# Patient Record
Sex: FEMALE | Race: WHITE | NOT HISPANIC OR LATINO | Employment: OTHER | URBAN - METROPOLITAN AREA
[De-identification: names, ages, dates, MRNs, and addresses within clinical notes are randomized per-mention and may not be internally consistent; named-entity substitution may affect disease eponyms.]

---

## 2023-09-13 DIAGNOSIS — C90.00 MULTIPLE MYELOMA NOT HAVING ACHIEVED REMISSION (MULTI): Primary | ICD-10-CM

## 2023-09-13 PROBLEM — I10 HYPERTENSION: Status: ACTIVE | Noted: 2023-09-13

## 2023-09-13 PROBLEM — E78.5 DYSLIPIDEMIA: Status: ACTIVE | Noted: 2023-09-13

## 2023-09-13 RX ORDER — ALBUTEROL SULFATE 0.83 MG/ML
3 SOLUTION RESPIRATORY (INHALATION) AS NEEDED
Status: CANCELLED | OUTPATIENT
Start: 2023-10-02

## 2023-09-13 RX ORDER — DIPHENHYDRAMINE HYDROCHLORIDE 50 MG/ML
50 INJECTION INTRAMUSCULAR; INTRAVENOUS AS NEEDED
Status: CANCELLED | OUTPATIENT
Start: 2023-10-30

## 2023-09-13 RX ORDER — DIPHENHYDRAMINE HYDROCHLORIDE 50 MG/ML
50 INJECTION INTRAMUSCULAR; INTRAVENOUS AS NEEDED
Status: CANCELLED | OUTPATIENT
Start: 2023-10-02

## 2023-09-13 RX ORDER — ACETAMINOPHEN 325 MG/1
650 TABLET ORAL ONCE
Status: CANCELLED | OUTPATIENT
Start: 2023-10-02

## 2023-09-13 RX ORDER — MONTELUKAST SODIUM 10 MG/1
10 TABLET ORAL ONCE
Status: CANCELLED | OUTPATIENT
Start: 2023-10-30

## 2023-09-13 RX ORDER — FAMOTIDINE 10 MG/ML
20 INJECTION INTRAVENOUS ONCE AS NEEDED
Status: CANCELLED | OUTPATIENT
Start: 2023-10-02

## 2023-09-13 RX ORDER — HEPARIN SODIUM,PORCINE/PF 10 UNIT/ML
50 SYRINGE (ML) INTRAVENOUS AS NEEDED
Status: CANCELLED | OUTPATIENT
Start: 2023-10-02

## 2023-09-13 RX ORDER — MONTELUKAST SODIUM 10 MG/1
10 TABLET ORAL ONCE
Status: CANCELLED | OUTPATIENT
Start: 2023-10-02

## 2023-09-13 RX ORDER — DIPHENHYDRAMINE HCL 50 MG
50 CAPSULE ORAL ONCE
Status: CANCELLED | OUTPATIENT
Start: 2023-10-30

## 2023-09-13 RX ORDER — EPINEPHRINE 0.3 MG/.3ML
0.3 INJECTION SUBCUTANEOUS EVERY 5 MIN PRN
Status: CANCELLED | OUTPATIENT
Start: 2023-10-30

## 2023-09-13 RX ORDER — FAMOTIDINE 10 MG/ML
20 INJECTION INTRAVENOUS ONCE AS NEEDED
Status: CANCELLED | OUTPATIENT
Start: 2023-10-30

## 2023-09-13 RX ORDER — ALBUTEROL SULFATE 0.83 MG/ML
3 SOLUTION RESPIRATORY (INHALATION) AS NEEDED
Status: CANCELLED | OUTPATIENT
Start: 2023-10-30

## 2023-09-13 RX ORDER — EPINEPHRINE 0.3 MG/.3ML
0.3 INJECTION SUBCUTANEOUS EVERY 5 MIN PRN
Status: CANCELLED | OUTPATIENT
Start: 2023-10-02

## 2023-09-13 RX ORDER — DIPHENHYDRAMINE HCL 50 MG
50 CAPSULE ORAL ONCE
Status: CANCELLED | OUTPATIENT
Start: 2023-10-02

## 2023-09-13 RX ORDER — HEPARIN 100 UNIT/ML
500 SYRINGE INTRAVENOUS AS NEEDED
Status: CANCELLED | OUTPATIENT
Start: 2023-10-02

## 2023-09-13 RX ORDER — ACETAMINOPHEN 325 MG/1
650 TABLET ORAL ONCE
Status: CANCELLED | OUTPATIENT
Start: 2023-10-30

## 2023-09-21 DIAGNOSIS — C90.00 MULTIPLE MYELOMA NOT HAVING ACHIEVED REMISSION (MULTI): Primary | ICD-10-CM

## 2023-09-26 PROBLEM — M89.9 LESION OF PELVIC BONE: Status: ACTIVE | Noted: 2023-09-26

## 2023-09-26 PROBLEM — R20.2 PARESTHESIA OF SKIN: Status: ACTIVE | Noted: 2023-09-26

## 2023-09-26 PROBLEM — D64.9 ANEMIA: Status: ACTIVE | Noted: 2023-09-26

## 2023-09-26 PROBLEM — H91.93 HEARING DIFFICULTY OF BOTH EARS: Status: ACTIVE | Noted: 2023-09-26

## 2023-09-26 PROBLEM — E87.6 HYPOKALEMIA: Status: ACTIVE | Noted: 2023-09-26

## 2023-09-26 PROBLEM — R20.2 PARESTHESIA OF BOTH HANDS: Status: ACTIVE | Noted: 2023-09-26

## 2023-09-26 PROBLEM — M17.12 OSTEOARTHRITIS OF LEFT KNEE: Status: ACTIVE | Noted: 2023-09-26

## 2023-09-26 PROBLEM — D50.9 IRON DEFICIENCY ANEMIA: Status: ACTIVE | Noted: 2023-09-26

## 2023-09-26 PROBLEM — M85.80 OSTEOPENIA: Status: ACTIVE | Noted: 2023-09-26

## 2023-09-26 PROBLEM — E78.5 HYPERLIPIDEMIA, UNSPECIFIED: Status: ACTIVE | Noted: 2023-09-26

## 2023-09-26 PROBLEM — R63.0 ANOREXIA: Status: ACTIVE | Noted: 2023-09-26

## 2023-09-26 PROBLEM — G56.03 BILATERAL CARPAL TUNNEL SYNDROME: Status: ACTIVE | Noted: 2023-09-26

## 2023-09-26 PROBLEM — S52.539A CLOSED COLLES' FRACTURE: Status: ACTIVE | Noted: 2023-09-26

## 2023-09-26 PROBLEM — M47.816 LUMBAR SPONDYLOSIS: Status: ACTIVE | Noted: 2023-09-26

## 2023-09-26 PROBLEM — J98.11 ATELECTASIS: Status: ACTIVE | Noted: 2023-09-26

## 2023-09-26 PROBLEM — M53.3 CHRONIC RIGHT SI JOINT PAIN: Status: ACTIVE | Noted: 2023-09-26

## 2023-09-26 PROBLEM — E83.52 HYPERCALCEMIA: Status: ACTIVE | Noted: 2023-09-26

## 2023-09-26 PROBLEM — E55.9 VITAMIN D DEFICIENCY: Status: ACTIVE | Noted: 2023-09-26

## 2023-09-26 PROBLEM — G89.29 CHRONIC RIGHT SI JOINT PAIN: Status: ACTIVE | Noted: 2023-09-26

## 2023-09-26 PROBLEM — K29.70 GASTRITIS DETERMINED BY BIOPSY: Status: ACTIVE | Noted: 2023-09-26

## 2023-09-26 PROBLEM — M35.3 POLYMYALGIA RHEUMATICA (MULTI): Status: ACTIVE | Noted: 2023-09-26

## 2023-09-26 PROBLEM — G62.9 NEUROPATHY: Status: ACTIVE | Noted: 2023-09-26

## 2023-09-26 RX ORDER — ONDANSETRON 4 MG/1
4 TABLET, FILM COATED ORAL EVERY 6 HOURS PRN
COMMUNITY
Start: 2023-02-06 | End: 2023-10-02 | Stop reason: ALTCHOICE

## 2023-09-26 RX ORDER — ATORVASTATIN CALCIUM 20 MG/1
1 TABLET, FILM COATED ORAL DAILY
COMMUNITY
End: 2024-02-14 | Stop reason: SDUPTHER

## 2023-09-26 RX ORDER — ACYCLOVIR 200 MG/1
1 CAPSULE ORAL 2 TIMES DAILY
COMMUNITY
End: 2023-10-02 | Stop reason: ENTERED-IN-ERROR

## 2023-09-26 RX ORDER — FLUOROURACIL 50 MG/G
CREAM TOPICAL 2 TIMES DAILY
COMMUNITY
Start: 2022-06-07 | End: 2023-10-02 | Stop reason: ALTCHOICE

## 2023-09-26 RX ORDER — FUROSEMIDE 20 MG/1
1 TABLET ORAL DAILY
COMMUNITY
Start: 2023-08-08 | End: 2023-10-02 | Stop reason: ALTCHOICE

## 2023-09-26 RX ORDER — ACYCLOVIR 400 MG/1
1 TABLET ORAL 2 TIMES DAILY
COMMUNITY
Start: 2023-09-11 | End: 2023-10-02 | Stop reason: SDUPTHER

## 2023-09-26 RX ORDER — AMLODIPINE BESYLATE 5 MG/1
1 TABLET ORAL DAILY
COMMUNITY
End: 2023-10-02 | Stop reason: SDUPTHER

## 2023-09-26 RX ORDER — ACETAMINOPHEN 325 MG/1
2 TABLET ORAL EVERY 4 HOURS PRN
COMMUNITY

## 2023-10-02 ENCOUNTER — LAB (OUTPATIENT)
Dept: LAB | Facility: HOSPITAL | Age: 83
End: 2023-10-02
Payer: MEDICARE

## 2023-10-02 ENCOUNTER — OFFICE VISIT (OUTPATIENT)
Dept: HEMATOLOGY/ONCOLOGY | Facility: HOSPITAL | Age: 83
End: 2023-10-02
Payer: MEDICARE

## 2023-10-02 ENCOUNTER — INFUSION (OUTPATIENT)
Dept: HEMATOLOGY/ONCOLOGY | Facility: HOSPITAL | Age: 83
End: 2023-10-02
Payer: MEDICARE

## 2023-10-02 VITALS
OXYGEN SATURATION: 100 % | WEIGHT: 151.9 LBS | HEART RATE: 87 BPM | SYSTOLIC BLOOD PRESSURE: 134 MMHG | BODY MASS INDEX: 28.35 KG/M2 | DIASTOLIC BLOOD PRESSURE: 64 MMHG | RESPIRATION RATE: 17 BRPM | TEMPERATURE: 97 F

## 2023-10-02 DIAGNOSIS — T45.1X5A IMMUNOSUPPRESSED DUE TO CHEMOTHERAPY (MULTI): ICD-10-CM

## 2023-10-02 DIAGNOSIS — E04.1 THYROID NODULE: ICD-10-CM

## 2023-10-02 DIAGNOSIS — C90.00 MULTIPLE MYELOMA NOT HAVING ACHIEVED REMISSION (MULTI): ICD-10-CM

## 2023-10-02 DIAGNOSIS — Z79.899 IMMUNOSUPPRESSED DUE TO CHEMOTHERAPY (MULTI): ICD-10-CM

## 2023-10-02 DIAGNOSIS — I10 PRIMARY HYPERTENSION: Primary | ICD-10-CM

## 2023-10-02 DIAGNOSIS — D84.821 IMMUNOSUPPRESSED DUE TO CHEMOTHERAPY (MULTI): ICD-10-CM

## 2023-10-02 LAB
ALBUMIN SERPL BCP-MCNC: 3.8 G/DL (ref 3.4–5)
ALP SERPL-CCNC: 53 U/L (ref 33–136)
ALT SERPL W P-5'-P-CCNC: 11 U/L (ref 7–45)
ANION GAP SERPL CALC-SCNC: 12 MMOL/L (ref 10–20)
AST SERPL W P-5'-P-CCNC: 15 U/L (ref 9–39)
BASOPHILS # BLD AUTO: 0.06 X10*3/UL (ref 0–0.1)
BASOPHILS NFR BLD AUTO: 2 %
BILIRUB SERPL-MCNC: 0.4 MG/DL (ref 0–1.2)
BUN SERPL-MCNC: 25 MG/DL (ref 6–23)
CALCIUM SERPL-MCNC: 8.9 MG/DL (ref 8.6–10.3)
CHLORIDE SERPL-SCNC: 108 MMOL/L (ref 98–107)
CO2 SERPL-SCNC: 28 MMOL/L (ref 21–32)
CREAT SERPL-MCNC: 1.1 MG/DL (ref 0.5–1.05)
EOSINOPHIL # BLD AUTO: 0.09 X10*3/UL (ref 0–0.4)
EOSINOPHIL NFR BLD AUTO: 3 %
ERYTHROCYTE [DISTWIDTH] IN BLOOD BY AUTOMATED COUNT: 14.6 % (ref 11.5–14.5)
GFR SERPL CREATININE-BSD FRML MDRD: 51 ML/MIN/1.73M*2
GLUCOSE SERPL-MCNC: 146 MG/DL (ref 74–99)
HBV CORE AB SER QL: NONREACTIVE
HBV SURFACE AB SER-ACNC: <3.1 MIU/ML
HBV SURFACE AG SERPL QL IA: NONREACTIVE
HCT VFR BLD AUTO: 28.9 % (ref 36–46)
HGB BLD-MCNC: 9.4 G/DL (ref 12–16)
IMM GRANULOCYTES # BLD AUTO: 0.01 X10*3/UL (ref 0–0.5)
IMM GRANULOCYTES NFR BLD AUTO: 0.3 % (ref 0–0.9)
LYMPHOCYTES # BLD AUTO: 0.64 X10*3/UL (ref 0.8–3)
LYMPHOCYTES NFR BLD AUTO: 21.4 %
MAGNESIUM SERPL-MCNC: 2.03 MG/DL (ref 1.6–2.4)
MCH RBC QN AUTO: 31.8 PG (ref 26–34)
MCHC RBC AUTO-ENTMCNC: 32.5 G/DL (ref 32–36)
MCV RBC AUTO: 98 FL (ref 80–100)
MONOCYTES # BLD AUTO: 0.32 X10*3/UL (ref 0.05–0.8)
MONOCYTES NFR BLD AUTO: 10.7 %
NEUTROPHILS # BLD AUTO: 1.87 X10*3/UL (ref 1.6–5.5)
NEUTROPHILS NFR BLD AUTO: 62.6 %
NRBC BLD-RTO: 0 /100 WBCS (ref 0–0)
PHOSPHATE SERPL-MCNC: 3.7 MG/DL (ref 2.5–4.9)
PLATELET # BLD AUTO: 219 X10*3/UL (ref 150–450)
PMV BLD AUTO: 9.1 FL (ref 7.5–11.5)
POTASSIUM SERPL-SCNC: 4.2 MMOL/L (ref 3.5–5.3)
PROT SERPL-MCNC: 5.2 G/DL (ref 6.4–8.2)
RBC # BLD AUTO: 2.96 X10*6/UL (ref 4–5.2)
SODIUM SERPL-SCNC: 144 MMOL/L (ref 136–145)
WBC # BLD AUTO: 3 X10*3/UL (ref 4.4–11.3)

## 2023-10-02 PROCEDURE — 3078F DIAST BP <80 MM HG: CPT | Performed by: PHYSICIAN ASSISTANT

## 2023-10-02 PROCEDURE — 86706 HEP B SURFACE ANTIBODY: CPT

## 2023-10-02 PROCEDURE — 1160F RVW MEDS BY RX/DR IN RCRD: CPT | Performed by: PHYSICIAN ASSISTANT

## 2023-10-02 PROCEDURE — 2500000004 HC RX 250 GENERAL PHARMACY W/ HCPCS (ALT 636 FOR OP/ED): Performed by: INTERNAL MEDICINE

## 2023-10-02 PROCEDURE — 86704 HEP B CORE ANTIBODY TOTAL: CPT

## 2023-10-02 PROCEDURE — 99214 OFFICE O/P EST MOD 30 MIN: CPT | Performed by: PHYSICIAN ASSISTANT

## 2023-10-02 PROCEDURE — 96402 CHEMO HORMON ANTINEOPL SQ/IM: CPT

## 2023-10-02 PROCEDURE — 84100 ASSAY OF PHOSPHORUS: CPT

## 2023-10-02 PROCEDURE — 83521 IG LIGHT CHAINS FREE EACH: CPT

## 2023-10-02 PROCEDURE — 1036F TOBACCO NON-USER: CPT | Performed by: PHYSICIAN ASSISTANT

## 2023-10-02 PROCEDURE — 3075F SYST BP GE 130 - 139MM HG: CPT | Performed by: PHYSICIAN ASSISTANT

## 2023-10-02 PROCEDURE — 99214 OFFICE O/P EST MOD 30 MIN: CPT | Mod: 59 | Performed by: PHYSICIAN ASSISTANT

## 2023-10-02 PROCEDURE — 1159F MED LIST DOCD IN RCRD: CPT | Performed by: PHYSICIAN ASSISTANT

## 2023-10-02 PROCEDURE — 36415 COLL VENOUS BLD VENIPUNCTURE: CPT

## 2023-10-02 PROCEDURE — 87340 HEPATITIS B SURFACE AG IA: CPT

## 2023-10-02 PROCEDURE — 85025 COMPLETE CBC W/AUTO DIFF WBC: CPT

## 2023-10-02 PROCEDURE — 2500000001 HC RX 250 WO HCPCS SELF ADMINISTERED DRUGS (ALT 637 FOR MEDICARE OP): Performed by: INTERNAL MEDICINE

## 2023-10-02 PROCEDURE — 80053 COMPREHEN METABOLIC PANEL: CPT

## 2023-10-02 PROCEDURE — 83735 ASSAY OF MAGNESIUM: CPT

## 2023-10-02 PROCEDURE — 1126F AMNT PAIN NOTED NONE PRSNT: CPT | Performed by: PHYSICIAN ASSISTANT

## 2023-10-02 RX ORDER — MONTELUKAST SODIUM 10 MG/1
10 TABLET ORAL ONCE
Status: COMPLETED | OUTPATIENT
Start: 2023-10-02 | End: 2023-10-02

## 2023-10-02 RX ORDER — EPINEPHRINE 0.3 MG/.3ML
0.3 INJECTION SUBCUTANEOUS EVERY 5 MIN PRN
Status: DISCONTINUED | OUTPATIENT
Start: 2023-10-02 | End: 2023-10-02 | Stop reason: HOSPADM

## 2023-10-02 RX ORDER — EPINEPHRINE 0.3 MG/.3ML
0.3 INJECTION SUBCUTANEOUS EVERY 5 MIN PRN
Status: CANCELLED | OUTPATIENT
Start: 2023-12-25

## 2023-10-02 RX ORDER — ALBUTEROL SULFATE 0.83 MG/ML
3 SOLUTION RESPIRATORY (INHALATION) AS NEEDED
Status: CANCELLED | OUTPATIENT
Start: 2023-12-25

## 2023-10-02 RX ORDER — ALBUTEROL SULFATE 0.83 MG/ML
3 SOLUTION RESPIRATORY (INHALATION) AS NEEDED
Status: DISCONTINUED | OUTPATIENT
Start: 2023-10-02 | End: 2023-10-02 | Stop reason: HOSPADM

## 2023-10-02 RX ORDER — DIPHENHYDRAMINE HYDROCHLORIDE 50 MG/ML
50 INJECTION INTRAMUSCULAR; INTRAVENOUS AS NEEDED
Status: CANCELLED | OUTPATIENT
Start: 2023-12-25

## 2023-10-02 RX ORDER — ACETAMINOPHEN 325 MG/1
650 TABLET ORAL ONCE
Status: COMPLETED | OUTPATIENT
Start: 2023-10-02 | End: 2023-10-02

## 2023-10-02 RX ORDER — FAMOTIDINE 10 MG/ML
20 INJECTION INTRAVENOUS ONCE AS NEEDED
Status: CANCELLED | OUTPATIENT
Start: 2023-12-25

## 2023-10-02 RX ORDER — DIPHENHYDRAMINE HCL 50 MG
50 CAPSULE ORAL ONCE
Status: COMPLETED | OUTPATIENT
Start: 2023-10-02 | End: 2023-10-02

## 2023-10-02 RX ORDER — AMLODIPINE BESYLATE 5 MG/1
5 TABLET ORAL DAILY
Qty: 90 TABLET | Refills: 0 | Status: SHIPPED | OUTPATIENT
Start: 2023-10-02 | End: 2023-11-27 | Stop reason: SDUPTHER

## 2023-10-02 RX ORDER — ACYCLOVIR 400 MG/1
400 TABLET ORAL 2 TIMES DAILY
Qty: 180 TABLET | Refills: 0 | Status: SHIPPED | OUTPATIENT
Start: 2023-10-02 | End: 2023-12-31

## 2023-10-02 RX ORDER — FAMOTIDINE 10 MG/ML
20 INJECTION INTRAVENOUS ONCE AS NEEDED
Status: DISCONTINUED | OUTPATIENT
Start: 2023-10-02 | End: 2023-10-02 | Stop reason: HOSPADM

## 2023-10-02 RX ORDER — LENALIDOMIDE 15 MG/1
15 CAPSULE ORAL DAILY
COMMUNITY
End: 2023-10-16 | Stop reason: SDUPTHER

## 2023-10-02 RX ORDER — DIPHENHYDRAMINE HYDROCHLORIDE 50 MG/ML
50 INJECTION INTRAMUSCULAR; INTRAVENOUS AS NEEDED
Status: DISCONTINUED | OUTPATIENT
Start: 2023-10-02 | End: 2023-10-02 | Stop reason: HOSPADM

## 2023-10-02 RX ADMIN — DEXAMETHASONE 20 MG: 4 TABLET ORAL at 15:50

## 2023-10-02 RX ADMIN — ACETAMINOPHEN 650 MG: 325 TABLET ORAL at 15:49

## 2023-10-02 RX ADMIN — DARATUMUMAB AND HYALURONIDASE-FIHJ (HUMAN RECOMBINANT) 1800 MG: 1800; 30000 INJECTION SUBCUTANEOUS at 16:53

## 2023-10-02 RX ADMIN — ZOLEDRONIC ACID 3.3 MG: 0.8 INJECTION, SOLUTION, CONCENTRATE INTRAVENOUS at 16:11

## 2023-10-02 RX ADMIN — DIPHENHYDRAMINE HYDROCHLORIDE 50 MG: 50 CAPSULE ORAL at 15:50

## 2023-10-02 RX ADMIN — MONTELUKAST 10 MG: 10 TABLET, FILM COATED ORAL at 15:50

## 2023-10-02 ASSESSMENT — PAIN SCALES - GENERAL: PAINLEVEL: 0-NO PAIN

## 2023-10-02 NOTE — PROGRESS NOTES
Patient ID: Sil West is a 83 y.o. female.      Oncology History Overview Note   IgG lambda MM diagnosed 1/23 after several months of urinary incontinence and diffuse brigida pain       Diagnosed by Bone biopsy on (1/6/23): plasma cells neoplasm.   - Repeat BMBx (1/26/23): hypercellular marrow, 80-90% plasma cells, lambda-restricted, FISH 1q gain(high risk) trisomy 3  - MRI spine (12/18/22): abundant foci of osseous metastases. MM involvement of mid cervical vertebral bodies, midthoracic vertebral bodies and lumbar vertebral bodies. Involvement of R iliac bone  - PET (1/23) mild FDG avidity associated with a large soft tissue mass w/in right iliac bone and right sacral ala with  a max SUV of 3.4. Mildly FDG avid soft tissue mass within the left posterior 6th rib with a max SUV of 2.5. Multiple additional hypermetabolic foci are noted within the appendicular skeleton. Mild diffuse heterogeneous uptake throughout the osseous structures.  - light chains (1/19/23) FKLC 1.57, FLLC 2947.7, K/L ratio 0 IgG 537, IgA 221, IgM 19, b2M 23.4 Hgb 5.7  - M protein IgA lambda 0.7 g/dL (1/18)  - LDH (1/26/23): 105 U/L  - Cr(1/26/23): 2.33-> peak at 3.15  - UPEP (1/28/23): Monoclonal lambda light chain at 386.6 mg/24H  - Hepatitis B Core Ab total, Hep B surf Ag, Hep B surf Ab, and Hep C: neg     Treatment History:   - s/p radiation to Right hip + T6-T8 x 5 fractions (total 2000 cGy completed 2/2/23)     Induction: Bortezomib and  dexamethasone (12/24/22) + daratumumab (12/27/22)  - C1: (1/28/23): Bortezomib 1, 4, 8, 11 + daratumumab x 2 doses  - C2 (2/16/23): Weekly dosing of both daratumumab and bortezomib (1, 8, 15) with addition of lenalidomide planned when renal function allows  - VGPR with M protein 0.1 and free lyte chain 1.37 5/25/23  - C8 roscoe RVD completed July 6, 2023.    7/6/23, appears to have had a VGPR vs CR with ongoing multifocal bone pain.  Repeat PET imaging (7/13/23) which showed marked improvement in brigida  "lesions, however pathological fx particularly in right  pelvis and ribs, possible infiltrate RLL base and  hypodensity right thyroid gland  - Cycle 9 and forward, Velcade is omitted, then she transitioned with C 10 to every 4 week schedule with Lucina (day 1) and Revlimid 15 mg increased from days 1-14/28 to days 1-21/28 days.        Multiple myeloma not having achieved remission (CMS/HCC)   9/13/2023 Initial Diagnosis    Multiple myeloma not having achieved remission (CMS/HCC)     10/2/2023 -  Chemotherapy    Daratumumab, 28 Day Cycles - Maintenance         Response:     Past Medical History:   History reviewed. No pertinent past medical history.    Surgical History:     Past Surgical History:   Procedure Laterality Date    CT GUIDED PERCUTANEOUS BIOPSY BONE DEEP  1/6/2023    CT GUIDED PERCUTANEOUS BIOPSY BONE DEEP 1/6/2023 [facility]        Family History:   No family history on file.    Social History:     Social History     Tobacco Use    Smoking status: Never     Passive exposure: Never    Smokeless tobacco: Never   Vaping Use    Vaping Use: Never used   Substance Use Topics    Alcohol use: Never    Drug use: Never      -------------------------------------------------------------------------------------------------------  Subjective       HPI  The patient presents to the clinic today for follow-up and C12 daratumumab and lenalidomide; overall, she is doing very well. .    She's been feeling \"pretty good\" since we saw her a month ago. She does have a pain in her back that seems to be associated to her bowel movements; if she has a good one, it doesn't hurt. She also has a stiff neck, which she thinks might be related to her lenalidomide. No nausea/vomiting or diarrhea. Just constipation when she takes the Revlimid. She does take sennosides, which helps \"eventually.\" She is trying to eat fiber and stewed prunes.    She needs refills on acyclovir and amlodipine > [facility] on [address] in [place]. "     Review of Systems   Gastrointestinal:  Positive for constipation.   Musculoskeletal:  Positive for arthralgias and neck pain.   All other systems reviewed and are negative.     -------------------------------------------------------------------------------------------------------  Objective   BSA: 1.73 meters squared  /64   Pulse 87   Temp 36.1 °C (97 °F)   Resp 17   Wt 68.9 kg (151 lb 14.4 oz)   SpO2 100%   BMI 28.35 kg/m²     Physical Exam  HENT:      Mouth/Throat:      Mouth: Mucous membranes are moist.      Pharynx: No posterior oropharyngeal erythema.   Eyes:      General: No scleral icterus.     Extraocular Movements: Extraocular movements intact.      Pupils: Pupils are equal, round, and reactive to light.   Cardiovascular:      Rate and Rhythm: Normal rate and regular rhythm.      Heart sounds: No murmur heard.     No friction rub. No gallop.   Pulmonary:      Effort: No respiratory distress.      Breath sounds: No stridor. No wheezing, rhonchi or rales.   Abdominal:      General: There is no distension.      Palpations: There is no mass.      Tenderness: There is no abdominal tenderness. There is no guarding or rebound.   Musculoskeletal:         General: No swelling or deformity.      Right lower leg: No edema.      Left lower leg: No edema.   Lymphadenopathy:      Cervical: No cervical adenopathy.   Skin:     Findings: No lesion or rash.   Neurological:      Mental Status: She is alert.      Cranial Nerves: No cranial nerve deficit.      Motor: No weakness.   Psychiatric:         Mood and Affect: Mood normal.         Behavior: Behavior normal.         Performance Status:  Symptomatic; fully ambulatory  -------------------------------------------------------------------------------------------------------  Assessment/Plan    Problem List Items Addressed This Visit             ICD-10-CM       Malignant Neoplasms    Multiple myeloma not having achieved remission (CMS/HCC) C90.00     - Most  recent (9/7/23) free light chains are normal, with free lambda light chains at 2.11 mg/dL, and SPEP shows a only a paraprotein on 0.1gm/dL which most likely represent the Lucina protein indicating a continued  VGPR. Today's myeloma lab results are pending (10/2/23).  - Will receive C12 daratumumab today (10/2/23, and she started C12 lenalidomide 15 mg daily today (10/2/23) as well  - Plan for C13 on 10/30/23     - Previously, Dr. Marshall explained that, although patient is healthy for her age, that 70% of patients who are not transplant candidates are alive at 6 years after start of modern therapy and that additional agents would be available at the time of relapse.  I also explained that it would take approximately 6 months to recovery from toxicities of an autograft.  Patient and daughter were already leaning against autograft and Dr. Marshall concurred with their decision.             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 Marshall previously xplained cysts are common and mostly benign.          -------------------------------------------------------------------------------------------------------   RTC:  - 10/30/23: Labs, Dr. Marshall, and C13 daratumumab + lenalidomide    Placido Tran PA-C

## 2023-10-03 LAB
KAPPA LC SERPL-MCNC: 1.36 MG/DL (ref 0.33–1.94)
KAPPA LC/LAMBDA SER: 0.57 {RATIO} (ref 0.26–1.65)
LAMBDA LC SERPL-MCNC: 2.38 MG/DL (ref 0.57–2.63)

## 2023-10-05 PROBLEM — Z91.89 AT RISK FOR OSTEOPENIA: Status: ACTIVE | Noted: 2023-10-05

## 2023-10-05 PROBLEM — D84.821 IMMUNOSUPPRESSED DUE TO CHEMOTHERAPY (MULTI): Status: ACTIVE | Noted: 2023-10-05

## 2023-10-05 PROBLEM — Z79.69 IMMUNOSUPPRESSED DUE TO CHEMOTHERAPY: Status: ACTIVE | Noted: 2023-10-05

## 2023-10-05 PROBLEM — Z79.899 IMMUNOSUPPRESSED DUE TO CHEMOTHERAPY (MULTI): Status: ACTIVE | Noted: 2023-10-05

## 2023-10-05 PROBLEM — E04.1 THYROID NODULE: Status: ACTIVE | Noted: 2023-10-05

## 2023-10-05 PROBLEM — T45.1X5A IMMUNOSUPPRESSED DUE TO CHEMOTHERAPY (MULTI): Status: ACTIVE | Noted: 2023-10-05

## 2023-10-05 ASSESSMENT — ENCOUNTER SYMPTOMS
CONSTIPATION: 1
NECK PAIN: 1
ARTHRALGIAS: 1

## 2023-10-06 NOTE — ASSESSMENT & PLAN NOTE
- Most recent (9/7/23) free light chains are normal, with free lambda light chains at 2.11 mg/dL, and SPEP shows a only a paraprotein on 0.1gm/dL which most likely represent the Lucina protein indicating a continued  VGPR. Today's myeloma lab results are pending (10/2/23).  - Will receive C12 daratumumab today (10/2/23, and she started C12 lenalidomide 15 mg daily today (10/2/23) as well  - Plan for C13 on 10/30/23     - Previously, Dr. Marshall explained that, although patient is healthy for her age, that 70% of patients who are not transplant candidates are alive at 6 years after start of modern therapy and that additional agents would be available at the time of relapse.  I also explained that it would take approximately 6 months to recovery from toxicities of an autograft.  Patient and daughter were already leaning against autograft and Dr. Marshall concurred with their decision.

## 2023-10-06 NOTE — ASSESSMENT & PLAN NOTE
- On PET imaging,   - Consider ultrasound at some point  - Dr. Marshall previously xplained cysts are common and mostly benign.

## 2023-10-06 NOTE — ASSESSMENT & PLAN NOTE
- Continue zoledronic acid 3.3 mg (renal dosing) every 3 months, received today (10/2/23), next dose late December 2023  - Continue vitamin d supplement -- 2000 units PO daily

## 2023-10-16 DIAGNOSIS — C90.01 MULTIPLE MYELOMA IN REMISSION (MULTI): Primary | ICD-10-CM

## 2023-10-16 RX ORDER — LENALIDOMIDE 15 MG/1
15 CAPSULE ORAL DAILY
Qty: 21 CAPSULE | Refills: 0 | Status: SHIPPED
Start: 2023-10-16 | End: 2023-10-18 | Stop reason: SDUPTHER

## 2023-10-18 ENCOUNTER — TELEPHONE (OUTPATIENT)
Dept: HEMATOLOGY/ONCOLOGY | Facility: HOSPITAL | Age: 83
End: 2023-10-18
Payer: MEDICARE

## 2023-10-18 DIAGNOSIS — C90.01 MULTIPLE MYELOMA IN REMISSION (MULTI): ICD-10-CM

## 2023-10-18 RX ORDER — LENALIDOMIDE 15 MG/1
15 CAPSULE ORAL DAILY
Qty: 21 CAPSULE | Refills: 0 | Status: SHIPPED | OUTPATIENT
Start: 2023-10-18 | End: 2023-10-23 | Stop reason: SDUPTHER

## 2023-10-23 ENCOUNTER — TELEPHONE (OUTPATIENT)
Dept: ADMISSION | Facility: HOSPITAL | Age: 83
End: 2023-10-23
Payer: MEDICARE

## 2023-10-23 DIAGNOSIS — C90.01 MULTIPLE MYELOMA IN REMISSION (MULTI): ICD-10-CM

## 2023-10-23 RX ORDER — LENALIDOMIDE 15 MG/1
15 CAPSULE ORAL DAILY
Qty: 21 CAPSULE | Refills: 0 | Status: SHIPPED | OUTPATIENT
Start: 2023-10-23 | End: 2023-11-10 | Stop reason: SDUPTHER

## 2023-10-23 NOTE — TELEPHONE ENCOUNTER
The pharmacy ([facility] by [address] called in and stated that the auth number on the Revlimid script sent in on 10/18  (Authorizatin # [id number] ) has already been used, new auth number needed and mes will need resubmitted.

## 2023-10-26 ENCOUNTER — TELEPHONE (OUTPATIENT)
Dept: ADMISSION | Facility: HOSPITAL | Age: 83
End: 2023-10-26
Payer: MEDICARE

## 2023-10-26 NOTE — TELEPHONE ENCOUNTER
Daughter called regarding Sil's Revlimid script. [facility]. She will call them.

## 2023-10-29 NOTE — PROGRESS NOTES
Patient ID: Sil West is a 83 y.o. female.    Diagnosis:     Treatment:   Oncology History Overview Note   IgG lambda MM diagnosed 1/23 after several months of urinary incontinence and diffuse brigida pain       Diagnosed by Bone biopsy on (1/6/23): plasma cells neoplasm.   - Repeat BMBx (1/26/23): hypercellular marrow, 80-90% plasma cells, lambda-restricted, FISH 1q gain(high risk) trisomy 3  - MRI spine (12/18/22): abundant foci of osseous metastases. MM involvement of mid cervical vertebral bodies, midthoracic vertebral bodies and lumbar vertebral bodies. Involvement of R iliac bone  - PET (1/23) mild FDG avidity associated with a large soft tissue mass w/in right iliac bone and right sacral ala with  a max SUV of 3.4. Mildly FDG avid soft tissue mass within the left posterior 6th rib with a max SUV of 2.5. Multiple additional hypermetabolic foci are noted within the appendicular skeleton. Mild diffuse heterogeneous uptake throughout the osseous structures.  - light chains (1/19/23) FKLC 1.57, FLLC 2947.7, K/L ratio 0 IgG 537, IgA 221, IgM 19, b2M 23.4 Hgb 5.7  - M protein IgA lambda 0.7 g/dL (1/18)  - LDH (1/26/23): 105 U/L  - Cr(1/26/23): 2.33-> peak at 3.15  - UPEP (1/28/23): Monoclonal lambda light chain at 386.6 mg/24H  - Hepatitis B Core Ab total, Hep B surf Ag, Hep B surf Ab, and Hep C: neg     Treatment History:   - s/p radiation to Right hip + T6-T8 x 5 fractions (total 2000 cGy completed 2/2/23)     Induction: Bortezomib and  dexamethasone (12/24/22) + daratumumab (12/27/22)  - C1: (1/28/23): Bortezomib 1, 4, 8, 11 + daratumumab x 2 doses  - C2 (2/16/23): Weekly dosing of both daratumumab and bortezomib (1, 8, 15) with addition of lenalidomide planned when renal function allows  - VGPR with M protein 0.1 and free lyte chain 1.37 5/25/23  - C8 roscoe RVD completed July 6, 2023.    7/6/23, appears to have had a VGPR vs CR with ongoing multifocal bone pain.  Repeat PET imaging (7/13/23) which showed  marked improvement in brigida lesions, however pathological fx particularly in right  pelvis and ribs, possible infiltrate RLL base and  hypodensity right thyroid gland  - Cycle 9 and forward, Velcade is omitted, then she transitioned with C 10 to every 4 week schedule with Lucina (day 1) and Revlimid 15 mg increased from days 1-14/28 to days 1-21/28 days.        Multiple myeloma not having achieved remission (CMS/HCC)   9/13/2023 Initial Diagnosis    Multiple myeloma not having achieved remission (CMS/HCC)     10/2/2023 -  Chemotherapy    Daratumumab, 28 Day Cycles - Maintenance       Response:     Past Medical History:   No past medical history on file.    Surgical History:     Past Surgical History:   Procedure Laterality Date    CT GUIDED PERCUTANEOUS BIOPSY BONE DEEP  1/6/2023    CT GUIDED PERCUTANEOUS BIOPSY BONE DEEP 1/6/2023 [facility]        Family History:   No family history on file.    Social History:     Social History     Tobacco Use    Smoking status: Never     Passive exposure: Never    Smokeless tobacco: Never   Vaping Use    Vaping Use: Never used   Substance Use Topics    Alcohol use: Never    Drug use: Never      -------------------------------------------------------------------------------------------------------  Subjective       HPI  Sil West is a 83 year old female with PMH of HTN, HLD, thyroid nodule, gastritis, iron deficiency anemia, polymyalgia rheumatica, osteopenia, and IgG Lambda multiple myeloma.    Sil West presents with her daughter to the clinic today (10/30/23), for C13 daratumumab and lenalidomine, count checks, and evaluation.      States she has had a difficult week, as her  of 59 years passed last Tuesday in his sleep.   Pain in back and left neck.  Describes the pain as sharp and very intense when doing activities like getting into bed or driving.  However, the pain only lasts temporarily while doing those movements then goes away.  Has been taking tylenol  "as needed.  This week she took tylenol PM at bedtime to help her sleep.  She has been able to sleep okay this week.      Repots fatigue but not able to do as much as she has been prior.  She has to lay down to \"recharge\".    Appetite was good until last Tuesday.  She reports that since her cancer diagnosis that she is not has hungry and misses meals at times.  Lost around 5 pounds recently.   was cooking for her, but she now has taken over this role.  Explained her bowels are more regular.  Has mild constipation, and has a BM about every 3 days.  Eats prunes and may try miralax soon.      Reports no fevers, chills, night sweats, swollen lymph nodes, headaches, light headedness, dizziness, cough, SOB, wheezing, chest pain, palpitations, nausea, vomiting, diarrhea, dysuria, blood in stool/urine, new rashes/lesions.      Review of Systems   Constitutional:  Positive for appetite change, fatigue and unexpected weight change. Negative for chills, diaphoresis and fever.   HENT:   Negative for mouth sores and sore throat.    Eyes:  Negative for eye problems.   Respiratory:  Negative for cough, shortness of breath and wheezing.    Cardiovascular:  Positive for leg swelling (mild to bilateral legs). Negative for chest pain and palpitations.   Gastrointestinal:  Positive for constipation (mild, BM every 3 days). Negative for blood in stool, diarrhea, nausea and vomiting.   Genitourinary:  Negative for dysuria and hematuria.    Musculoskeletal:  Positive for back pain (right side) and neck pain (left side). Negative for gait problem.   Skin:  Positive for itching and wound (get raised areas to bilateral arms and legs). Negative for rash.   Neurological:  Negative for dizziness, gait problem, headaches, light-headedness and numbness.   Hematological:  Negative for adenopathy. Does not bruise/bleed easily.   Psychiatric/Behavioral:  Negative for sleep disturbance.     " "  -------------------------------------------------------------------------------------------------------  Objective   BSA: 1.71 meters squared  /58   Pulse 79   Temp 36.1 °C (97 °F)   Resp 17   Ht (S) 1.561 m (5' 1.46\")   Wt 67.4 kg (148 lb 9.4 oz)   SpO2 100%   BMI 27.66 kg/m²     Physical Exam  Vitals reviewed.   Constitutional:       General: She is not in acute distress.     Appearance: Normal appearance. She is not ill-appearing.   HENT:      Head: Normocephalic.      Mouth/Throat:      Pharynx: Oropharynx is clear.   Eyes:      Pupils: Pupils are equal, round, and reactive to light.   Cardiovascular:      Rate and Rhythm: Normal rate.      Pulses: Normal pulses.      Heart sounds: Normal heart sounds. No murmur heard.     No friction rub. No gallop.   Pulmonary:      Effort: Pulmonary effort is normal. No respiratory distress.      Breath sounds: No wheezing.   Abdominal:      General: Abdomen is flat. Bowel sounds are normal. There is no distension.      Palpations: Abdomen is soft.      Tenderness: There is no abdominal tenderness.   Musculoskeletal:      Right lower leg: Edema (non-pitting) present.      Left lower leg: Edema (non-pitting) present.   Skin:     General: Skin is warm and dry.   Neurological:      General: No focal deficit present.      Mental Status: She is alert and oriented to person, place, and time.   Psychiatric:         Behavior: Behavior normal.         Judgment: Judgment normal.      Comments: Mood sad due to recent passing of        Performance Status:  ECOG Performance Status: 1 restricted in physically strenuous activity  -------------------------------------------------------------------------------------------------------  Assessment/Plan    Problem List Items Addressed This Visit             ICD-10-CM    Multiple myeloma not having achieved remission (CMS/[facility]) - Primary C90.00     - Most recent (10/2/23) free light chains are normal, with free lambda light " chains at 2.38 mg/dL, and SPEP shows a only a paraprotein on 0.1gm/dL which most likely represent the Lucina protein indicating a continued VGPR. Today's myeloma lab results are pending (10/30/23).  - Will receive C13 daratumumab today (10/30/23, and she started C13 lenalidomide 15 mg daily today (10/30/23) as well  - Plan for C14 on 11/27/23     -Taking aspirin 81 mg daily during treatment with lenalidomide     - Previously, Dr. Marshall explained that, although patient is healthy for her age, that 70% of patients who are not transplant candidates are alive at 6 years after start of modern therapy and that additional agents would be available at the time of relapse.  I also explained that it would take approximately 6 months to recovery from toxicities of an autograft.  Patient and daughter were already leaning against autograft and Dr. Marshall concurred with their decision.          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    - Dr. Marshall previously explained cysts are common and mostly benign.          Other Visit Diagnoses         Codes    Secondary malignant neoplasm of bone (CMS/HCC)     C79.51          RTC:  -11/27/23: Labs, visit with Placido Tran PA-C, and C14 daratumumab + lenalidomide    -------------------------------------------------------------------------------------------------------  CHANTEL Paige-PAUL

## 2023-10-30 ENCOUNTER — LAB (OUTPATIENT)
Dept: LAB | Facility: HOSPITAL | Age: 83
End: 2023-10-30
Payer: MEDICARE

## 2023-10-30 ENCOUNTER — INFUSION (OUTPATIENT)
Dept: HEMATOLOGY/ONCOLOGY | Facility: HOSPITAL | Age: 83
End: 2023-10-30
Payer: MEDICARE

## 2023-10-30 ENCOUNTER — OFFICE VISIT (OUTPATIENT)
Dept: HEMATOLOGY/ONCOLOGY | Facility: HOSPITAL | Age: 83
End: 2023-10-30
Payer: MEDICARE

## 2023-10-30 VITALS
RESPIRATION RATE: 17 BRPM | SYSTOLIC BLOOD PRESSURE: 134 MMHG | HEART RATE: 79 BPM | BODY MASS INDEX: 28.05 KG/M2 | DIASTOLIC BLOOD PRESSURE: 58 MMHG | OXYGEN SATURATION: 100 % | HEIGHT: 61 IN | WEIGHT: 148.59 LBS | TEMPERATURE: 97 F

## 2023-10-30 DIAGNOSIS — C90.00 MULTIPLE MYELOMA NOT HAVING ACHIEVED REMISSION (MULTI): ICD-10-CM

## 2023-10-30 DIAGNOSIS — C90.00 MULTIPLE MYELOMA NOT HAVING ACHIEVED REMISSION (MULTI): Primary | ICD-10-CM

## 2023-10-30 DIAGNOSIS — D84.821 IMMUNOSUPPRESSED DUE TO CHEMOTHERAPY (MULTI): ICD-10-CM

## 2023-10-30 DIAGNOSIS — C79.51 SECONDARY MALIGNANT NEOPLASM OF BONE (MULTI): ICD-10-CM

## 2023-10-30 DIAGNOSIS — E04.1 THYROID NODULE: ICD-10-CM

## 2023-10-30 DIAGNOSIS — Z79.899 IMMUNOSUPPRESSED DUE TO CHEMOTHERAPY (MULTI): ICD-10-CM

## 2023-10-30 DIAGNOSIS — I10 HYPERTENSION, UNSPECIFIED TYPE: ICD-10-CM

## 2023-10-30 DIAGNOSIS — T45.1X5A IMMUNOSUPPRESSED DUE TO CHEMOTHERAPY (MULTI): ICD-10-CM

## 2023-10-30 LAB
ALBUMIN SERPL BCP-MCNC: 4 G/DL (ref 3.4–5)
ALP SERPL-CCNC: 53 U/L (ref 33–136)
ALT SERPL W P-5'-P-CCNC: 12 U/L (ref 7–45)
ANION GAP SERPL CALC-SCNC: 12 MMOL/L (ref 10–20)
AST SERPL W P-5'-P-CCNC: 14 U/L (ref 9–39)
BASOPHILS # BLD AUTO: 0.04 X10*3/UL (ref 0–0.1)
BASOPHILS NFR BLD AUTO: 1.1 %
BILIRUB SERPL-MCNC: 0.3 MG/DL (ref 0–1.2)
BUN SERPL-MCNC: 22 MG/DL (ref 6–23)
CALCIUM SERPL-MCNC: 9.4 MG/DL (ref 8.6–10.3)
CHLORIDE SERPL-SCNC: 107 MMOL/L (ref 98–107)
CO2 SERPL-SCNC: 28 MMOL/L (ref 21–32)
CREAT SERPL-MCNC: 1.1 MG/DL (ref 0.5–1.05)
EOSINOPHIL # BLD AUTO: 0.06 X10*3/UL (ref 0–0.4)
EOSINOPHIL NFR BLD AUTO: 1.6 %
ERYTHROCYTE [DISTWIDTH] IN BLOOD BY AUTOMATED COUNT: 14.6 % (ref 11.5–14.5)
GFR SERPL CREATININE-BSD FRML MDRD: 51 ML/MIN/1.73M*2
GLUCOSE SERPL-MCNC: 120 MG/DL (ref 74–99)
HCT VFR BLD AUTO: 29.6 % (ref 36–46)
HGB BLD-MCNC: 10 G/DL (ref 12–16)
IMM GRANULOCYTES # BLD AUTO: 0.01 X10*3/UL (ref 0–0.5)
IMM GRANULOCYTES NFR BLD AUTO: 0.3 % (ref 0–0.9)
LDH SERPL L TO P-CCNC: 130 U/L (ref 84–246)
LYMPHOCYTES # BLD AUTO: 0.76 X10*3/UL (ref 0.8–3)
LYMPHOCYTES NFR BLD AUTO: 20.7 %
MCH RBC QN AUTO: 32.9 PG (ref 26–34)
MCHC RBC AUTO-ENTMCNC: 33.8 G/DL (ref 32–36)
MCV RBC AUTO: 97 FL (ref 80–100)
MONOCYTES # BLD AUTO: 0.37 X10*3/UL (ref 0.05–0.8)
MONOCYTES NFR BLD AUTO: 10.1 %
NEUTROPHILS # BLD AUTO: 2.44 X10*3/UL (ref 1.6–5.5)
NEUTROPHILS NFR BLD AUTO: 66.2 %
NRBC BLD-RTO: 0 /100 WBCS (ref 0–0)
PLATELET # BLD AUTO: 229 X10*3/UL (ref 150–450)
PMV BLD AUTO: 9.2 FL (ref 7.5–11.5)
POTASSIUM SERPL-SCNC: 4.3 MMOL/L (ref 3.5–5.3)
PROT SERPL-MCNC: 5.5 G/DL (ref 6.4–8.2)
PROT SERPL-MCNC: 6.1 G/DL (ref 6.4–8.2)
RBC # BLD AUTO: 3.04 X10*6/UL (ref 4–5.2)
SODIUM SERPL-SCNC: 143 MMOL/L (ref 136–145)
URATE SERPL-MCNC: 3.5 MG/DL (ref 2.3–6.7)
WBC # BLD AUTO: 3.7 X10*3/UL (ref 4.4–11.3)

## 2023-10-30 PROCEDURE — 84165 PROTEIN E-PHORESIS SERUM: CPT

## 2023-10-30 PROCEDURE — 84165 PROTEIN E-PHORESIS SERUM: CPT | Performed by: STUDENT IN AN ORGANIZED HEALTH CARE EDUCATION/TRAINING PROGRAM

## 2023-10-30 PROCEDURE — 80053 COMPREHEN METABOLIC PANEL: CPT

## 2023-10-30 PROCEDURE — 2500000004 HC RX 250 GENERAL PHARMACY W/ HCPCS (ALT 636 FOR OP/ED): Performed by: INTERNAL MEDICINE

## 2023-10-30 PROCEDURE — 3075F SYST BP GE 130 - 139MM HG: CPT | Performed by: INTERNAL MEDICINE

## 2023-10-30 PROCEDURE — 2500000001 HC RX 250 WO HCPCS SELF ADMINISTERED DRUGS (ALT 637 FOR MEDICARE OP): Performed by: INTERNAL MEDICINE

## 2023-10-30 PROCEDURE — 1160F RVW MEDS BY RX/DR IN RCRD: CPT | Performed by: INTERNAL MEDICINE

## 2023-10-30 PROCEDURE — 1125F AMNT PAIN NOTED PAIN PRSNT: CPT | Performed by: INTERNAL MEDICINE

## 2023-10-30 PROCEDURE — 36415 COLL VENOUS BLD VENIPUNCTURE: CPT

## 2023-10-30 PROCEDURE — 96401 CHEMO ANTI-NEOPL SQ/IM: CPT

## 2023-10-30 PROCEDURE — 86334 IMMUNOFIX E-PHORESIS SERUM: CPT

## 2023-10-30 PROCEDURE — 85025 COMPLETE CBC W/AUTO DIFF WBC: CPT

## 2023-10-30 PROCEDURE — 1159F MED LIST DOCD IN RCRD: CPT | Performed by: INTERNAL MEDICINE

## 2023-10-30 PROCEDURE — 84155 ASSAY OF PROTEIN SERUM: CPT

## 2023-10-30 PROCEDURE — 83615 LACTATE (LD) (LDH) ENZYME: CPT

## 2023-10-30 PROCEDURE — 83521 IG LIGHT CHAINS FREE EACH: CPT

## 2023-10-30 PROCEDURE — 99214 OFFICE O/P EST MOD 30 MIN: CPT | Mod: 25 | Performed by: INTERNAL MEDICINE

## 2023-10-30 PROCEDURE — 86320 SERUM IMMUNOELECTROPHORESIS: CPT | Performed by: STUDENT IN AN ORGANIZED HEALTH CARE EDUCATION/TRAINING PROGRAM

## 2023-10-30 PROCEDURE — 1126F AMNT PAIN NOTED NONE PRSNT: CPT | Performed by: INTERNAL MEDICINE

## 2023-10-30 PROCEDURE — 1036F TOBACCO NON-USER: CPT | Performed by: INTERNAL MEDICINE

## 2023-10-30 PROCEDURE — 84550 ASSAY OF BLOOD/URIC ACID: CPT

## 2023-10-30 PROCEDURE — 96372 THER/PROPH/DIAG INJ SC/IM: CPT | Performed by: INTERNAL MEDICINE

## 2023-10-30 PROCEDURE — 99214 OFFICE O/P EST MOD 30 MIN: CPT | Performed by: INTERNAL MEDICINE

## 2023-10-30 PROCEDURE — 3078F DIAST BP <80 MM HG: CPT | Performed by: INTERNAL MEDICINE

## 2023-10-30 RX ORDER — ACETAMINOPHEN 325 MG/1
650 TABLET ORAL ONCE
Status: COMPLETED | OUTPATIENT
Start: 2023-10-30 | End: 2023-10-30

## 2023-10-30 RX ORDER — ASPIRIN 81 MG/1
81 TABLET ORAL DAILY
COMMUNITY

## 2023-10-30 RX ORDER — DIPHENHYDRAMINE HCL 50 MG
50 CAPSULE ORAL ONCE
Status: COMPLETED | OUTPATIENT
Start: 2023-10-30 | End: 2023-10-30

## 2023-10-30 RX ORDER — MONTELUKAST SODIUM 10 MG/1
10 TABLET ORAL ONCE
Status: COMPLETED | OUTPATIENT
Start: 2023-10-30 | End: 2023-10-30

## 2023-10-30 RX ADMIN — MONTELUKAST 10 MG: 10 TABLET, FILM COATED ORAL at 11:15

## 2023-10-30 RX ADMIN — ACETAMINOPHEN 650 MG: 325 TABLET ORAL at 11:15

## 2023-10-30 RX ADMIN — DARATUMUMAB AND HYALURONIDASE-FIHJ (HUMAN RECOMBINANT) 1800 MG: 1800; 30000 INJECTION SUBCUTANEOUS at 11:24

## 2023-10-30 RX ADMIN — DIPHENHYDRAMINE HYDROCHLORIDE 50 MG: 50 CAPSULE ORAL at 11:15

## 2023-10-30 RX ADMIN — DEXAMETHASONE 20 MG: 6 TABLET ORAL at 11:15

## 2023-10-30 ASSESSMENT — ENCOUNTER SYMPTOMS
NUMBNESS: 0
WOUND: 1
ADENOPATHY: 0
SORE THROAT: 0
HEADACHES: 0
DYSURIA: 0
CONSTIPATION: 1
LIGHT-HEADEDNESS: 0
NAUSEA: 0
SLEEP DISTURBANCE: 0
NECK PAIN: 1
APPETITE CHANGE: 1
FATIGUE: 1
HEMATURIA: 0
BLOOD IN STOOL: 0
UNEXPECTED WEIGHT CHANGE: 1
DIAPHORESIS: 0
LEG SWELLING: 1
DIARRHEA: 0
PALPITATIONS: 0
CHILLS: 0
EYE PROBLEMS: 0
FEVER: 0
COUGH: 0
BRUISES/BLEEDS EASILY: 0
SHORTNESS OF BREATH: 0
WHEEZING: 0
BACK PAIN: 1
VOMITING: 0
DIZZINESS: 0

## 2023-10-30 ASSESSMENT — PAIN SCALES - GENERAL: PAINLEVEL: 9

## 2023-10-30 NOTE — ASSESSMENT & PLAN NOTE
- On PET imaging,   - Consider ultrasound at November 2023 visit.    - Dr. Marshall previously explained cysts are common and mostly benign.

## 2023-10-30 NOTE — ASSESSMENT & PLAN NOTE
- Most recent (10/2/23) free light chains are normal, with free lambda light chains at 2.38 mg/dL, and SPEP shows a only a paraprotein on 0.1gm/dL which most likely represent the Lucina protein indicating a continued VGPR. Today's myeloma lab results are pending (10/30/23).  - Will receive C13 daratumumab today (10/30/23, and she started C13 lenalidomide 15 mg daily today (10/30/23) as well  - Plan for C14 on 11/27/23     -Taking aspirin 81 mg daily during treatment with lenalidomide     - Previously, Dr. Marshall explained that, although patient is healthy for her age, that 70% of patients who are not transplant candidates are alive at 6 years after start of modern therapy and that additional agents would be available at the time of relapse.  I also explained that it would take approximately 6 months to recovery from toxicities of an autograft.  Patient and daughter were already leaning against autograft and Dr. Marshall concurred with their decision.

## 2023-10-31 DIAGNOSIS — C90.00 MULTIPLE MYELOMA NOT HAVING ACHIEVED REMISSION (MULTI): Primary | ICD-10-CM

## 2023-10-31 LAB
KAPPA LC SERPL-MCNC: 1.49 MG/DL (ref 0.33–1.94)
KAPPA LC/LAMBDA SER: 0.49 {RATIO} (ref 0.26–1.65)
LAMBDA LC SERPL-MCNC: 3.06 MG/DL (ref 0.57–2.63)

## 2023-11-05 LAB
ALBUMIN: 3.4 G/DL (ref 3.4–5)
ALPHA 1 GLOBULIN: 0.4 G/DL (ref 0.2–0.6)
ALPHA 2 GLOBULIN: 0.8 G/DL (ref 0.4–1.1)
BETA GLOBULIN: 0.6 G/DL (ref 0.5–1.2)
GAMMA GLOBULIN: 0.3 G/DL (ref 0.5–1.4)
IMMUNOFIXATION COMMENT: ABNORMAL
M-PROTEIN 1: 0.1 G/DL
PATH REVIEW - SERUM IMMUNOFIXATION: ABNORMAL
PATH REVIEW-SERUM PROTEIN ELECTROPHORESIS: ABNORMAL
PROTEIN ELECTROPHORESIS COMMENT: ABNORMAL

## 2023-11-08 ENCOUNTER — TELEPHONE (OUTPATIENT)
Dept: PALLIATIVE MEDICINE | Facility: HOSPITAL | Age: 83
End: 2023-11-08
Payer: MEDICARE

## 2023-11-08 ENCOUNTER — TELEPHONE (OUTPATIENT)
Dept: HEMATOLOGY/ONCOLOGY | Facility: HOSPITAL | Age: 83
End: 2023-11-08
Payer: MEDICARE

## 2023-11-08 NOTE — TELEPHONE ENCOUNTER
Called patient to determine what would be a good date and time for a follow up visit with Maria Luisa Hampton. Message left encouraging a call back

## 2023-11-10 DIAGNOSIS — C90.01 MULTIPLE MYELOMA IN REMISSION (MULTI): ICD-10-CM

## 2023-11-10 RX ORDER — LENALIDOMIDE 15 MG/1
15 CAPSULE ORAL DAILY
Qty: 21 CAPSULE | Refills: 0 | Status: SHIPPED | OUTPATIENT
Start: 2023-11-10 | End: 2023-12-06

## 2023-11-10 NOTE — PROGRESS NOTES
ONCOLOGY MEDICATION REFILL NOTE:    Sil West had a patient care encounter with  Janet Arboleda CNP   on 10/30/23 for management of their Multiple Myeloma.    Current Outpatient Medications on File Prior to Visit   Medication Sig Dispense Refill    acetaminophen (TylenoL) 325 mg tablet Take 2 tablets (650 mg) by mouth every 4 hours if needed.      acyclovir (Zovirax) 400 mg tablet Take 1 tablet (400 mg) by mouth 2 times a day. 180 tablet 0    amLODIPine (Norvasc) 5 mg tablet Take 1 tablet (5 mg) by mouth once daily. 90 tablet 0    aspirin 81 mg EC tablet Take 1 tablet (81 mg) by mouth once daily.      atorvastatin (Lipitor) 20 mg tablet Take 1 tablet (20 mg) by mouth once daily.      [DISCONTINUED] lenalidomide (Revlimid) 15 mg capsule Take 1 capsule (15 mg total) by mouth once daily. 21 capsule 0     No current facility-administered medications on file prior to visit.      Treatment Plans       Name Type Plan Dates Plan Provider         Active    Daratumumab, 28 Day Cycles - Maintenance Oncology Treatment  10/1/2023 - Present Carmencita Marshall MD                       Impression/ Plan:    Ms. West is tolerating therapy well. Labs stable and WNL, patient takes aspiring for thromboprophylaxis     Per Dr. Carmencita Marshall authorization, on 11/10/23 I refilled lenalidomide 15mg PO daily on days 1-21/28.  QTY 21 No refills. Authorization # [id number] obtained, prescription was sent to [facility].

## 2023-11-14 ENCOUNTER — TELEMEDICINE (OUTPATIENT)
Dept: PALLIATIVE MEDICINE | Facility: HOSPITAL | Age: 83
End: 2023-11-14
Payer: MEDICARE

## 2023-11-14 DIAGNOSIS — G89.3 CANCER RELATED PAIN: ICD-10-CM

## 2023-11-14 DIAGNOSIS — Z51.5 PALLIATIVE CARE ENCOUNTER: Primary | ICD-10-CM

## 2023-11-14 PROCEDURE — 99213 OFFICE O/P EST LOW 20 MIN: CPT | Performed by: NURSE PRACTITIONER

## 2023-11-14 NOTE — PROGRESS NOTES
SUPPORTIVE AND PALLIATIVE ONCOLOGY OUTPATIENT FOLLOW-UP    Virtual or Telephone Consent    A telephone visit (audio only) between the patient (at the originating site) and the provider (at the distant site) was utilized to provide this telehealth service.   Verbal consent was requested and obtained from Sil West on this date, 11/14/23 for a telehealth visit.     SERVICE DATE: 11/14/2023    Subjective   HISTORY OF PRESENT ILLNESS: Sil West is a 83 y.o. female who presents with diagnosis of Multiple Myeloma January 2023. Following with Dr. Marshall as primary oncologist. Patient has been referred to Supportive Oncology/Palliative Care for symptom management.      Pain Assessment:  Pain Score:  0    Symptom Assessment:  Pain:a little - continues in neck and upper to mid back, notes it managed well with Tylenol morning and evening  Sore Muscles/Spasms: none  Headache: none  Dizziness:none  Constipation: a little - history of diverticulitis, alternates between constipation and diarrhea. Manages by diet  Diarrhea: a little - as above   Nausea: none  Vomiting: none  Lack of Appetite: none   Weight Loss: a little - with husbands passing;  normally cooked for her. Notes she is now maintaining her weight learning to cook for herself  Taste changes: none  Dry Mouth: none  Pain in Mouth/Swallowing: none  Lack of Energy: a little - manageable   Difficulty Sleeping: none  Worrying: none  Anxiety: none  Depression: a little - with loss of . Notes to be doing okay  Shortness of breath: none  Other: none      Information obtained from: interview of patient  ______________________________________________________________________        Objective   Lab on 10/30/2023   Component Date Value    Ig Kappa Free Light Chain 10/30/2023 1.49     Ig Lambda Free Light Debi* 10/30/2023 3.06 (H)     Kappa/Lambda Ratio 10/30/2023 0.49     WBC 10/30/2023 3.7 (L)     nRBC 10/30/2023 0.0     RBC 10/30/2023 3.04 (L)      Hemoglobin 10/30/2023 10.0 (L)     Hematocrit 10/30/2023 29.6 (L)     MCV 10/30/2023 97     MCH 10/30/2023 32.9     MCHC 10/30/2023 33.8     RDW 10/30/2023 14.6 (H)     Platelets 10/30/2023 229     MPV 10/30/2023 9.2     Neutrophils % 10/30/2023 66.2     Immature Granulocytes %,* 10/30/2023 0.3     Lymphocytes % 10/30/2023 20.7     Monocytes % 10/30/2023 10.1     Eosinophils % 10/30/2023 1.6     Basophils % 10/30/2023 1.1     Neutrophils Absolute 10/30/2023 2.44     Immature Granulocytes Ab* 10/30/2023 0.01     Lymphocytes Absolute 10/30/2023 0.76 (L)     Monocytes Absolute 10/30/2023 0.37     Eosinophils Absolute 10/30/2023 0.06     Basophils Absolute 10/30/2023 0.04     Glucose 10/30/2023 120 (H)     Sodium 10/30/2023 143     Potassium 10/30/2023 4.3     Chloride 10/30/2023 107     Bicarbonate 10/30/2023 28     Anion Gap 10/30/2023 12     Urea Nitrogen 10/30/2023 22     Creatinine 10/30/2023 1.10 (H)     eGFR 10/30/2023 51 (L)     Calcium 10/30/2023 9.4     Albumin 10/30/2023 4.0     Alkaline Phosphatase 10/30/2023 53     Total Protein 10/30/2023 6.1 (L)     AST 10/30/2023 14     Bilirubin, Total 10/30/2023 0.3     ALT 10/30/2023 12     LDH 10/30/2023 130     Uric Acid 10/30/2023 3.5     Total Protein 10/30/2023 5.5 (L)     Albumin 10/30/2023 3.4     Alpha 1 Globulin 10/30/2023 0.4     Alpha 2 Globulin 10/30/2023 0.8     Beta Globulin 10/30/2023 0.6     Gamma 10/30/2023 0.3 (L)     M-PROTEIN 1 10/30/2023 0.1 (H)     Protein Electrophoresis * 10/30/2023 Aberrant band detected. See immunofixation.     Immunofixation Comment 10/30/2023 10/30/23 Known monoclonal IgG kappa in the gamma region at 0.1 g/dL (daratumumab therapy). Unchanged from the previous analysis on 9/7/23.     Path Review - Serum Prot* 10/30/2023 Reviewed and approved by LETHA HAAS on 11/5/23 at 7:21 PM.         Path Review - Serum Immu* 10/30/2023 Reviewed and approved by LETHA HAAS on 11/5/23 at 7:21 PM.            PHYSICAL EXAMINATION    Vital Signs:   Vital signs reviewed  Not complete dt telephone visit    Physical Exam  Not complete dt telephone visit    ASSESSMENT/PLAN    Pain  Pain is: muscle tightness to left neck; radiating mid to lower back pain from left to right; sharp pain to right hip worse with movement   Home regimen:   - Continue Acetaminophen 500mg-1000mg [id number] PRN  - Discussed Tramadol - patient declined - wants to limit opiates if possible  - Discussed Gabapentin - patient declined starting   - Continue Baclofen 5mg TID PRN for muscle tightness/pain  - Continue Lidocaine 4% Topical Patch once daily PRN    Opioid Use  Medication Management:   - OARRS report reviewed with no aberrant behavior; consistent with  prescriptions/records and patient history  - .  Overdose Risk Score 190. This has been discussed with patient.   - We will continue to closely monitor the patient for signs of prescription misuse including UDS, OARRS review and subjective reports at each visit.  - No concurrent benzodiazepine use   - I am a provider who either is or has consulted and collaborated with a provider certified in Hospice and Palliative Medicine and have conducted a face-face visit and examination for this patient.  - Routine Urine Drug Screen: defer  - Controlled Substance Agreement: defer  - Specifically discussed that controlled substance prescriptions will only be provided by our group as outlined in the completed agreement  - Prescribed naloxone: n/a  - Red Flags: none    Constipation  - Discussed starting Docusate 100mg 1-2x per day  - Discussed taking Senna 2 tabs 1-2x per day every day  - Encouraged good hydration    Nausea/Appetite - improving   - Continue Ondansetron 4mg [id number] PRN  - Continue Gas-X PRN with meals  - Dexamethasone with treatment  - Encouraged good hydration  - Encouraged small more frequent meals  - Encouraged continuing Boost protein supplement at least once per da    Altered Mood/Sleeping Difficulty  -  Continue to monitor  - Declines assistance - recently lost  unexpected    Supportive and Palliative Oncology Encounter:  Emotional support provided  Coordination of care  Will continue to follow and address symptoms as needed    Advance Directives  Existence of Advance Directives:No - has interest  Decision maker: Surrogate decision maker is her daughter -     Next Follow-Up Visit:  Patient declines follow-up at this time. Encouraged to call our office if any needs arise.    Signature and billing  Medical complexity was low level due to due to complexity of problems, extensive data review, and high risk of management/treatment.  Time was spent on the following: Prep Time, Time Directly with Patient/Family/Caregiver, Documentation Time. Total time spent: 25      Data    Some elements copied from Supportive Onc note on 9/7/23, the elements have been updated and all reflect current decision making from today, 11/14/2023.      Plan of Care discussed with: Patient    SIGNATURE: GLENN Garcia    Contact information:  Supportive and Palliative Oncology  Monday-Friday 8 AM-5 PM  Phone:  [phone], press option #5, then option #1.   Or Epic Secure Chat

## 2023-11-27 ENCOUNTER — LAB (OUTPATIENT)
Dept: LAB | Facility: HOSPITAL | Age: 83
End: 2023-11-27
Payer: MEDICARE

## 2023-11-27 ENCOUNTER — INFUSION (OUTPATIENT)
Dept: HEMATOLOGY/ONCOLOGY | Facility: HOSPITAL | Age: 83
End: 2023-11-27
Payer: MEDICARE

## 2023-11-27 ENCOUNTER — OFFICE VISIT (OUTPATIENT)
Dept: HEMATOLOGY/ONCOLOGY | Facility: HOSPITAL | Age: 83
End: 2023-11-27
Payer: MEDICARE

## 2023-11-27 VITALS
DIASTOLIC BLOOD PRESSURE: 59 MMHG | WEIGHT: 148.59 LBS | HEART RATE: 76 BPM | SYSTOLIC BLOOD PRESSURE: 134 MMHG | RESPIRATION RATE: 16 BRPM | BODY MASS INDEX: 27.66 KG/M2 | OXYGEN SATURATION: 100 % | TEMPERATURE: 98.2 F

## 2023-11-27 DIAGNOSIS — C90.00 MULTIPLE MYELOMA NOT HAVING ACHIEVED REMISSION (MULTI): Primary | ICD-10-CM

## 2023-11-27 DIAGNOSIS — T45.1X5A IMMUNOSUPPRESSED DUE TO CHEMOTHERAPY (MULTI): ICD-10-CM

## 2023-11-27 DIAGNOSIS — I10 PRIMARY HYPERTENSION: ICD-10-CM

## 2023-11-27 DIAGNOSIS — Z91.89 AT RISK FOR OSTEOPENIA: ICD-10-CM

## 2023-11-27 DIAGNOSIS — C90.00 MULTIPLE MYELOMA NOT HAVING ACHIEVED REMISSION (MULTI): ICD-10-CM

## 2023-11-27 DIAGNOSIS — E04.1 THYROID NODULE: ICD-10-CM

## 2023-11-27 DIAGNOSIS — Z79.899 IMMUNOSUPPRESSED DUE TO CHEMOTHERAPY (MULTI): ICD-10-CM

## 2023-11-27 DIAGNOSIS — D84.821 IMMUNOSUPPRESSED DUE TO CHEMOTHERAPY (MULTI): ICD-10-CM

## 2023-11-27 DIAGNOSIS — K59.09 OTHER CONSTIPATION: ICD-10-CM

## 2023-11-27 PROBLEM — J98.11 ATELECTASIS: Status: RESOLVED | Noted: 2023-09-26 | Resolved: 2023-11-27

## 2023-11-27 PROBLEM — S52.539A CLOSED COLLES' FRACTURE: Status: RESOLVED | Noted: 2023-09-26 | Resolved: 2023-11-27

## 2023-11-27 PROBLEM — D64.9 ANEMIA: Status: RESOLVED | Noted: 2023-09-26 | Resolved: 2023-11-27

## 2023-11-27 PROBLEM — D50.9 IRON DEFICIENCY ANEMIA: Status: RESOLVED | Noted: 2023-09-26 | Resolved: 2023-11-27

## 2023-11-27 PROBLEM — R20.2 PARESTHESIA OF BOTH HANDS: Status: RESOLVED | Noted: 2023-09-26 | Resolved: 2023-11-27

## 2023-11-27 PROBLEM — M85.80 OSTEOPENIA: Status: RESOLVED | Noted: 2023-09-26 | Resolved: 2023-11-27

## 2023-11-27 PROBLEM — E87.6 HYPOKALEMIA: Status: RESOLVED | Noted: 2023-09-26 | Resolved: 2023-11-27

## 2023-11-27 PROBLEM — R63.0 ANOREXIA: Status: RESOLVED | Noted: 2023-09-26 | Resolved: 2023-11-27

## 2023-11-27 PROBLEM — E78.5 DYSLIPIDEMIA: Status: RESOLVED | Noted: 2023-09-13 | Resolved: 2023-11-27

## 2023-11-27 PROBLEM — E83.52 HYPERCALCEMIA: Status: RESOLVED | Noted: 2023-09-26 | Resolved: 2023-11-27

## 2023-11-27 PROBLEM — K29.70 GASTRITIS DETERMINED BY BIOPSY: Status: RESOLVED | Noted: 2023-09-26 | Resolved: 2023-11-27

## 2023-11-27 LAB
ALBUMIN SERPL BCP-MCNC: 4 G/DL (ref 3.4–5)
ALP SERPL-CCNC: 48 U/L (ref 33–136)
ALT SERPL W P-5'-P-CCNC: 11 U/L (ref 7–45)
ANION GAP SERPL CALC-SCNC: 13 MMOL/L (ref 10–20)
AST SERPL W P-5'-P-CCNC: 14 U/L (ref 9–39)
BASOPHILS # BLD AUTO: 0.06 X10*3/UL (ref 0–0.1)
BASOPHILS NFR BLD AUTO: 1.9 %
BILIRUB SERPL-MCNC: 0.4 MG/DL (ref 0–1.2)
BUN SERPL-MCNC: 25 MG/DL (ref 6–23)
CALCIUM SERPL-MCNC: 9.4 MG/DL (ref 8.6–10.3)
CHLORIDE SERPL-SCNC: 105 MMOL/L (ref 98–107)
CO2 SERPL-SCNC: 29 MMOL/L (ref 21–32)
CREAT SERPL-MCNC: 1.1 MG/DL (ref 0.5–1.05)
EOSINOPHIL # BLD AUTO: 0.05 X10*3/UL (ref 0–0.4)
EOSINOPHIL NFR BLD AUTO: 1.6 %
ERYTHROCYTE [DISTWIDTH] IN BLOOD BY AUTOMATED COUNT: 14.5 % (ref 11.5–14.5)
GFR SERPL CREATININE-BSD FRML MDRD: 51 ML/MIN/1.73M*2
GLUCOSE SERPL-MCNC: 113 MG/DL (ref 74–99)
HCT VFR BLD AUTO: 28.8 % (ref 36–46)
HGB BLD-MCNC: 9.4 G/DL (ref 12–16)
IMM GRANULOCYTES # BLD AUTO: 0.01 X10*3/UL (ref 0–0.5)
IMM GRANULOCYTES NFR BLD AUTO: 0.3 % (ref 0–0.9)
LYMPHOCYTES # BLD AUTO: 0.63 X10*3/UL (ref 0.8–3)
LYMPHOCYTES NFR BLD AUTO: 19.9 %
MCH RBC QN AUTO: 32.2 PG (ref 26–34)
MCHC RBC AUTO-ENTMCNC: 32.6 G/DL (ref 32–36)
MCV RBC AUTO: 99 FL (ref 80–100)
MONOCYTES # BLD AUTO: 0.3 X10*3/UL (ref 0.05–0.8)
MONOCYTES NFR BLD AUTO: 9.5 %
NEUTROPHILS # BLD AUTO: 2.12 X10*3/UL (ref 1.6–5.5)
NEUTROPHILS NFR BLD AUTO: 66.8 %
NRBC BLD-RTO: 0 /100 WBCS (ref 0–0)
PLATELET # BLD AUTO: 217 X10*3/UL (ref 150–450)
POTASSIUM SERPL-SCNC: 4 MMOL/L (ref 3.5–5.3)
PROT SERPL-MCNC: 5.6 G/DL (ref 6.4–8.2)
PROT SERPL-MCNC: 5.9 G/DL (ref 6.4–8.2)
RBC # BLD AUTO: 2.92 X10*6/UL (ref 4–5.2)
SODIUM SERPL-SCNC: 143 MMOL/L (ref 136–145)
WBC # BLD AUTO: 3.2 X10*3/UL (ref 4.4–11.3)

## 2023-11-27 PROCEDURE — 84155 ASSAY OF PROTEIN SERUM: CPT

## 2023-11-27 PROCEDURE — 99417 PROLNG OP E/M EACH 15 MIN: CPT | Performed by: PHYSICIAN ASSISTANT

## 2023-11-27 PROCEDURE — 86320 SERUM IMMUNOELECTROPHORESIS: CPT | Performed by: STUDENT IN AN ORGANIZED HEALTH CARE EDUCATION/TRAINING PROGRAM

## 2023-11-27 PROCEDURE — 84165 PROTEIN E-PHORESIS SERUM: CPT | Performed by: STUDENT IN AN ORGANIZED HEALTH CARE EDUCATION/TRAINING PROGRAM

## 2023-11-27 PROCEDURE — 83521 IG LIGHT CHAINS FREE EACH: CPT

## 2023-11-27 PROCEDURE — 3078F DIAST BP <80 MM HG: CPT | Performed by: PHYSICIAN ASSISTANT

## 2023-11-27 PROCEDURE — 1036F TOBACCO NON-USER: CPT | Performed by: PHYSICIAN ASSISTANT

## 2023-11-27 PROCEDURE — 84165 PROTEIN E-PHORESIS SERUM: CPT

## 2023-11-27 PROCEDURE — 80053 COMPREHEN METABOLIC PANEL: CPT

## 2023-11-27 PROCEDURE — 2500000004 HC RX 250 GENERAL PHARMACY W/ HCPCS (ALT 636 FOR OP/ED): Performed by: INTERNAL MEDICINE

## 2023-11-27 PROCEDURE — 84155 ASSAY OF PROTEIN SERUM: CPT | Mod: 59

## 2023-11-27 PROCEDURE — 3075F SYST BP GE 130 - 139MM HG: CPT | Performed by: PHYSICIAN ASSISTANT

## 2023-11-27 PROCEDURE — 1160F RVW MEDS BY RX/DR IN RCRD: CPT | Performed by: PHYSICIAN ASSISTANT

## 2023-11-27 PROCEDURE — 99215 OFFICE O/P EST HI 40 MIN: CPT | Performed by: PHYSICIAN ASSISTANT

## 2023-11-27 PROCEDURE — 2500000001 HC RX 250 WO HCPCS SELF ADMINISTERED DRUGS (ALT 637 FOR MEDICARE OP): Performed by: INTERNAL MEDICINE

## 2023-11-27 PROCEDURE — 99215 OFFICE O/P EST HI 40 MIN: CPT | Mod: 25 | Performed by: PHYSICIAN ASSISTANT

## 2023-11-27 PROCEDURE — 1159F MED LIST DOCD IN RCRD: CPT | Performed by: PHYSICIAN ASSISTANT

## 2023-11-27 PROCEDURE — 1125F AMNT PAIN NOTED PAIN PRSNT: CPT | Performed by: PHYSICIAN ASSISTANT

## 2023-11-27 PROCEDURE — G2212 PROLONG OUTPT/OFFICE VIS: HCPCS | Performed by: PHYSICIAN ASSISTANT

## 2023-11-27 PROCEDURE — 36415 COLL VENOUS BLD VENIPUNCTURE: CPT

## 2023-11-27 PROCEDURE — 85025 COMPLETE CBC W/AUTO DIFF WBC: CPT

## 2023-11-27 PROCEDURE — 96401 CHEMO ANTI-NEOPL SQ/IM: CPT

## 2023-11-27 RX ORDER — AMLODIPINE BESYLATE 5 MG/1
5 TABLET ORAL DAILY
Qty: 90 TABLET | Refills: 0 | Status: SHIPPED | OUTPATIENT
Start: 2023-11-27 | End: 2023-12-26 | Stop reason: SDUPTHER

## 2023-11-27 RX ORDER — ACETAMINOPHEN 325 MG/1
650 TABLET ORAL ONCE
Status: COMPLETED | OUTPATIENT
Start: 2023-11-27 | End: 2023-11-27

## 2023-11-27 RX ORDER — DIPHENHYDRAMINE HCL 50 MG
50 CAPSULE ORAL ONCE
Status: COMPLETED | OUTPATIENT
Start: 2023-11-27 | End: 2023-11-27

## 2023-11-27 RX ORDER — MONTELUKAST SODIUM 10 MG/1
10 TABLET ORAL ONCE
Status: COMPLETED | OUTPATIENT
Start: 2023-11-27 | End: 2023-11-27

## 2023-11-27 RX ORDER — ASCORBIC ACID 125 MG
2 TABLET,CHEWABLE ORAL DAILY
COMMUNITY

## 2023-11-27 RX ADMIN — DARATUMUMAB AND HYALURONIDASE-FIHJ (HUMAN RECOMBINANT) 1800 MG: 1800; 30000 INJECTION SUBCUTANEOUS at 11:11

## 2023-11-27 RX ADMIN — MONTELUKAST 10 MG: 10 TABLET, FILM COATED ORAL at 11:11

## 2023-11-27 RX ADMIN — ACETAMINOPHEN 650 MG: 325 TABLET ORAL at 11:10

## 2023-11-27 RX ADMIN — DIPHENHYDRAMINE HYDROCHLORIDE 50 MG: 50 CAPSULE ORAL at 11:11

## 2023-11-27 RX ADMIN — DEXAMETHASONE 20 MG: 4 TABLET ORAL at 11:10

## 2023-11-27 ASSESSMENT — PAIN SCALES - GENERAL: PAINLEVEL: 5

## 2023-11-27 NOTE — ASSESSMENT & PLAN NOTE
- Continue zoledronic acid 3.3 mg (renal dosing) every 3 months, received (10/2/23), next dose late December 2023  - Continue vitamin D supplement -- 2000 units PO daily

## 2023-11-27 NOTE — PROGRESS NOTES
Patient ID: Sil West is a 83 y.o. female.    Diagnosis: No matching staging information was found for the patient.,    Treatment:   Oncology History Overview Note   IgG lambda MM diagnosed 1/23 after several months of urinary incontinence and diffuse brigida pain       Diagnosed by Bone biopsy on (1/6/23): plasma cells neoplasm.   - Repeat BMBx (1/26/23): hypercellular marrow, 80-90% plasma cells, lambda-restricted, FISH 1q gain(high risk) trisomy 3  - MRI spine (12/18/22): abundant foci of osseous metastases. MM involvement of mid cervical vertebral bodies, midthoracic vertebral bodies and lumbar vertebral bodies. Involvement of R iliac bone  - PET (1/23) mild FDG avidity associated with a large soft tissue mass w/in right iliac bone and right sacral ala with  a max SUV of 3.4. Mildly FDG avid soft tissue mass within the left posterior 6th rib with a max SUV of 2.5. Multiple additional hypermetabolic foci are noted within the appendicular skeleton. Mild diffuse heterogeneous uptake throughout the osseous structures.  - light chains (1/19/23) FKLC 1.57, FLLC 2947.7, K/L ratio 0 IgG 537, IgA 221, IgM 19, b2M 23.4 Hgb 5.7  - M protein IgA lambda 0.7 g/dL (1/18)  - LDH (1/26/23): 105 U/L  - Cr(1/26/23): 2.33-> peak at 3.15  - UPEP (1/28/23): Monoclonal lambda light chain at 386.6 mg/24H  - Hepatitis B Core Ab total, Hep B surf Ag, Hep B surf Ab, and Hep C: neg     Treatment History:   - s/p radiation to Right hip + T6-T8 x 5 fractions (total 2000 cGy completed 2/2/23)     Induction: Bortezomib and  dexamethasone (12/24/22) + daratumumab (12/27/22)  - C1: (1/28/23): Bortezomib 1, 4, 8, 11 + daratumumab x 2 doses  - C2 (2/16/23): Weekly dosing of both daratumumab and bortezomib (1, 8, 15) with addition of lenalidomide planned when renal function allows  - VGPR with M protein 0.1 and free lyte chain 1.37 5/25/23  - C8 roscoe RVD completed July 6, 2023.    7/6/23, appears to have had a VGPR vs CR with ongoing  multifocal bone pain.  Repeat PET imaging (7/13/23) which showed marked improvement in brigida lesions, however pathological fx particularly in right  pelvis and ribs, possible infiltrate RLL base and  hypodensity right thyroid gland  - Cycle 9 and forward, Velcade is omitted, then she transitioned with C 10 to every 4 week schedule with Lucina (day 1) and Revlimid 15 mg increased from days 1-14/28 to days 1-21/28 days.        Multiple myeloma not having achieved remission (CMS/HCC)   9/13/2023 Initial Diagnosis    Multiple myeloma not having achieved remission (CMS/HCC)     10/2/2023 -  Chemotherapy    Daratumumab, 28 Day Cycles - Maintenance         Past Medical History:   No past medical history on file.    Surgical History:     Past Surgical History:   Procedure Laterality Date    CT GUIDED PERCUTANEOUS BIOPSY BONE DEEP  1/6/2023    CT GUIDED PERCUTANEOUS BIOPSY BONE DEEP 1/6/2023 [facility]        Family History:   No family history on file.    Social History:     Social History     Tobacco Use    Smoking status: Never     Passive exposure: Never    Smokeless tobacco: Never   Vaping Use    Vaping Use: Never used   Substance Use Topics    Alcohol use: Never    Drug use: Never      -------------------------------------------------------------------------------------------------------  Subjective       The patient presents to the clinic today (11/28/23) for follow-up and daratumumab; overall, she is doing okay.    She says that her  passed away on 10/24/23, and she is straining a little under the weight of caring for all 6 of her cats. She has lots of support from her family -- her son lives in [place], and her daughter is only 50 miles away. Her third son lives in [place]. She has also gotten a little closer to some neighbors since his passing.    Physically, she says that she feels better and better with each month's cycle. Her brain is working to plan out what she needs to do each day.     She  "does just have a nagging backache. She says that it is helped greatly by acetaminophen, but when that wears off she has nagging pain. It gets set off greatly when she does things like rake leaves, or transplanting of mums. (She does a fair amount of work in her garden, it seems.) She doesn't haven't have to take anything other than Tylenol.     She says that since she last had imaging (July 2023), she thinks that her pain is stable. No new or asymmetrical weakness in her legs. She says she does notice that they're \"not as strong as they should be.\"    She is potentially interested in doing physical therapy or aquatic therapy. She thinks that the [facility] has seniors times.     She is having some constipation. She says she only has a bowel movement every 4 days, but she took MiraLax once and it helped a little. She didn't know she could take it every day.         Review of Systems   Gastrointestinal:  Positive for constipation.   Musculoskeletal:  Positive for back pain and flank pain.   All other systems reviewed and are negative.     -------------------------------------------------------------------------------------------------------  Objective   BSA: 1.71 meters squared  /59   Pulse 76   Temp 36.8 °C (98.2 °F)   Resp 16   Wt 67.4 kg (148 lb 9.4 oz)   SpO2 100%   BMI 27.66 kg/m²     Physical Exam  Constitutional:       Appearance: She is well-developed and normal weight.   HENT:      Mouth/Throat:      Mouth: Mucous membranes are moist.      Pharynx: No posterior oropharyngeal erythema.   Eyes:      General: No scleral icterus.     Extraocular Movements: Extraocular movements intact.      Pupils: Pupils are equal, round, and reactive to light.   Cardiovascular:      Rate and Rhythm: Normal rate and regular rhythm.      Heart sounds: No murmur heard.     No friction rub. No gallop.   Pulmonary:      Effort: No respiratory distress.      Breath sounds: No stridor. No wheezing, rhonchi or rales. "   Abdominal:      General: There is no distension.      Palpations: There is no mass.      Tenderness: There is no abdominal tenderness. There is no guarding or rebound.   Musculoskeletal:         General: No swelling or deformity.      Right lower leg: No edema.      Left lower leg: No edema.   Lymphadenopathy:      Cervical: No cervical adenopathy.   Skin:     Findings: No lesion or rash.   Neurological:      Mental Status: She is alert.      Cranial Nerves: No cranial nerve deficit.      Motor: Weakness present.      Deep Tendon Reflexes:      Reflex Scores:       Patellar reflexes are 2+ on the right side and 2+ on the left side.     Comments: 4+/5 against resistance in hip flexors b/l -- limited by back pain, 5/5 in knee extension and dorsiflexion b/l   Psychiatric:         Mood and Affect: Mood normal.         Behavior: Behavior normal.         Performance Status:  Symptomatic; fully ambulatory  -------------------------------------------------------------------------------------------------------  Multiple myeloma not having achieved remission (CMS/[facility])  M-PROTEIN 1   Date Value Ref Range Status   10/30/2023 0.1 (H)   g/dL Final   09/07/2023 0.1 (A) Not Detected g/dL Final   08/07/2023 0.1 (A) Not Detected g/dL Final   07/17/2023 0.1 (A) Not Detected g/dL Final     Ig Kappa Free Light Chain   Date Value Ref Range Status   10/30/2023 1.49 0.33 - 1.94 mg/dL Final   10/02/2023 1.36 0.33 - 1.94 mg/dL Final   09/07/2023 1.25 0.33 - 1.94 mg/dL Final   08/07/2023 1.29 0.33 - 1.94 mg/dL Final     Ig Lambda Free Light Chain   Date Value Ref Range Status   10/30/2023 3.06 (H) 0.57 - 2.63 mg/dL Final   10/02/2023 2.38 0.57 - 2.63 mg/dL Final   09/07/2023 2.11 0.57 - 2.63 mg/dL Final   08/07/2023 2.01 0.57 - 2.63 mg/dL Final   - Most recent myeloma labs indicate ongoing very good partial response (VGPR) by [facility] Uniform Reponse Criteria, although there was a very slight uptick in her free lambda light chains  - Myeloma  labs from today (11/27/23) are pending  - Will receive C14 daratumumab today (11/27/23, and she started C14 lenalidomide 15 mg daily today (11/27/23) as well  - Plan for C15 on 12/26/23  -Taking aspirin 81 mg daily for VTE prophylaxis during treatment with lenalidomide     - Previously, Dr. Marshall explained that, although patient is healthy for her age, that 70% of patients who are not transplant candidates are alive at 6 years after start of modern therapy and that additional agents would be available at the time of relapse.  I also explained that it would take approximately 6 months to recovery from toxicities of an autograft.  Patient and daughter were already leaning against autograft and Dr. Marshall concurred with their decision.     Thyroid nodule  - On PET imaging,   - Consider ultrasound at December 2023 visit.    - Dr. Marshall previously explained cysts are common and mostly benign.    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 Marshall, and C16 daratumumab  -------------------------------------------------------------------------------------------------------  Placido Tran PA-C

## 2023-11-27 NOTE — ASSESSMENT & PLAN NOTE
- On PET imaging,   - Consider ultrasound at December 2023 visit.    - Dr. Marshall previously explained cysts are common and mostly benign.

## 2023-11-27 NOTE — ASSESSMENT & PLAN NOTE
M-PROTEIN 1   Date Value Ref Range Status   10/30/2023 0.1 (H)   g/dL Final   09/07/2023 0.1 (A) Not Detected g/dL Final   08/07/2023 0.1 (A) Not Detected g/dL Final   07/17/2023 0.1 (A) Not Detected g/dL Final     Ig Kappa Free Light Chain   Date Value Ref Range Status   10/30/2023 1.49 0.33 - 1.94 mg/dL Final   10/02/2023 1.36 0.33 - 1.94 mg/dL Final   09/07/2023 1.25 0.33 - 1.94 mg/dL Final   08/07/2023 1.29 0.33 - 1.94 mg/dL Final     Ig Lambda Free Light Chain   Date Value Ref Range Status   10/30/2023 3.06 (H) 0.57 - 2.63 mg/dL Final   10/02/2023 2.38 0.57 - 2.63 mg/dL Final   09/07/2023 2.11 0.57 - 2.63 mg/dL Final   08/07/2023 2.01 0.57 - 2.63 mg/dL Final   - Most recent myeloma labs indicate ongoing very good partial response (VGPR) by IM[facility] Uniform Reponse Criteria, although there was a very slight uptick in her free lambda light chains  - Myeloma labs from today (11/27/23) are pending  - Will receive C14 daratumumab today (11/27/23, and she started C14 lenalidomide 15 mg daily today (11/27/23) as well  - Plan for C15 on 12/26/23  -Taking aspirin 81 mg daily for VTE prophylaxis during treatment with lenalidomide     - Previously, Dr. Marshall explained that, although patient is healthy for her age, that 70% of patients who are not transplant candidates are alive at 6 years after start of modern therapy and that additional agents would be available at the time of relapse.  I also explained that it would take approximately 6 months to recovery from toxicities of an autograft.  Patient and daughter were already leaning against autograft and Dr. Marshall concurred with their decision.

## 2023-11-28 PROBLEM — K59.09 OTHER CONSTIPATION: Status: ACTIVE | Noted: 2023-11-28

## 2023-11-28 LAB
KAPPA LC SERPL-MCNC: 1.49 MG/DL (ref 0.33–1.94)
KAPPA LC/LAMBDA SER: 0.41 {RATIO} (ref 0.26–1.65)
LAMBDA LC SERPL-MCNC: 3.67 MG/DL (ref 0.57–2.63)

## 2023-11-28 ASSESSMENT — ENCOUNTER SYMPTOMS
BACK PAIN: 1
CONSTIPATION: 1
FLANK PAIN: 1

## 2023-11-28 NOTE — ASSESSMENT & PLAN NOTE
:: Etiology unclear, possibly due to therapy  - Encouraged polyethylene glycol daily -- goal BM daily

## 2023-11-29 RX ORDER — LENALIDOMIDE 15 MG/1
15 CAPSULE ORAL DAILY
Qty: 21 CAPSULE | Refills: 0 | Status: SHIPPED | OUTPATIENT
Start: 2023-12-20 | End: 2023-12-06

## 2023-12-05 LAB
ALBUMIN: 3.6 G/DL (ref 3.4–5)
ALPHA 1 GLOBULIN: 0.3 G/DL (ref 0.2–0.6)
ALPHA 2 GLOBULIN: 0.7 G/DL (ref 0.4–1.1)
BETA GLOBULIN: 0.6 G/DL (ref 0.5–1.2)
GAMMA GLOBULIN: 0.3 G/DL (ref 0.5–1.4)
IMMUNOFIXATION COMMENT: ABNORMAL
M-PROTEIN 1: 0.1 G/DL
PATH REVIEW - SERUM IMMUNOFIXATION: ABNORMAL
PATH REVIEW-SERUM PROTEIN ELECTROPHORESIS: ABNORMAL
PROTEIN ELECTROPHORESIS COMMENT: ABNORMAL

## 2023-12-06 DIAGNOSIS — C90.00 MULTIPLE MYELOMA NOT HAVING ACHIEVED REMISSION (MULTI): ICD-10-CM

## 2023-12-06 RX ORDER — LENALIDOMIDE 15 MG/1
15 CAPSULE ORAL DAILY
Qty: 21 CAPSULE | Refills: 0 | Status: SHIPPED | OUTPATIENT
Start: 2023-12-20 | End: 2023-12-06 | Stop reason: SDUPTHER

## 2023-12-06 RX ORDER — LENALIDOMIDE 15 MG/1
15 CAPSULE ORAL DAILY
Qty: 21 CAPSULE | Refills: 0 | Status: SHIPPED | OUTPATIENT
Start: 2023-12-06 | End: 2024-01-03 | Stop reason: SDUPTHER

## 2023-12-06 NOTE — PROGRESS NOTES
ONCOLOGY MEDICATION REFILL NOTE:    Sil West had a patient care encounter with Placido Tran PA-C  on 11/27/23 for management of their Multiple Myeloma.    Current Outpatient Medications on File Prior to Visit   Medication Sig Dispense Refill    acetaminophen (TylenoL) 325 mg tablet Take 2 tablets (650 mg) by mouth every 4 hours if needed.      acyclovir (Zovirax) 400 mg tablet Take 1 tablet (400 mg) by mouth 2 times a day. 180 tablet 0    amLODIPine (Norvasc) 5 mg tablet Take 1 tablet (5 mg) by mouth once daily. 90 tablet 0    aspirin 81 mg EC tablet Take 1 tablet (81 mg) by mouth once daily.      atorvastatin (Lipitor) 20 mg tablet Take 1 tablet (20 mg) by mouth once daily.      cholecalciferol, vitamin D3, 25 mcg (1,000 unit) tablet,chewable Chew 2 tablets (50 mcg) once daily.      [DISCONTINUED] lenalidomide (Revlimid) 15 mg capsule Take 1 capsule (15 mg total) by mouth once daily.  Take days 1-21 of 28 day cycle 21 capsule 0    [DISCONTINUED] lenalidomide (Revlimid) 15 mg capsule Take 1 capsule (15 mg total) by mouth once daily for 21 days.  Take whole with water. Do not break, chew, or open. Take daily on days 1-21 of 28 day cycle. Auth # 21 [id number]     No current facility-administered medications on file prior to visit.          Impression/ Plan:    Ms. West continues to tolerate her maintenance Lucina/ john, taking low dose aspirin for prophylaxis of thrombus. CBCd and myeloma markers are stable    Per Dr. Carmencita Marshall authorization, on 12/06/23 I refilled lenalidomide 15mg PO daily on days 1-21 of 28.  QTY 21 No refills. Authorization # [id number] obtained, [facility].

## 2023-12-20 ENCOUNTER — TELEPHONE (OUTPATIENT)
Dept: PHARMACY | Facility: CLINIC | Age: 83
End: 2023-12-20

## 2023-12-20 ENCOUNTER — SPECIALTY PHARMACY (OUTPATIENT)
Dept: PHARMACY | Facility: CLINIC | Age: 83
End: 2023-12-20

## 2023-12-26 ENCOUNTER — OFFICE VISIT (OUTPATIENT)
Dept: HEMATOLOGY/ONCOLOGY | Facility: HOSPITAL | Age: 83
End: 2023-12-26
Payer: MEDICARE

## 2023-12-26 ENCOUNTER — LAB (OUTPATIENT)
Dept: LAB | Facility: HOSPITAL | Age: 83
End: 2023-12-26
Payer: MEDICARE

## 2023-12-26 ENCOUNTER — INFUSION (OUTPATIENT)
Dept: HEMATOLOGY/ONCOLOGY | Facility: HOSPITAL | Age: 83
End: 2023-12-26
Payer: MEDICARE

## 2023-12-26 VITALS
BODY MASS INDEX: 27.41 KG/M2 | WEIGHT: 147.27 LBS | DIASTOLIC BLOOD PRESSURE: 56 MMHG | HEART RATE: 70 BPM | SYSTOLIC BLOOD PRESSURE: 140 MMHG | OXYGEN SATURATION: 100 % | RESPIRATION RATE: 18 BRPM | TEMPERATURE: 97.3 F

## 2023-12-26 DIAGNOSIS — C90.00 MULTIPLE MYELOMA NOT HAVING ACHIEVED REMISSION (MULTI): ICD-10-CM

## 2023-12-26 DIAGNOSIS — I10 PRIMARY HYPERTENSION: ICD-10-CM

## 2023-12-26 DIAGNOSIS — C90.00 MULTIPLE MYELOMA NOT HAVING ACHIEVED REMISSION (MULTI): Primary | ICD-10-CM

## 2023-12-26 DIAGNOSIS — Z91.89 AT RISK FOR OSTEOPENIA: ICD-10-CM

## 2023-12-26 DIAGNOSIS — K59.09 OTHER CONSTIPATION: ICD-10-CM

## 2023-12-26 DIAGNOSIS — T45.1X5A IMMUNOSUPPRESSED DUE TO CHEMOTHERAPY (MULTI): ICD-10-CM

## 2023-12-26 DIAGNOSIS — Z79.899 IMMUNOSUPPRESSED DUE TO CHEMOTHERAPY (MULTI): ICD-10-CM

## 2023-12-26 DIAGNOSIS — E04.1 THYROID NODULE: ICD-10-CM

## 2023-12-26 DIAGNOSIS — D84.821 IMMUNOSUPPRESSED DUE TO CHEMOTHERAPY (MULTI): ICD-10-CM

## 2023-12-26 LAB
ALBUMIN SERPL BCP-MCNC: 3.7 G/DL (ref 3.4–5)
ALP SERPL-CCNC: 49 U/L (ref 33–136)
ALT SERPL W P-5'-P-CCNC: 10 U/L (ref 7–45)
ANION GAP SERPL CALC-SCNC: 11 MMOL/L (ref 10–20)
AST SERPL W P-5'-P-CCNC: 14 U/L (ref 9–39)
BASOPHILS # BLD AUTO: 0.05 X10*3/UL (ref 0–0.1)
BASOPHILS NFR BLD AUTO: 1.7 %
BILIRUB SERPL-MCNC: 0.4 MG/DL (ref 0–1.2)
BUN SERPL-MCNC: 24 MG/DL (ref 6–23)
CALCIUM SERPL-MCNC: 8.9 MG/DL (ref 8.6–10.3)
CHLORIDE SERPL-SCNC: 107 MMOL/L (ref 98–107)
CO2 SERPL-SCNC: 28 MMOL/L (ref 21–32)
CREAT SERPL-MCNC: 0.95 MG/DL (ref 0.5–1.05)
EOSINOPHIL # BLD AUTO: 0.06 X10*3/UL (ref 0–0.4)
EOSINOPHIL NFR BLD AUTO: 2 %
ERYTHROCYTE [DISTWIDTH] IN BLOOD BY AUTOMATED COUNT: 14.3 % (ref 11.5–14.5)
GFR SERPL CREATININE-BSD FRML MDRD: 60 ML/MIN/1.73M*2
GLUCOSE SERPL-MCNC: 146 MG/DL (ref 74–99)
HCT VFR BLD AUTO: 28.4 % (ref 36–46)
HGB BLD-MCNC: 9.4 G/DL (ref 12–16)
IGA SERPL-MCNC: 68 MG/DL (ref 70–400)
IGG SERPL-MCNC: 348 MG/DL (ref 700–1600)
IGM SERPL-MCNC: 11 MG/DL (ref 40–230)
IMM GRANULOCYTES # BLD AUTO: 0.02 X10*3/UL (ref 0–0.5)
IMM GRANULOCYTES NFR BLD AUTO: 0.7 % (ref 0–0.9)
LYMPHOCYTES # BLD AUTO: 0.66 X10*3/UL (ref 0.8–3)
LYMPHOCYTES NFR BLD AUTO: 22.2 %
MAGNESIUM SERPL-MCNC: 1.95 MG/DL (ref 1.6–2.4)
MCH RBC QN AUTO: 32.9 PG (ref 26–34)
MCHC RBC AUTO-ENTMCNC: 33.1 G/DL (ref 32–36)
MCV RBC AUTO: 99 FL (ref 80–100)
MONOCYTES # BLD AUTO: 0.24 X10*3/UL (ref 0.05–0.8)
MONOCYTES NFR BLD AUTO: 8.1 %
NEUTROPHILS # BLD AUTO: 1.94 X10*3/UL (ref 1.6–5.5)
NEUTROPHILS NFR BLD AUTO: 65.3 %
NRBC BLD-RTO: 0 /100 WBCS (ref 0–0)
PHOSPHATE SERPL-MCNC: 3 MG/DL (ref 2.5–4.9)
PLATELET # BLD AUTO: 218 X10*3/UL (ref 150–450)
POTASSIUM SERPL-SCNC: 4.2 MMOL/L (ref 3.5–5.3)
PROT SERPL-MCNC: 5.4 G/DL (ref 6.4–8.2)
PROT SERPL-MCNC: 5.7 G/DL (ref 6.4–8.2)
RBC # BLD AUTO: 2.86 X10*6/UL (ref 4–5.2)
SODIUM SERPL-SCNC: 142 MMOL/L (ref 136–145)
WBC # BLD AUTO: 3 X10*3/UL (ref 4.4–11.3)

## 2023-12-26 PROCEDURE — 2500000004 HC RX 250 GENERAL PHARMACY W/ HCPCS (ALT 636 FOR OP/ED): Performed by: INTERNAL MEDICINE

## 2023-12-26 PROCEDURE — 85025 COMPLETE CBC W/AUTO DIFF WBC: CPT

## 2023-12-26 PROCEDURE — 96360 HYDRATION IV INFUSION INIT: CPT | Mod: INF

## 2023-12-26 PROCEDURE — 84165 PROTEIN E-PHORESIS SERUM: CPT | Performed by: STUDENT IN AN ORGANIZED HEALTH CARE EDUCATION/TRAINING PROGRAM

## 2023-12-26 PROCEDURE — 2500000001 HC RX 250 WO HCPCS SELF ADMINISTERED DRUGS (ALT 637 FOR MEDICARE OP): Performed by: INTERNAL MEDICINE

## 2023-12-26 PROCEDURE — 83735 ASSAY OF MAGNESIUM: CPT

## 2023-12-26 PROCEDURE — 80053 COMPREHEN METABOLIC PANEL: CPT

## 2023-12-26 PROCEDURE — 84155 ASSAY OF PROTEIN SERUM: CPT | Mod: 59

## 2023-12-26 PROCEDURE — 1160F RVW MEDS BY RX/DR IN RCRD: CPT | Performed by: PHYSICIAN ASSISTANT

## 2023-12-26 PROCEDURE — 96401 CHEMO ANTI-NEOPL SQ/IM: CPT

## 2023-12-26 PROCEDURE — 82784 ASSAY IGA/IGD/IGG/IGM EACH: CPT

## 2023-12-26 PROCEDURE — 1125F AMNT PAIN NOTED PAIN PRSNT: CPT | Performed by: PHYSICIAN ASSISTANT

## 2023-12-26 PROCEDURE — 96361 HYDRATE IV INFUSION ADD-ON: CPT | Mod: INF

## 2023-12-26 PROCEDURE — 99417 PROLNG OP E/M EACH 15 MIN: CPT | Performed by: PHYSICIAN ASSISTANT

## 2023-12-26 PROCEDURE — 36415 COLL VENOUS BLD VENIPUNCTURE: CPT

## 2023-12-26 PROCEDURE — 1036F TOBACCO NON-USER: CPT | Performed by: PHYSICIAN ASSISTANT

## 2023-12-26 PROCEDURE — 99215 OFFICE O/P EST HI 40 MIN: CPT | Mod: 25 | Performed by: PHYSICIAN ASSISTANT

## 2023-12-26 PROCEDURE — 83521 IG LIGHT CHAINS FREE EACH: CPT

## 2023-12-26 PROCEDURE — 84165 PROTEIN E-PHORESIS SERUM: CPT

## 2023-12-26 PROCEDURE — 84100 ASSAY OF PHOSPHORUS: CPT

## 2023-12-26 PROCEDURE — 96365 THER/PROPH/DIAG IV INF INIT: CPT | Mod: INF

## 2023-12-26 PROCEDURE — 1159F MED LIST DOCD IN RCRD: CPT | Performed by: PHYSICIAN ASSISTANT

## 2023-12-26 RX ORDER — EPINEPHRINE 0.3 MG/.3ML
0.3 INJECTION SUBCUTANEOUS EVERY 5 MIN PRN
Status: CANCELLED | OUTPATIENT
Start: 2024-03-19

## 2023-12-26 RX ORDER — DIPHENHYDRAMINE HYDROCHLORIDE 50 MG/ML
50 INJECTION INTRAMUSCULAR; INTRAVENOUS AS NEEDED
Status: CANCELLED | OUTPATIENT
Start: 2024-02-26

## 2023-12-26 RX ORDER — FAMOTIDINE 10 MG/ML
20 INJECTION INTRAVENOUS ONCE AS NEEDED
Status: DISCONTINUED | OUTPATIENT
Start: 2023-12-26 | End: 2023-12-26 | Stop reason: HOSPADM

## 2023-12-26 RX ORDER — AMLODIPINE BESYLATE 5 MG/1
5 TABLET ORAL DAILY
Qty: 90 TABLET | Refills: 0 | Status: SHIPPED | OUTPATIENT
Start: 2023-12-26 | End: 2024-04-10 | Stop reason: SDUPTHER

## 2023-12-26 RX ORDER — ALBUTEROL SULFATE 0.83 MG/ML
3 SOLUTION RESPIRATORY (INHALATION) AS NEEDED
Status: DISCONTINUED | OUTPATIENT
Start: 2023-12-26 | End: 2023-12-26 | Stop reason: HOSPADM

## 2023-12-26 RX ORDER — DIPHENHYDRAMINE HYDROCHLORIDE 50 MG/ML
50 INJECTION INTRAMUSCULAR; INTRAVENOUS AS NEEDED
Status: CANCELLED | OUTPATIENT
Start: 2024-03-19

## 2023-12-26 RX ORDER — EPINEPHRINE 0.3 MG/.3ML
0.3 INJECTION SUBCUTANEOUS EVERY 5 MIN PRN
Status: CANCELLED | OUTPATIENT
Start: 2024-02-26

## 2023-12-26 RX ORDER — ALBUTEROL SULFATE 0.83 MG/ML
3 SOLUTION RESPIRATORY (INHALATION) AS NEEDED
Status: CANCELLED | OUTPATIENT
Start: 2024-02-26

## 2023-12-26 RX ORDER — HEPARIN 100 UNIT/ML
500 SYRINGE INTRAVENOUS AS NEEDED
OUTPATIENT
Start: 2023-12-26

## 2023-12-26 RX ORDER — FAMOTIDINE 10 MG/ML
20 INJECTION INTRAVENOUS ONCE AS NEEDED
Status: CANCELLED | OUTPATIENT
Start: 2024-03-19

## 2023-12-26 RX ORDER — ALBUTEROL SULFATE 0.83 MG/ML
3 SOLUTION RESPIRATORY (INHALATION) AS NEEDED
Status: CANCELLED | OUTPATIENT
Start: 2024-03-19

## 2023-12-26 RX ORDER — EPINEPHRINE 0.3 MG/.3ML
0.3 INJECTION SUBCUTANEOUS EVERY 5 MIN PRN
Status: DISCONTINUED | OUTPATIENT
Start: 2023-12-26 | End: 2023-12-26 | Stop reason: HOSPADM

## 2023-12-26 RX ORDER — DIPHENHYDRAMINE HCL 50 MG
50 CAPSULE ORAL ONCE
Status: COMPLETED | OUTPATIENT
Start: 2023-12-26 | End: 2023-12-26

## 2023-12-26 RX ORDER — DIPHENHYDRAMINE HYDROCHLORIDE 50 MG/ML
50 INJECTION INTRAMUSCULAR; INTRAVENOUS AS NEEDED
Status: DISCONTINUED | OUTPATIENT
Start: 2023-12-26 | End: 2023-12-26 | Stop reason: HOSPADM

## 2023-12-26 RX ORDER — DIPHENHYDRAMINE HCL 50 MG
50 CAPSULE ORAL ONCE
Status: CANCELLED | OUTPATIENT
Start: 2024-02-26

## 2023-12-26 RX ORDER — ACETAMINOPHEN 325 MG/1
650 TABLET ORAL ONCE
Status: CANCELLED | OUTPATIENT
Start: 2024-02-26

## 2023-12-26 RX ORDER — ACETAMINOPHEN 325 MG/1
650 TABLET ORAL ONCE
Status: COMPLETED | OUTPATIENT
Start: 2023-12-26 | End: 2023-12-26

## 2023-12-26 RX ORDER — FAMOTIDINE 10 MG/ML
20 INJECTION INTRAVENOUS ONCE AS NEEDED
Status: CANCELLED | OUTPATIENT
Start: 2024-02-26

## 2023-12-26 RX ORDER — MONTELUKAST SODIUM 10 MG/1
10 TABLET ORAL ONCE
Status: COMPLETED | OUTPATIENT
Start: 2023-12-26 | End: 2023-12-26

## 2023-12-26 RX ORDER — HEPARIN SODIUM,PORCINE/PF 10 UNIT/ML
50 SYRINGE (ML) INTRAVENOUS AS NEEDED
OUTPATIENT
Start: 2023-12-26

## 2023-12-26 RX ORDER — MONTELUKAST SODIUM 10 MG/1
10 TABLET ORAL ONCE
Status: CANCELLED | OUTPATIENT
Start: 2024-02-26

## 2023-12-26 RX ADMIN — ZOLEDRONIC ACID 3.3 MG: 4 INJECTION, SOLUTION, CONCENTRATE INTRAVENOUS at 12:40

## 2023-12-26 RX ADMIN — SODIUM CHLORIDE 500 ML: 9 INJECTION, SOLUTION INTRAVENOUS at 12:07

## 2023-12-26 RX ADMIN — MONTELUKAST 10 MG: 10 TABLET, FILM COATED ORAL at 10:35

## 2023-12-26 RX ADMIN — DEXAMETHASONE 20 MG: 6 TABLET ORAL at 10:35

## 2023-12-26 RX ADMIN — DIPHENHYDRAMINE HYDROCHLORIDE 50 MG: 50 CAPSULE ORAL at 10:32

## 2023-12-26 RX ADMIN — DARATUMUMAB AND HYALURONIDASE-FIHJ (HUMAN RECOMBINANT) 1800 MG: 1800; 30000 INJECTION SUBCUTANEOUS at 12:09

## 2023-12-26 RX ADMIN — ACETAMINOPHEN 650 MG: 325 TABLET ORAL at 10:35

## 2023-12-26 ASSESSMENT — ENCOUNTER SYMPTOMS
BACK PAIN: 1
CONSTIPATION: 1
FLANK PAIN: 1

## 2023-12-26 ASSESSMENT — PAIN SCALES - GENERAL: PAINLEVEL: 3

## 2023-12-26 NOTE — ASSESSMENT & PLAN NOTE
- Continue zoledronic acid 3.3 mg (renal dosing) every 3 months, received (10/2/23), next dose today (12/26/23)   - Continue vitamin D supplement -- 2000 units PO daily

## 2023-12-26 NOTE — PROGRESS NOTES
Patient ID: Sil West is a 83 y.o. female.    Diagnosis: No matching staging information was found for the patient.,    Treatment:   Oncology History Overview Note   I. Diagnosis (1/2023):  IgG Lambda Multiple Myeloma  Presenting symptoms: Urinary incontinence and diffuse bony pain  II. Workup:  Bone Biopsy (1/6/23):  Plasma cells neoplasm  Repeat Bone Marrow Biopsy (1/26/23):  Hypercellular marrow, 80-90% plasma cells, lambda-restricted  FISH: 1q gain (high risk), trisomy 3  MRI Spine (12/18/22):  Osseous metastases, involvement in mid-cervical, mid-thoracic, and lumbar vertebral bodies, as well as the right iliac bone  PET Scan (1/23):  FDG avidity in right iliac bone, right sacral ala, left posterior 6th rib, and appendicular skeleton  Serum and Urine Studies (1/19-1/28/23):  FKLC 1.57, FLLC 2947.7, K/L ratio 0  IgG 537, IgA 221, IgM 19, b2M 23.4, Hgb 5.7  M protein IgA lambda 0.7 g/dL   U/L  Creatinine: 2.33 (peaked at 3.15)  UPEP: Monoclonal lambda light chain at 386.6 mg/24H  Hepatitis B and C negative  III. Treatment:  Radiation (2/2/23):  Right hip + T6-T8 x 5 fractions (total 2000 cGy)  Induction (12/24/22 - 2/16/23):  Bortezomib and dexamethasone + daratumumab  C1 (1/28/23): Bortezomib 1, 4, 8, 11 + daratumumab x 2 doses  C2 (2/16/23): Weekly dosing of daratumumab and bortezomib (1, 8, 15), with lenalidomide planned when renal function allows  Response Evaluation (5/25/23):  VGPR with M protein 0.1 and free lyte chain 1.37  C8 Lucina RVD (Completed 7/6/23):  Transitioned with a VGPR vs CR, ongoing multifocal bone pain  IV. Response Evaluation (7/13/23):  Marked improvement in bony lesions  Pathological fractures in right pelvis and ribs, possible infiltrate in the right lower lobe base, and hypodensity in the right thyroid gland  V. Treatment Adjustment (Cycle 9 and forward):  Velcade omitted  Transitioned to a 4-week schedule with Lucina (day 1) and Revlimid 15 mg increased from days 1-14/28 to  "days 1-21/28 days     Multiple myeloma not having achieved remission (CMS/HCC)   9/13/2023 Initial Diagnosis    Multiple myeloma not having achieved remission (CMS/HCC)     10/2/2023 -  Chemotherapy    Daratumumab, 28 Day Cycles - Maintenance         Past Medical History:   No past medical history on file.    Surgical History:     Past Surgical History:   Procedure Laterality Date    CT GUIDED PERCUTANEOUS BIOPSY BONE DEEP  1/6/2023    CT GUIDED PERCUTANEOUS BIOPSY BONE DEEP 1/6/2023 [facility]        Family History:   No family history on file.    Social History:     Social History     Tobacco Use    Smoking status: Never     Passive exposure: Never    Smokeless tobacco: Never   Vaping Use    Vaping Use: Never used   Substance Use Topics    Alcohol use: Never    Drug use: Never      -------------------------------------------------------------------------------------------------------  Subjective       The patient presents to the clinic today (12/26/23) for follow-up, C15 daratumumab, and zoledronic acid; overall, she is doing well.    She says that her back pain is normal for her - she feels a twinge in her back or her neck when she moves in certain ways. She does have some pain relief from acetaminophen, but she gets constipation from this, so she tries to limit it.    She had to have 3 crowns replaced in November. No teeth were pulled; she just had to have the old crowns pulled out a new one put in.     Denies nausea/vomiting/diarrhea. Denies constipation, except when she takes acetaminophen. She's drinking the Boost less often because her FSA card is running out, but she'll aim to increase this again after January first. Denies chest pain, dyspnea, cough, and productive cough.     She says that \"I do have a runny nose more often than I should,\" but no other respiratory symptoms. She did have epistaxis x 1, and was able to stop it okay.    Review of Systems   Gastrointestinal:  Positive for constipation " (with acetaminophen use).   Musculoskeletal:  Positive for back pain (stable, worse with activity) and flank pain (stable, worse with activity).   All other systems reviewed and are negative.     -------------------------------------------------------------------------------------------------------  Objective   BSA: There is no height or weight on file to calculate BSA.  There were no vitals taken for this visit.    Physical Exam  Constitutional:       Appearance: She is well-developed and normal weight.   HENT:      Mouth/Throat:      Mouth: Mucous membranes are moist.      Pharynx: No posterior oropharyngeal erythema.   Eyes:      General: No scleral icterus.     Extraocular Movements: Extraocular movements intact.      Pupils: Pupils are equal, round, and reactive to light.   Cardiovascular:      Rate and Rhythm: Normal rate and regular rhythm.      Heart sounds: No murmur heard.     No friction rub. No gallop.   Pulmonary:      Effort: No respiratory distress.      Breath sounds: No stridor. No wheezing, rhonchi or rales.   Abdominal:      General: There is no distension.      Palpations: There is no mass.      Tenderness: There is no abdominal tenderness. There is no guarding or rebound.   Musculoskeletal:         General: No swelling or deformity.      Right lower leg: No edema.      Left lower leg: No edema.   Lymphadenopathy:      Cervical: No cervical adenopathy.   Skin:     Findings: No lesion or rash.   Neurological:      Mental Status: She is alert.      Cranial Nerves: No cranial nerve deficit.      Motor: Weakness present.      Deep Tendon Reflexes:      Reflex Scores:       Patellar reflexes are 2+ on the right side and 2+ on the left side.     Comments: 4+/5 against resistance in hip flexors b/l -- limited by back pain, 5/5 in knee extension and dorsiflexion b/l   Psychiatric:         Mood and Affect: Mood normal.         Behavior: Behavior normal.       Performance Status:  Symptomatic; fully  ambulatory  -------------------------------------------------------------------------------------------------------  Assessment/Plan    Multiple myeloma not having achieved remission (CMS/[facility])  M-PROTEIN 1   Date Value Ref Range Status   11/27/2023 0.1 (H)   g/dL Final   10/30/2023 0.1 (H)   g/dL Final   09/07/2023 0.1 (A) Not Detected g/dL Final   08/07/2023 0.1 (A) Not Detected g/dL Final     Ig Kappa Free Light Chain   Date Value Ref Range Status   11/27/2023 1.49 0.33 - 1.94 mg/dL Final   10/30/2023 1.49 0.33 - 1.94 mg/dL Final   10/02/2023 1.36 0.33 - 1.94 mg/dL Final   09/07/2023 1.25 0.33 - 1.94 mg/dL Final     Ig Lambda Free Light Chain   Date Value Ref Range Status   11/27/2023 3.67 (H) 0.57 - 2.63 mg/dL Final   10/30/2023 3.06 (H) 0.57 - 2.63 mg/dL Final   10/02/2023 2.38 0.57 - 2.63 mg/dL Final   09/07/2023 2.11 0.57 - 2.63 mg/dL Final   - Most recent myeloma labs indicate ongoing very good partial response (VGPR) by IMWG Uniform Reponse Criteria, although there was a very slight uptick in her free lambda light chains  - Myeloma labs from today (12/26/23) are pending  - Will receive C15 daratumumab today (12/26/23, and will start C15 lenalidomide 15 mg daily today (12/26/23) as well  - Plan for C16 on 1/22/24  -Taking aspirin 81 mg daily for VTE prophylaxis during treatment with lenalidomide     - Previously, Dr. Marshall explained that, although patient is healthy for her age, that 70% of patients who are not transplant candidates are alive at 6 years after start of modern therapy and that additional agents would be available at the time of relapse.  I also explained that it would take approximately 6 months to recovery from toxicities of an autograft.  Patient and daughter were already leaning against autograft and Dr. Marshall concurred with their decision.     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 Marshall previously explained cysts are common and mostly benign    Other constipation  :: Etiology unclear, possibly due to therapy, though she says this worsens with acetaminophen  - Encouraged polyethylene glycol daily -- goal BM daily  - Largely improved when not taking acetaminophen    RTC:  - 1/22/24: Labs, Dr. Marshall, and C16 daratumumab  - Has treatment and provider appointments requested through May (!) 2024  - Adjusted requests today (12/26/23) so that not every month is an MD visit -- can also see JOJO    -------------------------------------------------------------------------------------------------------  [facility]

## 2023-12-26 NOTE — ASSESSMENT & PLAN NOTE
- On PET imaging,   - Consider ultrasound at January 2024  - Dr. Marshall previously explained cysts are common and mostly benign

## 2023-12-26 NOTE — ASSESSMENT & PLAN NOTE
M-PROTEIN 1   Date Value Ref Range Status   11/27/2023 0.1 (H)   g/dL Final   10/30/2023 0.1 (H)   g/dL Final   09/07/2023 0.1 (A) Not Detected g/dL Final   08/07/2023 0.1 (A) Not Detected g/dL Final     Ig Kappa Free Light Chain   Date Value Ref Range Status   11/27/2023 1.49 0.33 - 1.94 mg/dL Final   10/30/2023 1.49 0.33 - 1.94 mg/dL Final   10/02/2023 1.36 0.33 - 1.94 mg/dL Final   09/07/2023 1.25 0.33 - 1.94 mg/dL Final     Ig Lambda Free Light Chain   Date Value Ref Range Status   11/27/2023 3.67 (H) 0.57 - 2.63 mg/dL Final   10/30/2023 3.06 (H) 0.57 - 2.63 mg/dL Final   10/02/2023 2.38 0.57 - 2.63 mg/dL Final   09/07/2023 2.11 0.57 - 2.63 mg/dL Final   - Most recent myeloma labs indicate ongoing very good partial response (VGPR) by IMWG Uniform Reponse Criteria, although there was a very slight uptick in her free lambda light chains  - Myeloma labs from today (12/26/23) are pending  - Will receive C15 daratumumab today (12/26/23, and will start C15 lenalidomide 15 mg daily today (12/26/23) as well  - Plan for C16 on 1/22/24  -Taking aspirin 81 mg daily for VTE prophylaxis during treatment with lenalidomide     - Previously, Dr. Marshall explained that, although patient is healthy for her age, that 70% of patients who are not transplant candidates are alive at 6 years after start of modern therapy and that additional agents would be available at the time of relapse.  I also explained that it would take approximately 6 months to recovery from toxicities of an autograft.  Patient and daughter were already leaning against autograft and Dr. Marshall concurred with their decision.

## 2023-12-26 NOTE — ASSESSMENT & PLAN NOTE
:: Etiology unclear, possibly due to therapy, though she says this worsens with acetaminophen  - Encouraged polyethylene glycol daily -- goal BM daily  - Largely improved when not taking acetaminophen

## 2023-12-27 LAB
KAPPA LC SERPL-MCNC: 1.46 MG/DL (ref 0.33–1.94)
KAPPA LC/LAMBDA SER: 0.27 {RATIO} (ref 0.26–1.65)
LAMBDA LC SERPL-MCNC: 5.38 MG/DL (ref 0.57–2.63)

## 2023-12-28 LAB
ALBUMIN: 3.5 G/DL (ref 3.4–5)
ALPHA 1 GLOBULIN: 0.3 G/DL (ref 0.2–0.6)
ALPHA 2 GLOBULIN: 0.7 G/DL (ref 0.4–1.1)
BETA GLOBULIN: 0.6 G/DL (ref 0.5–1.2)
GAMMA GLOBULIN: 0.3 G/DL (ref 0.5–1.4)
M-PROTEIN 1: 0.1 G/DL
PATH REVIEW-SERUM PROTEIN ELECTROPHORESIS: ABNORMAL
PROTEIN ELECTROPHORESIS COMMENT: ABNORMAL

## 2024-01-03 DIAGNOSIS — C90.00 MULTIPLE MYELOMA NOT HAVING ACHIEVED REMISSION (MULTI): ICD-10-CM

## 2024-01-03 RX ORDER — LENALIDOMIDE 15 MG/1
15 CAPSULE ORAL DAILY
Qty: 21 CAPSULE | Refills: 0 | Status: SHIPPED
Start: 2024-01-03 | End: 2024-01-04 | Stop reason: SDUPTHER

## 2024-01-03 NOTE — PROGRESS NOTES
ONCOLOGY MEDICATION REFILL NOTE:    Sil West had a patient care encounter with Placido Tran PA-C  on 23 for management of their multiple myeloma.    Current Outpatient Medications on File Prior to Visit   Medication Sig Dispense Refill    acetaminophen (TylenoL) 325 mg tablet Take 2 tablets (650 mg) by mouth every 4 hours if needed.      [] acyclovir (Zovirax) 400 mg tablet Take 1 tablet (400 mg) by mouth 2 times a day. 180 tablet 0    amLODIPine (Norvasc) 5 mg tablet Take 1 tablet (5 mg) by mouth once daily. 90 tablet 0    aspirin 81 mg EC tablet Take 1 tablet (81 mg) by mouth once daily.      atorvastatin (Lipitor) 20 mg tablet Take 1 tablet (20 mg) by mouth once daily.      cholecalciferol, vitamin D3, 25 mcg (1,000 unit) tablet,chewable Chew 2 tablets (50 mcg) once daily.      lenalidomide (Revlimid) 15 mg capsule Take 1 capsule (15 mg total) by mouth once daily.  Take whole with water. Do not break, chew, or open. Take daily on days 1-21 of 28 day cycle. 21 capsule 0     No current facility-administered medications on file prior to visit.          Impression/ Plan:    Ms. West continues to do well on her maintenance Lucina/ Domenico. Scr normalizing, CBCd WNL. KFLC are up slightly, but other myeloma markers remain stable. Pt takes aspirin for clot prevention.    Per Dr. Carmencita Marshall authorization, on 24 I refilled lenalidomide 15mg PO daily on days 1-.  QTY 21 No refills. Authorization # [id number] obtained, [facility].

## 2024-01-04 DIAGNOSIS — C90.00 MULTIPLE MYELOMA NOT HAVING ACHIEVED REMISSION (MULTI): ICD-10-CM

## 2024-01-04 RX ORDER — LENALIDOMIDE 15 MG/1
15 CAPSULE ORAL DAILY
Qty: 21 CAPSULE | Refills: 0 | Status: SHIPPED | OUTPATIENT
Start: 2024-01-04 | End: 2024-02-01 | Stop reason: SDUPTHER

## 2024-01-10 ENCOUNTER — TELEPHONE (OUTPATIENT)
Dept: ADMISSION | Facility: HOSPITAL | Age: 84
End: 2024-01-10
Payer: MEDICARE

## 2024-01-10 DIAGNOSIS — C90.00 MULTIPLE MYELOMA NOT HAVING ACHIEVED REMISSION (MULTI): Primary | ICD-10-CM

## 2024-01-10 RX ORDER — ACYCLOVIR 400 MG/1
400 TABLET ORAL 2 TIMES DAILY
Qty: 180 TABLET | Refills: 0 | Status: SHIPPED | OUTPATIENT
Start: 2024-01-10 | End: 2024-04-10 | Stop reason: SDUPTHER

## 2024-01-10 RX ORDER — ACYCLOVIR 400 MG/1
400 TABLET ORAL 2 TIMES DAILY
COMMUNITY
End: 2024-01-10 | Stop reason: SDUPTHER

## 2024-01-21 ASSESSMENT — ENCOUNTER SYMPTOMS
FLANK PAIN: 1
BACK PAIN: 1
CONSTIPATION: 1

## 2024-01-21 NOTE — PROGRESS NOTES
Patient ID: Sil West is a 83 y.o. female.    Diagnosis: No matching staging information was found for the patient.,    Treatment:   Oncology History Overview Note   I. Diagnosis (1/2023):  IgG Lambda Multiple Myeloma  Presenting symptoms: Urinary incontinence and diffuse bony pain  II. Workup:  Bone Biopsy (1/6/23):  Plasma cells neoplasm  Repeat Bone Marrow Biopsy (1/26/23):  Hypercellular marrow, 80-90% plasma cells, lambda-restricted  FISH: 1q gain (high risk), trisomy 3  MRI Spine (12/18/22):  Osseous metastases, involvement in mid-cervical, mid-thoracic, and lumbar vertebral bodies, as well as the right iliac bone  PET Scan (1/23):  FDG avidity in right iliac bone, right sacral ala, left posterior 6th rib, and appendicular skeleton  Serum and Urine Studies (1/19-1/28/23):  FKLC 1.57, FLLC 2947.7, K/L ratio 0  IgG 537, IgA 221, IgM 19, b2M 23.4, Hgb 5.7  M protein IgA lambda 0.7 g/dL   U/L  Creatinine: 2.33 (peaked at 3.15)  UPEP: Monoclonal lambda light chain at 386.6 mg/24H  Hepatitis B and C negative  III. Treatment:  Radiation (2/2/23):  Right hip + T6-T8 x 5 fractions (total 2000 cGy)  Induction (12/24/22 - 2/16/23):  Bortezomib and dexamethasone + daratumumab  C1 (1/28/23): Bortezomib 1, 4, 8, 11 + daratumumab x 2 doses  C2 (2/16/23): Weekly dosing of daratumumab and bortezomib (1, 8, 15), with lenalidomide planned when renal function allows  Response Evaluation (5/25/23):  VGPR with M protein 0.1 and free lyte chain 1.37  C8 Lucina RVD (Completed 7/6/23):  Transitioned with a VGPR vs CR, ongoing multifocal bone pain  IV. Response Evaluation (7/13/23):  Marked improvement in bony lesions  Pathological fractures in right pelvis and ribs, possible infiltrate in the right lower lobe base, and hypodensity in the right thyroid gland  V. Treatment Adjustment (Cycle 9 and forward):  Velcade omitted  Transitioned to a 4-week schedule with Lucina (day 1) and Revlimid 15 mg increased from days 1-14/28 to  days 1-21/28 days     Multiple myeloma not having achieved remission (CMS/HCC)   9/13/2023 Initial Diagnosis    Multiple myeloma not having achieved remission (CMS/HCC)     10/2/2023 -  Chemotherapy    Daratumumab, 28 Day Cycles - Maintenance         Past Medical History:   No past medical history on file.    Surgical History:     Past Surgical History:   Procedure Laterality Date    CT GUIDED PERCUTANEOUS BIOPSY BONE DEEP  1/6/2023    CT GUIDED PERCUTANEOUS BIOPSY BONE DEEP 1/6/2023 [facility]        Family History:   No family history on file.    Social History:     Social History     Tobacco Use    Smoking status: Never     Passive exposure: Never    Smokeless tobacco: Never   Vaping Use    Vaping Use: Never used   Substance Use Topics    Alcohol use: Never    Drug use: Never      -------------------------------------------------------------------------------------------------------  Subjective       The patient presents to the clinic today (01/22/24) for follow-up and cycle 16 of monthly  daratumumab; overall, she is doing okay.    She says that her  passed away on 10/24/23, and she is straining a little under the weight of caring for all 6 of her cats.  She is always struggling under mounds of paperwork.  Her children are checking on her frequently. She has some lower right back pain not as severe as before.    She is potentially interested in doing physical therapy or aquatic therapy. She thinks that the [facility] seniors times.         Review of Systems   Gastrointestinal:  Positive for constipation.   Musculoskeletal:  Positive for back pain and flank pain.      -------------------------------------------------------------------------------------------------------  Objective   BSA: 1.71 meters squared  /58   Pulse 77   Temp 37.2 °C (99 °F)   Resp 17   Wt 67.1 kg (147 lb 14.9 oz)   SpO2 100%   BMI 27.54 kg/m²     Physical Exam  Constitutional:       Appearance: Normal  appearance. She is well-developed and normal weight.      Comments: Looks very well   HENT:      Head: Normocephalic and atraumatic.      Nose: Nose normal.      Mouth/Throat:      Mouth: Mucous membranes are moist.      Pharynx: No posterior oropharyngeal erythema.   Eyes:      General: No scleral icterus.     Extraocular Movements: Extraocular movements intact.      Conjunctiva/sclera: Conjunctivae normal.      Pupils: Pupils are equal, round, and reactive to light.   Cardiovascular:      Rate and Rhythm: Normal rate and regular rhythm.      Heart sounds: No murmur heard.     No friction rub. No gallop.   Pulmonary:      Effort: No respiratory distress.      Breath sounds: Normal breath sounds. No stridor. No wheezing, rhonchi or rales.   Abdominal:      General: Abdomen is flat. Bowel sounds are normal. There is no distension.      Palpations: Abdomen is soft. There is no mass.      Tenderness: There is no abdominal tenderness. There is no guarding or rebound.   Musculoskeletal:         General: No swelling or deformity. Normal range of motion.      Right lower leg: No edema.      Left lower leg: No edema.      Comments: No spine tenderness   Lymphadenopathy:      Cervical: No cervical adenopathy.   Skin:     General: Skin is warm and dry.      Findings: No lesion or rash.   Neurological:      General: No focal deficit present.      Mental Status: She is alert and oriented to person, place, and time.      Cranial Nerves: No cranial nerve deficit.      Motor: No weakness.      Comments: Normal strength and gait   Psychiatric:         Mood and Affect: Mood normal.         Behavior: Behavior normal.         Thought Content: Thought content normal.         Performance Status:  Symptomatic; fully ambulatory  -------------------------------------------------------------------------------------------------------  No problem-specific Assessment & Plan notes found for this encounter.      Multiple myeloma not having  achieved remission (CMS/[facility])           Ig Lambda Free Light Chain   Date Value Ref Range Status   12/26/23 5.38     11/27/2023 3.67 (H) 0.57 - 2.63 mg/dL Final   10/30/2023 3.06 (H) 0.57 - 2.63 mg/dL Final   10/02/2023 2.38 0.57 - 2.63 mg/dL Final   09/07/2023 2.11 0.57 - 2.63 mg/dL Final   - Most recent myeloma labs indicate ongoing very good partial response (VGPR) by IMWG Uniform Reponse Criteria, although there was a very slight uptick in her free lambda light chains  -  Will receive C16 daratumumab today and  lenalidomide 15 mg daily today (12/26/23) as well  - Plan for C16 on 1/22/24  -Taking aspirin 81 mg daily for VTE prophylaxis during treatment with lenalidomide      Await free lyte chain testing from today     At risk for osteopenia  - Continue zoledronic acid 3.3 mg (renal dosing) every 3 months, received (10/2/23), next dose end of March.  - Continue vitamin D supplement -- 2000 units PO daily      Immunosuppressed due to chemotherapy (CMS/[facility])  - VZV: Continue acyclovir 400 mg PO BID     Thyroid nodule  - On PET imaging,   - Ordered ultrasound at January 2024  - Dr. Marshall previously explained cysts are common and mostly benign     Right lower back pain- seems musculoskeletal, last PET in 7/2023       RTC:  - 2/19/24/24: Labs, Dr. Marshall, and [id number]  - Has treatment and provider appointments requested through May (!) 2024, will order thryoid ultrasound    - ------------------------------------------  Carmencita Marshall MD

## 2024-01-22 ENCOUNTER — LAB (OUTPATIENT)
Dept: LAB | Facility: HOSPITAL | Age: 84
End: 2024-01-22
Payer: MEDICARE

## 2024-01-22 ENCOUNTER — OFFICE VISIT (OUTPATIENT)
Dept: HEMATOLOGY/ONCOLOGY | Facility: HOSPITAL | Age: 84
End: 2024-01-22
Payer: MEDICARE

## 2024-01-22 ENCOUNTER — INFUSION (OUTPATIENT)
Dept: HEMATOLOGY/ONCOLOGY | Facility: HOSPITAL | Age: 84
End: 2024-01-22
Payer: MEDICARE

## 2024-01-22 VITALS
SYSTOLIC BLOOD PRESSURE: 135 MMHG | RESPIRATION RATE: 17 BRPM | DIASTOLIC BLOOD PRESSURE: 58 MMHG | WEIGHT: 147.93 LBS | OXYGEN SATURATION: 100 % | HEART RATE: 77 BPM | BODY MASS INDEX: 27.54 KG/M2 | TEMPERATURE: 99 F

## 2024-01-22 DIAGNOSIS — C90.00 MULTIPLE MYELOMA NOT HAVING ACHIEVED REMISSION (MULTI): ICD-10-CM

## 2024-01-22 DIAGNOSIS — C90.00 MULTIPLE MYELOMA NOT HAVING ACHIEVED REMISSION (MULTI): Primary | ICD-10-CM

## 2024-01-22 LAB
ALBUMIN SERPL BCP-MCNC: 4 G/DL (ref 3.4–5)
ALP SERPL-CCNC: 53 U/L (ref 33–136)
ALT SERPL W P-5'-P-CCNC: 11 U/L (ref 7–45)
ANION GAP SERPL CALC-SCNC: 10 MMOL/L (ref 10–20)
AST SERPL W P-5'-P-CCNC: 12 U/L (ref 9–39)
BASOPHILS # BLD AUTO: 0.04 X10*3/UL (ref 0–0.1)
BASOPHILS NFR BLD AUTO: 1.1 %
BILIRUB SERPL-MCNC: 0.3 MG/DL (ref 0–1.2)
BUN SERPL-MCNC: 24 MG/DL (ref 6–23)
CALCIUM SERPL-MCNC: 8.9 MG/DL (ref 8.6–10.3)
CHLORIDE SERPL-SCNC: 106 MMOL/L (ref 98–107)
CO2 SERPL-SCNC: 31 MMOL/L (ref 21–32)
CREAT SERPL-MCNC: 0.97 MG/DL (ref 0.5–1.05)
EGFRCR SERPLBLD CKD-EPI 2021: 59 ML/MIN/1.73M*2
EOSINOPHIL # BLD AUTO: 0.04 X10*3/UL (ref 0–0.4)
EOSINOPHIL NFR BLD AUTO: 1.1 %
ERYTHROCYTE [DISTWIDTH] IN BLOOD BY AUTOMATED COUNT: 14 % (ref 11.5–14.5)
GLUCOSE SERPL-MCNC: 91 MG/DL (ref 74–99)
HCT VFR BLD AUTO: 30.4 % (ref 36–46)
HGB BLD-MCNC: 10 G/DL (ref 12–16)
IGA SERPL-MCNC: 73 MG/DL (ref 70–400)
IGG SERPL-MCNC: 397 MG/DL (ref 700–1600)
IGM SERPL-MCNC: 11 MG/DL (ref 40–230)
IMM GRANULOCYTES # BLD AUTO: 0.02 X10*3/UL (ref 0–0.5)
IMM GRANULOCYTES NFR BLD AUTO: 0.5 % (ref 0–0.9)
LDH SERPL L TO P-CCNC: 115 U/L (ref 84–246)
LYMPHOCYTES # BLD AUTO: 0.78 X10*3/UL (ref 0.8–3)
LYMPHOCYTES NFR BLD AUTO: 21.4 %
MCH RBC QN AUTO: 32.6 PG (ref 26–34)
MCHC RBC AUTO-ENTMCNC: 32.9 G/DL (ref 32–36)
MCV RBC AUTO: 99 FL (ref 80–100)
MONOCYTES # BLD AUTO: 0.38 X10*3/UL (ref 0.05–0.8)
MONOCYTES NFR BLD AUTO: 10.4 %
NEUTROPHILS # BLD AUTO: 2.38 X10*3/UL (ref 1.6–5.5)
NEUTROPHILS NFR BLD AUTO: 65.5 %
NRBC BLD-RTO: 0 /100 WBCS (ref 0–0)
PLATELET # BLD AUTO: 211 X10*3/UL (ref 150–450)
POTASSIUM SERPL-SCNC: 4.4 MMOL/L (ref 3.5–5.3)
PROT SERPL-MCNC: 5.6 G/DL (ref 6.4–8.2)
PROT SERPL-MCNC: 6 G/DL (ref 6.4–8.2)
RBC # BLD AUTO: 3.07 X10*6/UL (ref 4–5.2)
SODIUM SERPL-SCNC: 143 MMOL/L (ref 136–145)
WBC # BLD AUTO: 3.6 X10*3/UL (ref 4.4–11.3)

## 2024-01-22 PROCEDURE — 2500000004 HC RX 250 GENERAL PHARMACY W/ HCPCS (ALT 636 FOR OP/ED): Performed by: INTERNAL MEDICINE

## 2024-01-22 PROCEDURE — 36415 COLL VENOUS BLD VENIPUNCTURE: CPT

## 2024-01-22 PROCEDURE — 1160F RVW MEDS BY RX/DR IN RCRD: CPT | Performed by: INTERNAL MEDICINE

## 2024-01-22 PROCEDURE — 86320 SERUM IMMUNOELECTROPHORESIS: CPT | Performed by: STUDENT IN AN ORGANIZED HEALTH CARE EDUCATION/TRAINING PROGRAM

## 2024-01-22 PROCEDURE — 86334 IMMUNOFIX E-PHORESIS SERUM: CPT

## 2024-01-22 PROCEDURE — 1126F AMNT PAIN NOTED NONE PRSNT: CPT | Performed by: INTERNAL MEDICINE

## 2024-01-22 PROCEDURE — 83615 LACTATE (LD) (LDH) ENZYME: CPT

## 2024-01-22 PROCEDURE — 96401 CHEMO ANTI-NEOPL SQ/IM: CPT

## 2024-01-22 PROCEDURE — 99214 OFFICE O/P EST MOD 30 MIN: CPT | Performed by: INTERNAL MEDICINE

## 2024-01-22 PROCEDURE — 84165 PROTEIN E-PHORESIS SERUM: CPT | Performed by: STUDENT IN AN ORGANIZED HEALTH CARE EDUCATION/TRAINING PROGRAM

## 2024-01-22 PROCEDURE — 82784 ASSAY IGA/IGD/IGG/IGM EACH: CPT

## 2024-01-22 PROCEDURE — 80053 COMPREHEN METABOLIC PANEL: CPT

## 2024-01-22 PROCEDURE — 85025 COMPLETE CBC W/AUTO DIFF WBC: CPT

## 2024-01-22 PROCEDURE — 84155 ASSAY OF PROTEIN SERUM: CPT | Mod: 91

## 2024-01-22 PROCEDURE — 84165 PROTEIN E-PHORESIS SERUM: CPT

## 2024-01-22 PROCEDURE — 83521 IG LIGHT CHAINS FREE EACH: CPT

## 2024-01-22 PROCEDURE — 96372 THER/PROPH/DIAG INJ SC/IM: CPT | Performed by: INTERNAL MEDICINE

## 2024-01-22 PROCEDURE — 2500000001 HC RX 250 WO HCPCS SELF ADMINISTERED DRUGS (ALT 637 FOR MEDICARE OP): Performed by: INTERNAL MEDICINE

## 2024-01-22 PROCEDURE — 1036F TOBACCO NON-USER: CPT | Performed by: INTERNAL MEDICINE

## 2024-01-22 RX ORDER — MONTELUKAST SODIUM 10 MG/1
10 TABLET ORAL ONCE
Status: COMPLETED | OUTPATIENT
Start: 2024-01-22 | End: 2024-01-22

## 2024-01-22 RX ORDER — EPINEPHRINE 0.3 MG/.3ML
0.3 INJECTION SUBCUTANEOUS EVERY 5 MIN PRN
Status: DISCONTINUED | OUTPATIENT
Start: 2024-01-22 | End: 2024-01-22 | Stop reason: HOSPADM

## 2024-01-22 RX ORDER — ALBUTEROL SULFATE 0.83 MG/ML
3 SOLUTION RESPIRATORY (INHALATION) AS NEEDED
Status: DISCONTINUED | OUTPATIENT
Start: 2024-01-22 | End: 2024-01-22 | Stop reason: HOSPADM

## 2024-01-22 RX ORDER — DIPHENHYDRAMINE HYDROCHLORIDE 50 MG/ML
50 INJECTION INTRAMUSCULAR; INTRAVENOUS AS NEEDED
Status: DISCONTINUED | OUTPATIENT
Start: 2024-01-22 | End: 2024-01-22 | Stop reason: HOSPADM

## 2024-01-22 RX ORDER — ACETAMINOPHEN 325 MG/1
650 TABLET ORAL ONCE
Status: COMPLETED | OUTPATIENT
Start: 2024-01-22 | End: 2024-01-22

## 2024-01-22 RX ORDER — DIPHENHYDRAMINE HCL 50 MG
50 CAPSULE ORAL ONCE
Status: COMPLETED | OUTPATIENT
Start: 2024-01-22 | End: 2024-01-22

## 2024-01-22 RX ORDER — FAMOTIDINE 10 MG/ML
20 INJECTION INTRAVENOUS ONCE AS NEEDED
Status: DISCONTINUED | OUTPATIENT
Start: 2024-01-22 | End: 2024-01-22 | Stop reason: HOSPADM

## 2024-01-22 RX ADMIN — DIPHENHYDRAMINE HYDROCHLORIDE 50 MG: 50 CAPSULE ORAL at 12:40

## 2024-01-22 RX ADMIN — ACETAMINOPHEN 650 MG: 325 TABLET ORAL at 12:39

## 2024-01-22 RX ADMIN — MONTELUKAST 10 MG: 10 TABLET, FILM COATED ORAL at 12:40

## 2024-01-22 RX ADMIN — DEXAMETHASONE 20 MG: 6 TABLET ORAL at 12:40

## 2024-01-22 RX ADMIN — DARATUMUMAB AND HYALURONIDASE-FIHJ (HUMAN RECOMBINANT) 1800 MG: 1800; 30000 INJECTION SUBCUTANEOUS at 13:21

## 2024-01-22 ASSESSMENT — PAIN SCALES - GENERAL: PAINLEVEL: 0-NO PAIN

## 2024-01-23 LAB
KAPPA LC SERPL-MCNC: 1.93 MG/DL (ref 0.33–1.94)
KAPPA LC/LAMBDA SER: 0.24 {RATIO} (ref 0.26–1.65)
LAMBDA LC SERPL-MCNC: 8.06 MG/DL (ref 0.57–2.63)

## 2024-01-27 LAB
ALBUMIN: 3.5 G/DL (ref 3.4–5)
ALPHA 1 GLOBULIN: 0.3 G/DL (ref 0.2–0.6)
ALPHA 2 GLOBULIN: 0.8 G/DL (ref 0.4–1.1)
BETA GLOBULIN: 0.7 G/DL (ref 0.5–1.2)
GAMMA GLOBULIN: 0.4 G/DL (ref 0.5–1.4)
IMMUNOFIXATION COMMENT: NORMAL
M-PROTEIN 1: 0.1 G/DL
PATH REVIEW - SERUM IMMUNOFIXATION: NORMAL
PATH REVIEW-SERUM PROTEIN ELECTROPHORESIS: ABNORMAL
PROTEIN ELECTROPHORESIS COMMENT: ABNORMAL

## 2024-02-01 ENCOUNTER — TELEPHONE (OUTPATIENT)
Dept: ADMISSION | Facility: HOSPITAL | Age: 84
End: 2024-02-01
Payer: MEDICARE

## 2024-02-01 DIAGNOSIS — C90.00 MULTIPLE MYELOMA NOT HAVING ACHIEVED REMISSION (MULTI): ICD-10-CM

## 2024-02-01 RX ORDER — LENALIDOMIDE 15 MG/1
15 CAPSULE ORAL DAILY
Qty: 21 CAPSULE | Refills: 0 | Status: SHIPPED | OUTPATIENT
Start: 2024-02-01 | End: 2024-03-07 | Stop reason: SDUPTHER

## 2024-02-01 NOTE — PROGRESS NOTES
ONCOLOGY MEDICATION REFILL NOTE:    [unfilled]  Silagus West had a patient care encounter with Dr. Carmencita Marshall  on 1/22/24 for management of their Multiple myeloma.      [unfilled]  Current Outpatient Medications on File Prior to Visit   Medication Sig Dispense Refill    acetaminophen (TylenoL) 325 mg tablet Take 2 tablets (650 mg) by mouth every 4 hours if needed.      acyclovir (Zovirax) 400 mg tablet Take 1 tablet (400 mg) by mouth 2 times a day. 180 tablet 0    amLODIPine (Norvasc) 5 mg tablet Take 1 tablet (5 mg) by mouth once daily. 90 tablet 0    aspirin 81 mg EC tablet Take 1 tablet (81 mg) by mouth once daily.      atorvastatin (Lipitor) 20 mg tablet Take 1 tablet (20 mg) by mouth once daily.      cholecalciferol, vitamin D3, 25 mcg (1,000 unit) tablet,chewable Chew 2 tablets (50 mcg) once daily.      [DISCONTINUED] lenalidomide (Revlimid) 15 mg capsule Take 1 capsule (15 mg total) by mouth once daily.  Take whole with water. Do not break, chew, or open. Take daily on days 1-21 of 28 day cycle. 21 capsule 0     No current facility-administered medications on file prior to visit.      Lab Results   Component Value Date    KAPPA 1.93 01/22/2024    KAPPA 1.46 12/26/2023    KAPPA 1.49 11/27/2023      Lab Results   Component Value Date    LAMBDA 8.06 (H) 01/22/2024      Lab Results   Component Value Date    KAPLS 0.24 (L) 01/22/2024    KAPLS 0.27 12/26/2023    KAPLS 0.41 11/27/2023      Lab Results   Component Value Date    PEAK1 0.1 (H) 01/22/2024    PEAK1 0.1 (H) 12/26/2023    PEAK1 0.1 (H) 11/27/2023          [unfilled]  Ms. West is tolerating her maintenance daratumumab and lenalidomide well. CBCd  and myeloma markers are stable. Continue current treatment plan.  Clot Prevention: Thromboprophylaxis: Low dose aspirin      Per Dr. Carmencita Marshall authorization, on 02/01/24 I refilled lenalidomide 15mg PO daily on days 1-21/28.  QTY 21 No refills. Authorization # [id number] obtained,  prescription was sent to [facility].      Alcides RaderD, [facility]  Outpatient Hematology Pharmacist  [phone]

## 2024-02-14 DIAGNOSIS — C90.00 MULTIPLE MYELOMA NOT HAVING ACHIEVED REMISSION (MULTI): Primary | ICD-10-CM

## 2024-02-14 RX ORDER — ATORVASTATIN CALCIUM 20 MG/1
20 TABLET, FILM COATED ORAL DAILY
Qty: 90 TABLET | Refills: 3 | Status: SHIPPED | OUTPATIENT
Start: 2024-02-14 | End: 2025-02-13

## 2024-02-19 ENCOUNTER — APPOINTMENT (OUTPATIENT)
Dept: HEMATOLOGY/ONCOLOGY | Facility: HOSPITAL | Age: 84
End: 2024-02-19
Payer: MEDICARE

## 2024-02-19 ENCOUNTER — OFFICE VISIT (OUTPATIENT)
Dept: HEMATOLOGY/ONCOLOGY | Facility: HOSPITAL | Age: 84
End: 2024-02-19
Payer: MEDICARE

## 2024-02-19 ENCOUNTER — HOSPITAL ENCOUNTER (OUTPATIENT)
Dept: RADIOLOGY | Facility: HOSPITAL | Age: 84
Discharge: HOME | End: 2024-02-19
Payer: MEDICARE

## 2024-02-19 ENCOUNTER — INFUSION (OUTPATIENT)
Dept: HEMATOLOGY/ONCOLOGY | Facility: HOSPITAL | Age: 84
End: 2024-02-19
Payer: MEDICARE

## 2024-02-19 ENCOUNTER — LAB (OUTPATIENT)
Dept: LAB | Facility: HOSPITAL | Age: 84
End: 2024-02-19
Payer: MEDICARE

## 2024-02-19 VITALS
RESPIRATION RATE: 18 BRPM | TEMPERATURE: 98.2 F | DIASTOLIC BLOOD PRESSURE: 59 MMHG | OXYGEN SATURATION: 100 % | SYSTOLIC BLOOD PRESSURE: 121 MMHG | WEIGHT: 139.77 LBS | BODY MASS INDEX: 26.39 KG/M2 | HEART RATE: 94 BPM | HEIGHT: 61 IN

## 2024-02-19 DIAGNOSIS — C90.00 MULTIPLE MYELOMA NOT HAVING ACHIEVED REMISSION (MULTI): Primary | ICD-10-CM

## 2024-02-19 DIAGNOSIS — C90.00 MULTIPLE MYELOMA NOT HAVING ACHIEVED REMISSION (MULTI): ICD-10-CM

## 2024-02-19 LAB
ALBUMIN SERPL BCP-MCNC: 3.8 G/DL (ref 3.4–5)
ALP SERPL-CCNC: 63 U/L (ref 33–136)
ALT SERPL W P-5'-P-CCNC: 12 U/L (ref 7–45)
ANION GAP SERPL CALC-SCNC: 12 MMOL/L (ref 10–20)
AST SERPL W P-5'-P-CCNC: 13 U/L (ref 9–39)
BASOPHILS # BLD AUTO: 0.07 X10*3/UL (ref 0–0.1)
BASOPHILS NFR BLD AUTO: 1.9 %
BILIRUB SERPL-MCNC: 0.4 MG/DL (ref 0–1.2)
BUN SERPL-MCNC: 23 MG/DL (ref 6–23)
CALCIUM SERPL-MCNC: 9.3 MG/DL (ref 8.6–10.3)
CHLORIDE SERPL-SCNC: 104 MMOL/L (ref 98–107)
CO2 SERPL-SCNC: 29 MMOL/L (ref 21–32)
CREAT SERPL-MCNC: 1.01 MG/DL (ref 0.5–1.05)
EGFRCR SERPLBLD CKD-EPI 2021: 56 ML/MIN/1.73M*2
EOSINOPHIL # BLD AUTO: 0.37 X10*3/UL (ref 0–0.4)
EOSINOPHIL NFR BLD AUTO: 10.1 %
ERYTHROCYTE [DISTWIDTH] IN BLOOD BY AUTOMATED COUNT: 13.6 % (ref 11.5–14.5)
GLUCOSE SERPL-MCNC: 137 MG/DL (ref 74–99)
HCT VFR BLD AUTO: 29.2 % (ref 36–46)
HGB BLD-MCNC: 9.8 G/DL (ref 12–16)
IMM GRANULOCYTES # BLD AUTO: 0.01 X10*3/UL (ref 0–0.5)
IMM GRANULOCYTES NFR BLD AUTO: 0.3 % (ref 0–0.9)
LYMPHOCYTES # BLD AUTO: 0.45 X10*3/UL (ref 0.8–3)
LYMPHOCYTES NFR BLD AUTO: 12.3 %
MCH RBC QN AUTO: 32.7 PG (ref 26–34)
MCHC RBC AUTO-ENTMCNC: 33.6 G/DL (ref 32–36)
MCV RBC AUTO: 97 FL (ref 80–100)
MONOCYTES # BLD AUTO: 0.35 X10*3/UL (ref 0.05–0.8)
MONOCYTES NFR BLD AUTO: 9.6 %
NEUTROPHILS # BLD AUTO: 2.4 X10*3/UL (ref 1.6–5.5)
NEUTROPHILS NFR BLD AUTO: 65.8 %
NRBC BLD-RTO: 0 /100 WBCS (ref 0–0)
PLATELET # BLD AUTO: 251 X10*3/UL (ref 150–450)
POTASSIUM SERPL-SCNC: 4.3 MMOL/L (ref 3.5–5.3)
PROT SERPL-MCNC: 5.7 G/DL (ref 6.4–8.2)
PROT SERPL-MCNC: 6.2 G/DL (ref 6.4–8.2)
RBC # BLD AUTO: 3 X10*6/UL (ref 4–5.2)
SODIUM SERPL-SCNC: 141 MMOL/L (ref 136–145)
WBC # BLD AUTO: 3.7 X10*3/UL (ref 4.4–11.3)

## 2024-02-19 PROCEDURE — 1126F AMNT PAIN NOTED NONE PRSNT: CPT | Performed by: INTERNAL MEDICINE

## 2024-02-19 PROCEDURE — 76536 US EXAM OF HEAD AND NECK: CPT

## 2024-02-19 PROCEDURE — 86320 SERUM IMMUNOELECTROPHORESIS: CPT | Performed by: STUDENT IN AN ORGANIZED HEALTH CARE EDUCATION/TRAINING PROGRAM

## 2024-02-19 PROCEDURE — 36415 COLL VENOUS BLD VENIPUNCTURE: CPT

## 2024-02-19 PROCEDURE — 1159F MED LIST DOCD IN RCRD: CPT | Performed by: INTERNAL MEDICINE

## 2024-02-19 PROCEDURE — 1036F TOBACCO NON-USER: CPT | Performed by: INTERNAL MEDICINE

## 2024-02-19 PROCEDURE — 85025 COMPLETE CBC W/AUTO DIFF WBC: CPT

## 2024-02-19 PROCEDURE — 84165 PROTEIN E-PHORESIS SERUM: CPT | Performed by: STUDENT IN AN ORGANIZED HEALTH CARE EDUCATION/TRAINING PROGRAM

## 2024-02-19 PROCEDURE — 99214 OFFICE O/P EST MOD 30 MIN: CPT | Performed by: INTERNAL MEDICINE

## 2024-02-19 PROCEDURE — 80053 COMPREHEN METABOLIC PANEL: CPT

## 2024-02-19 PROCEDURE — 76536 US EXAM OF HEAD AND NECK: CPT | Performed by: RADIOLOGY

## 2024-02-19 PROCEDURE — 83521 IG LIGHT CHAINS FREE EACH: CPT

## 2024-02-19 PROCEDURE — 86334 IMMUNOFIX E-PHORESIS SERUM: CPT

## 2024-02-19 PROCEDURE — 84155 ASSAY OF PROTEIN SERUM: CPT | Mod: 59

## 2024-02-19 PROCEDURE — 1160F RVW MEDS BY RX/DR IN RCRD: CPT | Performed by: INTERNAL MEDICINE

## 2024-02-19 RX ORDER — DIPHENHYDRAMINE HCL 50 MG
50 CAPSULE ORAL ONCE
Status: CANCELLED | OUTPATIENT
Start: 2024-04-15

## 2024-02-19 RX ORDER — EPINEPHRINE 0.3 MG/.3ML
0.3 INJECTION SUBCUTANEOUS EVERY 5 MIN PRN
Status: CANCELLED | OUTPATIENT
Start: 2024-04-15

## 2024-02-19 RX ORDER — ACETAMINOPHEN 325 MG/1
650 TABLET ORAL ONCE
Status: CANCELLED | OUTPATIENT
Start: 2024-03-18

## 2024-02-19 RX ORDER — DEXAMETHASONE 4 MG/1
20 TABLET ORAL ONCE
Status: CANCELLED | OUTPATIENT
Start: 2024-04-15

## 2024-02-19 RX ORDER — DIPHENHYDRAMINE HYDROCHLORIDE 50 MG/ML
50 INJECTION INTRAMUSCULAR; INTRAVENOUS AS NEEDED
Status: CANCELLED | OUTPATIENT
Start: 2024-03-18

## 2024-02-19 RX ORDER — DIPHENHYDRAMINE HCL 50 MG
50 CAPSULE ORAL ONCE
Status: CANCELLED | OUTPATIENT
Start: 2024-03-18

## 2024-02-19 RX ORDER — FAMOTIDINE 10 MG/ML
20 INJECTION INTRAVENOUS ONCE AS NEEDED
Status: CANCELLED | OUTPATIENT
Start: 2024-04-15

## 2024-02-19 RX ORDER — ALBUTEROL SULFATE 0.83 MG/ML
3 SOLUTION RESPIRATORY (INHALATION) AS NEEDED
Status: CANCELLED | OUTPATIENT
Start: 2024-03-18

## 2024-02-19 RX ORDER — DEXAMETHASONE 2 MG/1
TABLET ORAL
Qty: 20 TABLET | Refills: 1 | Status: SHIPPED | OUTPATIENT
Start: 2024-02-19 | End: 2024-04-15 | Stop reason: SDUPTHER

## 2024-02-19 RX ORDER — DIPHENHYDRAMINE HYDROCHLORIDE 50 MG/ML
50 INJECTION INTRAMUSCULAR; INTRAVENOUS AS NEEDED
Status: CANCELLED | OUTPATIENT
Start: 2024-04-15

## 2024-02-19 RX ORDER — DEXAMETHASONE 4 MG/1
20 TABLET ORAL ONCE
Status: CANCELLED | OUTPATIENT
Start: 2024-03-18

## 2024-02-19 RX ORDER — ACETAMINOPHEN 325 MG/1
650 TABLET ORAL ONCE
Status: CANCELLED | OUTPATIENT
Start: 2024-04-15

## 2024-02-19 RX ORDER — ALBUTEROL SULFATE 0.83 MG/ML
3 SOLUTION RESPIRATORY (INHALATION) AS NEEDED
Status: CANCELLED | OUTPATIENT
Start: 2024-04-15

## 2024-02-19 RX ORDER — EPINEPHRINE 0.3 MG/.3ML
0.3 INJECTION SUBCUTANEOUS EVERY 5 MIN PRN
Status: CANCELLED | OUTPATIENT
Start: 2024-03-18

## 2024-02-19 RX ORDER — DEXAMETHASONE 2 MG/1
TABLET ORAL
Qty: 20 TABLET | Refills: 1 | Status: SHIPPED | OUTPATIENT
Start: 2024-02-19 | End: 2024-02-19 | Stop reason: SDUPTHER

## 2024-02-19 RX ORDER — MONTELUKAST SODIUM 10 MG/1
10 TABLET ORAL ONCE
Status: CANCELLED | OUTPATIENT
Start: 2024-04-15

## 2024-02-19 RX ORDER — MONTELUKAST SODIUM 10 MG/1
10 TABLET ORAL ONCE
Status: CANCELLED | OUTPATIENT
Start: 2024-03-18

## 2024-02-19 RX ORDER — FAMOTIDINE 10 MG/ML
20 INJECTION INTRAVENOUS ONCE AS NEEDED
Status: CANCELLED | OUTPATIENT
Start: 2024-03-18

## 2024-02-19 ASSESSMENT — ENCOUNTER SYMPTOMS
CONSTIPATION: 1
FLANK PAIN: 1
BACK PAIN: 1

## 2024-02-19 NOTE — PROGRESS NOTES
"Patient ID: Sil West \"Ines" is a 83 y.o. female.    Diagnosis: No matching staging information was found for the patient.,    Treatment:   Oncology History Overview Note   I. Diagnosis (1/2023):  IgG Lambda Multiple Myeloma  Presenting symptoms: Urinary incontinence and diffuse bony pain  II. Workup:  Bone Biopsy (1/6/23):  Plasma cells neoplasm  Repeat Bone Marrow Biopsy (1/26/23):  Hypercellular marrow, 80-90% plasma cells, lambda-restricted  FISH: 1q gain (high risk), trisomy 3  MRI Spine (12/18/22):  Osseous metastases, involvement in mid-cervical, mid-thoracic, and lumbar vertebral bodies, as well as the right iliac bone  PET Scan (1/23):  FDG avidity in right iliac bone, right sacral ala, left posterior 6th rib, and appendicular skeleton  Serum and Urine Studies (1/19-1/28/23):  FKLC 1.57, FLLC 2947.7, K/L ratio 0  IgG 537, IgA 221, IgM 19, b2M 23.4, Hgb 5.7  M protein IgA lambda 0.7 g/dL   U/L  Creatinine: 2.33 (peaked at 3.15)  UPEP: Monoclonal lambda light chain at 386.6 mg/24H  Hepatitis B and C negative  III. Treatment:  Radiation (2/2/23):  Right hip + T6-T8 x 5 fractions (total 2000 cGy)  Induction (12/24/22 - 2/16/23):  Bortezomib and dexamethasone + daratumumab  C1 (1/28/23): Bortezomib 1, 4, 8, 11 + daratumumab x 2 doses  C2 (2/16/23): Weekly dosing of daratumumab and bortezomib (1, 8, 15), with lenalidomide planned when renal function allows  Response Evaluation (5/25/23):  CR with M protein 0.1 (IgG kappa c/w roscoe) and free lyte chain 1.37  C8 Roscoe RVD (Completed 7/6/23):  Transitioned with a VGPR vs CR, ongoing multifocal bone pain  IV. Response Evaluation (7/13/23):  Marked improvement in bony lesions  Pathological fractures in right pelvis and ribs, possible infiltrate in the right lower lobe base, and hypodensity in the right thyroid gland  V. Treatment Adjustment (Cycle 9 and forward): 10/2023  Velcade omitted  Transitioned to a 4-week schedule with Roscoe (day 1) and Revlimid " 15 mg days 1-21/28 days     Multiple myeloma not having achieved remission (CMS/HCC)   9/13/2023 Initial Diagnosis    Multiple myeloma not having achieved remission (CMS/HCC)     10/2/2023 -  Chemotherapy    Daratumumab, 28 Day Cycles - Maintenance         Past Medical History:   No past medical history on file.    Surgical History:     Past Surgical History:   Procedure Laterality Date    CT GUIDED PERCUTANEOUS BIOPSY BONE DEEP  1/6/2023    CT GUIDED PERCUTANEOUS BIOPSY BONE DEEP 1/6/2023 [facility]        Family History:   No family history on file.    Social History:     Social History     Tobacco Use    Smoking status: Never     Passive exposure: Never    Smokeless tobacco: Never   Vaping Use    Vaping Use: Never used   Substance Use Topics    Alcohol use: Never    Drug use: Never      -------------------------------------------------------------------------------------------------------  Subjective       The patient presents to the clinic today (02/19/24) for follow-up and cycle 17 of monthly daratumumab. On Saturday, she had a fever 100.5 at home, with some upper resp symptoms; she took tylenol and no fever since. Today, she feels well. Still has Rt lower back pain, worse with movement. No dysuria.     She says that her  passed away on 10/24/23, and she is straining a little under the weight of caring for all 6 of her cats.  She lives alone in a house and dose grocery shopping/cooking herself. Her children are checking on her frequently. She has some right lower right back pain not as severe as before.    She is doing aquatic therapy. She thinks that the [facility] has seniors times.     Review of Systems   Gastrointestinal:  Positive for constipation.   Musculoskeletal:  Positive for back pain and flank pain.      -------------------------------------------------------------------------------------------------------  Objective   BSA: There is no height or weight on file to calculate  BSA.  There were no vitals taken for this visit.    Physical Exam  Constitutional:       Appearance: Normal appearance. She is well-developed and normal weight.      Comments: Looks very well   HENT:      Head: Normocephalic and atraumatic.      Nose: Nose normal.      Mouth/Throat:      Mouth: Mucous membranes are moist.      Pharynx: No posterior oropharyngeal erythema.   Eyes:      General: No scleral icterus.     Extraocular Movements: Extraocular movements intact.      Conjunctiva/sclera: Conjunctivae normal.      Pupils: Pupils are equal, round, and reactive to light.   Cardiovascular:      Rate and Rhythm: Normal rate and regular rhythm.      Heart sounds: No murmur heard.     No friction rub. No gallop.   Pulmonary:      Effort: No respiratory distress.      Breath sounds: Normal breath sounds. No stridor. No wheezing, rhonchi or rales.   Abdominal:      General: Abdomen is flat. Bowel sounds are normal. There is no distension.      Palpations: Abdomen is soft. There is no mass.      Tenderness: There is no abdominal tenderness. There is no guarding or rebound.   Musculoskeletal:         General: No swelling or deformity. Normal range of motion.      Right lower leg: No edema.      Left lower leg: No edema.      Comments: No spine tenderness   Lymphadenopathy:      Cervical: No cervical adenopathy.   Skin:     General: Skin is warm and dry.      Findings: No lesion or rash.   Neurological:      General: No focal deficit present.      Mental Status: She is alert and oriented to person, place, and time.      Cranial Nerves: No cranial nerve deficit.      Motor: No weakness.      Comments: Normal strength and gait   Psychiatric:         Mood and Affect: Mood normal.         Behavior: Behavior normal.         Thought Content: Thought content normal.         Performance Status:  Symptomatic; fully  ambulatory  -------------------------------------------------------------------------------------------------------  No problem-specific Assessment & Plan notes found for this encounter.      Multiple myeloma not having achieved remission (CMS/[facility])  IgG lambda MM  Component      Latest Ref Rng 7/17/2023 8/7/2023 9/7/2023 10/2/2023 10/30/2023   Ig [place] Free Light Chain      0.33 - 1.94 mg/dL 0.90  1.29  1.25  1.36  1.49    Ig Lambda Free Light Chain      0.57 - 2.63 mg/dL 1.56  2.01  2.11  2.38  3.06 (H)    Kappa/Lambda Ratio      0.26 - 1.65     0.57  0.49      Component      Latest Ref Rng 11/27/2023 12/26/2023 1/22/2024   Ig [place] Free Light Chain      0.33 - 1.94 mg/dL 1.49  1.46  1.93    Ig Lambda Free Light Chain      0.57 - 2.63 mg/dL 3.67 (H)  5.38 (H)  8.06 (H)    Kappa/Lambda Ratio      0.26 - 1.65  0.41  0.27  0.24 (L)       - Currently on roscoe/John. Continued uptrending of free lambda light chain. M-protein still 0.1g IgG kappa, likely represents daratumumab. Will add weekly dex 10mg to see if this could deepen her response.   -  Will delay C17 daratumumab to next week (2/26) given recent fever on 2/17. Will also start lenalidomide 15 mg next week (2/26/24). Would not change schedule for C18, so pt will take john for 14days this cycle.   -Taking aspirin 81 mg daily for VTE prophylaxis during treatment with lenalidomide      At risk for osteopenia  - Continue zoledronic acid 3.3 mg (renal dosing) every 3 months, received (12/26/23), next dose end of March.  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 Marshall, and [id number]  - Has treatment and provider appointments requested through May (!) 2024,    -  ------------------------------------------  Carmencita Marshall MD

## 2024-02-19 NOTE — PROGRESS NOTES
Pt presents to infusion appointment for maintenance daratumumab reporting fever and runny nose last week. Reports last fever on Saturday night with a high of 100.5F and states she still has a runny nose. Also c/o right sided back pain with movement. Dr. Marshall notified, decision to hold treatment made. Pt sent to Dr. Marshall's clinic ambulatory in stable condition at 1115.

## 2024-02-20 DIAGNOSIS — E04.1 THYROID NODULE: Primary | ICD-10-CM

## 2024-02-20 LAB
KAPPA LC SERPL-MCNC: 1.45 MG/DL (ref 0.33–1.94)
KAPPA LC/LAMBDA SER: 0.13 {RATIO} (ref 0.26–1.65)
LAMBDA LC SERPL-MCNC: 11.11 MG/DL (ref 0.57–2.63)

## 2024-02-22 LAB
ALBUMIN: 3.2 G/DL (ref 3.4–5)
ALPHA 1 GLOBULIN: 0.5 G/DL (ref 0.2–0.6)
ALPHA 2 GLOBULIN: 1 G/DL (ref 0.4–1.1)
BETA GLOBULIN: 0.7 G/DL (ref 0.5–1.2)
GAMMA GLOBULIN: 0.4 G/DL (ref 0.5–1.4)
IMMUNOFIXATION COMMENT: ABNORMAL
M-PROTEIN 1: 0.1 G/DL
PATH REVIEW - SERUM IMMUNOFIXATION: ABNORMAL
PATH REVIEW-SERUM PROTEIN ELECTROPHORESIS: ABNORMAL
PROTEIN ELECTROPHORESIS COMMENT: ABNORMAL

## 2024-02-26 ENCOUNTER — LAB (OUTPATIENT)
Dept: LAB | Facility: HOSPITAL | Age: 84
End: 2024-02-26
Payer: MEDICARE

## 2024-02-26 ENCOUNTER — INFUSION (OUTPATIENT)
Dept: HEMATOLOGY/ONCOLOGY | Facility: HOSPITAL | Age: 84
End: 2024-02-26
Payer: MEDICARE

## 2024-02-26 VITALS
HEIGHT: 61 IN | SYSTOLIC BLOOD PRESSURE: 130 MMHG | RESPIRATION RATE: 18 BRPM | HEART RATE: 97 BPM | WEIGHT: 147.05 LBS | OXYGEN SATURATION: 100 % | TEMPERATURE: 98.6 F | BODY MASS INDEX: 27.76 KG/M2 | DIASTOLIC BLOOD PRESSURE: 70 MMHG

## 2024-02-26 DIAGNOSIS — C90.00 MULTIPLE MYELOMA NOT HAVING ACHIEVED REMISSION (MULTI): ICD-10-CM

## 2024-02-26 DIAGNOSIS — C90.00 MULTIPLE MYELOMA NOT HAVING ACHIEVED REMISSION (MULTI): Primary | ICD-10-CM

## 2024-02-26 LAB
ALBUMIN SERPL BCP-MCNC: 3.8 G/DL (ref 3.4–5)
ALP SERPL-CCNC: 66 U/L (ref 33–136)
ALT SERPL W P-5'-P-CCNC: 11 U/L (ref 7–45)
ANION GAP SERPL CALC-SCNC: 11 MMOL/L (ref 10–20)
AST SERPL W P-5'-P-CCNC: 12 U/L (ref 9–39)
BASOPHILS # BLD AUTO: 0.06 X10*3/UL (ref 0–0.1)
BASOPHILS NFR BLD AUTO: 1.4 %
BILIRUB SERPL-MCNC: 0.3 MG/DL (ref 0–1.2)
BUN SERPL-MCNC: 31 MG/DL (ref 6–23)
CALCIUM SERPL-MCNC: 9.3 MG/DL (ref 8.6–10.3)
CHLORIDE SERPL-SCNC: 103 MMOL/L (ref 98–107)
CO2 SERPL-SCNC: 30 MMOL/L (ref 21–32)
CREAT SERPL-MCNC: 1.02 MG/DL (ref 0.5–1.05)
EGFRCR SERPLBLD CKD-EPI 2021: 55 ML/MIN/1.73M*2
EOSINOPHIL # BLD AUTO: 0.28 X10*3/UL (ref 0–0.4)
EOSINOPHIL NFR BLD AUTO: 6.6 %
ERYTHROCYTE [DISTWIDTH] IN BLOOD BY AUTOMATED COUNT: 13.3 % (ref 11.5–14.5)
GLUCOSE SERPL-MCNC: 94 MG/DL (ref 74–99)
HCT VFR BLD AUTO: 29.9 % (ref 36–46)
HGB BLD-MCNC: 9.8 G/DL (ref 12–16)
IMM GRANULOCYTES # BLD AUTO: 0.04 X10*3/UL (ref 0–0.5)
IMM GRANULOCYTES NFR BLD AUTO: 0.9 % (ref 0–0.9)
LYMPHOCYTES # BLD AUTO: 0.71 X10*3/UL (ref 0.8–3)
LYMPHOCYTES NFR BLD AUTO: 16.6 %
MCH RBC QN AUTO: 31.6 PG (ref 26–34)
MCHC RBC AUTO-ENTMCNC: 32.8 G/DL (ref 32–36)
MCV RBC AUTO: 97 FL (ref 80–100)
MONOCYTES # BLD AUTO: 0.26 X10*3/UL (ref 0.05–0.8)
MONOCYTES NFR BLD AUTO: 6.1 %
NEUTROPHILS # BLD AUTO: 2.92 X10*3/UL (ref 1.6–5.5)
NEUTROPHILS NFR BLD AUTO: 68.4 %
NRBC BLD-RTO: 0 /100 WBCS (ref 0–0)
PLATELET # BLD AUTO: 276 X10*3/UL (ref 150–450)
POTASSIUM SERPL-SCNC: 4.3 MMOL/L (ref 3.5–5.3)
PROT SERPL-MCNC: 6.2 G/DL (ref 6.4–8.2)
RBC # BLD AUTO: 3.1 X10*6/UL (ref 4–5.2)
SODIUM SERPL-SCNC: 140 MMOL/L (ref 136–145)
WBC # BLD AUTO: 4.3 X10*3/UL (ref 4.4–11.3)

## 2024-02-26 PROCEDURE — 2500000001 HC RX 250 WO HCPCS SELF ADMINISTERED DRUGS (ALT 637 FOR MEDICARE OP): Performed by: PHYSICIAN ASSISTANT

## 2024-02-26 PROCEDURE — 96372 THER/PROPH/DIAG INJ SC/IM: CPT | Performed by: PHYSICIAN ASSISTANT

## 2024-02-26 PROCEDURE — 2500000004 HC RX 250 GENERAL PHARMACY W/ HCPCS (ALT 636 FOR OP/ED): Performed by: PHYSICIAN ASSISTANT

## 2024-02-26 PROCEDURE — 85025 COMPLETE CBC W/AUTO DIFF WBC: CPT

## 2024-02-26 PROCEDURE — 96401 CHEMO ANTI-NEOPL SQ/IM: CPT

## 2024-02-26 PROCEDURE — 2500000004 HC RX 250 GENERAL PHARMACY W/ HCPCS (ALT 636 FOR OP/ED): Mod: JZ | Performed by: PHYSICIAN ASSISTANT

## 2024-02-26 PROCEDURE — 36415 COLL VENOUS BLD VENIPUNCTURE: CPT

## 2024-02-26 PROCEDURE — 84155 ASSAY OF PROTEIN SERUM: CPT

## 2024-02-26 RX ORDER — ACETAMINOPHEN 325 MG/1
650 TABLET ORAL ONCE
Status: COMPLETED | OUTPATIENT
Start: 2024-02-26 | End: 2024-02-26

## 2024-02-26 RX ORDER — FAMOTIDINE 10 MG/ML
20 INJECTION INTRAVENOUS ONCE AS NEEDED
Status: DISCONTINUED | OUTPATIENT
Start: 2024-02-26 | End: 2024-02-26 | Stop reason: HOSPADM

## 2024-02-26 RX ORDER — EPINEPHRINE 0.3 MG/.3ML
0.3 INJECTION SUBCUTANEOUS EVERY 5 MIN PRN
Status: DISCONTINUED | OUTPATIENT
Start: 2024-02-26 | End: 2024-02-26 | Stop reason: HOSPADM

## 2024-02-26 RX ORDER — DIPHENHYDRAMINE HYDROCHLORIDE 50 MG/ML
50 INJECTION INTRAMUSCULAR; INTRAVENOUS AS NEEDED
Status: DISCONTINUED | OUTPATIENT
Start: 2024-02-26 | End: 2024-02-26 | Stop reason: HOSPADM

## 2024-02-26 RX ORDER — DIPHENHYDRAMINE HCL 50 MG
50 CAPSULE ORAL ONCE
Status: COMPLETED | OUTPATIENT
Start: 2024-02-26 | End: 2024-02-26

## 2024-02-26 RX ORDER — MONTELUKAST SODIUM 10 MG/1
10 TABLET ORAL ONCE
Status: COMPLETED | OUTPATIENT
Start: 2024-02-26 | End: 2024-02-26

## 2024-02-26 RX ORDER — ALBUTEROL SULFATE 0.83 MG/ML
3 SOLUTION RESPIRATORY (INHALATION) AS NEEDED
Status: DISCONTINUED | OUTPATIENT
Start: 2024-02-26 | End: 2024-02-26 | Stop reason: HOSPADM

## 2024-02-26 RX ADMIN — ACETAMINOPHEN 650 MG: 325 TABLET ORAL at 15:23

## 2024-02-26 RX ADMIN — DARATUMUMAB AND HYALURONIDASE-FIHJ (HUMAN RECOMBINANT) 1800 MG: 1800; 30000 INJECTION SUBCUTANEOUS at 16:12

## 2024-02-26 RX ADMIN — DIPHENHYDRAMINE HYDROCHLORIDE 50 MG: 50 CAPSULE ORAL at 15:23

## 2024-02-26 RX ADMIN — MONTELUKAST 10 MG: 10 TABLET, FILM COATED ORAL at 15:23

## 2024-02-26 RX ADMIN — DEXAMETHASONE 20 MG: 6 TABLET ORAL at 15:23

## 2024-02-26 NOTE — PROGRESS NOTES
Patient arrived ambulatory to infusion for scheduled tx of C6D1 datatumumab. Denies any new or worsening sx, feeling much better from last week and has been afebrile. Tolerated injection into LLQ without issue. Discharged in stable condition.

## 2024-02-27 ENCOUNTER — TELEPHONE (OUTPATIENT)
Dept: HEMATOLOGY/ONCOLOGY | Facility: CLINIC | Age: 84
End: 2024-02-27
Payer: MEDICARE

## 2024-02-27 ENCOUNTER — TELEPHONE (OUTPATIENT)
Dept: HEMATOLOGY/ONCOLOGY | Facility: HOSPITAL | Age: 84
End: 2024-02-27
Payer: MEDICARE

## 2024-02-27 NOTE — TELEPHONE ENCOUNTER
Pt's daughter inquires if she should have thyroid labs done due to thyroid nodule?  She also asked if MRI thoracic spine was ordered due to patient's c/o back pain?

## 2024-03-04 ENCOUNTER — HOSPITAL ENCOUNTER (OUTPATIENT)
Dept: RADIOLOGY | Facility: HOSPITAL | Age: 84
Discharge: HOME | End: 2024-03-04
Payer: MEDICARE

## 2024-03-04 DIAGNOSIS — C90.00 MULTIPLE MYELOMA NOT HAVING ACHIEVED REMISSION (MULTI): ICD-10-CM

## 2024-03-04 PROCEDURE — 72158 MRI LUMBAR SPINE W/O & W/DYE: CPT

## 2024-03-04 PROCEDURE — 72158 MRI LUMBAR SPINE W/O & W/DYE: CPT | Performed by: STUDENT IN AN ORGANIZED HEALTH CARE EDUCATION/TRAINING PROGRAM

## 2024-03-04 PROCEDURE — 2550000001 HC RX 255 CONTRASTS: Performed by: STUDENT IN AN ORGANIZED HEALTH CARE EDUCATION/TRAINING PROGRAM

## 2024-03-04 PROCEDURE — A9575 INJ GADOTERATE MEGLUMI 0.1ML: HCPCS | Performed by: STUDENT IN AN ORGANIZED HEALTH CARE EDUCATION/TRAINING PROGRAM

## 2024-03-04 RX ORDER — GADOTERATE MEGLUMINE 376.9 MG/ML
13 INJECTION INTRAVENOUS
Status: COMPLETED | OUTPATIENT
Start: 2024-03-04 | End: 2024-03-04

## 2024-03-04 RX ADMIN — GADOTERATE MEGLUMINE 13 ML: 376.9 INJECTION INTRAVENOUS at 14:38

## 2024-03-07 DIAGNOSIS — C90.00 MULTIPLE MYELOMA NOT HAVING ACHIEVED REMISSION (MULTI): ICD-10-CM

## 2024-03-07 RX ORDER — LENALIDOMIDE 15 MG/1
15 CAPSULE ORAL DAILY
Qty: 21 CAPSULE | Refills: 0 | Status: SHIPPED | OUTPATIENT
Start: 2024-03-07 | End: 2024-04-05 | Stop reason: SDUPTHER

## 2024-03-07 NOTE — PROGRESS NOTES
"ONCOLOGY MEDICATION REFILL NOTE:    [unfilled]  Sil West \"Sherie\" had a patient care encounter with Dr. Carmencita Marshall  on 2/19/24 for management of their Multiple myeloma.      [unfilled]  Current Outpatient Medications on File Prior to Visit   Medication Sig Dispense Refill    acetaminophen (TylenoL) 325 mg tablet Take 2 tablets (650 mg) by mouth every 4 hours if needed.      acyclovir (Zovirax) 400 mg tablet Take 1 tablet (400 mg) by mouth 2 times a day. 180 tablet 0    amLODIPine (Norvasc) 5 mg tablet Take 1 tablet (5 mg) by mouth once daily. 90 tablet 0    aspirin 81 mg EC tablet Take 1 tablet (81 mg) by mouth once daily.      atorvastatin (Lipitor) 20 mg tablet Take 1 tablet (20 mg) by mouth once daily. 90 tablet 3    cholecalciferol, vitamin D3, 25 mcg (1,000 unit) tablet,chewable Chew 2 tablets (50 mcg) once daily.      dexAMETHasone (Decadron) 2 mg tablet Take 10mg weekly on weeks not receiving daratumumab 20 tablet 1    [DISCONTINUED] lenalidomide (Revlimid) 15 mg capsule Take 1 capsule (15 mg total) by mouth once daily.  Take whole with water. Do not break, chew, or open. Take daily on days 1-21 of 28 day cycle. 21 capsule 0     No current facility-administered medications on file prior to visit.      Lab Results   Component Value Date    KAPPA 1.45 02/19/2024    KAPPA 1.93 01/22/2024    KAPPA 1.46 12/26/2023      Lab Results   Component Value Date    LAMBDA 11.11 (H) 02/19/2024      Lab Results   Component Value Date    KAPLS 0.13 (L) 02/19/2024    KAPLS 0.24 (L) 01/22/2024    KAPLS 0.27 12/26/2023      Lab Results   Component Value Date    PEAK1 0.1 (H) 02/19/2024    PEAK1 0.1 (H) 01/22/2024    PEAK1 0.1 (H) 12/26/2023          [unfilled]  Ms. West is tolerating her treatment with Daratumumab and lenalidomide. Her CBCd is stable, but FLC is increasing. Adding Dexamethasone 10mg on weeks that she does not get daratumumab to help control. Continue lenalidomide.  Clot " Prevention: Thromboprophylaxis: Low dose aspirin      Per Dr. Carmencita Marshall authorization, on 03/07/24 I refilled lenalidomide 15mg PO daily on days 1-21/28.  QTY 21 No refills. Authorization # [id number] obtained, prescription was sent to [facility] specialty.      Alcides Lopez PharmD, [facility]  Outpatient Hematology Pharmacist  [phone]

## 2024-03-11 ENCOUNTER — TELEPHONE (OUTPATIENT)
Dept: ADMISSION | Facility: HOSPITAL | Age: 84
End: 2024-03-11
Payer: MEDICARE

## 2024-03-11 NOTE — TELEPHONE ENCOUNTER
"Daughter called in to review Revlimid schedule.  Per clinic note: \"Will also start lenalidomide 15 mg next week (2/26/24). Would not change schedule for C18, so pt will take john for 14days this cycle\".    Daughter confirmed yesterday was day 14 of Revlimid so Sherie is finished with current cycle/will not take anymore Revlimid.     Next FUV 3/18.  No further questions or concerns at this time.    "

## 2024-03-12 ENCOUNTER — HOSPITAL ENCOUNTER (OUTPATIENT)
Dept: RADIOLOGY | Facility: HOSPITAL | Age: 84
Discharge: HOME | End: 2024-03-12
Payer: MEDICARE

## 2024-03-12 DIAGNOSIS — C90.00 MULTIPLE MYELOMA NOT HAVING ACHIEVED REMISSION (MULTI): ICD-10-CM

## 2024-03-12 PROCEDURE — A9575 INJ GADOTERATE MEGLUMI 0.1ML: HCPCS | Performed by: INTERNAL MEDICINE

## 2024-03-12 PROCEDURE — 72157 MRI CHEST SPINE W/O & W/DYE: CPT

## 2024-03-12 PROCEDURE — 72157 MRI CHEST SPINE W/O & W/DYE: CPT | Performed by: RADIOLOGY

## 2024-03-12 PROCEDURE — 2550000001 HC RX 255 CONTRASTS: Performed by: INTERNAL MEDICINE

## 2024-03-12 RX ORDER — GADOTERATE MEGLUMINE 376.9 MG/ML
13 INJECTION INTRAVENOUS
Status: COMPLETED | OUTPATIENT
Start: 2024-03-12 | End: 2024-03-12

## 2024-03-12 RX ADMIN — GADOTERATE MEGLUMINE 13 ML: 376.9 INJECTION INTRAVENOUS at 13:40

## 2024-03-17 ASSESSMENT — ENCOUNTER SYMPTOMS
BACK PAIN: 1
FLANK PAIN: 1

## 2024-03-18 ENCOUNTER — APPOINTMENT (OUTPATIENT)
Dept: HEMATOLOGY/ONCOLOGY | Facility: HOSPITAL | Age: 84
End: 2024-03-18
Payer: MEDICARE

## 2024-03-18 ENCOUNTER — OFFICE VISIT (OUTPATIENT)
Dept: HEMATOLOGY/ONCOLOGY | Facility: HOSPITAL | Age: 84
End: 2024-03-18
Payer: MEDICARE

## 2024-03-18 ENCOUNTER — INFUSION (OUTPATIENT)
Dept: HEMATOLOGY/ONCOLOGY | Facility: HOSPITAL | Age: 84
End: 2024-03-18
Payer: MEDICARE

## 2024-03-18 ENCOUNTER — LAB (OUTPATIENT)
Dept: LAB | Facility: HOSPITAL | Age: 84
End: 2024-03-18
Payer: MEDICARE

## 2024-03-18 ENCOUNTER — APPOINTMENT (OUTPATIENT)
Dept: SURGERY | Facility: CLINIC | Age: 84
End: 2024-03-18
Payer: MEDICARE

## 2024-03-18 VITALS
DIASTOLIC BLOOD PRESSURE: 58 MMHG | BODY MASS INDEX: 26.99 KG/M2 | OXYGEN SATURATION: 100 % | WEIGHT: 145 LBS | RESPIRATION RATE: 16 BRPM | SYSTOLIC BLOOD PRESSURE: 123 MMHG | HEART RATE: 67 BPM

## 2024-03-18 DIAGNOSIS — T45.1X5A IMMUNOSUPPRESSED DUE TO CHEMOTHERAPY (MULTI): ICD-10-CM

## 2024-03-18 DIAGNOSIS — E55.9 VITAMIN D DEFICIENCY: ICD-10-CM

## 2024-03-18 DIAGNOSIS — M54.9 BACK PAIN, UNSPECIFIED BACK LOCATION, UNSPECIFIED BACK PAIN LATERALITY, UNSPECIFIED CHRONICITY: ICD-10-CM

## 2024-03-18 DIAGNOSIS — C90.00 MULTIPLE MYELOMA NOT HAVING ACHIEVED REMISSION (MULTI): ICD-10-CM

## 2024-03-18 DIAGNOSIS — D84.821 IMMUNOSUPPRESSED DUE TO CHEMOTHERAPY (MULTI): ICD-10-CM

## 2024-03-18 DIAGNOSIS — C90.00 MULTIPLE MYELOMA NOT HAVING ACHIEVED REMISSION (MULTI): Primary | ICD-10-CM

## 2024-03-18 DIAGNOSIS — L98.9 SKIN LESION OF RIGHT ARM: ICD-10-CM

## 2024-03-18 DIAGNOSIS — E04.1 THYROID NODULE: ICD-10-CM

## 2024-03-18 DIAGNOSIS — Z79.899 IMMUNOSUPPRESSED DUE TO CHEMOTHERAPY (MULTI): ICD-10-CM

## 2024-03-18 LAB
ALBUMIN SERPL BCP-MCNC: 3.9 G/DL (ref 3.4–5)
ALP SERPL-CCNC: 59 U/L (ref 33–136)
ALT SERPL W P-5'-P-CCNC: 11 U/L (ref 7–45)
ANION GAP SERPL CALC-SCNC: 12 MMOL/L (ref 10–20)
AST SERPL W P-5'-P-CCNC: 11 U/L (ref 9–39)
BASOPHILS # BLD AUTO: 0.02 X10*3/UL (ref 0–0.1)
BASOPHILS NFR BLD AUTO: 0.6 %
BILIRUB SERPL-MCNC: 0.3 MG/DL (ref 0–1.2)
BUN SERPL-MCNC: 24 MG/DL (ref 6–23)
CALCIUM SERPL-MCNC: 9.4 MG/DL (ref 8.6–10.3)
CHLORIDE SERPL-SCNC: 106 MMOL/L (ref 98–107)
CO2 SERPL-SCNC: 29 MMOL/L (ref 21–32)
CREAT SERPL-MCNC: 1.01 MG/DL (ref 0.5–1.05)
EGFRCR SERPLBLD CKD-EPI 2021: 56 ML/MIN/1.73M*2
EOSINOPHIL # BLD AUTO: 0.13 X10*3/UL (ref 0–0.4)
EOSINOPHIL NFR BLD AUTO: 4.2 %
ERYTHROCYTE [DISTWIDTH] IN BLOOD BY AUTOMATED COUNT: 13.8 % (ref 11.5–14.5)
GLUCOSE SERPL-MCNC: 100 MG/DL (ref 74–99)
HCT VFR BLD AUTO: 32.7 % (ref 36–46)
HGB BLD-MCNC: 10.6 G/DL (ref 12–16)
IMM GRANULOCYTES # BLD AUTO: 0.01 X10*3/UL (ref 0–0.5)
IMM GRANULOCYTES NFR BLD AUTO: 0.3 % (ref 0–0.9)
LYMPHOCYTES # BLD AUTO: 0.64 X10*3/UL (ref 0.8–3)
LYMPHOCYTES NFR BLD AUTO: 20.5 %
MAGNESIUM SERPL-MCNC: 2.04 MG/DL (ref 1.6–2.4)
MCH RBC QN AUTO: 31.7 PG (ref 26–34)
MCHC RBC AUTO-ENTMCNC: 32.4 G/DL (ref 32–36)
MCV RBC AUTO: 98 FL (ref 80–100)
MONOCYTES # BLD AUTO: 0.43 X10*3/UL (ref 0.05–0.8)
MONOCYTES NFR BLD AUTO: 13.8 %
NEUTROPHILS # BLD AUTO: 1.89 X10*3/UL (ref 1.6–5.5)
NEUTROPHILS NFR BLD AUTO: 60.6 %
NRBC BLD-RTO: 0 /100 WBCS (ref 0–0)
PHOSPHATE SERPL-MCNC: 3.9 MG/DL (ref 2.5–4.9)
PLATELET # BLD AUTO: 219 X10*3/UL (ref 150–450)
POTASSIUM SERPL-SCNC: 4.3 MMOL/L (ref 3.5–5.3)
PROT SERPL-MCNC: 5.8 G/DL (ref 6.4–8.2)
PROT SERPL-MCNC: 6.3 G/DL (ref 6.4–8.2)
RBC # BLD AUTO: 3.34 X10*6/UL (ref 4–5.2)
SODIUM SERPL-SCNC: 143 MMOL/L (ref 136–145)
WBC # BLD AUTO: 3.1 X10*3/UL (ref 4.4–11.3)

## 2024-03-18 PROCEDURE — 86320 SERUM IMMUNOELECTROPHORESIS: CPT | Performed by: PATHOLOGY

## 2024-03-18 PROCEDURE — 84155 ASSAY OF PROTEIN SERUM: CPT | Mod: 59

## 2024-03-18 PROCEDURE — 2500000004 HC RX 250 GENERAL PHARMACY W/ HCPCS (ALT 636 FOR OP/ED): Performed by: INTERNAL MEDICINE

## 2024-03-18 PROCEDURE — 83521 IG LIGHT CHAINS FREE EACH: CPT

## 2024-03-18 PROCEDURE — 96372 THER/PROPH/DIAG INJ SC/IM: CPT | Performed by: INTERNAL MEDICINE

## 2024-03-18 PROCEDURE — 83735 ASSAY OF MAGNESIUM: CPT

## 2024-03-18 PROCEDURE — 84165 PROTEIN E-PHORESIS SERUM: CPT | Performed by: PATHOLOGY

## 2024-03-18 PROCEDURE — 96374 THER/PROPH/DIAG INJ IV PUSH: CPT | Mod: INF

## 2024-03-18 PROCEDURE — 96401 CHEMO ANTI-NEOPL SQ/IM: CPT

## 2024-03-18 PROCEDURE — 1160F RVW MEDS BY RX/DR IN RCRD: CPT

## 2024-03-18 PROCEDURE — 99215 OFFICE O/P EST HI 40 MIN: CPT

## 2024-03-18 PROCEDURE — 85025 COMPLETE CBC W/AUTO DIFF WBC: CPT

## 2024-03-18 PROCEDURE — 1126F AMNT PAIN NOTED NONE PRSNT: CPT

## 2024-03-18 PROCEDURE — 96361 HYDRATE IV INFUSION ADD-ON: CPT | Mod: INF

## 2024-03-18 PROCEDURE — 36415 COLL VENOUS BLD VENIPUNCTURE: CPT

## 2024-03-18 PROCEDURE — 1036F TOBACCO NON-USER: CPT

## 2024-03-18 PROCEDURE — 84075 ASSAY ALKALINE PHOSPHATASE: CPT

## 2024-03-18 PROCEDURE — 2500000001 HC RX 250 WO HCPCS SELF ADMINISTERED DRUGS (ALT 637 FOR MEDICARE OP): Performed by: INTERNAL MEDICINE

## 2024-03-18 PROCEDURE — 84100 ASSAY OF PHOSPHORUS: CPT

## 2024-03-18 PROCEDURE — 1159F MED LIST DOCD IN RCRD: CPT

## 2024-03-18 PROCEDURE — 86334 IMMUNOFIX E-PHORESIS SERUM: CPT

## 2024-03-18 RX ORDER — ACETAMINOPHEN 325 MG/1
650 TABLET ORAL ONCE
Status: COMPLETED | OUTPATIENT
Start: 2024-03-18 | End: 2024-03-18

## 2024-03-18 RX ORDER — DIPHENHYDRAMINE HCL 50 MG
50 CAPSULE ORAL ONCE
Status: COMPLETED | OUTPATIENT
Start: 2024-03-18 | End: 2024-03-18

## 2024-03-18 RX ORDER — DIPHENHYDRAMINE HYDROCHLORIDE 50 MG/ML
50 INJECTION INTRAMUSCULAR; INTRAVENOUS AS NEEDED
Status: DISCONTINUED | OUTPATIENT
Start: 2024-03-18 | End: 2024-03-18 | Stop reason: HOSPADM

## 2024-03-18 RX ORDER — FAMOTIDINE 10 MG/ML
20 INJECTION INTRAVENOUS ONCE AS NEEDED
Status: CANCELLED | OUTPATIENT
Start: 2024-06-09

## 2024-03-18 RX ORDER — EPINEPHRINE 0.3 MG/.3ML
0.3 INJECTION SUBCUTANEOUS EVERY 5 MIN PRN
Status: CANCELLED | OUTPATIENT
Start: 2024-06-09

## 2024-03-18 RX ORDER — FAMOTIDINE 10 MG/ML
20 INJECTION INTRAVENOUS ONCE AS NEEDED
Status: DISCONTINUED | OUTPATIENT
Start: 2024-03-18 | End: 2024-03-18 | Stop reason: HOSPADM

## 2024-03-18 RX ORDER — MONTELUKAST SODIUM 10 MG/1
10 TABLET ORAL ONCE
Status: COMPLETED | OUTPATIENT
Start: 2024-03-18 | End: 2024-03-18

## 2024-03-18 RX ORDER — ALBUTEROL SULFATE 0.83 MG/ML
3 SOLUTION RESPIRATORY (INHALATION) AS NEEDED
Status: DISCONTINUED | OUTPATIENT
Start: 2024-03-18 | End: 2024-03-18 | Stop reason: HOSPADM

## 2024-03-18 RX ORDER — DIPHENHYDRAMINE HYDROCHLORIDE 50 MG/ML
50 INJECTION INTRAMUSCULAR; INTRAVENOUS AS NEEDED
Status: CANCELLED | OUTPATIENT
Start: 2024-06-09

## 2024-03-18 RX ORDER — ALBUTEROL SULFATE 0.83 MG/ML
3 SOLUTION RESPIRATORY (INHALATION) AS NEEDED
Status: CANCELLED | OUTPATIENT
Start: 2024-06-09

## 2024-03-18 RX ORDER — EPINEPHRINE 0.3 MG/.3ML
0.3 INJECTION SUBCUTANEOUS EVERY 5 MIN PRN
Status: DISCONTINUED | OUTPATIENT
Start: 2024-03-18 | End: 2024-03-18 | Stop reason: HOSPADM

## 2024-03-18 RX ADMIN — DIPHENHYDRAMINE HYDROCHLORIDE 50 MG: 50 CAPSULE ORAL at 14:18

## 2024-03-18 RX ADMIN — DEXAMETHASONE 20 MG: 6 TABLET ORAL at 14:18

## 2024-03-18 RX ADMIN — SODIUM CHLORIDE 500 ML: 9 INJECTION, SOLUTION INTRAVENOUS at 14:30

## 2024-03-18 RX ADMIN — ZOLEDRONIC ACID 3.3 MG: 4 INJECTION, SOLUTION, CONCENTRATE INTRAVENOUS at 14:41

## 2024-03-18 RX ADMIN — MONTELUKAST 10 MG: 10 TABLET, FILM COATED ORAL at 14:18

## 2024-03-18 RX ADMIN — ACETAMINOPHEN 650 MG: 325 TABLET ORAL at 14:18

## 2024-03-18 RX ADMIN — DARATUMUMAB AND HYALURONIDASE-FIHJ (HUMAN RECOMBINANT) 1800 MG: 1800; 30000 INJECTION SUBCUTANEOUS at 15:05

## 2024-03-18 ASSESSMENT — ENCOUNTER SYMPTOMS
PALPITATIONS: 0
WOUND: 0
NAUSEA: 0
FEVER: 0
CHILLS: 0
LIGHT-HEADEDNESS: 0
VOMITING: 0
DIARRHEA: 0
WHEEZING: 0
DIAPHORESIS: 0
UNEXPECTED WEIGHT CHANGE: 0
NUMBNESS: 1
ADENOPATHY: 0
FATIGUE: 1
COUGH: 0
CONSTIPATION: 0
BLOOD IN STOOL: 0
DYSURIA: 0
SHORTNESS OF BREATH: 0
DIZZINESS: 0
APPETITE CHANGE: 0
HEMATURIA: 0
LEG SWELLING: 0

## 2024-03-18 ASSESSMENT — PAIN SCALES - GENERAL: PAINLEVEL: 0-NO PAIN

## 2024-03-18 NOTE — SIGNIFICANT EVENT
03/18/24 1408   Prechemo Checklist   Has the patient been in the hospital, ED, or urgent care since last date of service No   Chemo/Immuno Consent Signed Yes   Protocol/Indications Verified Yes   Confirmed to previous date/time of medication Yes   Compared to previous dose Yes   All medications are dated accurately Yes   Pregnancy Test Negative Not applicable   Parameters Met Yes   BSA/Weight-Height Verified Yes   Dose Calculations Verified Yes

## 2024-03-18 NOTE — PROGRESS NOTES
"Patient ID: Sil West \"Ines" is a 83 y.o. female.    Treatment:   Oncology History Overview Note   I. Diagnosis (1/2023):  IgG Lambda Multiple Myeloma  Presenting symptoms: Urinary incontinence and diffuse bony pain  II. Workup:  Bone Biopsy (1/6/23):  Plasma cells neoplasm  Repeat Bone Marrow Biopsy (1/26/23):  Hypercellular marrow, 80-90% plasma cells, lambda-restricted  FISH: 1q gain (high risk), trisomy 3  MRI Spine (12/18/22):  Osseous metastases, involvement in mid-cervical, mid-thoracic, and lumbar vertebral bodies, as well as the right iliac bone  PET Scan (1/23):  FDG avidity in right iliac bone, right sacral ala, left posterior 6th rib, and appendicular skeleton  Serum and Urine Studies (1/19-1/28/23):  FKLC 1.57, FLLC 2947.7, K/L ratio 0  IgG 537, IgA 221, IgM 19, b2M 23.4, Hgb 5.7  M protein IgA lambda 0.7 g/dL   U/L  Creatinine: 2.33 (peaked at 3.15)  UPEP: Monoclonal lambda light chain at 386.6 mg/24H  Hepatitis B and C negative  III. Treatment:  Radiation (2/2/23):  Right hip + T6-T8 x 5 fractions (total 2000 cGy)  Induction (12/24/22 - 2/16/23):  Bortezomib and dexamethasone + daratumumab  C1 (1/28/23): Bortezomib 1, 4, 8, 11 + daratumumab x 2 doses  C2 (2/16/23): Weekly dosing of daratumumab and bortezomib (1, 8, 15), with lenalidomide planned when renal function allows  Response Evaluation (5/25/23):  CR with M protein 0.1 (IgG kappa c/w roscoe) and free lyte chain 1.37  C8 Roscoe RVD (Completed 7/6/23):  Transitioned with a VGPR vs CR, ongoing multifocal bone pain  IV. Response Evaluation (7/13/23):  Marked improvement in bony lesions  Pathological fractures in right pelvis and ribs, possible infiltrate in the right lower lobe base, and hypodensity in the right thyroid gland  V. Treatment Adjustment (Cycle 9 and forward): 10/2023  Velcade omitted  Transitioned to a 4-week schedule with Roscoe (day 1) and Revlimid 15 mg days 1-21/28 days     Multiple myeloma not having achieved remission " (CMS/[facility])   9/13/2023 Initial Diagnosis    Multiple myeloma not having achieved remission (CMS/[facility])     10/2/2023 -  Chemotherapy    Daratumumab, 28 Day Cycles - Maintenance       Past Medical History:  HTN, DLP  pre skin cancers,   diveriticulitis   peripheral artery disease not amenable to surgery    Surgical History:     Past Surgical History:   Procedure Laterality Date    CT GUIDED PERCUTANEOUS BIOPSY BONE DEEP  1/6/2023    CT GUIDED PERCUTANEOUS BIOPSY BONE DEEP 1/6/2023 [facility]        Family History:   No family history on file.    Social History:  ,  passed away on 10/24/23.  3 grown children (1 daughter, 2 sons). 2 grandchildren.  Has 6 cats.  Enjoyed skiing.  Occupation: retired .  Social History     Tobacco Use    Smoking status: Never     Passive exposure: Never    Smokeless tobacco: Never   Vaping Use    Vaping Use: Never used   Substance Use Topics    Alcohol use: Never    Drug use: Never      -------------------------------------------------------------------------------------------------------  Subjective       Sil West is a 83 year old female with IgG lambda multiple myeloma.  The patient presents to the clinic today (3/18/24) for follow-up and cycle 18 of monthly daratumumab and dose of zometa.    Sil reports she is doing okay.  Has some congestion since last Tuesday.  No fevers, chills, denies shortness of breath.  Declines wanting covid or flu testing.  Energy level is low but is getting better.  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  Appetite is good.  Feels she may have lost about 5 pounds.  Drinks boost a few times a week.  Chronic back pain to right side, takes prn tylenol.  Numbness to bilateral feet.      Review of Systems   Constitutional:  Positive for fatigue. Negative for appetite change, chills, diaphoresis, fever and unexpected weight change.   Respiratory:  Negative for  cough, shortness of breath and wheezing.    Cardiovascular:  Negative for chest pain, leg swelling and palpitations.   Gastrointestinal:  Negative for blood in stool, constipation, diarrhea, nausea and vomiting.   Genitourinary:  Negative for dysuria and hematuria.    Musculoskeletal:  Positive for back pain and flank pain. Negative for gait problem.   Skin:  Negative for rash and wound.   Neurological:  Positive for numbness. Negative for dizziness, gait problem and light-headedness.   Hematological:  Negative for adenopathy.      -------------------------------------------------------------------------------------------------------  Objective   BSA: 1.69 meters squared  /58 (BP Location: Left arm, Patient Position: Sitting, BP Cuff Size: Adult)   Pulse 67   Resp 16   Wt 65.8 kg (145 lb)   SpO2 100%   BMI 26.99 kg/m²     Physical Exam  Constitutional:       Appearance: Normal appearance. She is well-developed and normal weight.      Comments: Looks very well   HENT:      Head: Normocephalic and atraumatic.      Mouth/Throat:      Pharynx: No posterior oropharyngeal erythema.   Eyes:      General: No scleral icterus.     Pupils: Pupils are equal, round, and reactive to light.   Cardiovascular:      Rate and Rhythm: Normal rate and regular rhythm.      Pulses: Normal pulses.      Heart sounds: Normal heart sounds. No murmur heard.     No friction rub. No gallop.   Pulmonary:      Effort: Pulmonary effort is normal. No respiratory distress.      Breath sounds: Normal breath sounds. No wheezing.   Abdominal:      General: Abdomen is flat. Bowel sounds are normal. There is no distension.      Palpations: Abdomen is soft. There is no mass.      Tenderness: There is no abdominal tenderness.   Musculoskeletal:         General: No swelling. Normal range of motion.      Comments: No spine tenderness   Lymphadenopathy:      Cervical: No cervical adenopathy.      Comments: No lymphadenopathy   Skin:     General:  Skin is warm and dry.      Findings: No lesion or rash.      Comments: Irregular raised lesion to right forearm, eraser sized   Neurological:      General: No focal deficit present.      Mental Status: She is alert and oriented to person, place, and time.      Comments: Normal strength and gait   Psychiatric:         Mood and Affect: Mood normal.         Behavior: Behavior normal.         Thought Content: Thought content normal.       Performance Status:  Symptomatic; fully ambulatory  -------------------------------------------------------------------------------------------------------  Assessment and Plan:    Multiple myeloma not having achieved remission (CMS/[facility])  IgG lambda MM     Ig Kappa Free Light Chain   Date Value Ref Range Status   03/18/2024 1.02 0.33 - 1.94 mg/dL Final   02/19/2024 1.45 0.33 - 1.94 mg/dL Final   01/22/2024 1.93 0.33 - 1.94 mg/dL Final     Ig Lambda Free Light Chain   Date Value Ref Range Status   03/18/2024 18.62 (H) 0.57 - 2.63 mg/dL Final   02/19/2024 11.11 (H) 0.57 - 2.63 mg/dL Final   01/22/2024 8.06 (H) 0.57 - 2.63 mg/dL Final     Kappa/Lambda Ratio   Date Value Ref Range Status   03/18/2024 0.05 (L) 0.26 - 1.65 Final   02/19/2024 0.13 (L) 0.26 - 1.65 Final   01/22/2024 0.24 (L) 0.26 - 1.65 Final     M-PROTEIN 1   Date Value Ref Range Status   03/18/2024 0.1 (H)   g/dL Final   02/19/2024 0.1 (H)   g/dL Final   01/22/2024 0.1 (H)   g/dL Final     - Currently on daratumamab/Lenalidomide. Continued uptrending of free lambda light chain. M-protein still 0.1g IgG kappa, likely represents daratumumab. Will add weekly dex 10 mg to see if this could deepen her response.   -  Delayed C17 daratumumab by 1 week (2/26) due to fever on 2/17. Started lenalidomide 15 mg (2/26/24). Would not change schedule for C18, so pt will take john for 14 days this cycle.   3/18/24:  Continues to have increasing free lambda light chain, M-protein remains at 0.1.  Receiving C18 daratumumab today.  Continue  lenalidomide 15 mg on days 1-21 of 28 day cycle.  Continue dexamethasone 10 mg weekly on weeks not receiving daratumumab.    -Taking aspirin 81 mg daily for VTE prophylaxis during treatment with lenalidomide      At risk for osteopenia  - Continue zoledronic acid 3.3 mg (renal dosing) every 3 months, receiving dose today (3/18/24), next dose May 2024.   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  Has follow up appointment with Dr. Magallanes on 3/25/24.       Right lower back pain  - seems musculoskeletal  - MRI lumbar spine (3/4/24): degenerative changes, no evidence of progressive disease or fracture.    - MRI thoracic spine (3/12/24): degenerative changes, redemonstration of multifocal marrow signal abnormalities compatible with the given history of multiple myeloma, osseous expansion and epidural extension of neoplasm previously seen have improved, decrease in size and enhancement of previously seen lesions.      Dermatology:  3/18/24:  Noted irregular shaped lesion to right forearm.  Advised patient to follow up with dermatology to evaluate lesion.      RTC:  - 4/15/24: Follow up appointment with Dr. Marshall, blood work, and C19 daratumumab  - Has treatment and provider appointments scheduled through July 2024.  - ------------------------------------------  CHANTEL Medrano-PAUL

## 2024-03-19 ENCOUNTER — APPOINTMENT (OUTPATIENT)
Dept: HEMATOLOGY/ONCOLOGY | Facility: HOSPITAL | Age: 84
End: 2024-03-19
Payer: MEDICARE

## 2024-03-19 LAB
KAPPA LC SERPL-MCNC: 1.02 MG/DL (ref 0.33–1.94)
KAPPA LC/LAMBDA SER: 0.05 {RATIO} (ref 0.26–1.65)
LAMBDA LC SERPL-MCNC: 18.62 MG/DL (ref 0.57–2.63)

## 2024-03-21 LAB
ALBUMIN: 3.5 G/DL (ref 3.4–5)
ALPHA 1 GLOBULIN: 0.4 G/DL (ref 0.2–0.6)
ALPHA 2 GLOBULIN: 0.9 G/DL (ref 0.4–1.1)
BETA GLOBULIN: 0.7 G/DL (ref 0.5–1.2)
GAMMA GLOBULIN: 0.4 G/DL (ref 0.5–1.4)
IMMUNOFIXATION COMMENT: ABNORMAL
M-PROTEIN 1: 0.1 G/DL
PATH REVIEW - SERUM IMMUNOFIXATION: ABNORMAL
PATH REVIEW-SERUM PROTEIN ELECTROPHORESIS: ABNORMAL
PROTEIN ELECTROPHORESIS COMMENT: ABNORMAL

## 2024-03-22 PROBLEM — L98.9 SKIN LESION OF RIGHT ARM: Status: ACTIVE | Noted: 2024-03-22

## 2024-03-22 PROBLEM — M54.9 BACK PAIN: Status: ACTIVE | Noted: 2024-03-22

## 2024-03-22 PROBLEM — G89.29 OTHER CHRONIC PAIN: Status: ACTIVE | Noted: 2024-03-22

## 2024-03-25 ENCOUNTER — LAB (OUTPATIENT)
Dept: LAB | Facility: LAB | Age: 84
End: 2024-03-25
Payer: MEDICARE

## 2024-03-25 ENCOUNTER — OFFICE VISIT (OUTPATIENT)
Dept: SURGERY | Facility: CLINIC | Age: 84
End: 2024-03-25
Payer: MEDICARE

## 2024-03-25 ENCOUNTER — DOCUMENTATION (OUTPATIENT)
Dept: SURGERY | Facility: CLINIC | Age: 84
End: 2024-03-25

## 2024-03-25 VITALS
WEIGHT: 146 LBS | BODY MASS INDEX: 27.18 KG/M2 | SYSTOLIC BLOOD PRESSURE: 162 MMHG | DIASTOLIC BLOOD PRESSURE: 74 MMHG | HEART RATE: 63 BPM

## 2024-03-25 DIAGNOSIS — E04.1 THYROID NODULE: ICD-10-CM

## 2024-03-25 DIAGNOSIS — E04.2 MULTINODULAR THYROID: ICD-10-CM

## 2024-03-25 DIAGNOSIS — E04.2 MULTINODULAR THYROID: Primary | ICD-10-CM

## 2024-03-25 LAB
T4 FREE SERPL-MCNC: 1.1 NG/DL (ref 0.78–1.48)
TSH SERPL-ACNC: 1.76 MIU/L (ref 0.44–3.98)

## 2024-03-25 PROCEDURE — 99204 OFFICE O/P NEW MOD 45 MIN: CPT | Performed by: SURGERY

## 2024-03-25 PROCEDURE — 36415 COLL VENOUS BLD VENIPUNCTURE: CPT

## 2024-03-25 PROCEDURE — 84439 ASSAY OF FREE THYROXINE: CPT

## 2024-03-25 PROCEDURE — 1036F TOBACCO NON-USER: CPT | Performed by: SURGERY

## 2024-03-25 PROCEDURE — 1159F MED LIST DOCD IN RCRD: CPT | Performed by: SURGERY

## 2024-03-25 PROCEDURE — 84443 ASSAY THYROID STIM HORMONE: CPT

## 2024-03-25 PROCEDURE — 1160F RVW MEDS BY RX/DR IN RCRD: CPT | Performed by: SURGERY

## 2024-03-25 ASSESSMENT — ENCOUNTER SYMPTOMS
NECK STIFFNESS: 1
BRUISES/BLEEDS EASILY: 1
RESPIRATORY NEGATIVE: 1
NECK PAIN: 1
PSYCHIATRIC NEGATIVE: 1
NEUROLOGICAL NEGATIVE: 1
ARTHRALGIAS: 1
CARDIOVASCULAR NEGATIVE: 1
ENDOCRINE NEGATIVE: 1
EYES NEGATIVE: 1
CONSTITUTIONAL NEGATIVE: 1

## 2024-03-25 NOTE — PROGRESS NOTES
"Subjective   Patient ID: Sli West \"Ines" is a 83 y.o. female who presents for Incidentally discovered thyroid nodule.    HPI I saw Mrs. West in surgery clinic today.  She was referred by her oncologist, Dr. Marshall, for evaluation of incidentally discovered nodular thyroid disease.  Patient has evidence of multiple myeloma.  On a PET CT scan, she had a nodule noted in the right thyroid lobe.  This was negative for uptake on PET scan.  However based on the mass she was sent for a formal thyroid ultrasound completed February 2024.  This demonstrates a multinodular thyroid gland.  She has nodules ranging from 1.0 up to 3.2 cm in maximum dimension.  The largest nodule is TI-RADS 3 and at this size would meet criteria for biopsy.  None of her other nodules currently meet criteria for biopsy.    She is clinically euthyroid but has not had any recent thyroid function testing.    She denies any anterior cervical neck pain.  She does have some tightness in the posterior aspect of the left side of her neck that she thinks is skeletal muscular based.  She said that her vocal volume is a bit weaker than what she remembers in the past but no true hoarseness.  No personal history of head neck or chest radiation.  She did have radiation treatment to her left hip area related to the myeloma.    Family history no thyroid disorders no thyroid cancer.    Review of Systems   Constitutional: Negative.    Eyes: Negative.    Respiratory: Negative.     Cardiovascular: Negative.    Endocrine: Negative.    Musculoskeletal:  Positive for arthralgias, neck pain and neck stiffness.   Skin: Negative.    Neurological: Negative.    Hematological:  Bruises/bleeds easily.   Psychiatric/Behavioral: Negative.         Objective   Physical Exam  Vitals reviewed.   Constitutional:       Appearance: Normal appearance.   Eyes:      Comments: No proptosis   Neck:      Vascular: No carotid bruit.      Comments: Palpable 3 cm fullness right thyroid " lobe.  This is best appreciated upon swallowing.  Trachea remains midline.  No other palpable nodules.  Cardiovascular:      Rate and Rhythm: Normal rate and regular rhythm.      Pulses: Normal pulses.      Heart sounds: Normal heart sounds.   Pulmonary:      Effort: Pulmonary effort is normal. No respiratory distress.      Breath sounds: Normal breath sounds. No wheezing or rales.   Musculoskeletal:         General: Normal range of motion.   Lymphadenopathy:      Cervical: No cervical adenopathy.   Skin:     General: Skin is warm and dry.   Neurological:      General: No focal deficit present.      Mental Status: She is alert and oriented to person, place, and time.   Psychiatric:         Mood and Affect: Mood normal.         Behavior: Behavior normal.         US THYROID;  2/19/2024 1:14 pm      INDICATION:  Signs/Symptoms:thyroid  avid on PET scan.      COMPARISON:  PET-CT dated 07/11/2023      ACCESSION NUMBER(S):  [id number]      ORDERING CLINICIAN:  ERIKA PEÑA      TECHNIQUE:  Multiple ultrasonographic images of the thyroid gland were obtained.  This examination is interpreted at [facility].      FINDINGS:  The thyroid is enlarged and heterogeneous in appearance, with few  nodules identified.      RIGHT LOBE:  The right lobe of the thyroid measures 3.0 cm x 2.1 cm x 4.7 cm. The  right lobe of the thyroid is heterogeneous in echotexture with  nodules as described below:      Within the mid thyroid lobe there is a nodule with the following  features: Size: 3.2 x 2.7 x 1.8 cm  Composition: Solid or almost completely solid (2)  Echogenicity: Hyperechoic or isoechoic (1)  Shape: Wider-than-tall (0)  Margin: Smooth (0)  Echogenic Foci: None or Large comet-tail artifacts (0)  The total score of this nodule is 3 corresponding to a TI-RADS  category 3; Mildly suspicious. FNA is recommended if >2.5cm, Follow  up if >1.5cm in 1, 3, and 5 years.      Within the lower  thyroid lobe there is a nodule with the following  features: Size: 1.0 x 0.7 x 0.6 cm  Composition: Solid or almost completely solid (2)  Echogenicity: Hypoechoic (2)  Shape: Wider-than-tall (0)  Margin: Ill-defined (0)  Echogenic Foci: None or Large comet-tail artifacts (0)  The total score of this nodule is 4 corresponding to a TI-RADS  category 4; Moderately suspicious. FNA is recommended if >1.5cm,  Follow up if >1.0cm in 1, 2, 3, and 5 years.              LEFT LOBE:  The left lobe of the thyroid measures 1.6 cm x 1.8 cm x 4.2 cm. The  left lobe of the thyroid is homogeneous in echotexture and  demonstrates no nodules.      Within the mid thyroid lobe there is a nodule with the following  features: Size: 1.5 x 1.2 x 0.8 cm  Composition: Solid or almost completely solid (2)  Echogenicity: Hyperechoic or isoechoic (1)  Shape: Wider-than-tall (0)  Margin: Ill-defined (0)  Echogenic Foci: None or Large comet-tail artifacts (0)  The total score of this nodule is 3 corresponding to a TI-RADS  category 3; Mildly suspicious. FNA is recommended if >2.5cm, Follow  up if >1.5cm in 1, 3, and 5 years.          ISTHMUS:  The isthmus measures approximately 0.4 cm and is homogeneous in  echotexture and without any identifiable nodules.      CERVICAL LYMPH NODES:  A few non enlarged cervical lymph nodes are present with normal  appearance.      IMPRESSION:  The thyroid is enlarged in size and heterogeneous in echotexture with  2 nodules as described below:  1. Nodule in the right mid thyroid gland is TI-RADS category 3. Based  on size criteria FNA is recommended.  2. Nodule in the right lower thyroid gland is TI-RADS category 4.  Based on size criteria, follow-up in 1, 2, 3, and 5 years is  recommended.  3. Nodule in the left mid thyroid gland is TI-RADS category 3. Based  on size criteria follow-up in 1, 3, and 5 years is recommended.    Assessment/Plan    Mrs. West had incidental discovery of nodular thyroid disease during PET  scan follow-up for her multiple myeloma.  On PET scan she had a nonavid right sided thyroid nodule seen on the CT component.  I did discuss with her and her daughter that PET negative nodules are much less worrisome for cancer but cannot completely exclude cancer.    Based on that she had a follow-up ultrasound which shows a multinodular thyroid gland with a dominant 3.2 cm TI-RADS 3 nodule that would meet criteria for biopsy.  She is currently on antiplatelet therapy.  We will check with her oncologist if we could briefly interrupt this to facilitate ultrasound-guided biopsy of her nodule.    She is clinically euthyroid but has not had recent thyroid function testing.  We will update that as well.    Plan    1.  I will send her to the lab today to check a TSH and free T4 level.    2.  My nurse will check with her oncologist about briefly interrupting her antiplatelet therapy to facilitate biopsy.  We can then schedule a minor office procedure for ultrasound-guided biopsy of her right-sided thyroid nodule.    She and her daughter are comfortable with this plan.         Paul Magallanes MD 03/25/24 8:59 AM

## 2024-03-25 NOTE — PROGRESS NOTES
Plan for patient to have in office FNA of thyroid nodule. OK for patient to hold ASA 1 week prior to biopsy per Dr. Carmencita Marshall. Patient notified.

## 2024-04-03 ENCOUNTER — TELEPHONE (OUTPATIENT)
Dept: ADMISSION | Facility: HOSPITAL | Age: 84
End: 2024-04-03
Payer: MEDICARE

## 2024-04-03 NOTE — TELEPHONE ENCOUNTER
[facility] pharmacy faxed a refill request for Revlimid to the refill line.   Next MD FUV is 04/15/24.

## 2024-04-05 DIAGNOSIS — C90.00 MULTIPLE MYELOMA NOT HAVING ACHIEVED REMISSION (MULTI): ICD-10-CM

## 2024-04-05 RX ORDER — LENALIDOMIDE 15 MG/1
15 CAPSULE ORAL DAILY
Qty: 21 CAPSULE | Refills: 0 | Status: SHIPPED | OUTPATIENT
Start: 2024-04-05 | End: 2024-04-15 | Stop reason: ALTCHOICE

## 2024-04-10 ENCOUNTER — TELEPHONE (OUTPATIENT)
Dept: HEMATOLOGY/ONCOLOGY | Facility: HOSPITAL | Age: 84
End: 2024-04-10
Payer: MEDICARE

## 2024-04-10 DIAGNOSIS — I10 PRIMARY HYPERTENSION: ICD-10-CM

## 2024-04-10 DIAGNOSIS — C90.00 MULTIPLE MYELOMA NOT HAVING ACHIEVED REMISSION (MULTI): ICD-10-CM

## 2024-04-10 RX ORDER — AMLODIPINE BESYLATE 5 MG/1
5 TABLET ORAL DAILY
Qty: 90 TABLET | Refills: 3 | Status: SHIPPED | OUTPATIENT
Start: 2024-04-10 | End: 2025-04-05

## 2024-04-10 RX ORDER — ACYCLOVIR 400 MG/1
400 TABLET ORAL 2 TIMES DAILY
Qty: 180 TABLET | Refills: 3 | Status: SHIPPED | OUTPATIENT
Start: 2024-04-10

## 2024-04-14 RX ORDER — FAMOTIDINE 10 MG/ML
20 INJECTION INTRAVENOUS ONCE AS NEEDED
Status: CANCELLED | OUTPATIENT
Start: 2024-05-13

## 2024-04-14 RX ORDER — DEXAMETHASONE 4 MG/1
20 TABLET ORAL ONCE
Status: CANCELLED | OUTPATIENT
Start: 2024-05-13

## 2024-04-14 RX ORDER — DIPHENHYDRAMINE HYDROCHLORIDE 50 MG/ML
50 INJECTION INTRAMUSCULAR; INTRAVENOUS AS NEEDED
Status: CANCELLED | OUTPATIENT
Start: 2024-05-13

## 2024-04-14 RX ORDER — EPINEPHRINE 0.3 MG/.3ML
0.3 INJECTION SUBCUTANEOUS EVERY 5 MIN PRN
Status: CANCELLED | OUTPATIENT
Start: 2024-05-13

## 2024-04-14 RX ORDER — DIPHENHYDRAMINE HCL 50 MG
50 CAPSULE ORAL ONCE
Status: CANCELLED | OUTPATIENT
Start: 2024-05-13

## 2024-04-14 RX ORDER — ALBUTEROL SULFATE 0.83 MG/ML
3 SOLUTION RESPIRATORY (INHALATION) AS NEEDED
Status: CANCELLED | OUTPATIENT
Start: 2024-05-13

## 2024-04-14 RX ORDER — MONTELUKAST SODIUM 10 MG/1
10 TABLET ORAL ONCE
Status: CANCELLED | OUTPATIENT
Start: 2024-05-13

## 2024-04-14 RX ORDER — ACETAMINOPHEN 325 MG/1
650 TABLET ORAL ONCE
Status: CANCELLED | OUTPATIENT
Start: 2024-05-13

## 2024-04-14 ASSESSMENT — ENCOUNTER SYMPTOMS
WHEEZING: 0
DYSURIA: 0
CHILLS: 0
VOMITING: 0
LIGHT-HEADEDNESS: 0
COUGH: 0
HEMATURIA: 0
FLANK PAIN: 1
WOUND: 0
FEVER: 0
APPETITE CHANGE: 0
DIAPHORESIS: 0
PALPITATIONS: 0
BACK PAIN: 1
NAUSEA: 0
DIZZINESS: 0
CONSTIPATION: 0
ADENOPATHY: 0
SHORTNESS OF BREATH: 0
LEG SWELLING: 0
FATIGUE: 1
UNEXPECTED WEIGHT CHANGE: 0
NUMBNESS: 1
DIARRHEA: 0
BLOOD IN STOOL: 0

## 2024-04-14 NOTE — PROGRESS NOTES
"Patient ID: Sil West \"Ines" is a 83 y.o. female.    Treatment:   Oncology History Overview Note   I. Diagnosis (1/2023):  IgG Lambda Multiple Myeloma  Presenting symptoms: Urinary incontinence and diffuse bony pain  II. Workup:  Bone Biopsy (1/6/23):  Plasma cells neoplasm  Repeat Bone Marrow Biopsy (1/26/23):  Hypercellular marrow, 80-90% plasma cells, lambda-restricted  FISH: 1q gain (high risk), trisomy 3  MRI Spine (12/18/22):  Osseous metastases, involvement in mid-cervical, mid-thoracic, and lumbar vertebral bodies, as well as the right iliac bone  PET Scan (1/23):  FDG avidity in right iliac bone, right sacral ala, left posterior 6th rib, and appendicular skeleton  Serum and Urine Studies (1/19-1/28/23):  FKLC 1.57, FLLC 2947.7, K/L ratio 0  IgG 537, IgA 221, IgM 19, b2M 23.4, Hgb 5.7  M protein IgA lambda 0.7 g/dL   U/L  Creatinine: 2.33 (peaked at 3.15)  UPEP: Monoclonal lambda light chain at 386.6 mg/24H  Hepatitis B and C negative  III. Treatment:  Radiation (2/2/23):  Right hip + T6-T8 x 5 fractions (total 2000 cGy)  Induction (12/24/22 - 2/16/23):  Bortezomib and dexamethasone + daratumumab  C1 (1/28/23): Bortezomib 1, 4, 8, 11 + daratumumab x 2 doses  C2 (2/16/23): Weekly dosing of daratumumab and bortezomib (1, 8, 15), with lenalidomide planned when renal function allows  Response Evaluation (5/25/23):  CR with M protein 0.1 (IgG kappa c/w roscoe) and free lyte chain 1.37  C8 Roscoe RVD (Completed 7/6/23):  Transitioned with a VGPR vs CR, ongoing multifocal bone pain  IV. Response Evaluation (7/13/23):  Marked improvement in bony lesions  Pathological fractures in right pelvis and ribs, possible infiltrate in the right lower lobe base, and hypodensity in the right thyroid gland  V. Treatment Adjustment (Cycle 9 and forward): 10/2023  Velcade omitted  Transitioned to a 4-week schedule with Roscoe (day 1) and Revlimid 15 mg days 1-21/28 days     Multiple myeloma not having achieved remission " (Multi)   9/13/2023 Initial Diagnosis    Multiple myeloma not having achieved remission (CMS/HCC)     10/2/2023 -  Chemotherapy    Daratumumab, 28 Day Cycles - Maintenance       Past Medical History:  HTN, DLP  pre skin cancers,   diveriticulitis   peripheral artery disease not amenable to surgery    Surgical History:     Past Surgical History:   Procedure Laterality Date    CT GUIDED PERCUTANEOUS BIOPSY BONE DEEP  1/6/2023    CT GUIDED PERCUTANEOUS BIOPSY BONE DEEP 1/6/2023 [facility]        Family History:   No family history on file.    Social History:  ,  passed away on 10/24/23.  3 grown children (1 daughter, 2 sons). 2 grandchildren.  Has 6 cats.  Enjoyed skiing.  Occupation: retired .  Social History     Tobacco Use    Smoking status: Never     Passive exposure: Never    Smokeless tobacco: Never   Vaping Use    Vaping status: Never Used   Substance Use Topics    Alcohol use: Never    Drug use: Never      -------------------------------------------------------------------------------------------------------  Subjective       Sil Brett is a 83 year old female with IgG lambda multiple myeloma.  The patient presents to the clinic today (4/15/24) unaccompanied for follow-up and cycle 19 of monthly daratumumab and dose of zometa.    She is doing okay and has been doing a lot of yard work over the weekend. Still with a runny nose.  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 Note some ankle swelling, has been walking daughter's dog while she is out of town.        Review of Systems   Constitutional:  Positive for fatigue. Negative for appetite change, chills, diaphoresis, fever and unexpected weight change.   Respiratory:  Negative for cough, shortness of breath and wheezing.    Cardiovascular:  Negative for chest pain, leg  swelling and palpitations.   Gastrointestinal:  Negative for blood in stool, constipation, diarrhea, nausea and vomiting.   Genitourinary:  Negative for dysuria and hematuria.    Musculoskeletal:  Positive for back pain and flank pain. Negative for gait problem.   Skin:  Negative for rash and wound.   Neurological:  Positive for numbness. Negative for dizziness, gait problem and light-headedness.   Hematological:  Negative for adenopathy.      -------------------------------------------------------------------------------------------------------  Objective   BSA: There is no height or weight on file to calculate BSA.  There were no vitals taken for this visit.    Physical Exam  Constitutional:       Appearance: Normal appearance. She is well-developed and normal weight.      Comments: Looks very well   HENT:      Head: Normocephalic and atraumatic.      Mouth/Throat:      Pharynx: No posterior oropharyngeal erythema.   Eyes:      General: No scleral icterus.     Pupils: Pupils are equal, round, and reactive to light.   Cardiovascular:      Rate and Rhythm: Normal rate and regular rhythm.      Pulses: Normal pulses.      Heart sounds: Normal heart sounds. No murmur heard.     No friction rub. No gallop.   Pulmonary:      Effort: Pulmonary effort is normal. No respiratory distress.      Breath sounds: Normal breath sounds. No wheezing.   Abdominal:      General: Abdomen is flat. Bowel sounds are normal. There is no distension.      Palpations: Abdomen is soft. There is no mass.      Tenderness: There is no abdominal tenderness.   Musculoskeletal:         General: No swelling. Normal range of motion.      Comments: No spine tenderness   Lymphadenopathy:      Cervical: No cervical adenopathy.      Comments: No lymphadenopathy   Skin:     General: Skin is warm and dry.      Findings: No lesion or rash.      Comments: 2 Irregular raised lesions to right forearm, eraser sized   Neurological:      General: No focal deficit  present.      Mental Status: She is alert and oriented to person, place, and time.      Comments: Normal strength and gait   Psychiatric:         Mood and Affect: Mood normal.         Behavior: Behavior normal.         Thought Content: Thought content normal.       Performance Status:  Symptomatic; fully ambulatory  -------------------------------------------------------------------------------------------------------  Assessment and Plan:    Multiple myeloma not having achieved remission (CMS/[facility])  IgG lambda MM     Ig Kappa Free Light Chain   Date Value Ref Range Status   03/18/2024 1.02 0.33 - 1.94 mg/dL Final   02/19/2024 1.45 0.33 - 1.94 mg/dL Final   01/22/2024 1.93 0.33 - 1.94 mg/dL Final     Ig Lambda Free Light Chain   Date Value Ref Range Status   03/18/2024 18.62 (H) 0.57 - 2.63 mg/dL Final   02/19/2024 11.11 (H) 0.57 - 2.63 mg/dL Final   01/22/2024 8.06 (H) 0.57 - 2.63 mg/dL Final     Kappa/Lambda Ratio   Date Value Ref Range Status   03/18/2024 0.05 (L) 0.26 - 1.65 Final   02/19/2024 0.13 (L) 0.26 - 1.65 Final   01/22/2024 0.24 (L) 0.26 - 1.65 Final     M-PROTEIN 1   Date Value Ref Range Status   03/18/2024 0.1 (H)   g/dL Final   02/19/2024 0.1 (H)   g/dL Final   01/22/2024 0.1 (H)   g/dL Final     - Currently on daratumamab/Lenalidomide. Continued uptrending of free lambda light chain. M-protein still 0.1g IgG kappa, likely represents daratumumab. Will add weekly dex 10 mg to see if this could deepen her response.     4/15/24:  Continues to have increasing free lambda light chain, M-protein remains at 0.1.  Receiving C19 daratumumab today.  Discussed that free lyte chain continues to increase significantly and suggested switching revlimid to pomalidomide  4 mg 21/28 days since no other CRAB criteria,  reviewed side effects which include cytopenia, increased risk of blood clots,  patient information provided and chemo consent reviewed and signed. Continue dexamethasone 10 mg weekly on weeks not  receiving daratumumab.    -Taking aspirin 81 mg daily for VTE prophylaxis during treatment with lenalidomide      At risk for osteopenia  - Continue zoledronic acid 3.3 mg (renal dosing) every 3 months, receiving dose today (3/18/24), next dose May 2024.   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  Has follow up appointment with Dr. Magallanes on 3/25/24, biopsy is planned.       Right lower back pain  - seems musculoskeletal  - MRI lumbar spine (3/4/24): degenerative changes, no evidence of progressive disease or fracture.    - MRI thoracic spine (3/12/24): degenerative changes, redemonstration of multifocal marrow signal abnormalities compatible with the given history of multiple myeloma, osseous expansion and epidural extension of neoplasm previously seen have improved, decrease in size and enhancement of previously seen lesions.      Dermatology:  3/18/24:  Noted irregular shaped lesion to right forearm.  Advised patient to follow up with dermatology to evaluate lesio squamous cell cancer- MOHs procedure planned.      RTC:  - 5/13/24:  cycle 20 of roscoe with pomalyst, blood work - Has treatment and provider appointments scheduled through July 2024. Plan 2 year course of zometa  - ------------------------------------------  Carmencita Marshall MD

## 2024-04-15 ENCOUNTER — INFUSION (OUTPATIENT)
Dept: HEMATOLOGY/ONCOLOGY | Facility: HOSPITAL | Age: 84
End: 2024-04-15
Payer: MEDICARE

## 2024-04-15 ENCOUNTER — OFFICE VISIT (OUTPATIENT)
Dept: HEMATOLOGY/ONCOLOGY | Facility: HOSPITAL | Age: 84
End: 2024-04-15
Payer: MEDICARE

## 2024-04-15 ENCOUNTER — LAB (OUTPATIENT)
Dept: LAB | Facility: HOSPITAL | Age: 84
End: 2024-04-15
Payer: MEDICARE

## 2024-04-15 VITALS
WEIGHT: 145.5 LBS | DIASTOLIC BLOOD PRESSURE: 63 MMHG | OXYGEN SATURATION: 100 % | HEART RATE: 77 BPM | RESPIRATION RATE: 16 BRPM | BODY MASS INDEX: 27.09 KG/M2 | SYSTOLIC BLOOD PRESSURE: 133 MMHG | TEMPERATURE: 98.2 F

## 2024-04-15 DIAGNOSIS — C90.00 MULTIPLE MYELOMA NOT HAVING ACHIEVED REMISSION (MULTI): ICD-10-CM

## 2024-04-15 DIAGNOSIS — C90.00 MULTIPLE MYELOMA NOT HAVING ACHIEVED REMISSION (MULTI): Primary | ICD-10-CM

## 2024-04-15 LAB
ALBUMIN SERPL BCP-MCNC: 3.8 G/DL (ref 3.4–5)
ALP SERPL-CCNC: 49 U/L (ref 33–136)
ALT SERPL W P-5'-P-CCNC: 11 U/L (ref 7–45)
ANION GAP SERPL CALC-SCNC: 12 MMOL/L (ref 10–20)
AST SERPL W P-5'-P-CCNC: 12 U/L (ref 9–39)
BASOPHILS # BLD AUTO: 0.06 X10*3/UL (ref 0–0.1)
BASOPHILS NFR BLD AUTO: 1.4 %
BILIRUB SERPL-MCNC: 0.4 MG/DL (ref 0–1.2)
BUN SERPL-MCNC: 25 MG/DL (ref 6–23)
CALCIUM SERPL-MCNC: 9.3 MG/DL (ref 8.6–10.3)
CHLORIDE SERPL-SCNC: 106 MMOL/L (ref 98–107)
CO2 SERPL-SCNC: 28 MMOL/L (ref 21–32)
CREAT SERPL-MCNC: 1.03 MG/DL (ref 0.5–1.05)
EGFRCR SERPLBLD CKD-EPI 2021: 55 ML/MIN/1.73M*2
EOSINOPHIL # BLD AUTO: 0.06 X10*3/UL (ref 0–0.4)
EOSINOPHIL NFR BLD AUTO: 1.4 %
ERYTHROCYTE [DISTWIDTH] IN BLOOD BY AUTOMATED COUNT: 14.6 % (ref 11.5–14.5)
GLUCOSE SERPL-MCNC: 94 MG/DL (ref 74–99)
HCT VFR BLD AUTO: 32.6 % (ref 36–46)
HGB BLD-MCNC: 10.7 G/DL (ref 12–16)
IMM GRANULOCYTES # BLD AUTO: 0.01 X10*3/UL (ref 0–0.5)
IMM GRANULOCYTES NFR BLD AUTO: 0.2 % (ref 0–0.9)
LYMPHOCYTES # BLD AUTO: 0.7 X10*3/UL (ref 0.8–3)
LYMPHOCYTES NFR BLD AUTO: 16.7 %
MCH RBC QN AUTO: 31.8 PG (ref 26–34)
MCHC RBC AUTO-ENTMCNC: 32.8 G/DL (ref 32–36)
MCV RBC AUTO: 97 FL (ref 80–100)
MONOCYTES # BLD AUTO: 0.51 X10*3/UL (ref 0.05–0.8)
MONOCYTES NFR BLD AUTO: 12.1 %
NEUTROPHILS # BLD AUTO: 2.86 X10*3/UL (ref 1.6–5.5)
NEUTROPHILS NFR BLD AUTO: 68.2 %
NRBC BLD-RTO: 0 /100 WBCS (ref 0–0)
PLATELET # BLD AUTO: 257 X10*3/UL (ref 150–450)
POTASSIUM SERPL-SCNC: 4.4 MMOL/L (ref 3.5–5.3)
PROT SERPL-MCNC: 6 G/DL (ref 6.4–8.2)
RBC # BLD AUTO: 3.36 X10*6/UL (ref 4–5.2)
SODIUM SERPL-SCNC: 142 MMOL/L (ref 136–145)
WBC # BLD AUTO: 4.2 X10*3/UL (ref 4.4–11.3)

## 2024-04-15 PROCEDURE — 2500000001 HC RX 250 WO HCPCS SELF ADMINISTERED DRUGS (ALT 637 FOR MEDICARE OP): Performed by: INTERNAL MEDICINE

## 2024-04-15 PROCEDURE — 1159F MED LIST DOCD IN RCRD: CPT | Performed by: INTERNAL MEDICINE

## 2024-04-15 PROCEDURE — 36415 COLL VENOUS BLD VENIPUNCTURE: CPT

## 2024-04-15 PROCEDURE — 85025 COMPLETE CBC W/AUTO DIFF WBC: CPT

## 2024-04-15 PROCEDURE — 99214 OFFICE O/P EST MOD 30 MIN: CPT | Performed by: INTERNAL MEDICINE

## 2024-04-15 PROCEDURE — 83521 IG LIGHT CHAINS FREE EACH: CPT

## 2024-04-15 PROCEDURE — 2500000004 HC RX 250 GENERAL PHARMACY W/ HCPCS (ALT 636 FOR OP/ED): Performed by: INTERNAL MEDICINE

## 2024-04-15 PROCEDURE — 1126F AMNT PAIN NOTED NONE PRSNT: CPT | Performed by: INTERNAL MEDICINE

## 2024-04-15 PROCEDURE — 96401 CHEMO ANTI-NEOPL SQ/IM: CPT

## 2024-04-15 PROCEDURE — 80053 COMPREHEN METABOLIC PANEL: CPT

## 2024-04-15 PROCEDURE — 1160F RVW MEDS BY RX/DR IN RCRD: CPT | Performed by: INTERNAL MEDICINE

## 2024-04-15 PROCEDURE — 2500000004 HC RX 250 GENERAL PHARMACY W/ HCPCS (ALT 636 FOR OP/ED): Mod: JZ | Performed by: INTERNAL MEDICINE

## 2024-04-15 RX ORDER — MONTELUKAST SODIUM 10 MG/1
10 TABLET ORAL ONCE
Status: COMPLETED | OUTPATIENT
Start: 2024-04-15 | End: 2024-04-15

## 2024-04-15 RX ORDER — DIPHENHYDRAMINE HYDROCHLORIDE 50 MG/ML
50 INJECTION INTRAMUSCULAR; INTRAVENOUS AS NEEDED
Status: DISCONTINUED | OUTPATIENT
Start: 2024-04-15 | End: 2024-04-15 | Stop reason: HOSPADM

## 2024-04-15 RX ORDER — DEXAMETHASONE 2 MG/1
TABLET ORAL
Qty: 20 TABLET | Refills: 1 | Status: SHIPPED | OUTPATIENT
Start: 2024-04-15

## 2024-04-15 RX ORDER — ACETAMINOPHEN 325 MG/1
650 TABLET ORAL ONCE
Status: COMPLETED | OUTPATIENT
Start: 2024-04-15 | End: 2024-04-15

## 2024-04-15 RX ORDER — ALBUTEROL SULFATE 0.83 MG/ML
3 SOLUTION RESPIRATORY (INHALATION) AS NEEDED
Status: DISCONTINUED | OUTPATIENT
Start: 2024-04-15 | End: 2024-04-15 | Stop reason: HOSPADM

## 2024-04-15 RX ORDER — FAMOTIDINE 10 MG/ML
20 INJECTION INTRAVENOUS ONCE AS NEEDED
Status: DISCONTINUED | OUTPATIENT
Start: 2024-04-15 | End: 2024-04-15 | Stop reason: HOSPADM

## 2024-04-15 RX ORDER — EPINEPHRINE 0.3 MG/.3ML
0.3 INJECTION SUBCUTANEOUS EVERY 5 MIN PRN
Status: DISCONTINUED | OUTPATIENT
Start: 2024-04-15 | End: 2024-04-15 | Stop reason: HOSPADM

## 2024-04-15 RX ORDER — DIPHENHYDRAMINE HCL 50 MG
50 CAPSULE ORAL ONCE
Status: COMPLETED | OUTPATIENT
Start: 2024-04-15 | End: 2024-04-15

## 2024-04-15 RX ADMIN — MONTELUKAST 10 MG: 10 TABLET, FILM COATED ORAL at 13:42

## 2024-04-15 RX ADMIN — ACETAMINOPHEN 650 MG: 325 TABLET ORAL at 13:42

## 2024-04-15 RX ADMIN — DARATUMUMAB AND HYALURONIDASE-FIHJ (HUMAN RECOMBINANT) 1800 MG: 1800; 30000 INJECTION SUBCUTANEOUS at 14:06

## 2024-04-15 RX ADMIN — DIPHENHYDRAMINE HYDROCHLORIDE 50 MG: 50 CAPSULE ORAL at 13:42

## 2024-04-15 RX ADMIN — DEXAMETHASONE 20 MG: 6 TABLET ORAL at 13:42

## 2024-04-15 ASSESSMENT — PAIN SCALES - GENERAL: PAINLEVEL: 0-NO PAIN

## 2024-04-15 NOTE — PROGRESS NOTES
ONCOLOGY PHARMACY CHEMOTHERAPY EDUCATION NOTE     Diagnosis: Multiple myeloma    Prescriber: Dr. Marshall    Current Outpatient Medications on File Prior to Visit   Medication Sig Dispense Refill    acetaminophen (TylenoL) 325 mg tablet Take 2 tablets (650 mg) by mouth every 4 hours if needed.      acyclovir (Zovirax) 400 mg tablet Take 1 tablet (400 mg) by mouth 2 times a day. 180 tablet 3    amLODIPine (Norvasc) 5 mg tablet Take 1 tablet (5 mg) by mouth once daily. 90 tablet 3    aspirin 81 mg EC tablet Take 1 tablet (81 mg) by mouth once daily.      atorvastatin (Lipitor) 20 mg tablet Take 1 tablet (20 mg) by mouth once daily. 90 tablet 3    cholecalciferol, vitamin D3, 25 mcg (1,000 unit) tablet,chewable Chew 2 tablets (50 mcg) once daily.      dexAMETHasone (Decadron) 2 mg tablet Take 10mg weekly on weeks not receiving daratumumab 20 tablet 1    [DISCONTINUED] acyclovir (Zovirax) 400 mg tablet Take 1 tablet (400 mg) by mouth 2 times a day. 180 tablet 0    [DISCONTINUED] amLODIPine (Norvasc) 5 mg tablet Take 1 tablet (5 mg) by mouth once daily. 90 tablet 0    [DISCONTINUED] dexAMETHasone (Decadron) 2 mg tablet Take 10mg weekly on weeks not receiving daratumumab 20 tablet 1    [DISCONTINUED] lenalidomide (Revlimid) 15 mg capsule Take 1 capsule (15 mg total) by mouth once daily.  Take whole with water. Do not break, chew, or open. Take daily on days 1-21 of 28 day cycle. 21 capsule 0    [DISCONTINUED] pomalidomide (Pomalyst) 4 mg capsule Take 1 capsule (4 mg total) by mouth once daily for 21 days.  HAZARDOUS - use appropriate precautions for handling and disposal.   Do not crush, split, or open. 21 capsule 0     Current Facility-Administered Medications on File Prior to Visit   Medication Dose Route Frequency Provider Last Rate Last Admin    [COMPLETED] acetaminophen (Tylenol) tablet 650 mg  650 mg oral Once Carmencita Marshall MD   650 mg at 04/15/24 1342    albuterol 2.5 mg /3 mL (0.083 %) nebulizer solution 3 mL  3  mL nebulization PRN Carmencita Marshall MD        [COMPLETED] daratumumab-hyaluronidase (Darzalex Faspro) 1,800 mg-30,000 units/15 mL subQ syringe  1,800 mg subcutaneous Once Carmencita Marshall MD   1,800 mg at 04/15/24 1406    [COMPLETED] dexAMETHasone (Decadron) tablet 20 mg  20 mg oral Once Carmencita Marshall MD   20 mg at 04/15/24 1342    dextrose 5 % in water (D5W) bolus  500 mL intravenous PRN Carmencita Marshall MD        [COMPLETED] diphenhydrAMINE (BENADryl) capsule 50 mg  50 mg oral Once Carmencita Marshall MD   50 mg at 04/15/24 1342    diphenhydrAMINE (BENADryl) injection 50 mg  50 mg intravenous PRN Carmencita Marshall MD        EPINEPHrine (Epipen) injection syringe 0.3 mg  0.3 mg intramuscular q5 min PRN Carmencita Marshall MD        famotidine PF (Pepcid) injection 20 mg  20 mg intravenous Once PRN Carmencita Marshall MD        methylPREDNISolone sod succinate (SOLU-Medrol) 40 mg/mL injection 40 mg  40 mg intravenous PRN Carmencita Marshall MD        [COMPLETED] montelukast (Singulair) tablet 10 mg  10 mg oral Once Carmencita Marshall MD   10 mg at 04/15/24 1342    sodium chloride 0.9 % bolus 500 mL  500 mL intravenous PRN Carmencita Marshall MD         [unfilled]    Note:      Pharmacist consulted to provide education on Pomalidomide  chemotherapy via In-Person  visit.      1.  Chemotherapy Education: The chemotherapy regimen Daratumumab, pomalidomide, and dexamethasone was discussed with patient including treatment schedule, potential side effects, and management of side effects.  Potential side effects include but are not limited to: chemotherapy side effects: neutropenia, infection risk, anemia, fatigue, weakness, low energy, thrombocytopenia, n/v, diarrhea, constipation, edema, muscle or joint pain, skin rash, neuropathy, and blood clots .  Management techniques of these side effects were discussed, such as blood count checks, prophylactic anti-infective medicines, temperature checks, blood product transfusions,  electrolyte monitoring, antiemetic use, loperamide use with max dose of 8 tabs per 24 hours, staying hydrated if having diarrhea, monitoring for leg edema, SOB, trouble breathing, and daily aspirin .  Written materials were provided including drug-specific side effect handouts.  Prescriptions for supportive care/prophylaxis (if applicable) medications were also discussed in terms of use and potential side effects.      2.  Home Medications: Reviewed patients documented home medications. Drug interactions identified between chemotherapy and patient’s home medications: none*.  All questions were answered.  patient verbalized understanding of the plan of care and management of side effects.  Spent approximately 20 minutes coordinating care and patient interaction.  Will follow-up as necessary.        Thank you for consulting [facility] Oncology Pharmacy Team.   Please don’t hesitate to reach out for further questions regarding this patient.     Alcides Lopez, [facility], PharmD

## 2024-04-15 NOTE — PROGRESS NOTES
Pt arrived ambulatory to infusion for treatment of daratumumab.  Denies any new or worsening symptoms. Tolerated injection without issue. Discharged in stable condition.

## 2024-04-16 LAB
KAPPA LC SERPL-MCNC: 1.19 MG/DL (ref 0.33–1.94)
KAPPA LC/LAMBDA SER: 0.05 {RATIO} (ref 0.26–1.65)
LAMBDA LC SERPL-MCNC: 23.52 MG/DL (ref 0.57–2.63)

## 2024-04-22 ENCOUNTER — PROCEDURE VISIT (OUTPATIENT)
Dept: DERMATOLOGY | Facility: CLINIC | Age: 84
End: 2024-04-22
Payer: MEDICARE

## 2024-04-22 VITALS — HEART RATE: 87 BPM | SYSTOLIC BLOOD PRESSURE: 125 MMHG | DIASTOLIC BLOOD PRESSURE: 76 MMHG

## 2024-04-22 DIAGNOSIS — C44.622 SQUAMOUS CELL CARCINOMA OF SKIN OF RIGHT FOREARM: ICD-10-CM

## 2024-04-22 PROCEDURE — 13121 CMPLX RPR S/A/L 2.6-7.5 CM: CPT | Performed by: DERMATOLOGY

## 2024-04-22 PROCEDURE — 99204 OFFICE O/P NEW MOD 45 MIN: CPT | Performed by: DERMATOLOGY

## 2024-04-22 PROCEDURE — 17313 MOHS 1 STAGE T/A/L: CPT | Performed by: DERMATOLOGY

## 2024-04-22 NOTE — PROGRESS NOTES
Mohs Surgery Operative Note    Date of Surgery:  4/22/2024  Surgeon:  Sophia Vann MD  Office Location:  [address] [facility]  [address] [facility] [address]  [place] [address]  Dept: [phone]  Dept Fax: [phone]  Referring Provider:  GLENN Galeano       Assessment/Plan   Pre-procedure:   Obtained informed consent: written from patient  The surgical site was identified and confirmed with the patient.     Intra-operative:   Audible time out called at : 10:05AM 04/22/24  by: Teresa Danielle RN   Verified patient name, birthdate, site, specimen bottle label & requisition.    The planned procedure(s) was again reviewed with the patient. The risks of bleeding, infection, nerve damage and scarring were reviewed. Written authorization was obtained. The patient identity, surgical site, and planned procedure(s) were verified. The provider acted as both surgeon and pathologist.     Squamous cell carcinoma of skin of right forearm  Right Forearm  Mohs surgery  Consent obtained: written    Universal Protocol:  Procedure explained and questions answered to patient or proxy's satisfaction: Yes    Test results available and properly labeled: Yes    Pathology report reviewed: Yes    External notes reviewed: Yes    Photo or diagram used for site identification: Yes    Site/side marked: Yes    Slide independently reviewed by Mohs surgeon: Yes    Immediately prior to procedure a time out was called: Yes    Patient identity confirmed: verbally with patient  Preparation: Patient was prepped and draped in usual sterile fashion      Anticoagulation:  Is the patient taking prescription anticoagulant and/or aspirin prescribed/recommended by a physician? Yes    Was the anticoagulation regimen changed prior to Mohs? No      Anesthesia:  Anesthesia method: local infiltration  Local anesthetic: lidocaine 0.5% WITH epi    Procedure Details:  Biopsy accession number: [id number]  Date of biopsy:  4/1/2024  Pre-Op diagnosis: squamous cell carcinoma  SCC subtype: moderately differentiated (invasive)  Surgery side: right  Surgical site (from skin exam): Right Forearm  Pre-operative length (cm): 2  Pre-operative width (cm): 2  Indications for Mohs surgery: tumor size greater than 2 cm and immunocompromised  Previously treated? No      Micrographic Surgery Details:  Post-operative length (cm): 2.2  Post-operative width (cm): 2.2  Number of Mohs stages: 1    Stage 1  Comments: The patient was brought into the operating room and placed in the procedure chair in the appropriate position.  The area positive by previous biopsy was identified and confirmed with the patient. The area of clinically obvious tumor was debulked using a curette and/or scalpel as needed. An incision was made following the Mohs approach through the skin. The specimen was taken to the lab, divided into 2 piece(s) and appropriately chromacoded and processed.  Tumor features identified on Mohs section: no tumor identified  Depth of defect: subcutaneous fat    Patient tolerance of procedure: tolerated well, no immediate complications    Reconstruction:  Was the defect reconstructed? Yes    Was reconstruction performed by the same Mohs surgeon? Yes    Setting of reconstruction: outpatient office  When was reconstruction performed? same day  Type of reconstruction: linear  Linear reconstruction: complex  Length of linear repair (cm): 6  Subcutaneous Layers (Deep Stitches)   Suture size:  3-0  Suture type:  Vicryl  Stitches:  Buried vertical mattress  Fine/surface layer approximation (top stitches)   Epidermal/Superficial suture size:  5-0  Epidermal/Superficial suture type:  Fast-absorbing gut  Stitches: simple running    Hemostasis achieved with: electrodesiccation  Outcome: patient tolerated procedure well with no complications    Post-procedure details: sterile dressing applied and wound care instructions given    Dressing type: Coban, pressure  dressing, petrolatum, Hypafix and Telfa pad      Complex Linear Repair - Wide Undermining:  Given the location and size of the defect, it was determined that a complex layered linear closure was required to restore normal anatomy and function. The repair was considered complex because extensive undermining was required and performed. The amount of undermining performed was greater than the maximum width of the defect as measured perpendicular to the closure line along at least one entire edge of the defect. Standing cutaneous cones were removed using Burow's triangles. The wound edges were brought into close approximation with buried vertical mattress sutures. The remainder of the wound was then closed with epidermal top sutures.    The final repair measured 6 cm                  Wound care was discussed, and the patient was given written post-operative wound care instructions.      The patient will follow up with Sophia Vann MD as needed for any post operative problems or concerns, and will follow up with their primary dermatologist as scheduled.

## 2024-04-22 NOTE — LETTER
April 30, 2024     GLENN Galeano  [address]  [address]  [facility] [address]    Patient: Sherie West   YOB: 1940   Date of Visit: 4/22/2024       Dear GLENN Johnson:    Thank you for referring Sherie West to me for evaluation. Below are my notes for this consultation.  If you have questions, please do not hesitate to call me. I look forward to following your patient along with you.       Sincerely,     Sophia Vann MD      CC: No Recipients  ______________________________________________________________________________________    Mohs Surgery Operative Note    Date of Surgery:  4/22/2024  Surgeon:  Sophia Vann MD  Office Location: [address]  [address] [facility] [address]  [facility] [address]  Dept: [phone]  Dept Fax: [phone]  Referring Provider:  GLENN Galeano       Assessment/Plan  Pre-procedure:   Obtained informed consent: written from patient  The surgical site was identified and confirmed with the patient.     Intra-operative:   Audible time out called at : 10:05AM 04/22/24  by: Teresa Danielle RN   Verified patient name, birthdate, site, specimen bottle label & requisition.    The planned procedure(s) was again reviewed with the patient. The risks of bleeding, infection, nerve damage and scarring were reviewed. Written authorization was obtained. The patient identity, surgical site, and planned procedure(s) were verified. The provider acted as both surgeon and pathologist.     Squamous cell carcinoma of skin of right forearm  Right Forearm  Mohs surgery  Consent obtained: written    Universal Protocol:  Procedure explained and questions answered to patient or proxy's satisfaction: Yes    Test results available and properly labeled: Yes    Pathology report reviewed: Yes    External notes reviewed: Yes    Photo or diagram used for site identification: Yes    Site/side marked: Yes    Slide independently  reviewed by Mohs surgeon: Yes    Immediately prior to procedure a time out was called: Yes    Patient identity confirmed: verbally with patient  Preparation: Patient was prepped and draped in usual sterile fashion      Anticoagulation:  Is the patient taking prescription anticoagulant and/or aspirin prescribed/recommended by a physician? Yes    Was the anticoagulation regimen changed prior to Mohs? No      Anesthesia:  Anesthesia method: local infiltration  Local anesthetic: lidocaine 0.5% WITH epi    Procedure Details:  Biopsy accession number: [id number]  Date of biopsy: 4/1/2024  Pre-Op diagnosis: squamous cell carcinoma  SCC subtype: moderately differentiated (invasive)  Surgery side: right  Surgical site (from skin exam): Right Forearm  Pre-operative length (cm): 2  Pre-operative width (cm): 2  Indications for Mohs surgery: tumor size greater than 2 cm and immunocompromised  Previously treated? No      Micrographic Surgery Details:  Post-operative length (cm): 2.2  Post-operative width (cm): 2.2  Number of Mohs stages: 1    Stage 1  Comments: The patient was brought into the operating room and placed in the procedure chair in the appropriate position.  The area positive by previous biopsy was identified and confirmed with the patient. The area of clinically obvious tumor was debulked using a curette and/or scalpel as needed. An incision was made following the Mohs approach through the skin. The specimen was taken to the lab, divided into 2 piece(s) and appropriately chromacoded and processed.  Tumor features identified on Mohs section: no tumor identified  Depth of defect: subcutaneous fat    Patient tolerance of procedure: tolerated well, no immediate complications    Reconstruction:  Was the defect reconstructed? Yes    Was reconstruction performed by the same Mohs surgeon? Yes    Setting of reconstruction: outpatient office  When was reconstruction performed? same day  Type of reconstruction: linear  Linear  reconstruction: complex  Length of linear repair (cm): 6  Subcutaneous Layers (Deep Stitches)   Suture size:  3-0  Suture type:  Vicryl  Stitches:  Buried vertical mattress  Fine/surface layer approximation (top stitches)   Epidermal/Superficial suture size:  5-0  Epidermal/Superficial suture type:  Fast-absorbing gut  Stitches: simple running    Hemostasis achieved with: electrodesiccation  Outcome: patient tolerated procedure well with no complications    Post-procedure details: sterile dressing applied and wound care instructions given    Dressing type: Coban, pressure dressing, petrolatum, Hypafix and Telfa pad      Complex Linear Repair - Wide Undermining:  Given the location and size of the defect, it was determined that a complex layered linear closure was required to restore normal anatomy and function. The repair was considered complex because extensive undermining was required and performed. The amount of undermining performed was greater than the maximum width of the defect as measured perpendicular to the closure line along at least one entire edge of the defect. Standing cutaneous cones were removed using Burow's triangles. The wound edges were brought into close approximation with buried vertical mattress sutures. The remainder of the wound was then closed with epidermal top sutures.    The final repair measured 6 cm                  Wound care was discussed, and the patient was given written post-operative wound care instructions.      The patient will follow up with Sophia Vann MD as needed for any post operative problems or concerns, and will follow up with their primary dermatologist as scheduled.        Office Visit Note  Date: 4/22/2024  Surgeon:  Sophia Vann MD  Office Location: [address]  [address] [facility] [address]  [facility] [address]  Dept: [phone]  Dept Fax: [phone]  Referring Provider: Debra Figueroa, APRN-CNP    Subjective  Sil R  "Brett \"Sherie\" is a 83 y.o. female who presents for the following: MOHS Surgery    According to the patient, the lesion has been present for approximately greater than 1 year at the time of diagnosis.  The lesion is itchy and painful.  The lesion has not been treated previously.    The patient does not have a pacemaker / defibrillator.  The patient does not have a heart valve / joint replacement.    The patient is on blood thinners.  The patient does not have a history of hepatitis B or C.  The patient does not have a history of HIV.  The patient does have a history of immunosuppression (e.g. organ transplantation, malignancy, medications)    Review of Systems:  No other skin or systemic complaints other than what is documented elsewhere in the note.    MEDICAL HISTORY: clinically relevant history including significant past medical history, medications and allergies was reviewed and documented in Epic.    Objective  Well appearing patient in no apparent distress; mood and affect are within normal limits.  Vital signs: See record.  Noted on the Right Forearm  Is a 2.0 x 2.0 cm scar    The patient confirmed the identified site.    Discussion:  The nature of the diagnosis was explained. The lesion is a skin cancer.  It has a risk of local growth and distant spread. The condition is associated with sun exposure.  Warning signs of non-melanoma skin cancer discussed. Patient was instructed to perform monthly self skin examination.  We recommended that the patient have regular full skin exams given an increased risk of subsequent skin cancers. The patient was instructed to use sun protective behaviors including use of broad spectrum sunscreens and sun protective clothing to reduce risk of skin cancers.      Risks, benefits, side effects of Mohs surgery were discussed with patient and the patient voiced understanding.  It was explained that even though the cure rate of Mohs is very high it is not 100%. Risks of surgery " including but not limited to bleeding, infection, numbness, nerve damage, and scar were reviewed.  Discussion included wound care requirements, activity restrictions, likely scar outcome and time to heal.     After Mohs surgery, the defect may need to be repaired surgically and the scar may be longer than the original lesion.  Reconstruction options, risks, and benefits were reviewed including second intention healing, linear repair (4-1 ratio was explained), local flaps, skin grafts, cartilage grafts and interpolation flaps (the need for multiple surgeries was explained). Possible outcomes were reviewed including likely scar appearance, failure of flap survival, infection, bleeding and the need for revision surgery.     The pathology was reviewed, the photograph was reviewed, and the referring physician's note was reviewed.    Patient elected for Mohs surgery.

## 2024-04-22 NOTE — PROGRESS NOTES
"Office Visit Note  Date: 4/22/2024  Surgeon:  Sophia Vann MD  Office Location: DO [address] [facility]  [address] [facility] [address]  [facility] [address]  Dept: [phone]  Dept Fax: [phone]  Referring Provider: GLENN Galeano    Subjective   Sil West \"Sherie\" is a 83 y.o. female who presents for the following: MOHS Surgery    According to the patient, the lesion has been present for approximately greater than 1 year at the time of diagnosis.  The lesion is itchy and painful.  The lesion has not been treated previously.    The patient does not have a pacemaker / defibrillator.  The patient does not have a heart valve / joint replacement.    The patient is on blood thinners.  The patient does not have a history of hepatitis B or C.  The patient does not have a history of HIV.  The patient does have a history of immunosuppression (e.g. organ transplantation, malignancy, medications)    Review of Systems:  No other skin or systemic complaints other than what is documented elsewhere in the note.    MEDICAL HISTORY: clinically relevant history including significant past medical history, medications and allergies was reviewed and documented in Epic.    Objective   Well appearing patient in no apparent distress; mood and affect are within normal limits.  Vital signs: See record.  Noted on the Right Forearm  Is a 2.0 x 2.0 cm scar    The patient confirmed the identified site.    Discussion:  The nature of the diagnosis was explained. The lesion is a skin cancer.  It has a risk of local growth and distant spread. The condition is associated with sun exposure.  Warning signs of non-melanoma skin cancer discussed. Patient was instructed to perform monthly self skin examination.  We recommended that the patient have regular full skin exams given an increased risk of subsequent skin cancers. The patient was instructed to use sun protective behaviors including use of broad spectrum " sunscreens and sun protective clothing to reduce risk of skin cancers.      Risks, benefits, side effects of Mohs surgery were discussed with patient and the patient voiced understanding.  It was explained that even though the cure rate of Mohs is very high it is not 100%. Risks of surgery including but not limited to bleeding, infection, numbness, nerve damage, and scar were reviewed.  Discussion included wound care requirements, activity restrictions, likely scar outcome and time to heal.     After Mohs surgery, the defect may need to be repaired surgically and the scar may be longer than the original lesion.  Reconstruction options, risks, and benefits were reviewed including second intention healing, linear repair (4-1 ratio was explained), local flaps, skin grafts, cartilage grafts and interpolation flaps (the need for multiple surgeries was explained). Possible outcomes were reviewed including likely scar appearance, failure of flap survival, infection, bleeding and the need for revision surgery.     The pathology was reviewed, the photograph was reviewed, and the referring physician's note was reviewed.    Patient elected for Mohs surgery.

## 2024-04-24 ENCOUNTER — TELEPHONE (OUTPATIENT)
Dept: SURGERY | Facility: CLINIC | Age: 84
End: 2024-04-24
Payer: MEDICARE

## 2024-04-24 ENCOUNTER — TELEPHONE (OUTPATIENT)
Dept: ADMISSION | Facility: HOSPITAL | Age: 84
End: 2024-04-24
Payer: MEDICARE

## 2024-04-24 NOTE — TELEPHONE ENCOUNTER
Call to patient. No answer. Left voicemail message notifying patient that Dr. Magallanes has reviewed her medical record and corresponded with her PCP Dr. Marshall. Patient has been advised to continue with aspirin therapy in preparation for biopsy and that we would keep her for about 15-20 after the biopsy to monitor for bleeding. Callback requested and number provided.

## 2024-04-24 NOTE — TELEPHONE ENCOUNTER
Spoke to daughter (Frances) to confirm patient should stay on aspirin in preparation for biopsy 4/29/24. Frances will make sure the patient gets the message. No further questions.

## 2024-04-24 NOTE — TELEPHONE ENCOUNTER
The patient is having a biopsy of thyroid on Monday. Was advised by Dr. Magallanes to get off of baby aspirin x 7 days but Sherie is under the impression she should remain on it per Dr. Marshall    When can she stop the baby aspirin prior to the biopsy  Can she wear compression socks while off baby aspirin to assist with the prevention of blood clots

## 2024-04-24 NOTE — TELEPHONE ENCOUNTER
Asia Zurita to reach out to patient regarding instructions, she is going to reach out to patient daughter Frances. See her additional note in EPIC.

## 2024-04-29 ENCOUNTER — OFFICE VISIT (OUTPATIENT)
Dept: SURGERY | Facility: CLINIC | Age: 84
End: 2024-04-29
Payer: MEDICARE

## 2024-04-29 VITALS
SYSTOLIC BLOOD PRESSURE: 163 MMHG | BODY MASS INDEX: 27.55 KG/M2 | HEART RATE: 88 BPM | DIASTOLIC BLOOD PRESSURE: 70 MMHG | WEIGHT: 148 LBS

## 2024-04-29 DIAGNOSIS — E04.2 MULTINODULAR THYROID: Primary | ICD-10-CM

## 2024-04-29 PROCEDURE — 1160F RVW MEDS BY RX/DR IN RCRD: CPT | Performed by: SURGERY

## 2024-04-29 PROCEDURE — 88112 CYTOPATH CELL ENHANCE TECH: CPT

## 2024-04-29 PROCEDURE — 88173 CYTOPATH EVAL FNA REPORT: CPT | Performed by: PATHOLOGY

## 2024-04-29 PROCEDURE — 1159F MED LIST DOCD IN RCRD: CPT | Performed by: SURGERY

## 2024-04-29 PROCEDURE — 1036F TOBACCO NON-USER: CPT | Performed by: SURGERY

## 2024-04-29 PROCEDURE — 99213 OFFICE O/P EST LOW 20 MIN: CPT | Performed by: SURGERY

## 2024-04-29 PROCEDURE — 88173 CYTOPATH EVAL FNA REPORT: CPT

## 2024-04-29 PROCEDURE — 10005 FNA BX W/US GDN 1ST LES: CPT | Performed by: SURGERY

## 2024-04-29 NOTE — PROGRESS NOTES
"Subjective   Patient ID: Sil West \"Ines" is a 83 y.o. female who presents for ultrasound-guided thyroid biopsy.    HPI I saw Mrs. West back in surgery clinic today.  She has a history of melanoma and had a PET scan done.  There was a PET negative thyroid nodule seen on the CT component of her study.  On ultrasound this corresponds to a 3.2 cm TI-RADS 3 nodule in the right thyroid lobe which meets criteria for biopsy.      We did check in with her physicians about her antiplatelet therapy.  They felt that it was important for her to stay on this due to increased risk of thrombosis.  Therefore I told her she will just need to stay in the office with us for about a 10 to 15-minute observation after the biopsy to watch for any hematoma.    Objective   Physical Exam    Narrative & Impression   Interpreted By:  Rico Okeefe and Benza Andrew   STUDY:  US THYROID;  2/19/2024 1:14 pm      INDICATION:  Signs/Symptoms:thyroid  avid on PET scan.      COMPARISON:  PET-CT dated 07/11/2023      ACCESSION NUMBER(S):  [id number]      ORDERING CLINICIAN:  ERIKA PEÑA      TECHNIQUE:  Multiple ultrasonographic images of the thyroid gland were obtained.  This examination is interpreted at [facility].      FINDINGS:  The thyroid is enlarged and heterogeneous in appearance, with few  nodules identified.      RIGHT LOBE:  The right lobe of the thyroid measures 3.0 cm x 2.1 cm x 4.7 cm. The  right lobe of the thyroid is heterogeneous in echotexture with  nodules as described below:      Within the mid thyroid lobe there is a nodule with the following  features: Size: 3.2 x 2.7 x 1.8 cm  Composition: Solid or almost completely solid (2)  Echogenicity: Hyperechoic or isoechoic (1)  Shape: Wider-than-tall (0)  Margin: Smooth (0)  Echogenic Foci: None or Large comet-tail artifacts (0)  The total score of this nodule is 3 corresponding to a TI-RADS  category 3; " Mildly suspicious. FNA is recommended if >2.5cm, Follow  up if >1.5cm in 1, 3, and 5 years.      Within the lower thyroid lobe there is a nodule with the following  features: Size: 1.0 x 0.7 x 0.6 cm  Composition: Solid or almost completely solid (2)  Echogenicity: Hypoechoic (2)  Shape: Wider-than-tall (0)  Margin: Ill-defined (0)  Echogenic Foci: None or Large comet-tail artifacts (0)  The total score of this nodule is 4 corresponding to a TI-RADS  category 4; Moderately suspicious. FNA is recommended if >1.5cm,  Follow up if >1.0cm in 1, 2, 3, and 5 years.              LEFT LOBE:  The left lobe of the thyroid measures 1.6 cm x 1.8 cm x 4.2 cm. The  left lobe of the thyroid is homogeneous in echotexture and  demonstrates no nodules.      Within the mid thyroid lobe there is a nodule with the following  features: Size: 1.5 x 1.2 x 0.8 cm  Composition: Solid or almost completely solid (2)  Echogenicity: Hyperechoic or isoechoic (1)  Shape: Wider-than-tall (0)  Margin: Ill-defined (0)  Echogenic Foci: None or Large comet-tail artifacts (0)  The total score of this nodule is 3 corresponding to a TI-RADS  category 3; Mildly suspicious. FNA is recommended if >2.5cm, Follow  up if >1.5cm in 1, 3, and 5 years.          ISTHMUS:  The isthmus measures approximately 0.4 cm and is homogeneous in  echotexture and without any identifiable nodules.      CERVICAL LYMPH NODES:  A few non enlarged cervical lymph nodes are present with normal  appearance.      IMPRESSION:  The thyroid is enlarged in size and heterogeneous in echotexture with  2 nodules as described below:  1. Nodule in the right mid thyroid gland is TI-RADS category 3. Based  on size criteria FNA is recommended.  2. Nodule in the right lower thyroid gland is TI-RADS category 4.  Based on size criteria, follow-up in 1, 2, 3, and 5 years is  recommended.  3. Nodule in the left mid thyroid gland is TI-RADS category 3. Based  on size criteria follow-up in 1, 3, and 5  "years is recommended.       Patient ID: Sil West \"Ines" is a 83 y.o. female.    Biopsy    Date/Time: 4/29/2024 9:11 AM    Performed by: Paul Magallanes MD  Authorized by: Paul Magallanes MD    Consent:     Consent obtained:  Verbal    Consent given by:  Patient    Risks, benefits, and alternatives were discussed: yes      Risks discussed:  Bleeding and pain    Alternatives discussed:  No treatment and observation  Universal protocol:     Procedure explained and questions answered to patient or proxy's satisfaction: yes      Relevant documents present and verified: yes      Test results available: yes      Imaging studies available: yes      Required blood products, implants, devices, and special equipment available: yes      Site/side marked: no      Immediately prior to procedure, a time out was called: no      Patient identity confirmed:  Verbally with patient  Pre-procedure details:     Skin preparation:  Povidone-iodine  Sedation:     Sedation type:  None  Anesthesia:     Anesthesia method:  Local infiltration    Local anesthetic:  Lidocaine 1% w/o epi  Procedure specific details:      Ultrasound-guided thyroid biopsy.    In the office today I did an ultrasound-guided biopsy of the patient's 3.2 cm right sided TI-RADS 3 nodule.  This was done under sterile conditions with 1% lidocaine for pain control.  Needle placement into the nodule was done utilizing real-time ultrasound guidance with a 6-15 MHz linear ultrasound probe.  2 samples were taken.  Images were captured.  Post-procedure details:     Procedure completion:  Tolerated      Assessment/Plan    Mrs. West had incidental discovery of a right-sided thyroid nodule during PET scan follow-up for melanoma.  Nodule in the right thyroid lobe was PET scan negative.  On ultrasound at 3.2 cm TI-RADS 3.  This meets standard ACR guidelines for biopsy.  Therefore today in the office I performed an ultrasound-guided biopsy.  Full procedure listed above.  " Patient tolerated the biopsy well with no complications.    She was observed in clinic for 15 minutes after the biopsy with no sign of any bleeding or neck hematoma and was sent home.    Plan    1.  I told her I will call her toward the end of the week with biopsy results.  If she has not heard back from us within 1 week, she can feel free to contact our office.  Any further decision for surgical excision versus ongoing radiographic evaluation will be based on biopsy reports.         Paul Magallanes MD 04/29/24 9:10 AM

## 2024-05-06 LAB
LABORATORY COMMENT REPORT: NORMAL
LABORATORY COMMENT REPORT: NORMAL
PATH REPORT.FINAL DX SPEC: NORMAL
PATH REPORT.GROSS SPEC: NORMAL
PATH REPORT.RELEVANT HX SPEC: NORMAL
PATH REPORT.TOTAL CANCER: NORMAL

## 2024-05-08 ENCOUNTER — TELEPHONE (OUTPATIENT)
Dept: HEMATOLOGY/ONCOLOGY | Facility: CLINIC | Age: 84
End: 2024-05-08

## 2024-05-08 DIAGNOSIS — C90.00 MULTIPLE MYELOMA NOT HAVING ACHIEVED REMISSION (MULTI): ICD-10-CM

## 2024-05-08 NOTE — PROGRESS NOTES
"ONCOLOGY MEDICATION REFILL NOTE:    [unfilled]  [facility] \"Sherie\" had a patient care encounter with Dr. Carmencita Marshall  on 4/15/24 for management of their Multiple myeloma.      [unfilled]  Current Outpatient Medications on File Prior to Visit   Medication Sig Dispense Refill    acetaminophen (TylenoL) 325 mg tablet Take 2 tablets (650 mg) by mouth every 4 hours if needed.      acyclovir (Zovirax) 400 mg tablet Take 1 tablet (400 mg) by mouth 2 times a day. 180 tablet 3    amLODIPine (Norvasc) 5 mg tablet Take 1 tablet (5 mg) by mouth once daily. 90 tablet 3    aspirin 81 mg EC tablet Take 1 tablet (81 mg) by mouth once daily.      atorvastatin (Lipitor) 20 mg tablet Take 1 tablet (20 mg) by mouth once daily. 90 tablet 3    cholecalciferol, vitamin D3, 25 mcg (1,000 unit) tablet,chewable Chew 2 tablets (50 mcg) once daily.      dexAMETHasone (Decadron) 2 mg tablet Take 10mg weekly on weeks not receiving daratumumab 20 tablet 1    [DISCONTINUED] pomalidomide (Pomalyst) 4 mg capsule Take 1 capsule (4 mg total) by mouth once daily for 21 days.  Then off for 7 days. HAZARDOUS - use appropriate precautions for handling and disposal. Do not crush, split, or open. 21 capsule 0     No current facility-administered medications on file prior to visit.      Relevant Myeloma Labs:     Immunoglobulins:  Lab Results   Component Value Date     (L) 01/22/2024    IGM 11 (L) 01/22/2024    IGA 73 01/22/2024        24 Hour Urine Protein Electropheresis:   Lab Results   Component Value Date    GZNA63EBQ 772 (H) 01/28/2023    ALBPROTUR 5.4 01/28/2023    UPK01 50.1 (A) 01/28/2023    OGNCIXIU995 386.8 (A) 01/28/2023    UPEP ABNORMAL 01/28/2023    IFESU ABNORMAL 01/28/2023    PR35 A.HAAS 01/28/2023        Serum Protein Electropheresis:  Lab Results   Component Value Date    PROT 6.0 (L) 04/15/2024    PROT 6.3 (L) 03/18/2024    PROT 5.8 (L) 03/18/2024      Lab Results   Component Value Date    ALBUMINELP 3.5 " 03/18/2024    ALBUMINELP 3.2 (L) 02/19/2024    ALBUMINELP 3.5 01/22/2024     Lab Results   Component Value Date    ALPHA1 0.4 03/18/2024    ALPHA1 0.5 02/19/2024    ALPHA1 0.3 01/22/2024     Lab Results   Component Value Date    ALPHA2 0.9 03/18/2024    ALPHA2 1.0 02/19/2024    ALPHA2 0.8 01/22/2024     Lab Results   Component Value Date    BETA 0.7 03/18/2024    BETA 0.7 02/19/2024    BETA 0.7 01/22/2024     Lab Results   Component Value Date    GAMMA 0.4 (L) 03/18/2024    GAMMA 0.4 (L) 02/19/2024    GAMMA 0.4 (L) 01/22/2024     Lab Results   Component Value Date    SPEP  03/18/2024     Aberrant bands detected. See immunofixation.    Decrease in polyclonal gamma globulins.       SPEP  02/19/2024     Aberrant band detected. See immunofixation.       Hypoalbuminemia.     Decrease in polyclonal gamma globulins.       SPEP  01/22/2024     Aberrant band detected. See immunofixation.       Decrease in polyclonal gamma globulins.        Lab Results   Component Value Date    IEPIN  03/18/2024     Faint free lambda light chains in the gamma region at a level too low to quantitate and known monoclonal IgG kappa in the gamma region at 0.1 g/dL (daratumumab therapy). Unchanged from the previous analysis on 2/19/24.    IEPIN  02/19/2024 2/19/24 Known monoclonal IgG kappa in the gamma region at 0.1 g/dL (daratumumab therapy). Unchanged from the previous analysis on 1/22/24.    IEPIN  01/22/2024 01/22/24 Known monoclonal IgG kappa in the gamma region at 0.1 g/dL (daratumumab therapy). Unchanged from the previous analysis on 12/23/23.     Lab Results   Component Value Date    PRV12  03/18/2024     Reviewed and approved by FRANCE LIAO on 3/21/24 at 9:27 PM.       PRV12  02/19/2024     Reviewed and approved by LETHA HAAS on 2/22/24 at 9:52 PM.        PRV12  01/22/2024     Reviewed and approved by LETHA HAAS on 1/27/24 at 8:24 AM.         Lab Results   Component Value Date    PEAK1 0.1 (H) 03/18/2024    PEAK1 0.1  (H) 02/19/2024    PEAK1 0.1 (H) 01/22/2024       Serum Free Light Chains:  Lab Results   Component Value Date    KAPPA 1.19 04/15/2024    KAPPA 1.02 03/18/2024    KAPPA 1.45 02/19/2024     Lab Results   Component Value Date    LAMBDA 23.52 (H) 04/15/2024    LAMBDA 18.62 (H) 03/18/2024    LAMBDA 11.11 (H) 02/19/2024      Lab Results   Component Value Date    KAPLS 0.05 (L) 04/15/2024    KAPLS 0.05 (L) 03/18/2024    KAPLS 0.13 (L) 02/19/2024     Lab Results   Component Value Date    LIGHTCHAIN 0.59 09/07/2023    LIGHTCHAIN 0.64 08/07/2023    LIGHTCHAIN 0.58 07/17/2023         [unfilled]  Ms. West recently changed from lenalidomide to pomalidomide, and is tolerating her combination treatment with daratumumab and dexamthasone. Patient was switched to pomalidomide due to increasing free light chain trends, will continue to monitor on new therapy.  Clot Prevention: Thromboprophylaxis: Low dose aspirin      Per Dr. Carmencita Marshall authorization, on 05/08/24 I refilled Pomalidomide 4mg PO daily on days 1-21/28.  QTY 21 No refills. Authorization # [id number]  obtained, prescription was sent to [facility] specialty pharmacy.      Alcides RaderD, [facility]  Outpatient Hematology Pharmacist  [phone]

## 2024-05-12 RX ORDER — DIPHENHYDRAMINE HYDROCHLORIDE 50 MG/ML
50 INJECTION INTRAMUSCULAR; INTRAVENOUS AS NEEDED
Status: CANCELLED | OUTPATIENT
Start: 2024-06-10

## 2024-05-12 RX ORDER — DEXAMETHASONE 4 MG/1
20 TABLET ORAL ONCE
Status: CANCELLED | OUTPATIENT
Start: 2024-06-10

## 2024-05-12 RX ORDER — ALBUTEROL SULFATE 0.83 MG/ML
3 SOLUTION RESPIRATORY (INHALATION) AS NEEDED
Status: CANCELLED | OUTPATIENT
Start: 2024-06-10

## 2024-05-12 RX ORDER — ACETAMINOPHEN 325 MG/1
650 TABLET ORAL ONCE
Status: CANCELLED | OUTPATIENT
Start: 2024-06-10

## 2024-05-12 RX ORDER — FAMOTIDINE 10 MG/ML
20 INJECTION INTRAVENOUS ONCE AS NEEDED
Status: CANCELLED | OUTPATIENT
Start: 2024-06-10

## 2024-05-12 RX ORDER — MONTELUKAST SODIUM 10 MG/1
10 TABLET ORAL ONCE
Status: CANCELLED | OUTPATIENT
Start: 2024-06-10

## 2024-05-12 RX ORDER — DIPHENHYDRAMINE HCL 50 MG
50 CAPSULE ORAL ONCE
Status: CANCELLED | OUTPATIENT
Start: 2024-06-10

## 2024-05-12 RX ORDER — EPINEPHRINE 0.3 MG/.3ML
0.3 INJECTION SUBCUTANEOUS EVERY 5 MIN PRN
Status: CANCELLED | OUTPATIENT
Start: 2024-06-10

## 2024-05-12 ASSESSMENT — ENCOUNTER SYMPTOMS
CONSTIPATION: 0
PALPITATIONS: 0
LIGHT-HEADEDNESS: 0
NUMBNESS: 1
NAUSEA: 0
DYSURIA: 0
WHEEZING: 0
UNEXPECTED WEIGHT CHANGE: 0
DIARRHEA: 0
DIZZINESS: 0
DIAPHORESIS: 0
WOUND: 0
LEG SWELLING: 0
FEVER: 0
COUGH: 0
APPETITE CHANGE: 0
VOMITING: 0
CHILLS: 0
HEMATURIA: 0
ADENOPATHY: 0
SHORTNESS OF BREATH: 0
BLOOD IN STOOL: 0

## 2024-05-13 ENCOUNTER — APPOINTMENT (OUTPATIENT)
Dept: HEMATOLOGY/ONCOLOGY | Facility: HOSPITAL | Age: 84
End: 2024-05-13
Payer: MEDICARE

## 2024-05-13 ENCOUNTER — LAB (OUTPATIENT)
Dept: LAB | Facility: HOSPITAL | Age: 84
End: 2024-05-13
Payer: MEDICARE

## 2024-05-13 ENCOUNTER — INFUSION (OUTPATIENT)
Dept: HEMATOLOGY/ONCOLOGY | Facility: HOSPITAL | Age: 84
End: 2024-05-13
Payer: MEDICARE

## 2024-05-13 ENCOUNTER — OFFICE VISIT (OUTPATIENT)
Dept: HEMATOLOGY/ONCOLOGY | Facility: HOSPITAL | Age: 84
End: 2024-05-13
Payer: MEDICARE

## 2024-05-13 VITALS
BODY MASS INDEX: 27.7 KG/M2 | WEIGHT: 148.81 LBS | OXYGEN SATURATION: 100 % | SYSTOLIC BLOOD PRESSURE: 127 MMHG | TEMPERATURE: 98.8 F | DIASTOLIC BLOOD PRESSURE: 64 MMHG | HEART RATE: 79 BPM

## 2024-05-13 DIAGNOSIS — C90.00 MULTIPLE MYELOMA NOT HAVING ACHIEVED REMISSION (MULTI): ICD-10-CM

## 2024-05-13 DIAGNOSIS — C90.00 MULTIPLE MYELOMA NOT HAVING ACHIEVED REMISSION (MULTI): Primary | ICD-10-CM

## 2024-05-13 LAB
ALBUMIN SERPL BCP-MCNC: 3.8 G/DL (ref 3.4–5)
ALP SERPL-CCNC: 59 U/L (ref 33–136)
ALT SERPL W P-5'-P-CCNC: 14 U/L (ref 7–45)
ANION GAP SERPL CALC-SCNC: 12 MMOL/L (ref 10–20)
AST SERPL W P-5'-P-CCNC: 12 U/L (ref 9–39)
BASOPHILS # BLD AUTO: 0.05 X10*3/UL (ref 0–0.1)
BASOPHILS NFR BLD AUTO: 1.5 %
BILIRUB SERPL-MCNC: 0.4 MG/DL (ref 0–1.2)
BUN SERPL-MCNC: 21 MG/DL (ref 6–23)
CALCIUM SERPL-MCNC: 9.2 MG/DL (ref 8.6–10.6)
CHLORIDE SERPL-SCNC: 107 MMOL/L (ref 98–107)
CO2 SERPL-SCNC: 27 MMOL/L (ref 21–32)
CREAT SERPL-MCNC: 1.05 MG/DL (ref 0.5–1.05)
EGFRCR SERPLBLD CKD-EPI 2021: 53 ML/MIN/1.73M*2
EOSINOPHIL # BLD AUTO: 0.09 X10*3/UL (ref 0–0.4)
EOSINOPHIL NFR BLD AUTO: 2.7 %
ERYTHROCYTE [DISTWIDTH] IN BLOOD BY AUTOMATED COUNT: 15.2 % (ref 11.5–14.5)
GLUCOSE SERPL-MCNC: 115 MG/DL (ref 74–99)
HCT VFR BLD AUTO: 33.5 % (ref 36–46)
HGB BLD-MCNC: 10.9 G/DL (ref 12–16)
IMM GRANULOCYTES # BLD AUTO: 0.01 X10*3/UL (ref 0–0.5)
IMM GRANULOCYTES NFR BLD AUTO: 0.3 % (ref 0–0.9)
LYMPHOCYTES # BLD AUTO: 0.65 X10*3/UL (ref 0.8–3)
LYMPHOCYTES NFR BLD AUTO: 19.8 %
MCH RBC QN AUTO: 31.8 PG (ref 26–34)
MCHC RBC AUTO-ENTMCNC: 32.5 G/DL (ref 32–36)
MCV RBC AUTO: 98 FL (ref 80–100)
MONOCYTES # BLD AUTO: 0.35 X10*3/UL (ref 0.05–0.8)
MONOCYTES NFR BLD AUTO: 10.7 %
NEUTROPHILS # BLD AUTO: 2.13 X10*3/UL (ref 1.6–5.5)
NEUTROPHILS NFR BLD AUTO: 65 %
NRBC BLD-RTO: 0 /100 WBCS (ref 0–0)
PLATELET # BLD AUTO: 242 X10*3/UL (ref 150–450)
POTASSIUM SERPL-SCNC: 4.3 MMOL/L (ref 3.5–5.3)
PROT SERPL-MCNC: 5.8 G/DL (ref 6.4–8.2)
PROT SERPL-MCNC: 6.1 G/DL (ref 6.4–8.2)
RBC # BLD AUTO: 3.43 X10*6/UL (ref 4–5.2)
SODIUM SERPL-SCNC: 142 MMOL/L (ref 136–145)
WBC # BLD AUTO: 3.3 X10*3/UL (ref 4.4–11.3)

## 2024-05-13 PROCEDURE — 99214 OFFICE O/P EST MOD 30 MIN: CPT | Performed by: INTERNAL MEDICINE

## 2024-05-13 PROCEDURE — 96401 CHEMO ANTI-NEOPL SQ/IM: CPT

## 2024-05-13 PROCEDURE — 2500000001 HC RX 250 WO HCPCS SELF ADMINISTERED DRUGS (ALT 637 FOR MEDICARE OP): Performed by: INTERNAL MEDICINE

## 2024-05-13 PROCEDURE — 85025 COMPLETE CBC W/AUTO DIFF WBC: CPT

## 2024-05-13 PROCEDURE — 1160F RVW MEDS BY RX/DR IN RCRD: CPT | Performed by: INTERNAL MEDICINE

## 2024-05-13 PROCEDURE — 1126F AMNT PAIN NOTED NONE PRSNT: CPT | Performed by: INTERNAL MEDICINE

## 2024-05-13 PROCEDURE — 83521 IG LIGHT CHAINS FREE EACH: CPT

## 2024-05-13 PROCEDURE — 86334 IMMUNOFIX E-PHORESIS SERUM: CPT

## 2024-05-13 PROCEDURE — 1036F TOBACCO NON-USER: CPT | Performed by: INTERNAL MEDICINE

## 2024-05-13 PROCEDURE — 2500000004 HC RX 250 GENERAL PHARMACY W/ HCPCS (ALT 636 FOR OP/ED): Performed by: INTERNAL MEDICINE

## 2024-05-13 PROCEDURE — 84165 PROTEIN E-PHORESIS SERUM: CPT | Performed by: PATHOLOGY

## 2024-05-13 PROCEDURE — 1159F MED LIST DOCD IN RCRD: CPT | Performed by: INTERNAL MEDICINE

## 2024-05-13 PROCEDURE — 2500000004 HC RX 250 GENERAL PHARMACY W/ HCPCS (ALT 636 FOR OP/ED): Mod: JZ | Performed by: INTERNAL MEDICINE

## 2024-05-13 PROCEDURE — 84155 ASSAY OF PROTEIN SERUM: CPT | Mod: 59

## 2024-05-13 PROCEDURE — 86320 SERUM IMMUNOELECTROPHORESIS: CPT | Performed by: PATHOLOGY

## 2024-05-13 PROCEDURE — 36415 COLL VENOUS BLD VENIPUNCTURE: CPT

## 2024-05-13 PROCEDURE — 80053 COMPREHEN METABOLIC PANEL: CPT

## 2024-05-13 RX ORDER — EPINEPHRINE 0.3 MG/.3ML
0.3 INJECTION SUBCUTANEOUS EVERY 5 MIN PRN
Status: DISCONTINUED | OUTPATIENT
Start: 2024-05-13 | End: 2024-05-13 | Stop reason: HOSPADM

## 2024-05-13 RX ORDER — DIPHENHYDRAMINE HYDROCHLORIDE 50 MG/ML
50 INJECTION INTRAMUSCULAR; INTRAVENOUS AS NEEDED
Status: DISCONTINUED | OUTPATIENT
Start: 2024-05-13 | End: 2024-05-13 | Stop reason: HOSPADM

## 2024-05-13 RX ORDER — ACETAMINOPHEN 325 MG/1
650 TABLET ORAL ONCE
Status: COMPLETED | OUTPATIENT
Start: 2024-05-13 | End: 2024-05-13

## 2024-05-13 RX ORDER — DIPHENHYDRAMINE HYDROCHLORIDE 50 MG/ML
50 INJECTION INTRAMUSCULAR; INTRAVENOUS AS NEEDED
Status: CANCELLED | OUTPATIENT
Start: 2024-06-09

## 2024-05-13 RX ORDER — FAMOTIDINE 10 MG/ML
20 INJECTION INTRAVENOUS ONCE AS NEEDED
Status: CANCELLED | OUTPATIENT
Start: 2024-06-09

## 2024-05-13 RX ORDER — DIPHENHYDRAMINE HCL 50 MG
50 CAPSULE ORAL ONCE
Status: COMPLETED | OUTPATIENT
Start: 2024-05-13 | End: 2024-05-13

## 2024-05-13 RX ORDER — ALBUTEROL SULFATE 0.83 MG/ML
3 SOLUTION RESPIRATORY (INHALATION) AS NEEDED
Status: CANCELLED | OUTPATIENT
Start: 2024-06-09

## 2024-05-13 RX ORDER — EPINEPHRINE 0.3 MG/.3ML
0.3 INJECTION SUBCUTANEOUS EVERY 5 MIN PRN
Status: CANCELLED | OUTPATIENT
Start: 2024-06-09

## 2024-05-13 RX ORDER — MONTELUKAST SODIUM 10 MG/1
10 TABLET ORAL ONCE
Status: COMPLETED | OUTPATIENT
Start: 2024-05-13 | End: 2024-05-13

## 2024-05-13 RX ORDER — ALBUTEROL SULFATE 0.83 MG/ML
3 SOLUTION RESPIRATORY (INHALATION) AS NEEDED
Status: DISCONTINUED | OUTPATIENT
Start: 2024-05-13 | End: 2024-05-13 | Stop reason: HOSPADM

## 2024-05-13 RX ORDER — FAMOTIDINE 10 MG/ML
20 INJECTION INTRAVENOUS ONCE AS NEEDED
Status: DISCONTINUED | OUTPATIENT
Start: 2024-05-13 | End: 2024-05-13 | Stop reason: HOSPADM

## 2024-05-13 RX ADMIN — ACETAMINOPHEN 650 MG: 325 TABLET ORAL at 10:25

## 2024-05-13 RX ADMIN — DARATUMUMAB AND HYALURONIDASE-FIHJ (HUMAN RECOMBINANT) 1800 MG: 1800; 30000 INJECTION SUBCUTANEOUS at 11:08

## 2024-05-13 RX ADMIN — MONTELUKAST 10 MG: 10 TABLET, FILM COATED ORAL at 10:25

## 2024-05-13 RX ADMIN — DIPHENHYDRAMINE HYDROCHLORIDE 50 MG: 50 CAPSULE ORAL at 10:25

## 2024-05-13 RX ADMIN — DEXAMETHASONE 20 MG: 6 TABLET ORAL at 10:25

## 2024-05-13 ASSESSMENT — ENCOUNTER SYMPTOMS
BACK PAIN: 0
FLANK PAIN: 0
FATIGUE: 0

## 2024-05-13 ASSESSMENT — PAIN SCALES - GENERAL: PAINLEVEL: 0-NO PAIN

## 2024-05-13 NOTE — PROGRESS NOTES
"Patient ID: Sil West \"Ines" is a 83 y.o. female.    Treatment:   Oncology History Overview Note   I. Diagnosis (1/2023):  IgG Lambda Multiple Myeloma  Presenting symptoms: Urinary incontinence and diffuse bony pain  II. Workup:  Bone Biopsy (1/6/23):  Plasma cells neoplasm  Repeat Bone Marrow Biopsy (1/26/23):  Hypercellular marrow, 80-90% plasma cells, lambda-restricted  FISH: 1q gain (high risk), trisomy 3  MRI Spine (12/18/22):  Osseous metastases, involvement in mid-cervical, mid-thoracic, and lumbar vertebral bodies, as well as the right iliac bone  PET Scan (1/23):  FDG avidity in right iliac bone, right sacral ala, left posterior 6th rib, and appendicular skeleton  Serum and Urine Studies (1/19-1/28/23):  FKLC 1.57, FLLC 2947.7, K/L ratio 0  IgG 537, IgA 221, IgM 19, b2M 23.4, Hgb 5.7  M protein IgA lambda 0.7 g/dL   U/L  Creatinine: 2.33 (peaked at 3.15)  UPEP: Monoclonal lambda light chain at 386.6 mg/24H  Hepatitis B and C negative  III. Treatment:  Radiation (2/2/23):  Right hip + T6-T8 x 5 fractions (total 2000 cGy)  Induction (12/24/22 - 2/16/23):  Bortezomib and dexamethasone + daratumumab  C1 (1/28/23): Bortezomib 1, 4, 8, 11 + daratumumab x 2 doses  C2 (2/16/23): Weekly dosing of daratumumab and bortezomib (1, 8, 15), with lenalidomide planned when renal function allows  Response Evaluation (5/25/23):  CR with M protein 0.1 (IgG kappa c/w roscoe) and free lyte chain 1.37  C8 Roscoe RVD (Completed 7/6/23):  Transitioned with a VGPR vs CR, ongoing multifocal bone pain  IV. Response Evaluation (7/13/23):  Marked improvement in bony lesions  Pathological fractures in right pelvis and ribs, possible infiltrate in the right lower lobe base, and hypodensity in the right thyroid gland  V. Treatment Adjustment (Cycle 9 and forward): 10/2023  Velcade omitted  Transitioned to a 4-week schedule with Roscoe (day 1) and Revlimid 15 mg days 1-21/28 days     Multiple myeloma not having achieved remission " (Multi)   9/13/2023 Initial Diagnosis    Multiple myeloma not having achieved remission (CMS/HCC)     10/2/2023 -  Chemotherapy    Daratumumab, 28 Day Cycles - Maintenance       Past Medical History:  HTN, DLP  pre skin cancers,   diveriticulitis   peripheral artery disease not amenable to surgery    Surgical History:     Past Surgical History:   Procedure Laterality Date    CT GUIDED PERCUTANEOUS BIOPSY BONE DEEP  1/6/2023    CT GUIDED PERCUTANEOUS BIOPSY BONE DEEP 1/6/2023 [facility]        Family History:   No family history on file.    Social History:  ,  passed away on 10/24/23.  3 grown children (1 daughter, 2 sons). 2 grandchildren.  Has 6 cats.  Enjoyed skiing.  Occupation: retired .  Social History     Tobacco Use    Smoking status: Never     Passive exposure: Never    Smokeless tobacco: Never   Vaping Use    Vaping status: Never Used   Substance Use Topics    Alcohol use: Never    Drug use: Never      -------------------------------------------------------------------------------------------------------  Subjective       Sil West is a 83 year old female with IgG lambda multiple myeloma.  The patient presents to the clinic today (5/13/24) unaccompanied by  her daughter for follow-up and cycle 20 of monthly daratumumab on zometa q 3 months and weekly dex 10 mg.  Due to progressive free lyte chains transitioned revlimid to pomalyst, first dose to be taken today.     She is doing okay and has been doing a lot of yard work over the weekend.  Had needle aspirate of thyroid mass mid April which was indeterminate and another biopsy is going to be schedule. 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  She remains very active, doing yardwork, cutting down trees, using .   Back pain not an issue.      Review of Systems   Constitutional:  Negative for appetite change,  chills, diaphoresis, fatigue, fever and unexpected weight change.   Respiratory:  Negative for cough, shortness of breath and wheezing.    Cardiovascular:  Negative for chest pain, leg swelling and palpitations.   Gastrointestinal:  Negative for blood in stool, constipation, diarrhea, nausea and vomiting.   Genitourinary:  Negative for dysuria and hematuria.    Musculoskeletal:  Negative for back pain, flank pain and gait problem.   Skin:  Negative for rash and wound.   Neurological:  Positive for numbness. Negative for dizziness, gait problem and light-headedness.   Hematological:  Negative for adenopathy.      -------------------------------------------------------------------------------------------------------  Objective   BSA: There is no height or weight on file to calculate BSA.  There were no vitals taken for this visit.    Physical Exam  Constitutional:       Appearance: Normal appearance. She is well-developed and normal weight.      Comments: Looks very well   HENT:      Head: Normocephalic and atraumatic.      Nose: Nose normal.      Mouth/Throat:      Pharynx: No posterior oropharyngeal erythema.   Eyes:      General: No scleral icterus.     Extraocular Movements: Extraocular movements intact.      Conjunctiva/sclera: Conjunctivae normal.      Pupils: Pupils are equal, round, and reactive to light.   Neck:      Comments: Mild tenderness right side of thyroid at prior needle aspirate site.   Cardiovascular:      Rate and Rhythm: Normal rate and regular rhythm.      Pulses: Normal pulses.      Heart sounds: Normal heart sounds. No murmur heard.     No friction rub. No gallop.   Pulmonary:      Effort: Pulmonary effort is normal. No respiratory distress.      Breath sounds: Normal breath sounds. No wheezing.   Abdominal:      General: Abdomen is flat. Bowel sounds are normal. There is no distension.      Palpations: Abdomen is soft. There is no mass.      Tenderness: There is no abdominal tenderness.    Musculoskeletal:         General: No swelling. Normal range of motion.      Comments: No spine tenderness   Lymphadenopathy:      Cervical: No cervical adenopathy.      Comments: No lymphadenopathy   Skin:     General: Skin is warm and dry.      Findings: No lesion or rash.      Comments: Large surgical scar right forearm   Neurological:      General: No focal deficit present.      Mental Status: She is alert and oriented to person, place, and time.      Comments: Normal strength and gait   Psychiatric:         Mood and Affect: Mood normal.         Behavior: Behavior normal.         Thought Content: Thought content normal.       Performance Status:  Symptomatic; fully ambulatory  -------------------------------------------------------------------------------------------------------  Assessment and Plan:    Multiple myeloma not having achieved remission (CMS/[facility])  IgG lambda MM     Ig Kappa Free Light Chain   Date Value Ref Range Status   03/18/2024 1.02 0.33 - 1.94 mg/dL Final   02/19/2024 1.45 0.33 - 1.94 mg/dL Final   01/22/2024 1.93 0.33 - 1.94 mg/dL Final     Ig Lambda Free Light Chain   Date Value Ref Range Status   03/18/2024 18.62 (H) 0.57 - 2.63 mg/dL Final   02/19/2024 11.11 (H) 0.57 - 2.63 mg/dL Final   01/22/2024 8.06 (H) 0.57 - 2.63 mg/dL Final     Kappa/Lambda Ratio   Date Value Ref Range Status   03/18/2024 0.05 (L) 0.26 - 1.65 Final   02/19/2024 0.13 (L) 0.26 - 1.65 Final   01/22/2024 0.24 (L) 0.26 - 1.65 Final     M-PROTEIN 1   Date Value Ref Range Status   03/18/2024 0.1 (H)   g/dL Final   02/19/2024 0.1 (H)   g/dL Final   01/22/2024 0.1 (H)   g/dL Final     - Currently on daratumamab/Lenalidomide. Continued uptrending of free lambda light chain. M-protein still 0.1g IgG kappa, likely represents daratumumab. Will add weekly dex 10 mg to see if this could deepen her response.     5/13/24:  Continues to have increasing free lambda light chain, M-protein remains at 0.1.  Receiving C20 daratumumab  today.  Discussed that free lyte chain continues to increase significantly and suggested switching revlimid to pomalidomide  4 mg 21/28 days since no other CRAB criteria,  reviewed side effects which include cytopenia, increased risk of blood clots,  patient information provided and chemo consent reviewed and signed. Continue dexamethasone 10 mg weekly on weeks not receiving daratumumab.      -Taking aspirin 81 mg daily for VTE prophylaxis during treatment with pomalidomide    Explained many treatment options if disease progression, would prefer outpatient options     At risk for osteopenia  - Continue zoledronic acid 3.3 mg (renal dosing) every 3 months, receiving dose today (3/18/24), next dose today Emilia 10, 2024.   - Continue vitamin D supplement -- 2000 units PO daily. 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  Has follow up appointment with Dr. Magallanes on 3/25/24, biopsy is planned.       Right lower back pain  - seems musculoskeletal  - MRI lumbar spine (3/4/24): degenerative changes, no evidence of progressive disease or fracture.    - MRI thoracic spine (3/12/24): degenerative changes, redemonstration of multifocal marrow signal abnormalities compatible with the given history of multiple myeloma, osseous expansion and epidural extension of neoplasm previously seen have improved, decrease in size and enhancement of previously seen lesions.      Dermatology:  3/18/24:  Noted irregular shaped lesion to right forearm.  Advised patient to follow up with dermatology to evaluate lesio squamous cell cancer- MOHs procedure planned.      RTC: Overall doing well.  - 6/10/24:  cycle 21 of roscoe with pomalyst, blood work - Has treatment and provider appointments scheduled through July 2024. Plan 2 year course of zometa,  continue every 3 months, no sx of ONJ completes course 3/2025.   - ------------------------------------------  Carmencita Marshall MD

## 2024-05-14 LAB
KAPPA LC SERPL-MCNC: 0.78 MG/DL (ref 0.33–1.94)
KAPPA LC/LAMBDA SER: 0.03 {RATIO} (ref 0.26–1.65)
LAMBDA LC SERPL-MCNC: 26.58 MG/DL (ref 0.57–2.63)

## 2024-05-15 LAB
ALBUMIN: 3.6 G/DL (ref 3.4–5)
ALPHA 1 GLOBULIN: 0.3 G/DL (ref 0.2–0.6)
ALPHA 2 GLOBULIN: 0.8 G/DL (ref 0.4–1.1)
BETA GLOBULIN: 0.7 G/DL (ref 0.5–1.2)
GAMMA GLOBULIN: 0.4 G/DL (ref 0.5–1.4)
IMMUNOFIXATION COMMENT: ABNORMAL
M-PROTEIN 1: 0.1 G/DL
PATH REVIEW - SERUM IMMUNOFIXATION: ABNORMAL
PATH REVIEW-SERUM PROTEIN ELECTROPHORESIS: ABNORMAL
PROTEIN ELECTROPHORESIS COMMENT: ABNORMAL

## 2024-05-20 NOTE — PROGRESS NOTES
Outpatient Visit Note    Chief Complaint   Patient presents with    New Patient Visit    Annual Exam       HPI:  Sil West is a 83 y.o. female who presents to the office as a new patient with request to establish care and for an annual Medicare Wellness Visit.      Chart review notes a past medical history significant for IgG lambda multiple myeloma with active following this with hematology/oncology, having last been seen on 5/13/2024.  Visit was in regards to follow-up and cycle 20 of monthly daratumumab on zometa q 3 months and weekly dex 10 mg.  Due to progressive free lyte chains transitioned revlimid to pomalyst, first dose to be taken today.  At that time she stated to be doing okay.  She recently had needle aspirate biopsy of thyroid mass mid April which was indeterminate with another biopsy scheduled.  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  Continues to follow with dermatology with plans for full skin cancer screening.      Patient was previously established with Dr. Puentes, having last been seen for annual well exam on 10/5/2022.  Additional past medical history significant for hypertension, hyperlipidemia, PMR/neuropathy, iron anemia, PAD, diverticular disease, CKD stage IIIa and vitamin D deficiency.    Majority of blood work completed via hematology with last panel completed on 5/13/2024 including check of electrolytes, CBC and metabolic panel.  Blood work was generally unremarkable outside of hyperglycemia and stable GFR in CKD stage IIIa range.  Patient additionally had blood work completed in March including thyroid level checked which showed normal TSH/T4.     Well Exam:  Overall, they describes their health as fair with no reports of recent illness or hospitalization. They state that their diet is stable, though she does have to modify her diet to eliminate foods that exacerbate her diverticular disease. 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 They deny any significant sleep complaints. No reported issues of chest pain, shortness of breath, headaches, vision/hearing changes, abdominal pain, vomiting, diarrhea, melena, hematochezia, constipation or urinary symptoms. Of note, she does note intermittent fullness/irritation in her right ear.  Denies any drainage or appreciated ear swelling.    Preventative Health Maintenance:  In regards to preventative health maintenance, last Tdap received 2011. Flu shot not received for past season. Pneumonia vaccination series previously initiated with Pneumovax in 2009. Shingles vaccination initiated with Zostavax with Shingrix not pursued to date. COVID-19 vaccination series completed with patient currently due for booster. Last Mammogram in 2018. No history of abnormals.     At this time, patient is not interested in pursuing any additional immunizations or screenings outside of the recommendations given directly from her oncology team.      Advanced directives: None on file    Hearing screen: reports no difficulty with hearing and passes finger rub test bilaterally    Does the patient use opioid medications: No  Name of medication: N/A  If yes, do they take this medicine appropriately: N/A    How does the patient rate their health status today: Fair    Cognitive Screen:  AAAx3  to person, place and time: Yes  3 word recall: Apple, Car, Shoe - Immediate recall: Yes        - 5 minutes recall: Yes  Impression: No cognitive deficiency observed during screening or encounter today      Reviewed:   Past Medical History/Allergies:  Yes  Family History:  Yes  Social History:  Yes  Current Medications:  Yes  Vital Signs:  Yes  Advanced Directives:  discussed  Immunizations:  reviewed today  Home Safety:                    Up & Go test > 30 seconds?  No                   Home have rugs; lack grab bars in bathroom;  lack handrail on stairs; have poor lighting?  No                   Hearing difficulties?  No  Geriatric Assessment                   ADL areas requiring assistance:  Does not need help with Dressing, Eating, Ambulating, Toileting, Grooming, Hygiene.                    IADL areas requiring assistance:  Does not need help with Shopping, Housework, Accounting, Transportation, Driving.   Medications: reviewed  Current supplements:  Reviewed and recorded.   Other providers: Reviewed and recorded - Current providers and suppliers: Dr. Velarde.       PHQ9/GAD7:         Past Medical History:   Diagnosis Date    Diverticular disease     Hyperlipidemia     Hypertension     Neuropathy         Current Medications  Current Outpatient Medications   Medication Instructions    acetaminophen (TylenoL) 325 mg tablet 2 tablets, oral, Every 4 hours PRN    acyclovir (ZOVIRAX) 400 mg, oral, 2 times daily    amLODIPine (NORVASC) 5 mg, oral, Daily    aspirin 81 mg, oral, Daily    atorvastatin (LIPITOR) 20 mg, oral, Daily    cholecalciferol, vitamin D3, 25 mcg (1,000 unit) tablet,chewable 2 tablets, oral, Daily    dexAMETHasone (Decadron) 2 mg tablet Take 10mg weekly on weeks not receiving daratumumab    pomalidomide (POMALYST) 4 mg, oral, Daily, Then off for 7 days. HAZARDOUS - use appropriate precautions for handling and disposal. Do not crush, split, or open.        Allergies  Allergies   Allergen Reactions    Lobster Other     GI UPSET        Immunizations  Immunization History   Administered Date(s) Administered    Moderna SARS-CoV-2 Vaccination 03/10/2021, 12/27/2021    Pneumococcal polysaccharide vaccine, 23-valent, age 2 years and older (PNEUMOVAX 23) 05/14/2009    Tdap vaccine, age 7 year and older (BOOSTRIX, ADACEL) 03/24/2011    Tetanus Toxoid, Unspecified 01/01/1988    Tetanus toxoid, adsorbed 01/01/1988    Zoster, live 12/28/2012        Past Surgical History:   Procedure Laterality Date    CT GUIDED PERCUTANEOUS BIOPSY BONE  DEEP  01/06/2023    CT GUIDED PERCUTANEOUS BIOPSY BONE DEEP 1/6/2023 [facility]    OTHER SURGICAL HISTORY      THYROID BIOPSY     Family History   Problem Relation Name Age of Onset    Alzheimer's disease Mother      Colon cancer Father      Rashes / Skin problems Sister      Rashes / Skin problems Brother       Social History     Tobacco Use    Smoking status: Never     Passive exposure: Never    Smokeless tobacco: Never   Vaping Use    Vaping status: Never Used   Substance Use Topics    Alcohol use: Never    Drug use: Never     Tobacco Use: Low Risk  (5/21/2024)    Patient History     Smoking Tobacco Use: Never     Smokeless Tobacco Use: Never     Passive Exposure: Never        ROS  All pertinent positive symptoms are included in the history of present illness.  All other systems have been reviewed and are negative and noncontributory to this patient's current ailments.    VITAL SIGNS  Vitals:    05/21/24 0942   BP: 142/72   Pulse: 71   Temp: 36.3 °C (97.4 °F)   SpO2: 97%     Vitals:    05/21/24 0942   Weight: 68.3 kg (150 lb 9.6 oz)      Body mass index is 27.99 kg/m².     PHYSICAL EXAM  GENERAL APPEARANCE: well nourished, well developed, looks like stated age, in no acute distress, not ill or tired appearing, conversing well.   HEENT: no trauma, normocephalic. PERRLA and EOMI with normal external exam. TM's intact with no injection or effusion on left with obstructing external auditory canal cerumen on right impairing visualization of TM, no signs of infection. Nares patent, turbinates pink without discharge. Pharynx pink with no exudates or lesions, no enlarged tonsils.   NECK: no nodes, supple without rigidity, no neck mass was observed, no thyromegaly or thyroid nodules.   HEART: regular rate and rhythm, S1 and S2 heard with no murmurs or skipped beats  LUNGS: clear to auscultation bilaterally with no wheezes, crackles or rales.   ABDOMEN: no organomegaly, soft, nontender, nondistended, no  guarding/rebound/rigidity.   EXTREMITIES: moving all extremities equally with no edema or deformities.   SKIN: normal skin color and pigmentation, normal skin turgor without rash, lesions, or nodules visualized.   NEUROLOGIC EXAM: CN II-XII grossly intact, normal gait, normal balance, 5/5 muscle strength, sensation grossly intact.   PSYCH: mood and affect appropriate; alert and oriented to time, place, person; no difficulty with speech or language.     Preventative Services reviewed with patient and copy printed for patient.    Assessment/Plan   Problem List Items Addressed This Visit             ICD-10-CM    Multiple myeloma not having achieved remission (Multi) C90.00     - Continue to follow with established hematology/oncology team per protocol         Hyperlipidemia, unspecified E78.5     - Secondary to history of elevated cholesterol levels, will check cholesterol panel with next blood work; order placed  -Continue with current statin regimen along with healthy, low-fat diet         Relevant Orders    Lipid panel    Vitamin D deficiency E55.9     - Will monitor vitamin D level secondary to prior history of deficiency         Relevant Orders    Vitamin D 25-Hydroxy,Total (for eval of Vitamin D levels)    Thyroid nodule E04.1     - Continue with surgical biopsy/interpretation  -Most recent thyroid level performed in March was within normal         CKD stage 3a, GFR 45-59 ml/min (Multi) N18.31     - Kidney function stable on most recent blood work  -Continue to focus on good daily hydration         Hyperglycemia R73.9     - Will check sugar average secondary to previously elevated fasting glucose levels         Relevant Orders    Hemoglobin A1c    Impacted cerumen, right ear H61.21     - Recommend using OTC Debrox wax softening solution with plans to contact office if symptoms persist for possible irrigation          Other Visit Diagnoses         Codes    Routine adult health maintenance    -  Primary Z00.00             Additional Visit Plans:  Notes:  PREVENTATIVE HEALTH SCREENINGS INCLUDED:  - Blood pressure screen.  - Blood work may include a cholesterol and diabetes screen if risk factors exist (overweight, high blood pressure etc); screening for sexually transmitted infections; a one time HIV screen for all individuals, and a one time Hepatitis C Virus screen for those born between 8894-3096.  - I encourage you to eat a low-fat, moderate-carbohydrate, low-calorie diet to maintain a normal BMI (under 25) to reduce heart disease, and risk for diabetes  - Moderate intensity exercise for 30 minutes 5 days per week is recommended  - Along with recommendations for nutrition and exercise discussed today helpful resources recommended by the American Academy of Family Practice can be found at www.familydoctor.org or www.choosemyplate.gov    - Colon cancer screening for all ages 45-75 or 85 years old periodically depending on results.  - Cervical cancer screening (pap test) in women between 21-65 years old, periodically depending on results.  - Mammogram screening for breast cancer in women starting at 40-50 years and every 1-2 years.  - For men and women who have a 30 pack year smoking history and currently smoke or have quit in the past 15 years, screening for lung cancer with a yearly low dose CT scan is recommended starting at age 55 until age 80 years  - For men only who have smoked 100+ cigarettes anytime in their lifetime, a one time ultrasound screening for for abdominal aortic aneurysms starting at age 65 until 75 years old  - Bone density screening (DEXA) for osteoporosis in women aged 65 years and older, once every two years if needed.    IMMUNIZATIONS:  - Flu shot annually.  - Tetanus booster every 10 years.  - Two pneumococcal vaccinations after 65 years old.  - Shingles vaccine for those 50 years or older.     This was a shared decision making visit.    Next Wellness Exam Due  In 1 year from today    Counseling:        Medication education:         Education:  The patient is counseled regarding potential side-effects of all new medications        Understanding:  Patient expressed understanding        Adherence:  No barriers to adherence identified    ** Please excuse any errors in grammar or translation related to this dictation. Voice recognition software was utilized to prepare this document. **

## 2024-05-21 ENCOUNTER — OFFICE VISIT (OUTPATIENT)
Dept: PRIMARY CARE | Facility: CLINIC | Age: 84
End: 2024-05-21
Payer: MEDICARE

## 2024-05-21 VITALS
HEIGHT: 62 IN | SYSTOLIC BLOOD PRESSURE: 142 MMHG | TEMPERATURE: 97.4 F | OXYGEN SATURATION: 97 % | WEIGHT: 150.6 LBS | HEART RATE: 71 BPM | DIASTOLIC BLOOD PRESSURE: 72 MMHG | BODY MASS INDEX: 27.71 KG/M2

## 2024-05-21 DIAGNOSIS — N18.31 CKD STAGE 3A, GFR 45-59 ML/MIN (MULTI): ICD-10-CM

## 2024-05-21 DIAGNOSIS — H61.21 IMPACTED CERUMEN, RIGHT EAR: ICD-10-CM

## 2024-05-21 DIAGNOSIS — E55.9 VITAMIN D DEFICIENCY: ICD-10-CM

## 2024-05-21 DIAGNOSIS — E78.5 HYPERLIPIDEMIA, UNSPECIFIED HYPERLIPIDEMIA TYPE: ICD-10-CM

## 2024-05-21 DIAGNOSIS — E04.1 THYROID NODULE: ICD-10-CM

## 2024-05-21 DIAGNOSIS — R73.9 HYPERGLYCEMIA: ICD-10-CM

## 2024-05-21 DIAGNOSIS — C90.00 MULTIPLE MYELOMA NOT HAVING ACHIEVED REMISSION (MULTI): ICD-10-CM

## 2024-05-21 DIAGNOSIS — Z00.00 ROUTINE ADULT HEALTH MAINTENANCE: Primary | ICD-10-CM

## 2024-05-21 PROCEDURE — 1124F ACP DISCUSS-NO DSCNMKR DOCD: CPT | Performed by: FAMILY MEDICINE

## 2024-05-21 PROCEDURE — G0439 PPPS, SUBSEQ VISIT: HCPCS | Performed by: FAMILY MEDICINE

## 2024-05-21 PROCEDURE — 1036F TOBACCO NON-USER: CPT | Performed by: FAMILY MEDICINE

## 2024-05-21 PROCEDURE — 1160F RVW MEDS BY RX/DR IN RCRD: CPT | Performed by: FAMILY MEDICINE

## 2024-05-21 PROCEDURE — 99215 OFFICE O/P EST HI 40 MIN: CPT | Performed by: FAMILY MEDICINE

## 2024-05-21 PROCEDURE — 1126F AMNT PAIN NOTED NONE PRSNT: CPT | Performed by: FAMILY MEDICINE

## 2024-05-21 PROCEDURE — 1159F MED LIST DOCD IN RCRD: CPT | Performed by: FAMILY MEDICINE

## 2024-05-21 ASSESSMENT — PATIENT HEALTH QUESTIONNAIRE - PHQ9
2. FEELING DOWN, DEPRESSED OR HOPELESS: NOT AT ALL
1. LITTLE INTEREST OR PLEASURE IN DOING THINGS: NOT AT ALL
SUM OF ALL RESPONSES TO PHQ9 QUESTIONS 1 AND 2: 0

## 2024-05-21 ASSESSMENT — PAIN SCALES - GENERAL: PAINLEVEL: 0-NO PAIN

## 2024-05-21 NOTE — ASSESSMENT & PLAN NOTE
- Continue with surgical biopsy/interpretation  -Most recent thyroid level performed in March was within normal

## 2024-05-21 NOTE — ASSESSMENT & PLAN NOTE
- Secondary to history of elevated cholesterol levels, will check cholesterol panel with next blood work; order placed  -Continue with current statin regimen along with healthy, low-fat diet

## 2024-05-21 NOTE — ASSESSMENT & PLAN NOTE
- Recommend using OTC Debrox wax softening solution with plans to contact office if symptoms persist for possible irrigation

## 2024-05-21 NOTE — PATIENT INSTRUCTIONS
Problem List Items Addressed This Visit             ICD-10-CM    Multiple myeloma not having achieved remission (Multi) C90.00     - Continue to follow with established hematology/oncology team per protocol         Hyperlipidemia, unspecified E78.5     - Secondary to history of elevated cholesterol levels, will check cholesterol panel with next blood work; order placed  -Continue with current statin regimen along with healthy, low-fat diet         Relevant Orders    Lipid panel    Vitamin D deficiency E55.9     - Will monitor vitamin D level secondary to prior history of deficiency         Relevant Orders    Vitamin D 25-Hydroxy,Total (for eval of Vitamin D levels)    Thyroid nodule E04.1     - Continue with surgical biopsy/interpretation  -Most recent thyroid level performed in March was within normal         CKD stage 3a, GFR 45-59 ml/min (Multi) N18.31     - Kidney function stable on most recent blood work  -Continue to focus on good daily hydration         Hyperglycemia R73.9     - Will check sugar average secondary to previously elevated fasting glucose levels         Relevant Orders    Hemoglobin A1c    Impacted cerumen, right ear H61.21     - Recommend using OTC Debrox wax softening solution with plans to contact office if symptoms persist for possible irrigation          Other Visit Diagnoses         Codes    Routine adult health maintenance    -  Primary Z00.00            Additional Visit Plans:  Notes:  PREVENTATIVE HEALTH SCREENINGS INCLUDED:  - Blood pressure screen.  - Blood work may include a cholesterol and diabetes screen if risk factors exist (overweight, high blood pressure etc); screening for sexually transmitted infections; a one time HIV screen for all individuals, and a one time Hepatitis C Virus screen for those born between 4034-9345.  - I encourage you to eat a low-fat, moderate-carbohydrate, low-calorie diet to maintain a normal BMI (under 25) to reduce heart disease, and risk for  diabetes  - Moderate intensity exercise for 30 minutes 5 days per week is recommended  - Along with recommendations for nutrition and exercise discussed today helpful resources recommended by the American Academy of Family Practice can be found at www.familydoctor.org or www.choosemyplate.gov    - Colon cancer screening for all ages 45-75 or 85 years old periodically depending on results.  - Cervical cancer screening (pap test) in women between 21-65 years old, periodically depending on results.  - Mammogram screening for breast cancer in women starting at 40-50 years and every 1-2 years.  - For men and women who have a 30 pack year smoking history and currently smoke or have quit in the past 15 years, screening for lung cancer with a yearly low dose CT scan is recommended starting at age 55 until age 80 years  - For men only who have smoked 100+ cigarettes anytime in their lifetime, a one time ultrasound screening for for abdominal aortic aneurysms starting at age 65 until 75 years old  - Bone density screening (DEXA) for osteoporosis in women aged 65 years and older, once every two years if needed.    IMMUNIZATIONS:  - Flu shot annually.  - Tetanus booster every 10 years.  - Two pneumococcal vaccinations after 65 years old.  - Shingles vaccine for those 50 years or older.     This was a shared decision making visit.    Next Wellness Exam Due  In 1 year from today    Counseling:       Medication education:         Education:  The patient is counseled regarding potential side-effects of all new medications        Understanding:  Patient expressed understanding        Adherence:  No barriers to adherence identified    ** Please excuse any errors in grammar or translation related to this dictation. Voice recognition software was utilized to prepare this document. **

## 2024-05-22 ENCOUNTER — OFFICE VISIT (OUTPATIENT)
Dept: DERMATOLOGY | Facility: CLINIC | Age: 84
End: 2024-05-22
Payer: MEDICARE

## 2024-05-22 DIAGNOSIS — L90.5 SCAR CONDITIONS AND FIBROSIS OF SKIN: ICD-10-CM

## 2024-05-22 DIAGNOSIS — L81.4 LENTIGO: ICD-10-CM

## 2024-05-22 DIAGNOSIS — L57.0 ACTINIC KERATOSIS: ICD-10-CM

## 2024-05-22 DIAGNOSIS — D48.5 NEOPLASM OF UNCERTAIN BEHAVIOR OF SKIN: Primary | ICD-10-CM

## 2024-05-22 DIAGNOSIS — Z85.828 HISTORY OF NONMELANOMA SKIN CANCER: ICD-10-CM

## 2024-05-22 DIAGNOSIS — C44.92 SCC (SQUAMOUS CELL CARCINOMA): ICD-10-CM

## 2024-05-22 DIAGNOSIS — D18.01 ANGIOMA OF SKIN: ICD-10-CM

## 2024-05-22 DIAGNOSIS — L57.9 SKIN CHANGES DUE TO CHRONIC EXPOSURE TO NONIONIZING RADIATION: ICD-10-CM

## 2024-05-22 DIAGNOSIS — B35.1 ONYCHOMYCOSIS: ICD-10-CM

## 2024-05-22 DIAGNOSIS — L82.1 SEBORRHEIC KERATOSIS: ICD-10-CM

## 2024-05-22 PROCEDURE — 99213 OFFICE O/P EST LOW 20 MIN: CPT | Performed by: NURSE PRACTITIONER

## 2024-05-22 PROCEDURE — 17000 DESTRUCT PREMALG LESION: CPT | Performed by: NURSE PRACTITIONER

## 2024-05-22 PROCEDURE — 1159F MED LIST DOCD IN RCRD: CPT | Performed by: NURSE PRACTITIONER

## 2024-05-22 PROCEDURE — 11102 TANGNTL BX SKIN SINGLE LES: CPT | Performed by: NURSE PRACTITIONER

## 2024-05-22 PROCEDURE — 17003 DESTRUCT PREMALG LES 2-14: CPT | Performed by: NURSE PRACTITIONER

## 2024-05-22 PROCEDURE — 1160F RVW MEDS BY RX/DR IN RCRD: CPT | Performed by: NURSE PRACTITIONER

## 2024-05-22 PROCEDURE — 1036F TOBACCO NON-USER: CPT | Performed by: NURSE PRACTITIONER

## 2024-05-22 RX ORDER — CICLOPIROX 80 MG/ML
SOLUTION TOPICAL
Qty: 6.6 ML | Refills: 11 | Status: SHIPPED | OUTPATIENT
Start: 2024-05-22

## 2024-05-22 NOTE — PROGRESS NOTES
Subjective     Sherie West is a 83 y.o. female who presents for the following: Skin Check.     Review of Systems:  No other skin or systemic complaints other than what is documented elsewhere in the note.    The following portions of the chart were reviewed this encounter and updated as appropriate:   Tobacco  Allergies  Meds  Problems  Med Hx  Surg Hx         Skin Cancer History  No skin cancer on file.      Specialty Problems          Dermatology Problems    History of SCC (squamous cell carcinoma) of skin    Hx of basal cell carcinoma    Skin lesion of right arm        Objective   Well appearing patient in no apparent distress; mood and affect are within normal limits.    A full examination was performed including scalp, head, eyes, ears, nose, lips, neck, chest, axillae, abdomen, back, buttocks, bilateral upper extremities, bilateral lower extremities, hands, feet, fingers, toes, fingernails, and toenails. All findings within normal limits unless otherwise noted below.      Assessment/Plan   1. Neoplasm of uncertain behavior of skin  Left Dorsal Hand  7.5 mm pink to red thick crusty papule              Lesion biopsy  Type of biopsy: tangential    Informed consent: discussed and consent obtained    Timeout: patient name, date of birth, surgical site, and procedure verified    Procedure prep:  Patient was prepped and draped  Anesthesia: the lesion was anesthetized in a standard fashion    Anesthetic:  1% lidocaine plain local infiltration  Instrument used: DermaBlade    Hemostasis achieved with: electrodesiccation    Outcome: patient tolerated procedure well    Post-procedure details: wound care instructions given    Additional details:  Cleaned area with isopropyl alcohol prior to anesthesia or biopsy. Applied thin layer of vaseline and covered with bandaid after procedure      Staff Communication: Dermatology Local Anesthesia: 1 % Plain Lidocaine - Amount:0.5 ml    Specimen 1 - Dermatopathology- DERM  LAB  Differential Diagnosis: NMSC  Check Margins Yes/No?:    Comments:    Dermpath Lab: Routine Histopathology (formalin-fixed tissue)    NUB  - Given uncertainty in clinical diagnosis, shave biopsy is recommended in clinic today.  - The patient expressed understanding, is in agreement with this plan, and wishes to proceed with biopsy.  - Oral and written wound care instructions provided.  - Advised patient that the office will call within 2 weeks to discuss biopsy results.      2. Actinic keratosis (2)  Left 3rd Finger Metacarpophalangeal Joint, Left Dorsal Mid 3rd Finger  Numerous thin erythematous papules with gritty scale to the hands and face. 2 erythematous macules with thick scale on the left hand.     WHAT IS ACTINIC KERATOSIS?   - Actinic keratosis (AK) is a skin condition caused by sun damage. It causes scaly, rough, or bumpy spots on the skin.  - If left alone, AKs may turn into a skin cancer. People who burn easily or have trouble tanning are at more risk for developing AKs.   - There is no one test for AKs and diagnosis is made by clinical appearance. Treatment options include cryotherapy, therapy with lights, and various creams (e.g., topical 5-fluorocuracil, imiquimod).       To lower the chance of getting AK, you can:       ?  Stay out of the sun in the middle of the day (from 10 a.m. to 4 p.m.)       ?  Wear sunscreen - An SPF of at least 30 is best. The SPF number is on the sunscreen bottle or tube.       ?  Wear a wide-brimmed hat, long-sleeved shirt, long pants, or long skirt outside. A baseball hat does not give much protection.        ?  Do not use tanning beds.        ?  Keep a low-fat diet, less than 21% of calories should come from fat       ?  Take Vitamin B3 (nicotinomide) 500mg twice daily.      YOUR TREATMENT PLAN  - At this time I recommend treatment with cryotherapy.  - Possible side effects of liquid nitrogen treatment reviewed including formation of blisters, crusting, tenderness,  scar, and discoloration which may be permanent.  - Patient advised to return the office for re-evaluation if the treated lesion(s) do not resolve within 4-6 weeks. Patient verbalizes understanding.    Will plan to treat face and dorsal hands with efudex in the fall.     Destr of lesion - Left 3rd Finger Metacarpophalangeal Joint, Left Dorsal Mid 3rd Finger  Complexity: simple    Destruction method: cryotherapy    Informed consent: discussed and consent obtained    Timeout:  patient name, date of birth, surgical site, and procedure verified  Lesion destroyed using liquid nitrogen: Yes    Cryotherapy cycles:  2  Outcome: patient tolerated procedure well with no complications    Post-procedure details: wound care instructions given      3. Angioma of skin  Scattered cherry-red papule(s).    A cherry hemangioma is a small macule (small, flat, smooth area) or papule (small, solid bump) formed from an overgrowth of tiny blood vessels in the skin. Cherry hemangiomas are characteristically red or purplish in color. They often first appear in middle adulthood and usually increase in number with age. Cherry hemangiomas are noncancerous (benign) and are common in adults.    The present appearance of the lesion is not worrisome but it should continue to be observed and testing/treatment may be warranted if change occurs.    4. Seborrheic keratosis  Stuck on verrucous, tan-brown papules and plaques.      Seborrheic keratoses are common noncancerous (benign) growths of unknown cause seen in adults due to a thickening of an area of the top skin layer. Seborrheic keratoses may appear as if they are stuck on to the skin. They have distinct borders, and they may appear as papules (small, solid bumps) or plaques (solid, raised patches that are bigger than a thumbnail). They may be the same color as your skin, or they may be pink, light brown, darker brown, or very dark brown, or sometimes may appear black.    There is no way to  prevent new seborrheic keratoses from forming. Seborrheic keratoses can be removed, but removal is considered a cosmetic issue and is usually not covered by insurance.    PLAN  No treatment is needed unless there is irritation from clothing, such as itching or bleeding.  2.   Some lotions containing alpha hydroxy acids, salicylic acid, or urea may make the areas feel smoother with regular use but will not eliminate them.    5. Lentigo  Scattered tan macules in sun-exposed areas.    A solar lentigo (plural, solar lentigines), also known as a sun-induced freckle or senile lentigo, is a dark (hyperpigmented) lesion caused by natural or artificial ultraviolet (UV) light. Solar lentigines may be single or multiple. This type of lentigo is different from a simple lentigo (lentigo simplex) because it is caused by exposure to UV light. Solar lentigines are benign, but they do indicate excessive sun exposure, a risk factor for the development of skin cancer.    To prevent solar lentigines, avoid exposure to sunlight in midday (10 AM to 3 PM), wear sun-protective clothing (tightly woven clothes and hats), and apply sunscreen (SPF 30 UVA and UVB block).    The present appearance of the lesion is not worrisome but it should continue to be observed and testing/treatment may be warranted if change occurs.    6. Scar conditions and fibrosis of skin  Well healed scar at the site(s) of prior treatment with no evidence of recurrence.          The scar is clear, there is no evidence of recurrence.  The present appearance of the scar is not worrisome but it should continue to be observed and testing/treatment may be warranted if change occurs.      7. History of nonmelanoma skin cancer    ABCDEs of melanoma and atypical moles were discussed with the patient.    Patient was instructed to perform monthly self skin examination.  We recommended that the patient have regular full skin exams given an increased risk of subsequent skin  cancers.    The patient was instructed to use sun protective behaviors including use of broad spectrum sunscreens and sun protective clothing to reduce risk of skin cancers.    Warning signs of non-melanoma skin cancer discussed.    8. Skin changes due to chronic exposure to nonionizing radiation  Actinic changes in the form of freckles, lentigines and hyper/hypopigmentation     ABCDEs of melanoma and atypical moles were discussed with the patient.    Patient was instructed to perform monthly self skin examination.  We recommended that the patient have regular full skin exams given an increased risk of subsequent skin cancers.    The patient was instructed to use sun protective behaviors including use of broad spectrum sunscreens and sun protective clothing to reduce risk of skin cancers.    Warning signs of non-melanoma skin cancer discussed.    9. Onychomycosis  #3,4,5 toes bilaterally  Distal nail with thickening and subungal debris and yellow white discoloration    -We reviewed the etiology of onychomycosis.  It is due to a superficial dermatophyte infection of the nail plate.  This can cause thickening of the nails, as well as yellow discoloration and the toenail can become more fragile and prone to trauma.  -Treatment options discussed.  Patient not interested in oral medication but wants to trial topical. Advised to use consistently for 1 year.     Plan  Penlac daily    Related Medications  ciclopirox (Penlac) 8 % solution  Apply to affected nails nightly. Remove build up weekly with nail polish remover.          Return in 6 months for routine skin check or return to clinic sooner if needed

## 2024-05-22 NOTE — PATIENT INSTRUCTIONS

## 2024-05-24 LAB
LABORATORY COMMENT REPORT: NORMAL
PATH REPORT.FINAL DX SPEC: NORMAL
PATH REPORT.GROSS SPEC: NORMAL
PATH REPORT.MICROSCOPIC SPEC OTHER STN: NORMAL
PATH REPORT.RELEVANT HX SPEC: NORMAL
PATH REPORT.TOTAL CANCER: NORMAL

## 2024-05-28 ENCOUNTER — TELEPHONE (OUTPATIENT)
Dept: DERMATOLOGY | Facility: CLINIC | Age: 84
End: 2024-05-28
Payer: MEDICARE

## 2024-05-28 NOTE — TELEPHONE ENCOUNTER
Skin cancer. Order placed for referral to derm surgery for Mohs. Patient needs to return to clinic in 6 months for next routine skin check.

## 2024-06-05 ENCOUNTER — PROCEDURE VISIT (OUTPATIENT)
Dept: DERMATOLOGY | Facility: CLINIC | Age: 84
End: 2024-06-05
Payer: MEDICARE

## 2024-06-05 DIAGNOSIS — C44.629 SQUAMOUS CELL CARCINOMA OF DORSUM OF LEFT HAND: ICD-10-CM

## 2024-06-05 PROCEDURE — 17312 MOHS ADDL STAGE: CPT | Performed by: DERMATOLOGY

## 2024-06-05 PROCEDURE — 13132 CMPLX RPR F/C/C/M/N/AX/G/H/F: CPT | Performed by: DERMATOLOGY

## 2024-06-05 PROCEDURE — 17311 MOHS 1 STAGE H/N/HF/G: CPT | Performed by: DERMATOLOGY

## 2024-06-05 NOTE — PROGRESS NOTES
Mohs Surgery Operative Note    Date of Surgery:  6/5/2024  Surgeon:  Sophia Vann MD  Office Location:  [address] [facility]  [address]  [address]  [facility] [address]  Dept: [phone]  Dept Fax: [phone]  Referring Provider: Esequiel Stiles, APRN-CNP  [facility] [facility]  [address]  [place] [address]      Assessment/Plan   Pre-procedure:   Obtained informed consent: written from patient  The surgical site was identified and confirmed with the patient.     Intra-operative:   Audible time out called at : 10:18 AM 06/05/24  by: Jennifer Cintron LPN   Verified patient name, birthdate, site, specimen bottle label & requisition.    The planned procedure(s) was again reviewed with the patient. The risks of bleeding, infection, nerve damage and scarring were reviewed. Written authorization was obtained. The patient identity, surgical site, and planned procedure(s) were verified. The provider acted as both surgeon and pathologist.     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  Left Dorsal Hand  Mohs surgery  Consent obtained: written    Universal Protocol:  Procedure explained and questions answered to patient or proxy's satisfaction: Yes    Test results available and properly labeled: Yes    Pathology report reviewed: Yes    External notes reviewed: Yes    Photo or diagram used for site identification: Yes    Site/side marked: Yes    Slide independently reviewed by Mohs surgeon: Yes    Immediately prior to procedure a time out was called: Yes    Patient identity confirmed: verbally with patient  Preparation: Patient was prepped and draped in usual sterile fashion      Anticoagulation:  Is the patient taking prescription anticoagulant and/or aspirin prescribed/recommended by a physician? No    Was the anticoagulation regimen changed prior to Mohs? No      Anesthesia:  Anesthesia method: local infiltration  Local anesthetic: lidocaine 2% WITH epi    Procedure Details:  Biopsy accession  number: [id number]  Date of biopsy: 5/22/2024  Pre-Op diagnosis: squamous cell carcinoma  SCC subtype: moderately differentiated  Surgery side: left  Surgical site (from skin exam): Left Dorsal Hand  Pre-operative length (cm): 1.4  Pre-operative width (cm): 1.5  Indications for Mohs surgery: ill-defined borders  Previously treated? No      Micrographic Surgery Details:  Post-operative length (cm): 1.5  Post-operative width (cm): 1.8  Number of Mohs stages: 2    Stage 1     Comments: The patient was brought into the operating room and placed in the procedure chair in the appropriate position.  The area positive by previous biopsy was identified and confirmed with the patient. The area of clinically obvious tumor was debulked using a curette and/or scalpel as needed. An incision was made following the Mohs approach through the skin. The specimen was taken to the lab, divided into 1 piece(s) and appropriately chromacoded and processed.  Tumor was seen on the lateral margins as indicated on the on the Mohs map.  Squamous cell carcinoma in situ. Histologic examination revealed enlarged, atypical keratinocytes with large nuclear to cytoplasmic ratio extending throughout the full thickness of an epidermis.   Tumor features identified on Mohs section: squamous cell carcinoma   Depth of invasion: dermis    Stage 2     Comments: The area of positivity as noted on the Mohs map in the previous stage was identified and removed using the Mohs technique. The specimen was taken to the lab and appropriately chromacoded and processed in 1 piece(s).  Tumor features identified on Mohs section: no tumor identified  Depth of defect: subcutaneous fat    Patient tolerance of procedure: tolerated well, no immediate complications    Reconstruction:  Was the defect reconstructed? Yes    Was reconstruction performed by the same Mohs surgeon? Yes    Setting of reconstruction: outpatient office  When was reconstruction performed? same day  Type  of reconstruction: linear  Linear reconstruction: complex  Length of linear repair (cm): 3  Subcutaneous Layers (Deep Stitches)   Suture size:  3-0  Suture type:  Vicryl  Stitches:  Buried vertical mattress  Fine/surface layer approximation (top stitches)   Epidermal/Superficial suture size:  5-0  Epidermal/Superficial suture type:  Fast-absorbing gut  Stitches: simple running    Hemostasis achieved with: electrodesiccation  Outcome: patient tolerated procedure well with no complications    Post-procedure details: sterile dressing applied and wound care instructions given    Dressing type: pressure dressing      Complex Linear Repair - Wide Undermining:  Given the location and size of the defect, it was determined that a complex layered linear closure was required to restore normal anatomy and function. The repair was considered complex because extensive undermining was required and performed. The amount of undermining performed was greater than the maximum width of the defect as measured perpendicular to the closure line along at least one entire edge of the defect. Standing cutaneous cones were removed using Burow's triangles. The wound edges were brought into close approximation with buried vertical mattress sutures. The remainder of the wound was then closed with epidermal top sutures.    The final repair measured 3.0 cm              Wound care was discussed, and the patient was given written post-operative wound care instructions.      The patient will follow up with Sophia Vann MD as needed for any post operative problems or concerns, and will follow up with their primary dermatologist as scheduled.

## 2024-06-05 NOTE — PROGRESS NOTES
"Office Visit Note  Date: 6/5/2024  Surgeon:  Sophia Vann MD  Office Location:  [address] [facility]  [address]  [address]  [facility] [address]  Dept: [phone]  Dept Fax: [phone]  Referring Provider: Esequiel Stiles, APRN-CNP  [address]  [address]  [place] [address]    Subjective   Sil ALBA West \"Sherie\" is a 83 y.o. female who presents for the following: MOHS Surgery    According to the patient, the lesion has been present for approximately greater than 1 year at the time of diagnosis.  The lesion is painful.  The lesion has not been treated previously.    The patient does not have a pacemaker / defibrillator.  The patient does not have a heart valve / joint replacement.    The patient is on blood thinners.  The patient does not have a history of hepatitis B or C.  The patient does not have a history of HIV.  The patient does not have a history of immunosuppression (e.g. organ transplantation, malignancy, medications)    Review of Systems:  No other skin or systemic complaints other than what is documented elsewhere in the note.    MEDICAL HISTORY: clinically relevant history including significant past medical history, medications and allergies was reviewed and documented in Epic.    Objective   Well appearing patient in no apparent distress; mood and affect are within normal limits.  Vital signs: See record.  Noted on the Left Dorsal Hand  Is a 1.4 x 1.5 cm scar    The patient confirmed the identified site.    Discussion:  The nature of the diagnosis was explained. The lesion is a skin cancer.  It has a risk of local growth and distant spread. The condition is associated with sun exposure.  Warning signs of non-melanoma skin cancer discussed. Patient was instructed to perform monthly self skin examination.  We recommended that the patient have regular full skin exams given an increased risk of subsequent skin cancers. The patient was instructed to use sun " protective behaviors including use of broad spectrum sunscreens and sun protective clothing to reduce risk of skin cancers.      Risks, benefits, side effects of Mohs surgery were discussed with patient and the patient voiced understanding.  It was explained that even though the cure rate of Mohs is very high it is not 100%. Risks of surgery including but not limited to bleeding, infection, numbness, nerve damage, and scar were reviewed.  Discussion included wound care requirements, activity restrictions, likely scar outcome and time to heal.     After Mohs surgery, the defect may need to be repaired surgically and the scar may be longer than the original lesion.  Reconstruction options, risks, and benefits were reviewed including second intention healing, linear repair (4-1 ratio was explained), local flaps, skin grafts, cartilage grafts and interpolation flaps (the need for multiple surgeries was explained). Possible outcomes were reviewed including likely scar appearance, failure of flap survival, infection, bleeding and the need for revision surgery.     The pathology was reviewed, the photograph was reviewed, and the referring physician's note was reviewed.    Patient elected for Mohs surgery.

## 2024-06-07 ASSESSMENT — ENCOUNTER SYMPTOMS
APPETITE CHANGE: 0
CONSTIPATION: 0
BLOOD IN STOOL: 0
ADENOPATHY: 0
CHILLS: 0
NAUSEA: 0
HEMATURIA: 0
DIZZINESS: 0
FEVER: 0
DYSURIA: 0
WHEEZING: 0
LIGHT-HEADEDNESS: 0
PALPITATIONS: 0
FLANK PAIN: 0
UNEXPECTED WEIGHT CHANGE: 0
BACK PAIN: 0
COUGH: 0
VOMITING: 0
SHORTNESS OF BREATH: 0
DIARRHEA: 0
DIAPHORESIS: 0

## 2024-06-07 NOTE — PROGRESS NOTES
"Patient ID: Sil West \"Ines" is a 83 y.o. female.    Treatment:   Oncology History Overview Note   I. Diagnosis (1/2023):  IgG Lambda Multiple Myeloma  Presenting symptoms: Urinary incontinence and diffuse bony pain  II. Workup:  Bone Biopsy (1/6/23):  Plasma cells neoplasm  Repeat Bone Marrow Biopsy (1/26/23):  Hypercellular marrow, 80-90% plasma cells, lambda-restricted  FISH: 1q gain (high risk), trisomy 3  MRI Spine (12/18/22):  Osseous metastases, involvement in mid-cervical, mid-thoracic, and lumbar vertebral bodies, as well as the right iliac bone  PET Scan (1/23):  FDG avidity in right iliac bone, right sacral ala, left posterior 6th rib, and appendicular skeleton  Serum and Urine Studies (1/19-1/28/23):  FKLC 1.57, FLLC 2947.7, K/L ratio 0  IgG 537, IgA 221, IgM 19, b2M 23.4, Hgb 5.7  M protein IgA lambda 0.7 g/dL   U/L  Creatinine: 2.33 (peaked at 3.15)  UPEP: Monoclonal lambda light chain at 386.6 mg/24H  Hepatitis B and C negative  III. Treatment:  Radiation (2/2/23):  Right hip + T6-T8 x 5 fractions (total 2000 cGy)  Induction (12/24/22 - 2/16/23):  Bortezomib and dexamethasone + daratumumab  C1 (1/28/23): Bortezomib 1, 4, 8, 11 + daratumumab x 2 doses  C2 (2/16/23): Weekly dosing of daratumumab and bortezomib (1, 8, 15), with lenalidomide planned when renal function allows  Response Evaluation (5/25/23):  CR with M protein 0.1 (IgG kappa c/w roscoe) and free lyte chain 1.37  C8 Roscoe RVD (Completed 7/6/23):  Transitioned with a VGPR vs CR, ongoing multifocal bone pain  IV. Response Evaluation (7/13/23):  Marked improvement in bony lesions  Pathological fractures in right pelvis and ribs, possible infiltrate in the right lower lobe base, and hypodensity in the right thyroid gland  V. Treatment Adjustment (Cycle 9 and forward): 10/2023  Velcade omitted  Transitioned to a 4-week schedule with Roscoe (day 1) and Revlimid 15 mg days 1-21/28 days     Multiple myeloma not having achieved remission " (Multi)   9/13/2023 Initial Diagnosis    Multiple myeloma not having achieved remission (CMS/HCC)     10/2/2023 -  Chemotherapy    Daratumumab, 28 Day Cycles - Maintenance       Past Medical History:  HTN, DLP  pre skin cancers,   diveriticulitis   peripheral artery disease not amenable to surgery    Surgical History:     Past Surgical History:   Procedure Laterality Date    CT GUIDED PERCUTANEOUS BIOPSY BONE DEEP  01/06/2023    CT GUIDED PERCUTANEOUS BIOPSY BONE DEEP 1/6/2023 [facility]    OTHER SURGICAL HISTORY      THYROID BIOPSY      Family History:     Family History   Problem Relation Name Age of Onset    Alzheimer's disease Mother      Colon cancer Father      Rashes / Skin problems Sister      Rashes / Skin problems Brother       Social History:  ,  passed away on 10/24/23.  3 grown children (1 daughter, 2 sons). 2 grandchildren.  Has 6 cats.  Enjoyed skiing.  Occupation: retired .  Social History     Tobacco Use    Smoking status: Never     Passive exposure: Never    Smokeless tobacco: Never   Vaping Use    Vaping status: Never Used   Substance Use Topics    Alcohol use: Never    Drug use: Never      -------------------------------------------------------------------------------------------------------  Subjective       Sil West is a 83 year old female with IgG lambda multiple myeloma.  Sil presents to the clinic today (6/10/24) unaccompanied for follow up evaluation of her MM and blood work.  She is due for C21 daratumumab with dexamethasone 10 mg weekly on weeks not receiving daratumumab.  Due to progressive free lyte chains transitioned revlimid to pomalyst, starting 5/13/24.  She remains on pomalyst 4 mg daily on days 1-21 out of 28 day cycle.  She is obtaining pomalyst without issues from pharmacy and is starting new cycle today. She receives zometa every 3 months and is due today.       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  Will be going back to see dermatology for full body check in November.  Has toenail fungus to last three toes to right foot, was prescribed ciclopirox 8% solution.  Going to get ear wax removed tomorrow.  Energy level is good but has some bad when she wears herself out.  Stays active and mows yard and gardening.  Appetite is good and weight is stable.    Eats prunes to prevent constipation.  Urinary urgency and incontinence at times.  No other urinary symptoms.    Review of Systems   Constitutional:  Positive for fatigue. Negative for appetite change, chills, diaphoresis, fever and unexpected weight change.   Respiratory:  Negative for cough, shortness of breath and wheezing.    Cardiovascular:  Positive for leg swelling (mild swelling to legs at times). Negative for chest pain and palpitations.   Gastrointestinal:  Negative for blood in stool, constipation, diarrhea, nausea and vomiting.   Genitourinary:  Positive for bladder incontinence. Negative for dysuria and hematuria.    Musculoskeletal:  Negative for back pain, flank pain and gait problem.   Skin:  Positive for wound (right outter ankle and had SCC removed to left hand and right forearm). Negative for rash.   Neurological:  Negative for dizziness, gait problem, light-headedness and numbness.   Hematological:  Negative for adenopathy. Does not bruise/bleed easily.   -------------------------------------------------------------------------------------------------------  Objective   BSA: 1.71 meters squared  /54   Pulse 74   Temp 36.2 °C (97.2 °F)   Resp 17   Wt 67.7 kg (149 lb 4 oz)   SpO2 100%   BMI 27.74 kg/m²     Physical Exam  Constitutional:       Appearance: Normal appearance. She is well-developed and normal weight.      Comments: Looks very well   HENT:      Head: Normocephalic and atraumatic.      Nose: Nose normal.      Mouth/Throat:      Pharynx: No posterior oropharyngeal erythema.   Eyes:       General: No scleral icterus.     Extraocular Movements: Extraocular movements intact.      Conjunctiva/sclera: Conjunctivae normal.      Pupils: Pupils are equal, round, and reactive to light.   Cardiovascular:      Rate and Rhythm: Normal rate and regular rhythm.      Pulses: Normal pulses.      Heart sounds: Normal heart sounds. No murmur heard.     No friction rub. No gallop.   Pulmonary:      Effort: Pulmonary effort is normal. No respiratory distress.      Breath sounds: Normal breath sounds. No wheezing.   Abdominal:      General: Abdomen is flat. Bowel sounds are normal. There is no distension.      Palpations: Abdomen is soft. There is no mass.      Tenderness: There is no abdominal tenderness.   Musculoskeletal:         General: No swelling. Normal range of motion.      Comments: No spine tenderness   Lymphadenopathy:      Cervical: No cervical adenopathy.      Comments: No lymphadenopathy   Skin:     General: Skin is warm and dry.      Findings: No lesion or rash.      Comments: Large surgical scar right forearm   Neurological:      General: No focal deficit present.      Mental Status: She is alert and oriented to person, place, and time.      Comments: Normal strength and gait   Psychiatric:         Mood and Affect: Mood normal.         Behavior: Behavior normal.         Thought Content: Thought content normal.     Performance Status:  Symptomatic; fully ambulatory  -------------------------------------------------------------------------------------------------------  Assessment and Plan:    Multiple myeloma not having achieved remission (CMS/[facility])  IgG lambda MM     Ig Kappa Free Light Chain   Date Value Ref Range Status   06/10/2024 1.09 0.33 - 1.94 mg/dL Final   05/13/2024 0.78 0.33 - 1.94 mg/dL Final   04/15/2024 1.19 0.33 - 1.94 mg/dL Final   03/18/2024 1.02 0.33 - 1.94 mg/dL Final   02/19/2024 1.45 0.33 - 1.94 mg/dL Final     Ig Lambda Free Light Chain   Date Value Ref Range Status   06/10/2024  37.52 (H) 0.57 - 2.63 mg/dL Final   05/13/2024 26.58 (H) 0.57 - 2.63 mg/dL Final   04/15/2024 23.52 (H) 0.57 - 2.63 mg/dL Final   03/18/2024 18.62 (H) 0.57 - 2.63 mg/dL Final   02/19/2024 11.11 (H) 0.57 - 2.63 mg/dL Final     Kappa/Lambda Ratio   Date Value Ref Range Status   06/10/2024 0.03 (L) 0.26 - 1.65 Final   05/13/2024 0.03 (L) 0.26 - 1.65 Final   04/15/2024 0.05 (L) 0.26 - 1.65 Final   03/18/2024 0.05 (L) 0.26 - 1.65 Final   02/19/2024 0.13 (L) 0.26 - 1.65 Final     M-PROTEIN 1   Date Value Ref Range Status   05/13/2024 0.1 (H)   g/dL Final   03/18/2024 0.1 (H)   g/dL Final   02/19/2024 0.1 (H)   g/dL Final   01/22/2024 0.1 (H)   g/dL Final   12/26/2023 0.1 (H)   g/dL Final     - Currently on daratumamab/Lenalidomide. Continued uptrending of free lambda light chain. M-protein still 0.1g IgG kappa, likely represents daratumumab. Will add weekly dex 10 mg to see if this could deepen her response.     5/13/24:  Continues to have increasing free lambda light chain, M-protein remains at 0.1.  Receiving C20 daratumumab today.  Discussed that free lyte chain continues to increase significantly and suggested switching revlimid to pomalidomide  4 mg 21/28 days since no other CRAB criteria,  reviewed side effects which include cytopenia, increased risk of blood clots,  patient information provided and chemo consent reviewed and signed. Continue dexamethasone 10 mg weekly on weeks not receiving daratumumab.    -Taking aspirin 81 mg daily for VTE prophylaxis during treatment with pomalidomide    Explained many treatment options if disease progression, would prefer outpatient options  6/10/24:  Laboratory results from today: hgb 10.6, plt 247, WBC 2.8, and ANC 1.59.  Free lambda light chains increasing to 37.52.  M-protein in process from today.  No concerning findings on physical examination today.  Scheduled to receive C21 daratumubuab today, continue weekly dexamethasone 10 mg on weeks not receiving daratumumab.   Continue pomalyst 4 mg daily on days 1-21 out of 28 day cycle.  Continue aspirin 81 mg daily.  Follow up on 7/8/24 with Dr. Marshall.    Bone Health:  - Continue zoledronic acid 3.3 mg (renal dosing) every 3 months, receiving dose today (6/10/24), next dose due Sept 2024.  - Continue vitamin D supplement -- 2000 units PO daily. 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  Has follow up appointment with Dr. Magallanes on 4/29/24 showing rare atypical cells from FNA of right mid lobe.       Right lower back pain  - seems musculoskeletal  - MRI lumbar spine (3/4/24): degenerative changes, no evidence of progressive disease or fracture.    - MRI thoracic spine (3/12/24): degenerative changes, redemonstration of multifocal marrow signal abnormalities compatible with the given history of multiple myeloma, osseous expansion and epidural extension of neoplasm previously seen have improved, decrease in size and enhancement of previously seen lesions.      Dermatology:  3/18/24:  Noted irregular shaped lesion to right forearm.  Advised patient to follow up with dermatology to evaluate lesio squamous cell cancer- MOHs procedure planned.    6/10/24:  Reports she had SSC removed from right forearm and left hand June 2024.  Following with dermatology.      RTC: Overall doing well.  7/8/24:  Follow up visit with Dr. Marshall.  Due for daratumumab C22.  Plan 2 year course of zometa, continue every 3 months, no sx of ONJ completes course 3/2025.   - ------------------------------------------  CHANTEL Medrano-PAUL

## 2024-06-09 RX ORDER — DIPHENHYDRAMINE HCL 50 MG
50 CAPSULE ORAL ONCE
Status: CANCELLED | OUTPATIENT
Start: 2024-07-08

## 2024-06-09 RX ORDER — MONTELUKAST SODIUM 10 MG/1
10 TABLET ORAL ONCE
Status: CANCELLED | OUTPATIENT
Start: 2024-07-08

## 2024-06-09 RX ORDER — ALBUTEROL SULFATE 0.83 MG/ML
3 SOLUTION RESPIRATORY (INHALATION) AS NEEDED
Status: CANCELLED | OUTPATIENT
Start: 2024-07-08

## 2024-06-09 RX ORDER — DIPHENHYDRAMINE HYDROCHLORIDE 50 MG/ML
50 INJECTION INTRAMUSCULAR; INTRAVENOUS AS NEEDED
Status: CANCELLED | OUTPATIENT
Start: 2024-07-08

## 2024-06-09 RX ORDER — EPINEPHRINE 0.3 MG/.3ML
0.3 INJECTION SUBCUTANEOUS EVERY 5 MIN PRN
Status: CANCELLED | OUTPATIENT
Start: 2024-07-08

## 2024-06-09 RX ORDER — ACETAMINOPHEN 325 MG/1
650 TABLET ORAL ONCE
Status: CANCELLED | OUTPATIENT
Start: 2024-07-08

## 2024-06-09 RX ORDER — FAMOTIDINE 10 MG/ML
20 INJECTION INTRAVENOUS ONCE AS NEEDED
Status: CANCELLED | OUTPATIENT
Start: 2024-07-08

## 2024-06-10 ENCOUNTER — OFFICE VISIT (OUTPATIENT)
Dept: HEMATOLOGY/ONCOLOGY | Facility: HOSPITAL | Age: 84
End: 2024-06-10
Payer: MEDICARE

## 2024-06-10 ENCOUNTER — LAB (OUTPATIENT)
Dept: LAB | Facility: HOSPITAL | Age: 84
End: 2024-06-10
Payer: MEDICARE

## 2024-06-10 ENCOUNTER — INFUSION (OUTPATIENT)
Dept: HEMATOLOGY/ONCOLOGY | Facility: HOSPITAL | Age: 84
End: 2024-06-10
Payer: MEDICARE

## 2024-06-10 VITALS
OXYGEN SATURATION: 100 % | RESPIRATION RATE: 17 BRPM | WEIGHT: 149.25 LBS | HEART RATE: 74 BPM | SYSTOLIC BLOOD PRESSURE: 128 MMHG | TEMPERATURE: 97.2 F | BODY MASS INDEX: 27.74 KG/M2 | DIASTOLIC BLOOD PRESSURE: 54 MMHG

## 2024-06-10 DIAGNOSIS — Z91.89 AT RISK FOR OSTEOPENIA: ICD-10-CM

## 2024-06-10 DIAGNOSIS — Z85.828 HISTORY OF SCC (SQUAMOUS CELL CARCINOMA) OF SKIN: ICD-10-CM

## 2024-06-10 DIAGNOSIS — Z79.899 IMMUNOSUPPRESSED DUE TO CHEMOTHERAPY (MULTI): ICD-10-CM

## 2024-06-10 DIAGNOSIS — C90.00 MULTIPLE MYELOMA NOT HAVING ACHIEVED REMISSION (MULTI): Primary | ICD-10-CM

## 2024-06-10 DIAGNOSIS — M54.9 BACK PAIN, UNSPECIFIED BACK LOCATION, UNSPECIFIED BACK PAIN LATERALITY, UNSPECIFIED CHRONICITY: ICD-10-CM

## 2024-06-10 DIAGNOSIS — E55.9 VITAMIN D DEFICIENCY: ICD-10-CM

## 2024-06-10 DIAGNOSIS — R73.9 HYPERGLYCEMIA: ICD-10-CM

## 2024-06-10 DIAGNOSIS — C90.00 MULTIPLE MYELOMA NOT HAVING ACHIEVED REMISSION (MULTI): ICD-10-CM

## 2024-06-10 DIAGNOSIS — D84.821 IMMUNOSUPPRESSED DUE TO CHEMOTHERAPY (MULTI): ICD-10-CM

## 2024-06-10 DIAGNOSIS — T45.1X5A IMMUNOSUPPRESSED DUE TO CHEMOTHERAPY (MULTI): ICD-10-CM

## 2024-06-10 DIAGNOSIS — E04.1 THYROID NODULE: ICD-10-CM

## 2024-06-10 LAB
25(OH)D3 SERPL-MCNC: 57 NG/ML (ref 30–100)
ALBUMIN SERPL BCP-MCNC: 3.8 G/DL (ref 3.4–5)
ALP SERPL-CCNC: 55 U/L (ref 33–136)
ALT SERPL W P-5'-P-CCNC: 10 U/L (ref 7–45)
ANION GAP SERPL CALC-SCNC: 12 MMOL/L (ref 10–20)
AST SERPL W P-5'-P-CCNC: 10 U/L (ref 9–39)
BASOPHILS # BLD AUTO: 0.04 X10*3/UL (ref 0–0.1)
BASOPHILS NFR BLD AUTO: 1.4 %
BILIRUB SERPL-MCNC: 0.4 MG/DL (ref 0–1.2)
BUN SERPL-MCNC: 24 MG/DL (ref 6–23)
CALCIUM SERPL-MCNC: 9 MG/DL (ref 8.6–10.3)
CHLORIDE SERPL-SCNC: 107 MMOL/L (ref 98–107)
CO2 SERPL-SCNC: 29 MMOL/L (ref 21–32)
CREAT SERPL-MCNC: 1.06 MG/DL (ref 0.5–1.05)
EGFRCR SERPLBLD CKD-EPI 2021: 53 ML/MIN/1.73M*2
EOSINOPHIL # BLD AUTO: 0.08 X10*3/UL (ref 0–0.4)
EOSINOPHIL NFR BLD AUTO: 2.8 %
ERYTHROCYTE [DISTWIDTH] IN BLOOD BY AUTOMATED COUNT: 15.3 % (ref 11.5–14.5)
EST. AVERAGE GLUCOSE BLD GHB EST-MCNC: 117 MG/DL
GLUCOSE SERPL-MCNC: 104 MG/DL (ref 74–99)
HBA1C MFR BLD: 5.7 %
HCT VFR BLD AUTO: 33.2 % (ref 36–46)
HGB BLD-MCNC: 10.6 G/DL (ref 12–16)
IGA SERPL-MCNC: 64 MG/DL (ref 70–400)
IGG SERPL-MCNC: 357 MG/DL (ref 700–1600)
IGM SERPL-MCNC: 10 MG/DL (ref 40–230)
IMM GRANULOCYTES # BLD AUTO: 0.02 X10*3/UL (ref 0–0.5)
IMM GRANULOCYTES NFR BLD AUTO: 0.7 % (ref 0–0.9)
LYMPHOCYTES # BLD AUTO: 0.72 X10*3/UL (ref 0.8–3)
LYMPHOCYTES NFR BLD AUTO: 25.5 %
MAGNESIUM SERPL-MCNC: 2.02 MG/DL (ref 1.6–2.4)
MCH RBC QN AUTO: 32.2 PG (ref 26–34)
MCHC RBC AUTO-ENTMCNC: 31.9 G/DL (ref 32–36)
MCV RBC AUTO: 101 FL (ref 80–100)
MONOCYTES # BLD AUTO: 0.37 X10*3/UL (ref 0.05–0.8)
MONOCYTES NFR BLD AUTO: 13.1 %
NEUTROPHILS # BLD AUTO: 1.59 X10*3/UL (ref 1.6–5.5)
NEUTROPHILS NFR BLD AUTO: 56.5 %
NRBC BLD-RTO: 0 /100 WBCS (ref 0–0)
PHOSPHATE SERPL-MCNC: 3 MG/DL (ref 2.5–4.9)
PLATELET # BLD AUTO: 247 X10*3/UL (ref 150–450)
POTASSIUM SERPL-SCNC: 4.2 MMOL/L (ref 3.5–5.3)
PROT SERPL-MCNC: 5.4 G/DL (ref 6.4–8.2)
PROT SERPL-MCNC: 6.1 G/DL (ref 6.4–8.2)
RBC # BLD AUTO: 3.29 X10*6/UL (ref 4–5.2)
SODIUM SERPL-SCNC: 144 MMOL/L (ref 136–145)
WBC # BLD AUTO: 2.8 X10*3/UL (ref 4.4–11.3)

## 2024-06-10 PROCEDURE — 2500000001 HC RX 250 WO HCPCS SELF ADMINISTERED DRUGS (ALT 637 FOR MEDICARE OP): Performed by: INTERNAL MEDICINE

## 2024-06-10 PROCEDURE — 83521 IG LIGHT CHAINS FREE EACH: CPT

## 2024-06-10 PROCEDURE — 96401 CHEMO ANTI-NEOPL SQ/IM: CPT

## 2024-06-10 PROCEDURE — 83735 ASSAY OF MAGNESIUM: CPT

## 2024-06-10 PROCEDURE — 1159F MED LIST DOCD IN RCRD: CPT

## 2024-06-10 PROCEDURE — 83036 HEMOGLOBIN GLYCOSYLATED A1C: CPT

## 2024-06-10 PROCEDURE — 85025 COMPLETE CBC W/AUTO DIFF WBC: CPT

## 2024-06-10 PROCEDURE — 84155 ASSAY OF PROTEIN SERUM: CPT | Mod: 59

## 2024-06-10 PROCEDURE — 99214 OFFICE O/P EST MOD 30 MIN: CPT

## 2024-06-10 PROCEDURE — 84075 ASSAY ALKALINE PHOSPHATASE: CPT

## 2024-06-10 PROCEDURE — 86334 IMMUNOFIX E-PHORESIS SERUM: CPT

## 2024-06-10 PROCEDURE — 1125F AMNT PAIN NOTED PAIN PRSNT: CPT

## 2024-06-10 PROCEDURE — 96367 TX/PROPH/DG ADDL SEQ IV INF: CPT

## 2024-06-10 PROCEDURE — 99214 OFFICE O/P EST MOD 30 MIN: CPT | Mod: 25

## 2024-06-10 PROCEDURE — 36415 COLL VENOUS BLD VENIPUNCTURE: CPT

## 2024-06-10 PROCEDURE — 82306 VITAMIN D 25 HYDROXY: CPT

## 2024-06-10 PROCEDURE — 1160F RVW MEDS BY RX/DR IN RCRD: CPT

## 2024-06-10 PROCEDURE — 2500000004 HC RX 250 GENERAL PHARMACY W/ HCPCS (ALT 636 FOR OP/ED): Mod: JZ | Performed by: INTERNAL MEDICINE

## 2024-06-10 PROCEDURE — 96365 THER/PROPH/DIAG IV INF INIT: CPT

## 2024-06-10 PROCEDURE — 82784 ASSAY IGA/IGD/IGG/IGM EACH: CPT

## 2024-06-10 PROCEDURE — 96361 HYDRATE IV INFUSION ADD-ON: CPT | Mod: INF

## 2024-06-10 PROCEDURE — 84100 ASSAY OF PHOSPHORUS: CPT

## 2024-06-10 PROCEDURE — 2500000004 HC RX 250 GENERAL PHARMACY W/ HCPCS (ALT 636 FOR OP/ED): Performed by: INTERNAL MEDICINE

## 2024-06-10 RX ORDER — FAMOTIDINE 10 MG/ML
20 INJECTION INTRAVENOUS ONCE AS NEEDED
Status: DISCONTINUED | OUTPATIENT
Start: 2024-06-10 | End: 2024-06-10 | Stop reason: HOSPADM

## 2024-06-10 RX ORDER — DIPHENHYDRAMINE HCL 50 MG
50 CAPSULE ORAL ONCE
Status: COMPLETED | OUTPATIENT
Start: 2024-06-10 | End: 2024-06-10

## 2024-06-10 RX ORDER — FAMOTIDINE 10 MG/ML
20 INJECTION INTRAVENOUS ONCE AS NEEDED
OUTPATIENT
Start: 2024-09-02

## 2024-06-10 RX ORDER — ACETAMINOPHEN 325 MG/1
650 TABLET ORAL ONCE
Status: COMPLETED | OUTPATIENT
Start: 2024-06-10 | End: 2024-06-10

## 2024-06-10 RX ORDER — EPINEPHRINE 0.3 MG/.3ML
0.3 INJECTION SUBCUTANEOUS EVERY 5 MIN PRN
OUTPATIENT
Start: 2024-09-02

## 2024-06-10 RX ORDER — ALBUTEROL SULFATE 0.83 MG/ML
3 SOLUTION RESPIRATORY (INHALATION) AS NEEDED
Status: DISCONTINUED | OUTPATIENT
Start: 2024-06-10 | End: 2024-06-10 | Stop reason: HOSPADM

## 2024-06-10 RX ORDER — DIPHENHYDRAMINE HYDROCHLORIDE 50 MG/ML
50 INJECTION INTRAMUSCULAR; INTRAVENOUS AS NEEDED
OUTPATIENT
Start: 2024-09-02

## 2024-06-10 RX ORDER — MONTELUKAST SODIUM 10 MG/1
10 TABLET ORAL ONCE
Status: COMPLETED | OUTPATIENT
Start: 2024-06-10 | End: 2024-06-10

## 2024-06-10 RX ORDER — DIPHENHYDRAMINE HYDROCHLORIDE 50 MG/ML
50 INJECTION INTRAMUSCULAR; INTRAVENOUS AS NEEDED
Status: DISCONTINUED | OUTPATIENT
Start: 2024-06-10 | End: 2024-06-10 | Stop reason: HOSPADM

## 2024-06-10 RX ORDER — EPINEPHRINE 0.3 MG/.3ML
0.3 INJECTION SUBCUTANEOUS EVERY 5 MIN PRN
Status: DISCONTINUED | OUTPATIENT
Start: 2024-06-10 | End: 2024-06-10 | Stop reason: HOSPADM

## 2024-06-10 RX ORDER — ALBUTEROL SULFATE 0.83 MG/ML
3 SOLUTION RESPIRATORY (INHALATION) AS NEEDED
OUTPATIENT
Start: 2024-09-02

## 2024-06-10 RX ADMIN — DIPHENHYDRAMINE HYDROCHLORIDE 50 MG: 50 CAPSULE ORAL at 12:34

## 2024-06-10 RX ADMIN — MONTELUKAST 10 MG: 10 TABLET, FILM COATED ORAL at 12:34

## 2024-06-10 RX ADMIN — DARATUMUMAB AND HYALURONIDASE-FIHJ (HUMAN RECOMBINANT) 1800 MG: 1800; 30000 INJECTION SUBCUTANEOUS at 13:04

## 2024-06-10 RX ADMIN — ACETAMINOPHEN 650 MG: 325 TABLET ORAL at 12:34

## 2024-06-10 RX ADMIN — DEXAMETHASONE 20 MG: 6 TABLET ORAL at 12:34

## 2024-06-10 RX ADMIN — SODIUM CHLORIDE 500 ML: 9 INJECTION, SOLUTION INTRAVENOUS at 12:33

## 2024-06-10 RX ADMIN — ZOLEDRONIC ACID 3.3 MG: 4 INJECTION, SOLUTION, CONCENTRATE INTRAVENOUS at 13:03

## 2024-06-10 ASSESSMENT — ENCOUNTER SYMPTOMS
BRUISES/BLEEDS EASILY: 0
FATIGUE: 1
WOUND: 1
LEG SWELLING: 1
NUMBNESS: 0

## 2024-06-10 ASSESSMENT — PAIN SCALES - GENERAL: PAINLEVEL: 4

## 2024-06-10 NOTE — SIGNIFICANT EVENT

## 2024-06-11 ENCOUNTER — OFFICE VISIT (OUTPATIENT)
Dept: PRIMARY CARE | Facility: CLINIC | Age: 84
End: 2024-06-11
Payer: MEDICARE

## 2024-06-11 VITALS
HEART RATE: 108 BPM | OXYGEN SATURATION: 99 % | DIASTOLIC BLOOD PRESSURE: 74 MMHG | HEIGHT: 62 IN | BODY MASS INDEX: 27.97 KG/M2 | WEIGHT: 152 LBS | SYSTOLIC BLOOD PRESSURE: 140 MMHG | TEMPERATURE: 98.9 F | RESPIRATION RATE: 18 BRPM

## 2024-06-11 DIAGNOSIS — Z23 ENCOUNTER FOR IMMUNIZATION: Primary | ICD-10-CM

## 2024-06-11 DIAGNOSIS — H61.21 IMPACTED CERUMEN, RIGHT EAR: ICD-10-CM

## 2024-06-11 LAB
KAPPA LC SERPL-MCNC: 1.09 MG/DL (ref 0.33–1.94)
KAPPA LC/LAMBDA SER: 0.03 {RATIO} (ref 0.26–1.65)
LAMBDA LC SERPL-MCNC: 37.52 MG/DL (ref 0.57–2.63)

## 2024-06-11 PROCEDURE — 1124F ACP DISCUSS-NO DSCNMKR DOCD: CPT | Performed by: NURSE PRACTITIONER

## 2024-06-11 PROCEDURE — 69210 REMOVE IMPACTED EAR WAX UNI: CPT | Performed by: NURSE PRACTITIONER

## 2024-06-11 PROCEDURE — 1126F AMNT PAIN NOTED NONE PRSNT: CPT | Performed by: NURSE PRACTITIONER

## 2024-06-11 PROCEDURE — 99214 OFFICE O/P EST MOD 30 MIN: CPT | Performed by: NURSE PRACTITIONER

## 2024-06-11 PROCEDURE — 90715 TDAP VACCINE 7 YRS/> IM: CPT | Performed by: NURSE PRACTITIONER

## 2024-06-11 PROCEDURE — 1159F MED LIST DOCD IN RCRD: CPT | Performed by: NURSE PRACTITIONER

## 2024-06-11 PROCEDURE — 1036F TOBACCO NON-USER: CPT | Performed by: NURSE PRACTITIONER

## 2024-06-11 RX ORDER — POMALIDOMIDE 4 MG/1
4 CAPSULE ORAL DAILY
COMMUNITY
Start: 2024-06-03

## 2024-06-11 ASSESSMENT — ENCOUNTER SYMPTOMS
GASTROINTESTINAL NEGATIVE: 1
CONSTITUTIONAL NEGATIVE: 1
MUSCULOSKELETAL NEGATIVE: 1
CARDIOVASCULAR NEGATIVE: 1
RESPIRATORY NEGATIVE: 1

## 2024-06-11 ASSESSMENT — PATIENT HEALTH QUESTIONNAIRE - PHQ9
SUM OF ALL RESPONSES TO PHQ9 QUESTIONS 1 AND 2: 0
2. FEELING DOWN, DEPRESSED OR HOPELESS: NOT AT ALL
1. LITTLE INTEREST OR PLEASURE IN DOING THINGS: NOT AT ALL

## 2024-06-11 ASSESSMENT — PAIN SCALES - GENERAL: PAINLEVEL: 0-NO PAIN

## 2024-06-11 NOTE — PROGRESS NOTES
"Chief Complaint  Sil R Pool \"Sherie\" is a 83 y.o. female presenting for \"Ear Fullness (PT HAD A PHYSICAL WITH DR ABBOTT AND WAS TOLD SHE HAS A PARTIAL BLOCKAGE, WAS TOLD TO USE DROPS, BUT FEELS LIKE SOMETHING IS STILL IN THERE, STATES IT IS STARTING TO AFFECT HER BALANCE).\"    HPI     Sil R Pool \"Sherie\" is a 83 y.o. female presenting for blocked ear on the left, she states it is bothering her balance would like cleaned       Past Medical History  Patient Active Problem List    Diagnosis Date Noted    CKD stage 3a, GFR 45-59 ml/min (Multi) 05/21/2024    Hyperglycemia 05/21/2024    Impacted cerumen, right ear 05/21/2024    Skin lesion of right arm 03/22/2024    Other chronic pain 03/22/2024    Back pain 03/22/2024    Other constipation 11/28/2023    At risk for osteopenia 10/05/2023    Immunosuppressed due to chemotherapy (Multi) 10/05/2023    Thyroid nodule 10/05/2023    Bilateral carpal tunnel syndrome 09/26/2023    Chronic right SI joint pain 09/26/2023    Hearing difficulty of both ears 09/26/2023    Hyperlipidemia, unspecified 09/26/2023    Lesion of pelvic bone 09/26/2023    Lumbar spondylosis 09/26/2023    Neuropathy 09/26/2023    Osteoarthritis of left knee 09/26/2023    Paresthesia of skin 09/26/2023    Polymyalgia rheumatica (Multi) 09/26/2023    Vitamin D deficiency 09/26/2023    Multiple myeloma not having achieved remission (Multi) 09/13/2023    History of SCC (squamous cell carcinoma) of skin 03/21/2014    Hx of basal cell carcinoma 03/21/2014        Medications  Current Outpatient Medications   Medication Instructions    acetaminophen (TylenoL) 325 mg tablet 2 tablets, oral, Every 4 hours PRN    acyclovir (ZOVIRAX) 400 mg, oral, 2 times daily    amLODIPine (NORVASC) 5 mg, oral, Daily    aspirin 81 mg, oral, Daily    atorvastatin (LIPITOR) 20 mg, oral, Daily    cholecalciferol, vitamin D3, 25 mcg (1,000 unit) tablet,chewable 2 tablets, oral, Daily    ciclopirox (Penlac) 8 % solution Apply to " "affected nails nightly. Remove build up weekly with nail polish remover.    dexAMETHasone (Decadron) 2 mg tablet Take 10mg weekly on weeks not receiving daratumumab    Pomalyst 4 mg, oral, Daily        Surgical History  She has a past surgical history that includes CT guided percutaneous biopsy bone deep (01/06/2023) and Other surgical history.     Social History  She reports that she has never smoked. She has never been exposed to tobacco smoke. She has never used smokeless tobacco. She reports that she does not drink alcohol and does not use drugs.    Family History  Family History   Problem Relation Name Age of Onset    Alzheimer's disease Mother      Colon cancer Father      Rashes / Skin problems Sister      Rashes / Skin problems Brother          Allergies  Lobster and Shellfish derived    ROS  Review of Systems   Constitutional: Negative.    HENT:  Positive for hearing loss.    Respiratory: Negative.     Cardiovascular: Negative.    Gastrointestinal: Negative.    Genitourinary: Negative.    Musculoskeletal: Negative.         Last Recorded Vitals  /74 (BP Location: Right arm, Patient Position: Sitting, BP Cuff Size: Adult)   Pulse 108   Temp 37.2 °C (98.9 °F)   Resp 18   Wt 68.9 kg (152 lb)   SpO2 99%     Physical Exam  Constitutional:       Appearance: Normal appearance.   HENT:      Right Ear: There is no impacted cerumen.      Left Ear: There is impacted cerumen.   Cardiovascular:      Rate and Rhythm: Normal rate and regular rhythm.      Pulses: Normal pulses.      Heart sounds: Normal heart sounds.   Pulmonary:      Effort: Pulmonary effort is normal.      Breath sounds: Normal breath sounds.   Neurological:      Mental Status: She is alert.         Relevant Results      Assessment/Plan   Sil \"Sherie\" was seen today for ear fullness.  Diagnoses and all orders for this visit:  Encounter for immunization (Primary)  -     Tdap vaccine, age 7 years and older  Impacted cerumen, right ear   Ear " cleaned with water and h2o2, tolerated well        COUNSELING      Medication education:              Education:  The patient is counseled regarding potential side-effects of any and all new medications             Understanding:  Patient expressed understanding             Adherence:  No barriers to adherence identified        Terra Baires, APRN-CNP

## 2024-06-13 LAB
ALBUMIN: 3.3 G/DL (ref 3.4–5)
ALPHA 1 GLOBULIN: 0.3 G/DL (ref 0.2–0.6)
ALPHA 2 GLOBULIN: 0.8 G/DL (ref 0.4–1.1)
BETA GLOBULIN: 0.7 G/DL (ref 0.5–1.2)
GAMMA GLOBULIN: 0.3 G/DL (ref 0.5–1.4)
IMMUNOFIXATION COMMENT: ABNORMAL
M-PROTEIN 1: 0.1 G/DL
PATH REVIEW - SERUM IMMUNOFIXATION: ABNORMAL
PATH REVIEW-SERUM PROTEIN ELECTROPHORESIS: ABNORMAL
PROTEIN ELECTROPHORESIS COMMENT: ABNORMAL

## 2024-06-17 ENCOUNTER — APPOINTMENT (OUTPATIENT)
Dept: SURGERY | Facility: CLINIC | Age: 84
End: 2024-06-17
Payer: MEDICARE

## 2024-06-17 VITALS
BODY MASS INDEX: 27.7 KG/M2 | SYSTOLIC BLOOD PRESSURE: 136 MMHG | WEIGHT: 149 LBS | DIASTOLIC BLOOD PRESSURE: 73 MMHG | HEART RATE: 49 BPM

## 2024-06-17 DIAGNOSIS — E04.1 THYROID NODULE: Primary | ICD-10-CM

## 2024-06-17 PROCEDURE — 10005 FNA BX W/US GDN 1ST LES: CPT | Performed by: SURGERY

## 2024-06-17 PROCEDURE — 1036F TOBACCO NON-USER: CPT | Performed by: SURGERY

## 2024-06-17 PROCEDURE — 99213 OFFICE O/P EST LOW 20 MIN: CPT | Performed by: SURGERY

## 2024-06-17 PROCEDURE — 1160F RVW MEDS BY RX/DR IN RCRD: CPT | Performed by: SURGERY

## 2024-06-17 PROCEDURE — 88173 CYTOPATH EVAL FNA REPORT: CPT

## 2024-06-17 PROCEDURE — 88112 CYTOPATH CELL ENHANCE TECH: CPT

## 2024-06-17 PROCEDURE — 1159F MED LIST DOCD IN RCRD: CPT | Performed by: SURGERY

## 2024-06-17 NOTE — PROGRESS NOTES
"Subjective   Patient ID: Sil West \"Sherie\" is a 83 y.o. female who presents for repeat thyroid biopsy.    HPI I saw Mrs. West back in surgery clinic today.  She has a known history of melanoma.  On her PET scan she had a PET scan positive right-sided thyroid nodule.  Based on this I did a biopsy on her on April 29, 2024.  This came back with some mild atypia but it was a very limited cellularity specimen.  Based on this, I told her I would recommend a repeat biopsy and she returns for that today.    Review of Systems    Objective   Physical Exam    Cytology Consultation (Non-Gynecologic): [id number]  Order: [id number]   Collected 4/29/2024 09:54       Status: Final result       Visible to patient: Yes (seen)       Dx: Multinodular thyroid    0 Result Notes      Component    Final Cytological Interpretation      A. THYROID FINE NEEDLE ASPIRATION RIGHT MID LOBE                                                 Rare Atypical cells are present                                Ancillary Testing: Specimen cellularity is inadequate for molecular testing                      Electronically signed by Amanda Cummins MD on 5/6/2024 at 1149        Slide(s) initially screened by DIANE Kumar at [facility] [address] [facility] [id number]  By the signature on this report, the individual or group listed as making the Final Interpretation/Diagnosis certifies that they have reviewed this case.    Clinical History    83-year-old woman with history of melanoma with a 3.2 cm TI-RADS 3 nodule right thyroid lobe        Patient ID: Sil West \"Ines" is a 83 y.o. female.    Biopsy    Date/Time: 6/17/2024 9:57 AM    Performed by: Paul Magallanes MD  Authorized by: Paul Magallanes MD    Consent:     Consent obtained:  Verbal    Consent given by:  Patient    Risks, benefits, and alternatives were discussed: yes      Risks discussed:  Bleeding and pain    Alternatives discussed:  No treatment and " observation  Universal protocol:     Procedure explained and questions answered to patient or proxy's satisfaction: yes      Relevant documents present and verified: yes      Test results available: yes      Imaging studies available: yes      Required blood products, implants, devices, and special equipment available: yes      Site/side marked: no      Immediately prior to procedure, a time out was called: yes      Patient identity confirmed:  Verbally with patient  Pre-procedure details:     Skin preparation:  Povidone-iodine  Sedation:     Sedation type:  None  Anesthesia:     Anesthesia method:  Local infiltration    Local anesthetic:  Lidocaine 1% w/o epi  Procedure specific details:      Ultrasound-guided thyroid biopsy.    In the office today I did an ultrasound-guided biopsy of the patient's 3 cm right-sided thyroid nodule.  She has a known history of melanoma with a PET scan positive right-sided thyroid nodule.  Her prior biopsy came back with some atypia but it was a limited specimen.  She returns for repeat biopsy today.  This was done under real-time ultrasound guidance with a 6-15 MHz linear ultrasound probe.  2 samples were taken.  Images were captured.  Post-procedure details:     Procedure completion:  Tolerated well, no immediate complications    Assessment/Plan         Mrs. West has a 3 cm right-sided thyroid nodule found during PET scan follow-up of her melanoma.  Initial biopsy of this came back with some atypia but it was a very limited specimen.  Therefore she returned to the office today regular repeat biopsy.  She tolerated this well with no complications.    I told her I will call her toward the end the week with biopsy results.  Any further decision for surgical excision versus ongoing radiographic evaluation will be based on biopsy reports.    Paul Magallanes MD 06/17/24 9:55 AM

## 2024-06-24 DIAGNOSIS — E04.1 THYROID NODULE: ICD-10-CM

## 2024-06-26 ENCOUNTER — ONCOLOGY MEDICATION OUTREACH (OUTPATIENT)
Dept: HEMATOLOGY/ONCOLOGY | Facility: HOSPITAL | Age: 84
End: 2024-06-26
Payer: MEDICARE

## 2024-06-26 DIAGNOSIS — C90.00 MULTIPLE MYELOMA NOT HAVING ACHIEVED REMISSION (MULTI): ICD-10-CM

## 2024-06-26 NOTE — PROGRESS NOTES
"ONCOLOGY CLINICAL PHARMACY NOTE     Subjective  Sil West \"Ines" is a 83 y.o. female with MM, here for refill support.        Treatment history  Treatment Details   Treatment goal Control   Plan Name Daratumumab, Pomalidomide and dexamethasone (oral meds managed outside treatment plan) 28 Day Cycles - Maintenance   Status Active   Start Date 10/2/2023   End Date 8/5/2024 (Planned)   Provider Carmencita Marshall MD   Chemotherapy daratumumab-hyaluronidase (Darzalex Faspro) 1,800 mg-30,000 units/15 mL subQ syringe, 1,800 mg, subcutaneous, Once, 11 of 12 cycles  Administration: 1,800 mg (10/2/2023), 1,800 mg (10/30/2023), 1,800 mg (11/27/2023), 1,800 mg (12/26/2023), 1,800 mg (5/13/2024), 1,800 mg (1/22/2024), 1,800 mg (2/26/2024), 1,800 mg (3/18/2024), 1,800 mg (4/15/2024), 1,800 mg (6/10/2024)       Treatment Details   Treatment goal [No plan goal]   Plan Name Zoledronic Acid (Zometa), Every 12 Weeks   Status Active   Start Date 10/2/2023   End Date Until discontinued   Provider Carmencita Marshall MD   Chemotherapy zoledronic acid (Zometa) 3.3 mg in dextrose 5% 100 mL IV, 3.3 mg (original dose ), intravenous, Once, 1 of 1 cycle  Dose modification: 3.3 mg (Cycle 1)  Administration: 3.3 mg (10/2/2023), 3.3 mg (12/26/2023), 3.3 mg (3/18/2024), 3.3 mg (6/10/2024)       Treatment Details   Treatment goal [No plan goal]   Plan Name Venous Access Orders   Status Active   Start Date 12/26/2023   End Date Until discontinued   Provider Carmencita Marshall MD   Chemotherapy [No matching medication found in this treatment plan]        Objective  There were no vitals taken for this visit.  Lab Results   Component Value Date    WBC 2.8 (L) 06/10/2024    HGB 10.6 (L) 06/10/2024    HCT 33.2 (L) 06/10/2024     (H) 06/10/2024     06/10/2024      Lab Results   Component Value Date    GLUCOSE 104 (H) 06/10/2024    CALCIUM 9.0 06/10/2024     06/10/2024    K 4.2 06/10/2024    CO2 29 06/10/2024     06/10/2024    " "BUN 24 (H) 06/10/2024    CREATININE 1.06 (H) 06/10/2024     Lab Results   Component Value Date    ALT 10 06/10/2024    AST 10 06/10/2024    ALKPHOS 55 06/10/2024    BILITOT 0.4 06/10/2024       Allergies and Medications   Allergies   Allergen Reactions    Lobster Other     GI UPSET    Shellfish Derived Other     Vomiting , diarrhea       Current Outpatient Medications:     acetaminophen (TylenoL) 325 mg tablet, Take 2 tablets (650 mg) by mouth every 4 hours if needed., Disp: , Rfl:     acyclovir (Zovirax) 400 mg tablet, Take 1 tablet (400 mg) by mouth 2 times a day., Disp: 180 tablet, Rfl: 3    amLODIPine (Norvasc) 5 mg tablet, Take 1 tablet (5 mg) by mouth once daily., Disp: 90 tablet, Rfl: 3    aspirin 81 mg EC tablet, Take 1 tablet (81 mg) by mouth once daily., Disp: , Rfl:     atorvastatin (Lipitor) 20 mg tablet, Take 1 tablet (20 mg) by mouth once daily., Disp: 90 tablet, Rfl: 3    cholecalciferol, vitamin D3, 25 mcg (1,000 unit) tablet,chewable, Chew 2 tablets (50 mcg) once daily., Disp: , Rfl:     ciclopirox (Penlac) 8 % solution, Apply to affected nails nightly. Remove build up weekly with nail polish remover., Disp: 6.6 mL, Rfl: 11    dexAMETHasone (Decadron) 2 mg tablet, Take 10mg weekly on weeks not receiving daratumumab, Disp: 20 tablet, Rfl: 1    Pomalyst 4 mg capsule, Take 1 capsule (4 mg) by mouth once daily., Disp: , Rfl:     Assessment and Plan  Sil West \"Sherie\" is a 83 y.o. female with MM, to be treated with pomalidomide.    Per Dr. Marshall's authorization, on 6/26/2024, I refilled pomalidomide 4 mg once daily for 21 days, then 7 days off, for a 28-day supply. #21, 0 RF. Auth# [id number] obtained and prescription sent to [facility].    Patient is taking aspirin 81 mg once daily for thromboembolic prophylaxis.     Gennaro Bhardwaj, PharmD    "

## 2024-07-07 ASSESSMENT — ENCOUNTER SYMPTOMS
DYSURIA: 0
CHILLS: 0
ADENOPATHY: 0
BLOOD IN STOOL: 0
NUMBNESS: 0
HEMATURIA: 0
LEG SWELLING: 1
APPETITE CHANGE: 0
FATIGUE: 1
UNEXPECTED WEIGHT CHANGE: 0
FEVER: 0
DIAPHORESIS: 0
BACK PAIN: 0
VOMITING: 0
PALPITATIONS: 0
NAUSEA: 0
DIZZINESS: 0
FLANK PAIN: 0
LIGHT-HEADEDNESS: 0
DIARRHEA: 0

## 2024-07-07 NOTE — PROGRESS NOTES
"Patient ID: Sil West \"Ines" is a 83 y.o. female.    Treatment:   Oncology History Overview Note   I. Diagnosis (1/2023):  IgG Lambda Multiple Myeloma  Presenting symptoms: Urinary incontinence and diffuse bony pain  II. Workup:  Bone Biopsy (1/6/23):  Plasma cells neoplasm  Repeat Bone Marrow Biopsy (1/26/23):  Hypercellular marrow, 80-90% plasma cells, lambda-restricted  FISH: 1q gain (high risk), trisomy 3  MRI Spine (12/18/22):  Osseous metastases, involvement in mid-cervical, mid-thoracic, and lumbar vertebral bodies, as well as the right iliac bone  PET Scan (1/23):  FDG avidity in right iliac bone, right sacral ala, left posterior 6th rib, and appendicular skeleton  Serum and Urine Studies (1/19-1/28/23):  FKLC 1.57, FLLC 2947.7, K/L ratio 0  IgG 537, IgA 221, IgM 19, b2M 23.4, Hgb 5.7  M protein IgA lambda 0.7 g/dL   U/L  Creatinine: 2.33 (peaked at 3.15)  UPEP: Monoclonal lambda light chain at 386.6 mg/24H  Hepatitis B and C negative  III. Treatment:  Radiation (2/2/23):  Right hip + T6-T8 x 5 fractions (total 2000 cGy)  Induction (12/24/22 - 2/16/23):  Bortezomib and dexamethasone + daratumumab  C1 (1/28/23): Bortezomib 1, 4, 8, 11 + daratumumab x 2 doses  C2 (2/16/23): Weekly dosing of daratumumab and bortezomib (1, 8, 15), with lenalidomide planned when renal function allows  Response Evaluation (5/25/23):  CR with M protein 0.1 (IgG kappa c/w roscoe) and free lyte chain 1.37  C8 Roscoe RVD (Completed 7/6/23):  Transitioned with a VGPR vs CR, ongoing multifocal bone pain  IV. Response Evaluation (7/13/23):  Marked improvement in bony lesions  Pathological fractures in right pelvis and ribs, possible infiltrate in the right lower lobe base, and hypodensity in the right thyroid gland  V. Treatment Adjustment (Cycle 9 and forward): 10/2023  Velcade omitted  Transitioned to a 4-week schedule with Roscoe (day 1) and Revlimid 15 mg days 1-21/28 days     Multiple myeloma not having achieved remission " (Multi)   9/13/2023 Initial Diagnosis    Multiple myeloma not having achieved remission (CMS/HCC)     10/2/2023 -  Chemotherapy    Daratumumab, Pomalidomide and dexamethasone (oral meds managed outside treatment plan) 28 Day Cycles - Maintenance       Past Medical History:  HTN, DLP  pre skin cancers,   diveriticulitis   peripheral artery disease not amenable to surgery    Surgical History:     Past Surgical History:   Procedure Laterality Date    CT GUIDED PERCUTANEOUS BIOPSY BONE DEEP  01/06/2023    CT GUIDED PERCUTANEOUS BIOPSY BONE DEEP 1/6/2023 [facility]    OTHER SURGICAL HISTORY      THYROID BIOPSY      Family History:     Family History   Problem Relation Name Age of Onset    Alzheimer's disease Mother      Colon cancer Father      Rashes / Skin problems Sister      Rashes / Skin problems Brother       Social History:  ,  passed away on 10/24/23.  3 grown children (1 daughter, 2 sons). 2 grandchildren.  Has 6 cats.  Enjoyed skiing.  Occupation: retired .  Social History     Tobacco Use    Smoking status: Never     Passive exposure: Never    Smokeless tobacco: Never   Vaping Use    Vaping status: Never Used   Substance Use Topics    Alcohol use: Never    Drug use: Never      -------------------------------------------------------------------------------------------------------  Subjective       Sil West is a 83 year old female with IgG lambda multiple myeloma.  Sil presents to the clinic today (6/10/24) unaccompanied for follow up evaluation of her MM and blood work.  She is due for C21 daratumumab with dexamethasone 10 mg weekly on weeks not receiving daratumumab.  Due to progressive free lyte chains transitioned revlimid to pomalyst, starting 5/13/24.  She remains on pomalyst 4 mg daily on days 1-21 out of 28 day cycle.  She is obtaining pomalyst without issues from pharmacy and is starting new cycle today. She receives zometa every 3 months and is due today.        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  Will be going back to see dermatology for full body check in November.  Has toenail fungus to last three toes to right foot, was prescribed ciclopirox 8% solution.  Going to get ear wax removed tomorrow.  Energy level is good but has some bad when she wears herself out.  Stays active and mows yard and gardening.  Appetite is good and weight is stable.    Eats prunes to prevent constipation.  Urinary urgency and incontinence at times.  No other urinary symptoms.    7/8/24:  Sil presents to the clinic today (7/8/24) unaccompanied for follow up evaluation of her multiple myeloma, blood work, and is due for for C22 daratumumab with dexamethadone 10 mg weekly on weeks not receiving daratumumab.  She remains on pomalyst 4 mg daily on days 1-21 out of 28 day cycle.  She is receiving pomalyst okay from pharmacy.  Reviving zometa every 3 months, last received 6/10/24.      Went to [employer] game yesterday to celebrate her upcoming birthday.  Energy level can be lower when she is active, takes care of 1 acre yard that she keeps up with.  Appetite is good and weight is stable.  Baseline swelling to bilateral legs.  Urinary urgency and incontinence at times.  No other urinary symptoms.  Following with Dr. Magallanes for her thyroid with monitoring with US.      Review of Systems   Constitutional:  Positive for fatigue. Negative for appetite change, chills, diaphoresis, fever and unexpected weight change.   Respiratory: Negative.     Cardiovascular:  Positive for leg swelling (mild swelling to legs at times). Negative for chest pain and palpitations.   Gastrointestinal:  Negative for blood in stool, constipation, diarrhea, nausea and vomiting.   Genitourinary:  Positive for bladder incontinence. Negative for dysuria and hematuria.    Musculoskeletal:  Negative for back pain, flank pain and gait problem.   Skin:  Negative  for rash and wound.   Neurological:  Negative for dizziness, gait problem, light-headedness and numbness.   Hematological:  Negative for adenopathy. Bruises/bleeds easily (bruises easily).   -------------------------------------------------------------------------------------------------------  Objective   BSA: 1.72 meters squared  /62   Pulse 79   Temp 37 °C (98.6 °F)   Resp 17   Wt 68.4 kg (150 lb 12.7 oz)   SpO2 100%   BMI 28.03 kg/m²     Physical Exam  Vitals reviewed.   Constitutional:       Appearance: Normal appearance. She is well-developed and normal weight.   HENT:      Head: Normocephalic and atraumatic.      Nose: Nose normal.   Eyes:      General: No scleral icterus.     Extraocular Movements: Extraocular movements intact.      Conjunctiva/sclera: Conjunctivae normal.      Pupils: Pupils are equal, round, and reactive to light.   Cardiovascular:      Rate and Rhythm: Normal rate and regular rhythm.      Pulses: Normal pulses.      Heart sounds: Normal heart sounds. No murmur heard.     No friction rub. No gallop.   Pulmonary:      Effort: Pulmonary effort is normal.      Breath sounds: Normal breath sounds.   Abdominal:      General: Abdomen is flat. Bowel sounds are normal. There is no distension.      Palpations: Abdomen is soft. There is no mass.      Tenderness: There is no abdominal tenderness.   Musculoskeletal:         General: Normal range of motion.      Right lower leg: Edema (non-pitting) present.      Left lower leg: Edema (non-pitting) present.      Comments: No spine tenderness   Lymphadenopathy:      Comments: No lymphadenopathy   Skin:     General: Skin is warm and dry.   Neurological:      General: No focal deficit present.      Mental Status: She is alert and oriented to person, place, and time.      Comments: Normal strength and gait   Psychiatric:         Mood and Affect: Mood normal.         Behavior: Behavior normal.         Thought Content: Thought content normal.      Performance Status:  Symptomatic; fully ambulatory  -------------------------------------------------------------------------------------------------------  Assessment and Plan:    Multiple myeloma not having achieved remission (CMS/[facility])  IgG lambda MM     Ig Kappa Free Light Chain   Date Value Ref Range Status   06/10/2024 1.09 0.33 - 1.94 mg/dL Final   05/13/2024 0.78 0.33 - 1.94 mg/dL Final   04/15/2024 1.19 0.33 - 1.94 mg/dL Final   03/18/2024 1.02 0.33 - 1.94 mg/dL Final   02/19/2024 1.45 0.33 - 1.94 mg/dL Final     Ig Lambda Free Light Chain   Date Value Ref Range Status   06/10/2024 37.52 (H) 0.57 - 2.63 mg/dL Final   05/13/2024 26.58 (H) 0.57 - 2.63 mg/dL Final   04/15/2024 23.52 (H) 0.57 - 2.63 mg/dL Final   03/18/2024 18.62 (H) 0.57 - 2.63 mg/dL Final   02/19/2024 11.11 (H) 0.57 - 2.63 mg/dL Final     Kappa/Lambda Ratio   Date Value Ref Range Status   06/10/2024 0.03 (L) 0.26 - 1.65 Final   05/13/2024 0.03 (L) 0.26 - 1.65 Final   04/15/2024 0.05 (L) 0.26 - 1.65 Final   03/18/2024 0.05 (L) 0.26 - 1.65 Final   02/19/2024 0.13 (L) 0.26 - 1.65 Final     M-PROTEIN 1   Date Value Ref Range Status   06/10/2024 0.1 (H)   g/dL Final   05/13/2024 0.1 (H)   g/dL Final   03/18/2024 0.1 (H)   g/dL Final   02/19/2024 0.1 (H)   g/dL Final   01/22/2024 0.1 (H)   g/dL Final     - Currently on daratumamab/Lenalidomide. Continued uptrending of free lambda light chain. M-protein still 0.1g IgG kappa, likely represents daratumumab. Will add weekly dex 10 mg to see if this could deepen her response.     5/13/24:  Continues to have increasing free lambda light chain, M-protein remains at 0.1.  Receiving C20 daratumumab today.  Discussed that free lyte chain continues to increase significantly and suggested switching revlimid to pomalidomide  4 mg 21/28 days since no other CRAB criteria,  reviewed side effects which include cytopenia, increased risk of blood clots,  patient information provided and chemo consent reviewed and  signed. Continue dexamethasone 10 mg weekly on weeks not receiving daratumumab.    -Taking aspirin 81 mg daily for VTE prophylaxis during treatment with pomalidomide    Explained many treatment options if disease progression, would prefer outpatient options  6/10/24:  Laboratory results from today: hgb 10.6, plt 247, WBC 2.8, and ANC 1.59.  Free lambda light chains increasing to 37.52.  M-protein in process from today.  No concerning findings on physical examination today.  Scheduled to receive C21 daratumubuab today, continue weekly dexamethasone 10 mg on weeks not receiving daratumumab.  Continue pomalyst 4 mg daily on days 1-21 out of 28 day cycle.  Continue aspirin 81 mg daily.  Follow up on 7/8/24 with Dr. Marshall.    7/8/24:  Laboratory results from today: hgb 10.3, plt 286, WBC 3.8, and ANC 2.27.  Kappa and lambda free light chains and SPEP in process from today.  Scheduled to receive C22 daratumumab today.  Continue dexamethasone 10 mg weekly when not receiving daratumubmab.  Continue pomalyst 4 mg daily on days 1-21 out of 28 day cycle.  Dr. Marshall discussed with patient need for further testing with BMBX and PET scan due to elevated free light chains.  Dr. Marshall educated patient on possible treatment plans including carfilzomib with cyclophosphamide, biologic medication such as bite  which would require inpatient stay for first week of treatment, or CAR-T.  Provided Sil with printed off educational information on carfilzomib and cyclophosphamide.  Sil lives in [place] so goal would be to set up treatment at [facility] closer to home.  Follow up visit scheduled for 8/5/24.       Bone Health:  - Continue zoledronic acid 3.3 mg (renal dosing) every 3 months, receiving dose today (6/10/24), next dose due Sept 2024.  - Continue vitamin D supplement -- 2000 units PO daily. 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  Thyroid biopsy 4/29/24 showing rare atypical cells from FNA of right mid lobe.  Repeat biopsy was nondiagnostic.  Dr. Magallanes recommended total thyroidectomy but Sil requested to having US monitoring for now.  Following with Dr. Magallanes, last seen 6/17/24.       Right lower back pain  - seems musculoskeletal  - MRI lumbar spine (3/4/24): degenerative changes, no evidence of progressive disease or fracture.    - MRI thoracic spine (3/12/24): degenerative changes, redemonstration of multifocal marrow signal abnormalities compatible with the given history of multiple myeloma, osseous expansion and epidural extension of neoplasm previously seen have improved, decrease in size and enhancement of previously seen lesions.      Dermatology:  3/18/24:  Noted irregular shaped lesion to right forearm.  Advised patient to follow up with dermatology to evaluate lesio squamous cell cancer- MOHs procedure planned.    Reports she had SSC removed from right forearm and left hand June 2024.  Following with dermatology.      RTC:   Plan for PET scan and Bmbx, will contact with results.  Follow up visit around 7/29/24, about 1 week after completing testing.  May make adjustments to chemotherapy regimen based on testing results.    Plan 2 year course of zometa, continue every 3 months, no sx of ONJ completes course 3/2025.   - ------------------------------------------  CHANTEL Medrano-PAUL

## 2024-07-08 ENCOUNTER — OFFICE VISIT (OUTPATIENT)
Dept: HEMATOLOGY/ONCOLOGY | Facility: HOSPITAL | Age: 84
End: 2024-07-08
Payer: MEDICARE

## 2024-07-08 ENCOUNTER — INFUSION (OUTPATIENT)
Dept: HEMATOLOGY/ONCOLOGY | Facility: HOSPITAL | Age: 84
End: 2024-07-08
Payer: MEDICARE

## 2024-07-08 ENCOUNTER — LAB (OUTPATIENT)
Dept: LAB | Facility: HOSPITAL | Age: 84
End: 2024-07-08
Payer: MEDICARE

## 2024-07-08 VITALS
DIASTOLIC BLOOD PRESSURE: 62 MMHG | BODY MASS INDEX: 28.03 KG/M2 | OXYGEN SATURATION: 100 % | HEART RATE: 79 BPM | RESPIRATION RATE: 17 BRPM | WEIGHT: 150.79 LBS | SYSTOLIC BLOOD PRESSURE: 131 MMHG | TEMPERATURE: 98.6 F

## 2024-07-08 DIAGNOSIS — Z79.899 IMMUNOSUPPRESSED DUE TO CHEMOTHERAPY (MULTI): ICD-10-CM

## 2024-07-08 DIAGNOSIS — Z85.828 HISTORY OF SCC (SQUAMOUS CELL CARCINOMA) OF SKIN: ICD-10-CM

## 2024-07-08 DIAGNOSIS — T45.1X5A IMMUNOSUPPRESSED DUE TO CHEMOTHERAPY (MULTI): ICD-10-CM

## 2024-07-08 DIAGNOSIS — C90.00 MULTIPLE MYELOMA, REMISSION STATUS UNSPECIFIED (MULTI): ICD-10-CM

## 2024-07-08 DIAGNOSIS — E04.1 THYROID NODULE: ICD-10-CM

## 2024-07-08 DIAGNOSIS — C90.00 MULTIPLE MYELOMA NOT HAVING ACHIEVED REMISSION (MULTI): Primary | ICD-10-CM

## 2024-07-08 DIAGNOSIS — C90.00 MULTIPLE MYELOMA NOT HAVING ACHIEVED REMISSION (MULTI): ICD-10-CM

## 2024-07-08 DIAGNOSIS — D84.821 IMMUNOSUPPRESSED DUE TO CHEMOTHERAPY (MULTI): ICD-10-CM

## 2024-07-08 LAB
ALBUMIN SERPL BCP-MCNC: 3.6 G/DL (ref 3.4–5)
ALP SERPL-CCNC: 55 U/L (ref 33–136)
ALT SERPL W P-5'-P-CCNC: 8 U/L (ref 7–45)
ANION GAP SERPL CALC-SCNC: 12 MMOL/L (ref 10–20)
AST SERPL W P-5'-P-CCNC: 10 U/L (ref 9–39)
BASOPHILS # BLD AUTO: 0.06 X10*3/UL (ref 0–0.1)
BASOPHILS NFR BLD AUTO: 1.6 %
BILIRUB SERPL-MCNC: 0.4 MG/DL (ref 0–1.2)
BUN SERPL-MCNC: 20 MG/DL (ref 6–23)
CALCIUM SERPL-MCNC: 9.4 MG/DL (ref 8.6–10.3)
CHLORIDE SERPL-SCNC: 108 MMOL/L (ref 98–107)
CO2 SERPL-SCNC: 28 MMOL/L (ref 21–32)
CREAT SERPL-MCNC: 1.01 MG/DL (ref 0.5–1.05)
EGFRCR SERPLBLD CKD-EPI 2021: 56 ML/MIN/1.73M*2
EOSINOPHIL # BLD AUTO: 0.06 X10*3/UL (ref 0–0.4)
EOSINOPHIL NFR BLD AUTO: 1.6 %
ERYTHROCYTE [DISTWIDTH] IN BLOOD BY AUTOMATED COUNT: 15.3 % (ref 11.5–14.5)
GLUCOSE SERPL-MCNC: 80 MG/DL (ref 74–99)
HCT VFR BLD AUTO: 31.8 % (ref 36–46)
HGB BLD-MCNC: 10.3 G/DL (ref 12–16)
IGA SERPL-MCNC: 65 MG/DL (ref 70–400)
IGG SERPL-MCNC: 314 MG/DL (ref 700–1600)
IGM SERPL-MCNC: 10 MG/DL (ref 40–230)
IMM GRANULOCYTES # BLD AUTO: 0.03 X10*3/UL (ref 0–0.5)
IMM GRANULOCYTES NFR BLD AUTO: 0.8 % (ref 0–0.9)
LYMPHOCYTES # BLD AUTO: 0.74 X10*3/UL (ref 0.8–3)
LYMPHOCYTES NFR BLD AUTO: 19.5 %
MCH RBC QN AUTO: 33 PG (ref 26–34)
MCHC RBC AUTO-ENTMCNC: 32.4 G/DL (ref 32–36)
MCV RBC AUTO: 102 FL (ref 80–100)
MONOCYTES # BLD AUTO: 0.64 X10*3/UL (ref 0.05–0.8)
MONOCYTES NFR BLD AUTO: 16.8 %
NEUTROPHILS # BLD AUTO: 2.27 X10*3/UL (ref 1.6–5.5)
NEUTROPHILS NFR BLD AUTO: 59.7 %
NRBC BLD-RTO: 0 /100 WBCS (ref 0–0)
PLATELET # BLD AUTO: 286 X10*3/UL (ref 150–450)
POTASSIUM SERPL-SCNC: 4.2 MMOL/L (ref 3.5–5.3)
PROT SERPL-MCNC: 5.3 G/DL (ref 6.4–8.2)
PROT SERPL-MCNC: 5.7 G/DL (ref 6.4–8.2)
RBC # BLD AUTO: 3.12 X10*6/UL (ref 4–5.2)
SODIUM SERPL-SCNC: 144 MMOL/L (ref 136–145)
WBC # BLD AUTO: 3.8 X10*3/UL (ref 4.4–11.3)

## 2024-07-08 PROCEDURE — 84165 PROTEIN E-PHORESIS SERUM: CPT

## 2024-07-08 PROCEDURE — 1160F RVW MEDS BY RX/DR IN RCRD: CPT

## 2024-07-08 PROCEDURE — 85025 COMPLETE CBC W/AUTO DIFF WBC: CPT

## 2024-07-08 PROCEDURE — 1159F MED LIST DOCD IN RCRD: CPT

## 2024-07-08 PROCEDURE — 84075 ASSAY ALKALINE PHOSPHATASE: CPT

## 2024-07-08 PROCEDURE — 83521 IG LIGHT CHAINS FREE EACH: CPT

## 2024-07-08 PROCEDURE — 1036F TOBACCO NON-USER: CPT

## 2024-07-08 PROCEDURE — 1126F AMNT PAIN NOTED NONE PRSNT: CPT

## 2024-07-08 PROCEDURE — 84155 ASSAY OF PROTEIN SERUM: CPT

## 2024-07-08 PROCEDURE — 2500000004 HC RX 250 GENERAL PHARMACY W/ HCPCS (ALT 636 FOR OP/ED): Performed by: INTERNAL MEDICINE

## 2024-07-08 PROCEDURE — 2500000001 HC RX 250 WO HCPCS SELF ADMINISTERED DRUGS (ALT 637 FOR MEDICARE OP): Performed by: INTERNAL MEDICINE

## 2024-07-08 PROCEDURE — 99215 OFFICE O/P EST HI 40 MIN: CPT

## 2024-07-08 PROCEDURE — 2500000004 HC RX 250 GENERAL PHARMACY W/ HCPCS (ALT 636 FOR OP/ED): Mod: JZ | Performed by: INTERNAL MEDICINE

## 2024-07-08 PROCEDURE — 96401 CHEMO ANTI-NEOPL SQ/IM: CPT

## 2024-07-08 PROCEDURE — 36415 COLL VENOUS BLD VENIPUNCTURE: CPT

## 2024-07-08 PROCEDURE — 82784 ASSAY IGA/IGD/IGG/IGM EACH: CPT

## 2024-07-08 PROCEDURE — 99215 OFFICE O/P EST HI 40 MIN: CPT | Mod: 25

## 2024-07-08 PROCEDURE — 86334 IMMUNOFIX E-PHORESIS SERUM: CPT

## 2024-07-08 RX ORDER — FAMOTIDINE 10 MG/ML
20 INJECTION INTRAVENOUS ONCE AS NEEDED
Status: CANCELLED | OUTPATIENT
Start: 2024-07-08

## 2024-07-08 RX ORDER — ALBUTEROL SULFATE 0.83 MG/ML
3 SOLUTION RESPIRATORY (INHALATION) AS NEEDED
Status: CANCELLED | OUTPATIENT
Start: 2024-07-08

## 2024-07-08 RX ORDER — ACETAMINOPHEN 325 MG/1
650 TABLET ORAL ONCE
Status: CANCELLED | OUTPATIENT
Start: 2024-07-08

## 2024-07-08 RX ORDER — DIPHENHYDRAMINE HYDROCHLORIDE 50 MG/ML
50 INJECTION INTRAMUSCULAR; INTRAVENOUS AS NEEDED
Status: DISCONTINUED | OUTPATIENT
Start: 2024-07-08 | End: 2024-07-08 | Stop reason: HOSPADM

## 2024-07-08 RX ORDER — DIPHENHYDRAMINE HCL 50 MG
50 CAPSULE ORAL ONCE
Status: COMPLETED | OUTPATIENT
Start: 2024-07-08 | End: 2024-07-08

## 2024-07-08 RX ORDER — MONTELUKAST SODIUM 10 MG/1
10 TABLET ORAL ONCE
Status: COMPLETED | OUTPATIENT
Start: 2024-07-08 | End: 2024-07-08

## 2024-07-08 RX ORDER — FAMOTIDINE 10 MG/ML
20 INJECTION INTRAVENOUS ONCE AS NEEDED
Status: DISCONTINUED | OUTPATIENT
Start: 2024-07-08 | End: 2024-07-08 | Stop reason: HOSPADM

## 2024-07-08 RX ORDER — MONTELUKAST SODIUM 10 MG/1
10 TABLET ORAL ONCE
Status: CANCELLED | OUTPATIENT
Start: 2024-07-08

## 2024-07-08 RX ORDER — ACETAMINOPHEN 325 MG/1
650 TABLET ORAL ONCE
Status: COMPLETED | OUTPATIENT
Start: 2024-07-08 | End: 2024-07-08

## 2024-07-08 RX ORDER — EPINEPHRINE 0.3 MG/.3ML
0.3 INJECTION SUBCUTANEOUS EVERY 5 MIN PRN
Status: DISCONTINUED | OUTPATIENT
Start: 2024-07-08 | End: 2024-07-08 | Stop reason: HOSPADM

## 2024-07-08 RX ORDER — EPINEPHRINE 0.3 MG/.3ML
0.3 INJECTION SUBCUTANEOUS EVERY 5 MIN PRN
Status: CANCELLED | OUTPATIENT
Start: 2024-07-08

## 2024-07-08 RX ORDER — DEXAMETHASONE 4 MG/1
20 TABLET ORAL ONCE
Status: CANCELLED | OUTPATIENT
Start: 2024-07-08

## 2024-07-08 RX ORDER — ALBUTEROL SULFATE 0.83 MG/ML
3 SOLUTION RESPIRATORY (INHALATION) AS NEEDED
Status: DISCONTINUED | OUTPATIENT
Start: 2024-07-08 | End: 2024-07-08 | Stop reason: HOSPADM

## 2024-07-08 RX ORDER — DIPHENHYDRAMINE HCL 50 MG
50 CAPSULE ORAL ONCE
Status: CANCELLED | OUTPATIENT
Start: 2024-07-08

## 2024-07-08 RX ORDER — DIPHENHYDRAMINE HYDROCHLORIDE 50 MG/ML
50 INJECTION INTRAMUSCULAR; INTRAVENOUS AS NEEDED
Status: CANCELLED | OUTPATIENT
Start: 2024-07-08

## 2024-07-08 ASSESSMENT — PAIN SCALES - GENERAL: PAINLEVEL: 0-NO PAIN

## 2024-07-08 ASSESSMENT — ENCOUNTER SYMPTOMS
RESPIRATORY NEGATIVE: 1
CONSTIPATION: 0
WOUND: 0
BRUISES/BLEEDS EASILY: 1

## 2024-07-08 NOTE — SIGNIFICANT EVENT

## 2024-07-08 NOTE — PROGRESS NOTES
Pt. To [facility] following Medical Oncology Appt.,  Pt. Received Daratumumab without incident.  Pt. Discharged to home.

## 2024-07-09 DIAGNOSIS — C90.00 MULTIPLE MYELOMA NOT HAVING ACHIEVED REMISSION (MULTI): Primary | ICD-10-CM

## 2024-07-09 LAB
KAPPA LC SERPL-MCNC: 1.12 MG/DL (ref 0.33–1.94)
KAPPA LC/LAMBDA SER: 0.02 {RATIO} (ref 0.26–1.65)
LAMBDA LC SERPL-MCNC: 62.18 MG/DL (ref 0.57–2.63)

## 2024-07-10 ENCOUNTER — TELEPHONE (OUTPATIENT)
Dept: HEMATOLOGY/ONCOLOGY | Facility: HOSPITAL | Age: 84
End: 2024-07-10
Payer: MEDICARE

## 2024-07-10 LAB
ALBUMIN: 3.2 G/DL (ref 3.4–5)
ALPHA 1 GLOBULIN: 0.3 G/DL (ref 0.2–0.6)
ALPHA 2 GLOBULIN: 0.8 G/DL (ref 0.4–1.1)
BETA GLOBULIN: 0.6 G/DL (ref 0.5–1.2)
GAMMA GLOBULIN: 0.3 G/DL (ref 0.5–1.4)
IMMUNOFIXATION COMMENT: NORMAL
M-PROTEIN 1: 0.1 G/DL
PATH REVIEW - SERUM IMMUNOFIXATION: NORMAL
PATH REVIEW-SERUM PROTEIN ELECTROPHORESIS: ABNORMAL
PROTEIN ELECTROPHORESIS COMMENT: ABNORMAL

## 2024-07-10 NOTE — TELEPHONE ENCOUNTER
Per Dr. Marshall, keep both appointments for now.    Called patient's daughter Beulah. Understanding verbalized with teach back. No further needs at this time.

## 2024-07-10 NOTE — TELEPHONE ENCOUNTER
Patient's daughter calls to inquire if they can cancel the 7/29 appt with Bisi Arboleda CNP if they are seeing Dr. Marshall on 7/18.    Message sent to team.

## 2024-07-17 ENCOUNTER — HOSPITAL ENCOUNTER (OUTPATIENT)
Dept: RADIOLOGY | Facility: HOSPITAL | Age: 84
Discharge: HOME | End: 2024-07-17
Payer: MEDICARE

## 2024-07-17 DIAGNOSIS — C90.00 MULTIPLE MYELOMA NOT HAVING ACHIEVED REMISSION (MULTI): ICD-10-CM

## 2024-07-17 LAB — GLUCOSE BLD MANUAL STRIP-MCNC: 87 MG/DL (ref 74–99)

## 2024-07-17 PROCEDURE — A9552 F18 FDG: HCPCS

## 2024-07-17 PROCEDURE — 3430000001 HC RX 343 DIAGNOSTIC RADIOPHARMACEUTICALS

## 2024-07-17 PROCEDURE — 82947 ASSAY GLUCOSE BLOOD QUANT: CPT

## 2024-07-17 PROCEDURE — 78816 PET IMAGE W/CT FULL BODY: CPT | Mod: PET TUMOR SUBSQ TX STRATEGY | Performed by: RADIOLOGY

## 2024-07-17 PROCEDURE — 78816 PET IMAGE W/CT FULL BODY: CPT | Mod: PS

## 2024-07-17 RX ORDER — FLUDEOXYGLUCOSE F 18 200 MCI/ML
13 INJECTION, SOLUTION INTRAVENOUS
Status: COMPLETED | OUTPATIENT
Start: 2024-07-17 | End: 2024-07-17

## 2024-07-18 ENCOUNTER — OFFICE VISIT (OUTPATIENT)
Dept: HEMATOLOGY/ONCOLOGY | Facility: HOSPITAL | Age: 84
End: 2024-07-18
Payer: MEDICARE

## 2024-07-18 ENCOUNTER — LAB (OUTPATIENT)
Dept: LAB | Facility: HOSPITAL | Age: 84
End: 2024-07-18
Payer: MEDICARE

## 2024-07-18 VITALS
RESPIRATION RATE: 17 BRPM | HEART RATE: 95 BPM | DIASTOLIC BLOOD PRESSURE: 57 MMHG | OXYGEN SATURATION: 100 % | SYSTOLIC BLOOD PRESSURE: 126 MMHG | TEMPERATURE: 98.1 F

## 2024-07-18 DIAGNOSIS — C90.00 MULTIPLE MYELOMA NOT HAVING ACHIEVED REMISSION (MULTI): ICD-10-CM

## 2024-07-18 DIAGNOSIS — C90.00 MULTIPLE MYELOMA NOT HAVING ACHIEVED REMISSION (MULTI): Primary | ICD-10-CM

## 2024-07-18 DIAGNOSIS — I42.7 CARDIOTOXICITY (MULTI): ICD-10-CM

## 2024-07-18 LAB
APPEARANCE UR: CLEAR
BILIRUB UR STRIP.AUTO-MCNC: NEGATIVE MG/DL
COLOR UR: NORMAL
GLUCOSE UR STRIP.AUTO-MCNC: NORMAL MG/DL
KETONES UR STRIP.AUTO-MCNC: NEGATIVE MG/DL
LEUKOCYTE ESTERASE UR QL STRIP.AUTO: NEGATIVE
NITRITE UR QL STRIP.AUTO: NEGATIVE
PH UR STRIP.AUTO: 5 [PH]
PROT UR STRIP.AUTO-MCNC: NEGATIVE MG/DL
RBC # UR STRIP.AUTO: NEGATIVE /UL
SP GR UR STRIP.AUTO: 1.03
UROBILINOGEN UR STRIP.AUTO-MCNC: NORMAL MG/DL

## 2024-07-18 PROCEDURE — 1126F AMNT PAIN NOTED NONE PRSNT: CPT | Performed by: INTERNAL MEDICINE

## 2024-07-18 PROCEDURE — 99215 OFFICE O/P EST HI 40 MIN: CPT | Performed by: INTERNAL MEDICINE

## 2024-07-18 PROCEDURE — 81003 URINALYSIS AUTO W/O SCOPE: CPT

## 2024-07-18 PROCEDURE — 1159F MED LIST DOCD IN RCRD: CPT | Performed by: INTERNAL MEDICINE

## 2024-07-18 RX ORDER — PROCHLORPERAZINE EDISYLATE 5 MG/ML
10 INJECTION INTRAMUSCULAR; INTRAVENOUS EVERY 6 HOURS PRN
OUTPATIENT
Start: 2024-08-12

## 2024-07-18 RX ORDER — PROCHLORPERAZINE MALEATE 10 MG
10 TABLET ORAL EVERY 6 HOURS PRN
OUTPATIENT
Start: 2024-09-16

## 2024-07-18 RX ORDER — PROCHLORPERAZINE EDISYLATE 5 MG/ML
10 INJECTION INTRAMUSCULAR; INTRAVENOUS EVERY 6 HOURS PRN
OUTPATIENT
Start: 2024-09-16

## 2024-07-18 RX ORDER — EPINEPHRINE 0.3 MG/.3ML
0.3 INJECTION SUBCUTANEOUS EVERY 5 MIN PRN
OUTPATIENT
Start: 2024-09-16

## 2024-07-18 RX ORDER — FAMOTIDINE 10 MG/ML
20 INJECTION INTRAVENOUS ONCE AS NEEDED
OUTPATIENT
Start: 2024-08-19

## 2024-07-18 RX ORDER — DEXAMETHASONE 4 MG/1
20 TABLET ORAL ONCE
OUTPATIENT
Start: 2024-09-09

## 2024-07-18 RX ORDER — EPINEPHRINE 0.3 MG/.3ML
0.3 INJECTION SUBCUTANEOUS EVERY 5 MIN PRN
OUTPATIENT
Start: 2024-09-09

## 2024-07-18 RX ORDER — DEXAMETHASONE 4 MG/1
20 TABLET ORAL ONCE
OUTPATIENT
Start: 2024-09-16

## 2024-07-18 RX ORDER — FAMOTIDINE 10 MG/ML
20 INJECTION INTRAVENOUS ONCE AS NEEDED
OUTPATIENT
Start: 2024-09-02

## 2024-07-18 RX ORDER — PROCHLORPERAZINE EDISYLATE 5 MG/ML
10 INJECTION INTRAMUSCULAR; INTRAVENOUS EVERY 6 HOURS PRN
OUTPATIENT
Start: 2024-08-05

## 2024-07-18 RX ORDER — FAMOTIDINE 10 MG/ML
20 INJECTION INTRAVENOUS ONCE AS NEEDED
OUTPATIENT
Start: 2024-08-12

## 2024-07-18 RX ORDER — EPINEPHRINE 0.3 MG/.3ML
0.3 INJECTION SUBCUTANEOUS EVERY 5 MIN PRN
OUTPATIENT
Start: 2024-08-12

## 2024-07-18 RX ORDER — EPINEPHRINE 0.3 MG/.3ML
0.3 INJECTION SUBCUTANEOUS EVERY 5 MIN PRN
OUTPATIENT
Start: 2024-08-05

## 2024-07-18 RX ORDER — PROCHLORPERAZINE MALEATE 10 MG
10 TABLET ORAL EVERY 6 HOURS PRN
OUTPATIENT
Start: 2024-08-19

## 2024-07-18 RX ORDER — DIPHENHYDRAMINE HYDROCHLORIDE 50 MG/ML
50 INJECTION INTRAMUSCULAR; INTRAVENOUS AS NEEDED
OUTPATIENT
Start: 2024-08-05

## 2024-07-18 RX ORDER — ALBUTEROL SULFATE 0.83 MG/ML
3 SOLUTION RESPIRATORY (INHALATION) AS NEEDED
OUTPATIENT
Start: 2024-09-09

## 2024-07-18 RX ORDER — DEXAMETHASONE 4 MG/1
20 TABLET ORAL ONCE
OUTPATIENT
Start: 2024-09-02

## 2024-07-18 RX ORDER — PROCHLORPERAZINE EDISYLATE 5 MG/ML
10 INJECTION INTRAMUSCULAR; INTRAVENOUS EVERY 6 HOURS PRN
OUTPATIENT
Start: 2024-09-02

## 2024-07-18 RX ORDER — ALBUTEROL SULFATE 0.83 MG/ML
3 SOLUTION RESPIRATORY (INHALATION) AS NEEDED
OUTPATIENT
Start: 2024-09-02

## 2024-07-18 RX ORDER — PROCHLORPERAZINE MALEATE 10 MG
10 TABLET ORAL EVERY 6 HOURS PRN
OUTPATIENT
Start: 2024-08-12

## 2024-07-18 RX ORDER — PROCHLORPERAZINE EDISYLATE 5 MG/ML
10 INJECTION INTRAMUSCULAR; INTRAVENOUS EVERY 6 HOURS PRN
OUTPATIENT
Start: 2024-08-19

## 2024-07-18 RX ORDER — DIPHENHYDRAMINE HYDROCHLORIDE 50 MG/ML
50 INJECTION INTRAMUSCULAR; INTRAVENOUS AS NEEDED
OUTPATIENT
Start: 2024-09-02

## 2024-07-18 RX ORDER — ALBUTEROL SULFATE 0.83 MG/ML
3 SOLUTION RESPIRATORY (INHALATION) AS NEEDED
OUTPATIENT
Start: 2024-08-19

## 2024-07-18 RX ORDER — DEXAMETHASONE 4 MG/1
20 TABLET ORAL ONCE
OUTPATIENT
Start: 2024-08-12

## 2024-07-18 RX ORDER — PROCHLORPERAZINE MALEATE 10 MG
10 TABLET ORAL EVERY 6 HOURS PRN
OUTPATIENT
Start: 2024-09-09

## 2024-07-18 RX ORDER — FAMOTIDINE 10 MG/ML
20 INJECTION INTRAVENOUS ONCE AS NEEDED
OUTPATIENT
Start: 2024-09-09

## 2024-07-18 RX ORDER — DIPHENHYDRAMINE HYDROCHLORIDE 50 MG/ML
50 INJECTION INTRAMUSCULAR; INTRAVENOUS AS NEEDED
OUTPATIENT
Start: 2024-08-19

## 2024-07-18 RX ORDER — ALBUTEROL SULFATE 0.83 MG/ML
3 SOLUTION RESPIRATORY (INHALATION) AS NEEDED
OUTPATIENT
Start: 2024-09-16

## 2024-07-18 RX ORDER — EPINEPHRINE 0.3 MG/.3ML
0.3 INJECTION SUBCUTANEOUS EVERY 5 MIN PRN
OUTPATIENT
Start: 2024-08-19

## 2024-07-18 RX ORDER — ALBUTEROL SULFATE 0.83 MG/ML
3 SOLUTION RESPIRATORY (INHALATION) AS NEEDED
OUTPATIENT
Start: 2024-08-12

## 2024-07-18 RX ORDER — DIPHENHYDRAMINE HYDROCHLORIDE 50 MG/ML
50 INJECTION INTRAMUSCULAR; INTRAVENOUS AS NEEDED
OUTPATIENT
Start: 2024-09-09

## 2024-07-18 RX ORDER — FAMOTIDINE 10 MG/ML
20 INJECTION INTRAVENOUS ONCE AS NEEDED
OUTPATIENT
Start: 2024-09-16

## 2024-07-18 RX ORDER — DIPHENHYDRAMINE HYDROCHLORIDE 50 MG/ML
50 INJECTION INTRAMUSCULAR; INTRAVENOUS AS NEEDED
OUTPATIENT
Start: 2024-08-12

## 2024-07-18 RX ORDER — DIPHENHYDRAMINE HYDROCHLORIDE 50 MG/ML
50 INJECTION INTRAMUSCULAR; INTRAVENOUS AS NEEDED
OUTPATIENT
Start: 2024-09-16

## 2024-07-18 RX ORDER — PROCHLORPERAZINE MALEATE 10 MG
10 TABLET ORAL EVERY 6 HOURS PRN
OUTPATIENT
Start: 2024-09-02

## 2024-07-18 RX ORDER — DEXAMETHASONE 4 MG/1
20 TABLET ORAL ONCE
OUTPATIENT
Start: 2024-08-05

## 2024-07-18 RX ORDER — PROCHLORPERAZINE EDISYLATE 5 MG/ML
10 INJECTION INTRAMUSCULAR; INTRAVENOUS EVERY 6 HOURS PRN
OUTPATIENT
Start: 2024-09-09

## 2024-07-18 RX ORDER — EPINEPHRINE 0.3 MG/.3ML
0.3 INJECTION SUBCUTANEOUS EVERY 5 MIN PRN
OUTPATIENT
Start: 2024-09-02

## 2024-07-18 RX ORDER — FAMOTIDINE 10 MG/ML
20 INJECTION INTRAVENOUS ONCE AS NEEDED
OUTPATIENT
Start: 2024-08-05

## 2024-07-18 RX ORDER — PROCHLORPERAZINE MALEATE 10 MG
10 TABLET ORAL EVERY 6 HOURS PRN
OUTPATIENT
Start: 2024-08-05

## 2024-07-18 RX ORDER — DEXAMETHASONE 4 MG/1
20 TABLET ORAL ONCE
OUTPATIENT
Start: 2024-08-19

## 2024-07-18 RX ORDER — ALBUTEROL SULFATE 0.83 MG/ML
3 SOLUTION RESPIRATORY (INHALATION) AS NEEDED
OUTPATIENT
Start: 2024-08-05

## 2024-07-18 ASSESSMENT — ENCOUNTER SYMPTOMS
CONSTIPATION: 0
APPETITE CHANGE: 0
DYSURIA: 0
CHILLS: 0
RESPIRATORY NEGATIVE: 1
DIAPHORESIS: 0
VOMITING: 0
NAUSEA: 0
BRUISES/BLEEDS EASILY: 1
BACK PAIN: 0
LIGHT-HEADEDNESS: 0
DIZZINESS: 0
NUMBNESS: 0
DIARRHEA: 0
UNEXPECTED WEIGHT CHANGE: 0
FLANK PAIN: 0
WOUND: 0
HEMATURIA: 0
FATIGUE: 1
ADENOPATHY: 0
FEVER: 0
PALPITATIONS: 0
LEG SWELLING: 1
BLOOD IN STOOL: 0

## 2024-07-18 ASSESSMENT — PAIN SCALES - GENERAL: PAINLEVEL: 0-NO PAIN

## 2024-07-18 NOTE — PROGRESS NOTES
"Patient ID: Sil West \"Ines" is a 84 y.o. female.    Treatment:   Oncology History Overview Note   I. Diagnosis (1/2023):  IgG Lambda Multiple Myeloma  Presenting symptoms: Urinary incontinence and diffuse bony pain  II. Workup:  Bone Biopsy (1/6/23):  Plasma cells neoplasm  Repeat Bone Marrow Biopsy (1/26/23):  Hypercellular marrow, 80-90% plasma cells, lambda-restricted  FISH: 1q gain (high risk), trisomy 3  MRI Spine (12/18/22):  Osseous metastases, involvement in mid-cervical, mid-thoracic, and lumbar vertebral bodies, as well as the right iliac bone  PET Scan (1/23):  FDG avidity in right iliac bone, right sacral ala, left posterior 6th rib, and appendicular skeleton  Serum and Urine Studies (1/19-1/28/23):  FKLC 1.57, FLLC 2947.7, K/L ratio 0  IgG 537, IgA 221, IgM 19, b2M 23.4, Hgb 5.7  M protein IgA lambda 0.7 g/dL   U/L  Creatinine: 2.33 (peaked at 3.15)  UPEP: Monoclonal lambda light chain at 386.6 mg/24H  Hepatitis B and C negative  III. Treatment:  Radiation (2/2/23):  Right hip + T6-T8 x 5 fractions (total 2000 cGy)  Induction (12/24/22 - 2/16/23):  Bortezomib and dexamethasone + daratumumab  C1 (1/28/23): Bortezomib 1, 4, 8, 11 + daratumumab x 2 doses  C2 (2/16/23): Weekly dosing of daratumumab and bortezomib (1, 8, 15), with lenalidomide planned when renal function allows  Response Evaluation (5/25/23):  CR with M protein 0.1 (IgG kappa c/w roscoe) and free lyte chain 1.37  C8 Roscoe RVD (Completed 7/6/23):  Transitioned with a VGPR vs CR, ongoing multifocal bone pain  IV. Response Evaluation (7/13/23):  Marked improvement in bony lesions  Pathological fractures in right pelvis and ribs, possible infiltrate in the right lower lobe base, and hypodensity in the right thyroid gland  V. Treatment Adjustment (Cycle 9 and forward): 10/2023  Velcade omitted  Transitioned to a 4-week schedule with Roscoe (day 1) and Revlimid 15 mg days 1-21/28 days     Multiple myeloma not having achieved remission " (Multi)   9/13/2023 Initial Diagnosis    Multiple myeloma not having achieved remission (CMS/HCC)     10/2/2023 -  Chemotherapy    Daratumumab, Pomalidomide and dexamethasone (oral meds managed outside treatment plan) 28 Day Cycles - Maintenance     8/5/2024 -  Chemotherapy    Carfilzomib and Cyclophosphamide (Weekly) / Dexamethasone, 28 Day Cycles       Past Medical History:  HTN, DLP  pre skin cancers,   diveriticulitis   peripheral artery disease not amenable to surgery    Surgical History:  squamous cell carcinoma removed to left hand and right arm 6/2024  Past Surgical History:   Procedure Laterality Date    CT GUIDED PERCUTANEOUS BIOPSY BONE DEEP  01/06/2023    CT GUIDED PERCUTANEOUS BIOPSY BONE DEEP 1/6/2023 [facility]    OTHER SURGICAL HISTORY      THYROID BIOPSY      Family History:     Family History   Problem Relation Name Age of Onset    Alzheimer's disease Mother      Colon cancer Father      Rashes / Skin problems Sister      Rashes / Skin problems Brother       Social History:  ,  passed away on 10/24/23.  3 grown children (1 daughter, 2 sons). 2 grandchildren.  Has 6 cats.  Enjoyed skiing.  Occupation: retired .  Social History     Tobacco Use    Smoking status: Never     Passive exposure: Never    Smokeless tobacco: Never   Vaping Use    Vaping status: Never Used   Substance Use Topics    Alcohol use: Never    Drug use: Never      -------------------------------------------------------------------------------------------------------  Subjective       Sil West is a 83 year old female with IgG lambda multiple myeloma.  Sil presents to the clinic today (6/10/24) unaccompanied for follow up evaluation of her MM and blood work.  She is due for C21 daratumumab with dexamethasone 10 mg weekly on weeks not receiving daratumumab.  Due to progressive free lyte chains transitioned revlimid to pomalyst, starting 5/13/24.  She remains on pomalyst 4 mg daily on days  1-21 out of 28 day cycle.  She is obtaining pomalyst without issues from pharmacy and is starting new cycle today. She receives zometa every 3 months and is due today.       Sil presents to the clinic today (7/18/24) with her daughter for followup of her multiple myeloma and to discuss new treatment after marked elevation in free lyte chains.   She received day 1 of  C22 daratumumab on 7/8/24 with dexamethasone 10 mg weekly on weeks not receiving daratumumab.  She remains on pomalyst 4 mg daily on days 1-21 out of 28 day cycle.  Reviving zometa every 3 months, last received 6/10/24.  PET imaging done 7/17/24 showed an new soft tissue uptake in right 11th rib and interestingly patient has pain there as well.    She is taking boost occasionally.     Review of Systems   Constitutional:  Positive for fatigue. Negative for appetite change, chills, diaphoresis, fever and unexpected weight change.   Respiratory: Negative.     Cardiovascular:  Positive for leg swelling (mild swelling to legs at times). Negative for chest pain and palpitations.   Gastrointestinal:  Negative for blood in stool, constipation, diarrhea, nausea and vomiting.   Genitourinary:  Positive for bladder incontinence. Negative for dysuria and hematuria.    Musculoskeletal:  Negative for back pain, flank pain and gait problem.   Skin:  Negative for rash and wound.   Neurological:  Negative for dizziness, gait problem, light-headedness and numbness.   Hematological:  Negative for adenopathy. Bruises/bleeds easily (bruises easily).   -------------------------------------------------------------------------------------------------------  Objective   BSA: There is no height or weight on file to calculate BSA.  /57 (BP Location: Left arm, Patient Position: Sitting, BP Cuff Size: Adult)   Pulse 95   Temp 36.7 °C (98.1 °F) (Temporal)   Resp 17   SpO2 100%     Physical Exam  Vitals reviewed.   Constitutional:       Appearance: Normal appearance.  She is well-developed and normal weight.   HENT:      Head: Normocephalic and atraumatic.      Nose: Nose normal.   Eyes:      General: No scleral icterus.     Extraocular Movements: Extraocular movements intact.      Conjunctiva/sclera: Conjunctivae normal.      Pupils: Pupils are equal, round, and reactive to light.   Cardiovascular:      Rate and Rhythm: Normal rate and regular rhythm.      Pulses: Normal pulses.      Heart sounds: Normal heart sounds. No murmur heard.     No friction rub. No gallop.   Pulmonary:      Effort: Pulmonary effort is normal.      Breath sounds: Normal breath sounds.   Abdominal:      General: Abdomen is flat. Bowel sounds are normal. There is no distension.      Palpations: Abdomen is soft. There is no mass.      Tenderness: There is no abdominal tenderness.   Musculoskeletal:         General: Normal range of motion.      Right lower leg: Edema (non-pitting) present.      Left lower leg: Edema (non-pitting) present.      Comments: No spine tenderness   Lymphadenopathy:      Comments: No lymphadenopathy   Skin:     General: Skin is warm and dry.   Neurological:      General: No focal deficit present.      Mental Status: She is alert and oriented to person, place, and time.      Comments: Normal strength and gait   Psychiatric:         Mood and Affect: Mood normal.         Behavior: Behavior normal.         Thought Content: Thought content normal.   Performance Status:  Symptomatic; fully ambulatory  -------------------------------------------------------------------------------------------------------  Assessment and Plan:    Multiple myeloma not having achieved remission (CMS/HCC)  IgG lambda MM     Ig Kappa Free Light Chain   Date Value Ref Range Status   07/08/2024 1.12 0.33 - 1.94 mg/dL Final   06/10/2024 1.09 0.33 - 1.94 mg/dL Final   05/13/2024 0.78 0.33 - 1.94 mg/dL Final   04/15/2024 1.19 0.33 - 1.94 mg/dL Final   03/18/2024 1.02 0.33 - 1.94 mg/dL Final     Ig Lambda Free Light  Chain   Date Value Ref Range Status   07/08/2024 62.18 (H) 0.57 - 2.63 mg/dL Final   06/10/2024 37.52 (H) 0.57 - 2.63 mg/dL Final   05/13/2024 26.58 (H) 0.57 - 2.63 mg/dL Final   04/15/2024 23.52 (H) 0.57 - 2.63 mg/dL Final   03/18/2024 18.62 (H) 0.57 - 2.63 mg/dL Final     Kappa/Lambda Ratio   Date Value Ref Range Status   07/08/2024 0.02 (L) 0.26 - 1.65 Final   06/10/2024 0.03 (L) 0.26 - 1.65 Final   05/13/2024 0.03 (L) 0.26 - 1.65 Final   04/15/2024 0.05 (L) 0.26 - 1.65 Final   03/18/2024 0.05 (L) 0.26 - 1.65 Final     M-PROTEIN 1   Date Value Ref Range Status   07/08/2024 0.1 (H)   g/dL Final   06/10/2024 0.1 (H)   g/dL Final   05/13/2024 0.1 (H)   g/dL Final   03/18/2024 0.1 (H)   g/dL Final   02/19/2024 0.1 (H)   g/dL Final     - Currently on daratumamab/Lenalidomide. Continued uptrending of free lambda light chain. M-protein still 0.1g IgG kappa, likely represents daratumumab. Will add weekly dex 10 mg to see if this could deepen her response.     5/13/24:  Continues to have increasing free lambda light chain, M-protein remains at 0.1.  Receiving C20 daratumumab today.  Discussed that free lyte chain continues to increase significantly and suggested switching revlimid to pomalidomide  4 mg 21/28 days since no other CRAB criteria,  -Taking aspirin 81 mg daily for VTE prophylaxis during treatment with pomalidomide      7/18/24:  Laboratory results  as above show major increase in free lyte chains to 62 mg/dL up from 18 in March 2024, only sx is right rib pain where PET from yesterday shows a new lesion, no other new lesions seen,  BM biopsy planned for next week.  Plan to transition to carfilzomib weekly and cyclophosphanude 300 mg/m2 IV weekly both days 1,8, and 15 with dex 20 mg weekly on dayy 1,8,15, and 22.  Side effects of carfilzomib and cyclophosphamide including fluid retention, cytopenias, nausea, hair loss reviewed and all questions answered.  Previously had provided Sil with printed off  educational information on carfilzomib and cyclophosphamide.  Sil lives in [place] so goal would be to set up treatment at [facility] closer to home.  Chemo consent signed.  Baseline ECHO ordered due to potential cardiotoxicity with carfilzomib    Next Follow up visit scheduled for 8/5/24.       Dysuria:  urinalysis bland, suspect needs urologic evaluation for incontinence    Bone Health:  - Continue zoledronic acid 3.3 mg (renal dosing) every 3 months, receiving dose today (6/10/24), next dose due Sept 2024.  - Continue vitamin D supplement -- 2000 units PO daily. 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  Thyroid biopsy 4/29/24 showing rare atypical cells from FNA of right mid lobe.  Repeat biopsy was nondiagnostic.  Dr. Magallanes recommended total thyroidectomy but Sil requested to having US monitoring for now.  Following with Dr. Magallanes, last seen 6/17/24.       Right lower back pain  - seems musculoskeletal  - MRI lumbar spine (3/4/24): degenerative changes, no evidence of progressive disease or fracture.    - MRI thoracic spine (3/12/24): degenerative changes, redemonstration of multifocal marrow signal abnormalities compatible with the given history of multiple myeloma, osseous expansion and epidural extension of neoplasm previously seen have improved, decrease in size and enhancement of previously seen lesions.        RTC: Discontinued roscoe pom, to start cyclophosphamide, carfilzomib on 8/5/24, orders placed.   Plan 2 year course of zometa, continue every 3 months, no sx of ONJ completes course 3/2025.   - ------------------------------------------  Carmencita Marshall MD

## 2024-07-19 ENCOUNTER — DOCUMENTATION (OUTPATIENT)
Dept: HEMATOLOGY/ONCOLOGY | Facility: HOSPITAL | Age: 84
End: 2024-07-19
Payer: MEDICARE

## 2024-07-19 NOTE — PROGRESS NOTES
Call made to patient to inform of appt scheduled for 9.3.24 at [facility]; no answer, left brief VM with call back.

## 2024-07-22 ENCOUNTER — NURSE TRIAGE (OUTPATIENT)
Dept: HEMATOLOGY/ONCOLOGY | Facility: HOSPITAL | Age: 84
End: 2024-07-22

## 2024-07-22 ENCOUNTER — PROCEDURE VISIT (OUTPATIENT)
Dept: OTHER | Facility: HOSPITAL | Age: 84
End: 2024-07-22
Payer: MEDICARE

## 2024-07-22 ENCOUNTER — LAB (OUTPATIENT)
Dept: LAB | Facility: HOSPITAL | Age: 84
End: 2024-07-22
Payer: MEDICARE

## 2024-07-22 VITALS
OXYGEN SATURATION: 97 % | TEMPERATURE: 99 F | DIASTOLIC BLOOD PRESSURE: 57 MMHG | WEIGHT: 154.32 LBS | RESPIRATION RATE: 18 BRPM | SYSTOLIC BLOOD PRESSURE: 141 MMHG | BODY MASS INDEX: 28.69 KG/M2 | HEART RATE: 71 BPM

## 2024-07-22 DIAGNOSIS — C90.00 MULTIPLE MYELOMA NOT HAVING ACHIEVED REMISSION (MULTI): ICD-10-CM

## 2024-07-22 LAB
ALBUMIN SERPL BCP-MCNC: 3.8 G/DL (ref 3.4–5)
ALP SERPL-CCNC: 55 U/L (ref 33–136)
ALT SERPL W P-5'-P-CCNC: 9 U/L (ref 7–45)
ANION GAP SERPL CALC-SCNC: 13 MMOL/L (ref 10–20)
AST SERPL W P-5'-P-CCNC: 10 U/L (ref 9–39)
BASOPHILS # BLD AUTO: 0.06 X10*3/UL (ref 0–0.1)
BASOPHILS NFR BLD AUTO: 1.3 %
BILIRUB SERPL-MCNC: 0.6 MG/DL (ref 0–1.2)
BUN SERPL-MCNC: 23 MG/DL (ref 6–23)
CALCIUM SERPL-MCNC: 9.2 MG/DL (ref 8.6–10.3)
CHLORIDE SERPL-SCNC: 106 MMOL/L (ref 98–107)
CO2 SERPL-SCNC: 29 MMOL/L (ref 21–32)
CREAT SERPL-MCNC: 1.04 MG/DL (ref 0.5–1.05)
EGFRCR SERPLBLD CKD-EPI 2021: 54 ML/MIN/1.73M*2
EOSINOPHIL # BLD AUTO: 0.22 X10*3/UL (ref 0–0.4)
EOSINOPHIL NFR BLD AUTO: 4.7 %
ERYTHROCYTE [DISTWIDTH] IN BLOOD BY AUTOMATED COUNT: 14.8 % (ref 11.5–14.5)
GLUCOSE SERPL-MCNC: 117 MG/DL (ref 74–99)
HCT VFR BLD AUTO: 32.7 % (ref 36–46)
HGB BLD-MCNC: 10.6 G/DL (ref 12–16)
IMM GRANULOCYTES # BLD AUTO: 0.05 X10*3/UL (ref 0–0.5)
IMM GRANULOCYTES NFR BLD AUTO: 1.1 % (ref 0–0.9)
LDH SERPL L TO P-CCNC: 99 U/L (ref 84–246)
LYMPHOCYTES # BLD AUTO: 0.57 X10*3/UL (ref 0.8–3)
LYMPHOCYTES NFR BLD AUTO: 12.2 %
MCH RBC QN AUTO: 33.4 PG (ref 26–34)
MCHC RBC AUTO-ENTMCNC: 32.4 G/DL (ref 32–36)
MCV RBC AUTO: 103 FL (ref 80–100)
MONOCYTES # BLD AUTO: 0.67 X10*3/UL (ref 0.05–0.8)
MONOCYTES NFR BLD AUTO: 14.4 %
NEUTROPHILS # BLD AUTO: 3.09 X10*3/UL (ref 1.6–5.5)
NEUTROPHILS NFR BLD AUTO: 66.3 %
NRBC BLD-RTO: 0 /100 WBCS (ref 0–0)
PLATELET # BLD AUTO: 237 X10*3/UL (ref 150–450)
POTASSIUM SERPL-SCNC: 4.6 MMOL/L (ref 3.5–5.3)
PROT SERPL-MCNC: 5.4 G/DL (ref 6.4–8.2)
PROT SERPL-MCNC: 6 G/DL (ref 6.4–8.2)
RBC # BLD AUTO: 3.17 X10*6/UL (ref 4–5.2)
SODIUM SERPL-SCNC: 143 MMOL/L (ref 136–145)
WBC # BLD AUTO: 4.7 X10*3/UL (ref 4.4–11.3)

## 2024-07-22 PROCEDURE — 83521 IG LIGHT CHAINS FREE EACH: CPT

## 2024-07-22 PROCEDURE — 84165 PROTEIN E-PHORESIS SERUM: CPT

## 2024-07-22 PROCEDURE — 84155 ASSAY OF PROTEIN SERUM: CPT

## 2024-07-22 PROCEDURE — 36415 COLL VENOUS BLD VENIPUNCTURE: CPT

## 2024-07-22 PROCEDURE — 38222 DX BONE MARROW BX & ASPIR: CPT | Performed by: PHYSICIAN ASSISTANT

## 2024-07-22 PROCEDURE — 85097 BONE MARROW INTERPRETATION: CPT | Mod: TC

## 2024-07-22 PROCEDURE — 85025 COMPLETE CBC W/AUTO DIFF WBC: CPT

## 2024-07-22 PROCEDURE — 88185 FLOWCYTOMETRY/TC ADD-ON: CPT | Mod: TC

## 2024-07-22 PROCEDURE — 86334 IMMUNOFIX E-PHORESIS SERUM: CPT

## 2024-07-22 PROCEDURE — 80053 COMPREHEN METABOLIC PANEL: CPT

## 2024-07-22 PROCEDURE — 83615 LACTATE (LD) (LDH) ENZYME: CPT

## 2024-07-22 NOTE — TELEPHONE ENCOUNTER
Spoke to pt who does not think there has been any further bleeding.  She will continue to monitor and call back if needed.

## 2024-07-22 NOTE — PROGRESS NOTES
"Patient ID: Sil West \"Ines" is a 84 y.o. female.    Bone marrow aspirate & biopsy    Date/Time: 7/22/2024 9:20 AM    Performed by: Placido Nelson PA-C  Authorized by: Placdio Nelson PA-C    Consent:     Consent obtained:  Verbal and written    Consent given by:  Patient    Risks, benefits, and alternatives were discussed: yes      Risks discussed:  Bleeding, infection and pain  Universal protocol:     Procedure explained and questions answered to patient or proxy's satisfaction: yes      Relevant documents present and verified: yes      Test results available: yes      Imaging studies available: yes      Required blood products, implants, devices, and special equipment available: yes      Site/side marked: yes      Immediately prior to procedure, a time out was called: yes      Patient identity confirmed:  Verbally with patient  Indications:     Indications:  Disease assessment  Pre-procedure details:     Skin preparation:  Chlorhexidine    Preparation: Patient was prepped and draped in the usual sterile fashion    Sedation:     Sedation type:  None  Anesthesia:     Anesthesia method:  Local infiltration    Local anesthetic:  Lidocaine 1% w/o epi  Procedure specific details:      The procedure was explained & potential complications reviewed with the patient including the risks for bleeding, infection, & discomfort at the bone marrow biopsy site. The patient was given time for questions regarding the procedure. The patient agreed to proceed & electronic signed consent was obtained.  The patient's most recent history & physical exam were reviewed & relevant findings were noted.  The patient's allergy history was reviewed.  Next, a pre-procedure time out was performed to properly identify the patient & the procedure to be performed.  The patient was placed in the prone position & the left posterior iliac crest bone marrow biopsy site was identified & marked.  Next, the skin at the marked bone marrow " biopsy site was cleansed with Chlorhexidine solution & sterile drapes were placed.  The skin, subcutaneous tissue, & periosteum below the marked bone marrow biopsy site were anesthetized using 10 ml of 1% Lidocaine solution.  Next, a Jamshidi needle was inserted at the marked biopsy site & slowly advanced into the posterior iliac crest.  Marrow aspirate & core biopsy were obtained & sent to Pathology for review & testing.  The needle was removed & sterile dressing was applied to the biopsy site.  The patient tolerated the procedure well without incident.  Prior to discharge, the biopsy site was inspected & the patient was given written post procedure care instructions.   Post-procedure details:     Procedure completion:  Tolerated well, no immediate complications

## 2024-07-22 NOTE — TELEPHONE ENCOUNTER
"Pt had BMBX this morning and her daughter called to report that pt is having some bleeding from the site.    Call placed to pt for more information.  She was at the [facility] and someone noticed a 3x3\" spot of blood on her clothing.  Original bandage is intact.  Pt is home now and denies dizziness of SOB.  She is alone and uncertain if the site is still bleeding.  Her daughter is on her way over and pt will try to apply pressure to the area in the interim.  Additional Information   Commented on: Can you give me an estimate of how much blood you've lost?     Unsure-bled through her bandage and has a 3x3\" spot of blood on her clothes   Commented on: What have you tried to stop or slow down the bleeding?     Just got home, will apply pressure    Protocols used: Bleeding    "

## 2024-07-22 NOTE — TELEPHONE ENCOUNTER
Per Jose Roberto Nelson PA-C, pt advised to continue holding pressure for active bleeding.  Pt changed her clothes and there is a very small new spot of blood on her undergarments that leaked out of the dressing.  If someone is with her, they can change dressing if indicated.  Pt's daughter is with her now.  The dressing seems saturated so they will plan to change.  Discussed that pt will need to be evaluated if bleeding persists.

## 2024-07-22 NOTE — PROGRESS NOTES
Patient arrived to [facility] unit via self-ambulation for bone marrow biopsy. She is alert and oriented x3, denies pain or any discomfort. Vital signs obtained. Blood drawn in outpatient lab prior to arrival on unit. Jose Roberto Nelson NP performed procedure. Dressing is clean, dry and intact checked by NP.  Education provided and PI sheet given. No adverse reactions, no complaints and no assistance needed.

## 2024-07-23 LAB
CELL COUNT (BLOOD): 4.89 X10*3/UL
CELL POPULATIONS: NORMAL
DIAGNOSIS: NORMAL
FLOW DIFFERENTIAL: NORMAL
FLOW TEST ORDERED: NORMAL
KAPPA LC SERPL-MCNC: 1.42 MG/DL (ref 0.33–1.94)
KAPPA LC/LAMBDA SER: 0.02 {RATIO} (ref 0.26–1.65)
LAB TEST METHOD: NORMAL
LAMBDA LC SERPL-MCNC: 67.22 MG/DL (ref 0.57–2.63)
NUMBER OF CELLS COLLECTED: NORMAL
PATH REPORT.TOTAL CANCER: NORMAL
SIGNATURE COMMENT: NORMAL
SPECIMEN VIABILITY: NORMAL

## 2024-07-24 LAB
ALBUMIN: 3.2 G/DL (ref 3.4–5)
ALPHA 1 GLOBULIN: 0.4 G/DL (ref 0.2–0.6)
ALPHA 2 GLOBULIN: 0.8 G/DL (ref 0.4–1.1)
BETA GLOBULIN: 0.7 G/DL (ref 0.5–1.2)
GAMMA GLOBULIN: 0.3 G/DL (ref 0.5–1.4)
IMMUNOFIXATION COMMENT: NORMAL
M-PROTEIN 1: 0.1 G/DL
PATH REVIEW - SERUM IMMUNOFIXATION: NORMAL
PATH REVIEW-SERUM PROTEIN ELECTROPHORESIS: ABNORMAL
PROTEIN ELECTROPHORESIS COMMENT: ABNORMAL

## 2024-07-25 LAB
PATH REPORT.COMMENTS IMP SPEC: NORMAL
PATH REPORT.FINAL DX SPEC: NORMAL
PATH REPORT.GROSS SPEC: NORMAL
PATH REPORT.MICROSCOPIC SPEC OTHER STN: NORMAL
PATH REPORT.RELEVANT HX SPEC: NORMAL
PATH REPORT.TOTAL CANCER: NORMAL

## 2024-07-29 ENCOUNTER — APPOINTMENT (OUTPATIENT)
Dept: HEMATOLOGY/ONCOLOGY | Facility: HOSPITAL | Age: 84
End: 2024-07-29
Payer: MEDICARE

## 2024-08-02 ENCOUNTER — HOSPITAL ENCOUNTER (OUTPATIENT)
Dept: CARDIOLOGY | Facility: HOSPITAL | Age: 84
Discharge: HOME | End: 2024-08-02
Payer: MEDICARE

## 2024-08-02 DIAGNOSIS — I42.7 CARDIOTOXICITY (MULTI): ICD-10-CM

## 2024-08-02 DIAGNOSIS — C90.00 MULTIPLE MYELOMA NOT HAVING ACHIEVED REMISSION (MULTI): ICD-10-CM

## 2024-08-02 PROCEDURE — 93306 TTE W/DOPPLER COMPLETE: CPT | Performed by: INTERNAL MEDICINE

## 2024-08-02 PROCEDURE — 93306 TTE W/DOPPLER COMPLETE: CPT

## 2024-08-03 LAB
AORTIC VALVE PEAK VELOCITY: 1.49 M/S
AV PEAK GRADIENT: 8.8 MMHG
AVA (PEAK VEL): 1.72 CM2
EJECTION FRACTION APICAL 4 CHAMBER: 50.2
EJECTION FRACTION: 63 %
LEFT ATRIUM VOLUME AREA LENGTH INDEX BSA: 18.5 ML/M2
LEFT VENTRICLE INTERNAL DIMENSION DIASTOLE: 3.8 CM (ref 3.5–6)
LEFT VENTRICULAR OUTFLOW TRACT DIAMETER: 1.77 CM
MITRAL VALVE E/A RATIO: 0.79
MITRAL VALVE E/E' RATIO: 9.31
RIGHT VENTRICLE FREE WALL PEAK S': 20.71 CM/S
TRICUSPID ANNULAR PLANE SYSTOLIC EXCURSION: 2.9 CM

## 2024-08-04 ASSESSMENT — ENCOUNTER SYMPTOMS
RESPIRATORY NEGATIVE: 1
FEVER: 0
APPETITE CHANGE: 0
LEG SWELLING: 1
ADENOPATHY: 0
PALPITATIONS: 0
UNEXPECTED WEIGHT CHANGE: 0
BRUISES/BLEEDS EASILY: 1
DIAPHORESIS: 0
HEMATURIA: 0
DYSURIA: 0
CHILLS: 0

## 2024-08-04 NOTE — PROGRESS NOTES
"Patient ID: Sil West \"Ines" is a 84 y.o. female.    Treatment:   Oncology History Overview Note   I. Diagnosis (1/2023):  IgG Lambda Multiple Myeloma  Presenting symptoms: Urinary incontinence and diffuse bony pain  II. Workup:  Bone Biopsy (1/6/23):  Plasma cells neoplasm  Repeat Bone Marrow Biopsy (1/26/23):  Hypercellular marrow, 80-90% plasma cells, lambda-restricted  FISH: 1q gain (high risk), trisomy 3  MRI Spine (12/18/22):  Osseous metastases, involvement in mid-cervical, mid-thoracic, and lumbar vertebral bodies, as well as the right iliac bone  PET Scan (1/23):  FDG avidity in right iliac bone, right sacral ala, left posterior 6th rib, and appendicular skeleton  Serum and Urine Studies (1/19-1/28/23):  FKLC 1.57, FLLC 2947.7, K/L ratio 0  IgG 537, IgA 221, IgM 19, b2M 23.4, Hgb 5.7  M protein IgA lambda 0.7 g/dL   U/L  Creatinine: 2.33 (peaked at 3.15)  UPEP: Monoclonal lambda light chain at 386.6 mg/24H  Hepatitis B and C negative  III. Treatment:  Radiation (2/2/23):  Right hip + T6-T8 x 5 fractions (total 2000 cGy)  Induction (12/24/22 - 2/16/23):  Bortezomib and dexamethasone + daratumumab  C1 (1/28/23): Bortezomib 1, 4, 8, 11 + daratumumab x 2 doses  C2 (2/16/23): Weekly dosing of daratumumab and bortezomib (1, 8, 15), with lenalidomide planned when renal function allows  Response Evaluation (5/25/23):  CR with M protein 0.1 (IgG kappa c/w roscoe) and free lyte chain 1.37  C8 Roscoe RVD (Completed 7/6/23):  Transitioned with a VGPR vs CR, ongoing multifocal bone pain  IV. Response Evaluation (7/13/23):  Marked improvement in bony lesions  Pathological fractures in right pelvis and ribs, possible infiltrate in the right lower lobe base, and hypodensity in the right thyroid gland  V. Treatment Adjustment (Cycle 9 and forward): 10/2023  Velcade omitted  Transitioned to a 4-week schedule with Roscoe (day 1) and Revlimid 15 mg days 1-21/28 days     Multiple myeloma not having achieved remission " (Multi)   9/13/2023 Initial Diagnosis    Multiple myeloma not having achieved remission (CMS/HCC)     10/2/2023 - 7/8/2024 Chemotherapy    Daratumumab, Pomalidomide and dexamethasone (oral meds managed outside treatment plan) 28 Day Cycles - Maintenance     8/5/2024 -  Chemotherapy    Carfilzomib and Cyclophosphamide (Weekly) / Dexamethasone, 28 Day Cycles       Past Medical History:  HTN, DLP  pre skin cancers,   diveriticulitis   peripheral artery disease not amenable to surgery    Surgical History:  squamous cell carcinoma removed to left hand and right arm 6/2024  Past Surgical History:   Procedure Laterality Date    CT GUIDED PERCUTANEOUS BIOPSY BONE DEEP  01/06/2023    CT GUIDED PERCUTANEOUS BIOPSY BONE DEEP 1/6/2023 [facility]    OTHER SURGICAL HISTORY      THYROID BIOPSY      Family History:     Family History   Problem Relation Name Age of Onset    Alzheimer's disease Mother      Colon cancer Father      Rashes / Skin problems Sister      Rashes / Skin problems Brother       Social History:  ,  passed away on 10/24/23.  3 grown children (1 daughter, 2 sons). 2 grandchildren.  Has 6 cats.  Enjoyed skiing.  Occupation: retired .  Social History     Tobacco Use    Smoking status: Never     Passive exposure: Never    Smokeless tobacco: Never   Vaping Use    Vaping status: Never Used   Substance Use Topics    Alcohol use: Never    Drug use: Never      -------------------------------------------------------------------------------------------------------  Subjective       Sil West is a 84 year old female with IgG lambda multiple myeloma.  She recently had an increase to her lambda free light chains.  PET imaging done 7/17/24 showed an new soft tissue uptake in right 11th rib and interestingly patient has pain there as well.      Sil presents to the clinic today (8/5/24) with her daughter-in-law Ana for follow up evaluation and is scheduled to receive C1D1 carfilzomib,  cytoxan, and dexamethasone.  Receiving zometa every 3 months, and last received 6/10/24.  Energy level is good.  However, states she doesn't have the mental drive she had before, think it's too hot and doesn't want to do as much. Appetite is good.  Has gained a few pounds. Drinks boost some days.  Continues to have swelling to bilateral legs.  Back pain, not taking any pain medication.  Bruises easily.  Is following with dermatologist Dr. Vann, removed SCC in the past, got approved for removal of more skin lesions.      Review of Systems   Constitutional:  Negative for appetite change, chills, diaphoresis, fatigue, fever and unexpected weight change.   Respiratory: Negative.     Cardiovascular:  Positive for leg swelling (mild swelling to legs at times). Negative for chest pain and palpitations.   Gastrointestinal: Negative.    Genitourinary:  Positive for bladder incontinence. Negative for dysuria and hematuria.    Musculoskeletal:  Positive for back pain.   Skin: Negative.    Neurological: Negative.    Hematological:  Negative for adenopathy. Bruises/bleeds easily (bruises easily).   -------------------------------------------------------------------------------------------------------  Objective   BSA: 1.76 meters squared  /57   Pulse 72   Temp 36.6 °C (97.9 °F)   Resp 16   Wt 71.1 kg (156 lb 12 oz)   SpO2 100%   BMI 29.14 kg/m²     Physical Exam  Vitals reviewed.   Constitutional:       Appearance: Normal appearance. She is well-developed and normal weight.   HENT:      Head: Normocephalic and atraumatic.      Nose: Nose normal.   Eyes:      General: No scleral icterus.     Extraocular Movements: Extraocular movements intact.      Conjunctiva/sclera: Conjunctivae normal.      Pupils: Pupils are equal, round, and reactive to light.   Cardiovascular:      Rate and Rhythm: Normal rate and regular rhythm.      Pulses: Normal pulses.      Heart sounds: Normal heart sounds. No murmur heard.     No friction  rub. No gallop.   Pulmonary:      Effort: Pulmonary effort is normal.      Breath sounds: Normal breath sounds.   Abdominal:      General: Abdomen is flat. Bowel sounds are normal. There is no distension.      Palpations: Abdomen is soft. There is no mass.      Tenderness: There is no abdominal tenderness.   Musculoskeletal:         General: Normal range of motion.      Right lower leg: Edema (non-pitting) present.      Left lower leg: Edema (non-pitting) present.      Comments: No spine tenderness   Lymphadenopathy:      Comments: No lymphadenopathy   Skin:     General: Skin is warm and dry.   Neurological:      General: No focal deficit present.      Mental Status: She is alert and oriented to person, place, and time.      Comments: Normal strength and gait   Psychiatric:         Mood and Affect: Mood normal.         Behavior: Behavior normal.         Thought Content: Thought content normal.     Performance Status:  Symptomatic; fully ambulatory  -------------------------------------------------------------------------------------------------------  Assessment and Plan:    Multiple myeloma not having achieved remission (CMS/HCC)  IgG lambda MM    - Currently on daratumamab/Lenalidomide. Continued uptrending of free lambda light chain. M-protein still 0.1g IgG kappa, likely represents daratumumab. Will add weekly dex 10 mg to see if this could deepen her response.     5/13/24:  Continues to have increasing free lambda light chain, M-protein remains at 0.1.  Receiving C20 daratumumab today.  Discussed that free lyte chain continues to increase significantly and suggested switching revlimid to pomalidomide  4 mg 21/28 days since no other CRAB criteria,  -Taking aspirin 81 mg daily for VTE prophylaxis during treatment with pomalidomide      7/18/24:  Laboratory results show major increase in free lyte chains to 62 mg/dL up from 18 in March 2024, only sx is right rib pain where PET from yesterday shows a new lesion, no  other new lesions seen,  BM biopsy planned for next week.  Plan to transition to carfilzomib weekly and cyclophosphanude 300 mg/m2 IV weekly both days 1,8, and 15 with dex 20 mg weekly on dayy 1,8,15, and 22.  Side effects of carfilzomib and cyclophosphamide including fluid retention, cytopenias, nausea, hair loss reviewed and all questions answered.  Previously had provided Sil with printed off educational information on carfilzomib and cyclophosphamide.  Sil lives in [place] so goal would be to set up treatment at [facility] closer to home.  Chemo consent signed.  Baseline ECHO ordered due to potential cardiotoxicity with carfilzomib    Bone marrow biopsy results from 7/22/24 showing limited bone marrow (20% cellularity) with maturing trilineage hematopoiesis, minimal involvement by lambda-restricted plasma cells by flow cytometry.    8/5/24:  CBC results stable from today.  Lambda free light chain level increased to 67.22 (7/22/24), from 62.18 (7/8/24).  M-protein 0.1 (7/22/24).  Scheduled to receive C1D1 carfilzomib, cytoxan, and dexamethasone 20 mg weekly.  New prescription for dexamethasone 20 mg, take on day 22 of 28 day cycle sent to pharmacy.  Reviewed supportive medications ondansetron and prochlorperazine, prescriptions sent to pharmacy.  Sil will receive first cycle of chemotherapy at [facility] with C2D1 at [facility] then at [facility].  Sil is requesting to have treatment transferred to [facility] closer to home, will discuss with Dr. [facility].  Follow up visit on 9/3/24 at [facility] with C2D1.       Ig [place] Free Light Chain   Date Value Ref Range Status   07/22/2024 1.42 0.33 - 1.94 mg/dL Final   07/08/2024 1.12 0.33 - 1.94 mg/dL Final   06/10/2024 1.09 0.33 - 1.94 mg/dL Final   05/13/2024 0.78 0.33 - 1.94 mg/dL Final   04/15/2024 1.19 0.33 - 1.94 mg/dL Final     Ig Lambda Free Light Chain   Date Value Ref Range Status   07/22/2024 67.22 (H) 0.57 - 2.63  mg/dL Final   07/08/2024 62.18 (H) 0.57 - 2.63 mg/dL Final   06/10/2024 37.52 (H) 0.57 - 2.63 mg/dL Final   05/13/2024 26.58 (H) 0.57 - 2.63 mg/dL Final   04/15/2024 23.52 (H) 0.57 - 2.63 mg/dL Final     Kappa/Lambda Ratio   Date Value Ref Range Status   07/22/2024 0.02 (L) 0.26 - 1.65 Final   07/08/2024 0.02 (L) 0.26 - 1.65 Final   06/10/2024 0.03 (L) 0.26 - 1.65 Final   05/13/2024 0.03 (L) 0.26 - 1.65 Final   04/15/2024 0.05 (L) 0.26 - 1.65 Final     M-PROTEIN 1   Date Value Ref Range Status   07/22/2024 0.1 (H)   g/dL Final   07/08/2024 0.1 (H)   g/dL Final   06/10/2024 0.1 (H)   g/dL Final   05/13/2024 0.1 (H)   g/dL Final   03/18/2024 0.1 (H)   g/dL Final      Dysuria:  urinalysis bland, suspect needs urologic evaluation for incontinence    Bone Health:  - Continue zoledronic acid 3.3 mg (renal dosing) every 3 months, receiving dose (6/10/24), next dose due Sept 2024.  - Continue vitamin D supplement -- 2000 units PO daily.  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  Thyroid biopsy 4/29/24 showing rare atypical cells from FNA of right mid lobe.  Repeat biopsy was nondiagnostic.  Dr. Magallanes recommended total thyroidectomy but Sil requested to having US monitoring for now.  Following with Dr. Magallanes, last seen 6/17/24.  Is scheduled to have thyroid US on 8/15/24.       Right lower back pain  - seems musculoskeletal  - MRI lumbar spine (3/4/24): degenerative changes, no evidence of progressive disease or fracture.    - MRI thoracic spine (3/12/24): degenerative changes, redemonstration of multifocal marrow signal abnormalities compatible with the given history of multiple myeloma, osseous expansion and epidural extension of neoplasm previously seen have improved, decrease in size and enhancement of previously seen lesions.         RTC:   8/12/24:  C1D8 infusion visit  8/19/24:  C1D15 infusion visit  9/3/24:  Follow up visit with provider and C2D1 infusion appt at [facility].   Plan 2 year course of zometa, continue every 3 months, no sx of ONJ completes course 3/2025.   - ------------------------------------------  CHANTEL Medrano-PAUL

## 2024-08-05 ENCOUNTER — LAB (OUTPATIENT)
Dept: LAB | Facility: HOSPITAL | Age: 84
End: 2024-08-05
Payer: MEDICARE

## 2024-08-05 ENCOUNTER — INFUSION (OUTPATIENT)
Dept: HEMATOLOGY/ONCOLOGY | Facility: HOSPITAL | Age: 84
End: 2024-08-05
Payer: MEDICARE

## 2024-08-05 ENCOUNTER — TELEPHONE (OUTPATIENT)
Dept: HEMATOLOGY/ONCOLOGY | Facility: HOSPITAL | Age: 84
End: 2024-08-05

## 2024-08-05 ENCOUNTER — OFFICE VISIT (OUTPATIENT)
Dept: HEMATOLOGY/ONCOLOGY | Facility: HOSPITAL | Age: 84
End: 2024-08-05
Payer: MEDICARE

## 2024-08-05 VITALS
SYSTOLIC BLOOD PRESSURE: 128 MMHG | RESPIRATION RATE: 16 BRPM | TEMPERATURE: 97.9 F | OXYGEN SATURATION: 100 % | WEIGHT: 156.75 LBS | BODY MASS INDEX: 29.14 KG/M2 | HEART RATE: 72 BPM | DIASTOLIC BLOOD PRESSURE: 57 MMHG

## 2024-08-05 DIAGNOSIS — C90.00 MULTIPLE MYELOMA NOT HAVING ACHIEVED REMISSION (MULTI): ICD-10-CM

## 2024-08-05 DIAGNOSIS — D84.821 IMMUNOSUPPRESSED DUE TO CHEMOTHERAPY (MULTI): ICD-10-CM

## 2024-08-05 DIAGNOSIS — T45.1X5A IMMUNOSUPPRESSED DUE TO CHEMOTHERAPY (MULTI): ICD-10-CM

## 2024-08-05 DIAGNOSIS — E04.1 THYROID NODULE: ICD-10-CM

## 2024-08-05 DIAGNOSIS — C90.00 MULTIPLE MYELOMA NOT HAVING ACHIEVED REMISSION (MULTI): Primary | ICD-10-CM

## 2024-08-05 DIAGNOSIS — Z79.899 IMMUNOSUPPRESSED DUE TO CHEMOTHERAPY (MULTI): ICD-10-CM

## 2024-08-05 DIAGNOSIS — G89.29 OTHER CHRONIC PAIN: ICD-10-CM

## 2024-08-05 LAB
ALBUMIN SERPL BCP-MCNC: 3.7 G/DL (ref 3.4–5)
ALP SERPL-CCNC: 50 U/L (ref 33–136)
ALT SERPL W P-5'-P-CCNC: 8 U/L (ref 7–45)
ANION GAP SERPL CALC-SCNC: 12 MMOL/L (ref 10–20)
AST SERPL W P-5'-P-CCNC: 11 U/L (ref 9–39)
BASOPHILS # BLD AUTO: 0.08 X10*3/UL (ref 0–0.1)
BASOPHILS NFR BLD AUTO: 2.5 %
BILIRUB SERPL-MCNC: 0.4 MG/DL (ref 0–1.2)
BUN SERPL-MCNC: 25 MG/DL (ref 6–23)
CALCIUM SERPL-MCNC: 8.9 MG/DL (ref 8.6–10.3)
CHLORIDE SERPL-SCNC: 107 MMOL/L (ref 98–107)
CO2 SERPL-SCNC: 28 MMOL/L (ref 21–32)
CREAT SERPL-MCNC: 1.03 MG/DL (ref 0.5–1.05)
EGFRCR SERPLBLD CKD-EPI 2021: 54 ML/MIN/1.73M*2
EOSINOPHIL # BLD AUTO: 0.08 X10*3/UL (ref 0–0.4)
EOSINOPHIL NFR BLD AUTO: 2.5 %
ERYTHROCYTE [DISTWIDTH] IN BLOOD BY AUTOMATED COUNT: 14.6 % (ref 11.5–14.5)
GLUCOSE SERPL-MCNC: 133 MG/DL (ref 74–99)
HCT VFR BLD AUTO: 31.5 % (ref 36–46)
HGB BLD-MCNC: 10.4 G/DL (ref 12–16)
IMM GRANULOCYTES # BLD AUTO: 0.01 X10*3/UL (ref 0–0.5)
IMM GRANULOCYTES NFR BLD AUTO: 0.3 % (ref 0–0.9)
LYMPHOCYTES # BLD AUTO: 0.89 X10*3/UL (ref 0.8–3)
LYMPHOCYTES NFR BLD AUTO: 27.4 %
MCH RBC QN AUTO: 33.9 PG (ref 26–34)
MCHC RBC AUTO-ENTMCNC: 33 G/DL (ref 32–36)
MCV RBC AUTO: 103 FL (ref 80–100)
MONOCYTES # BLD AUTO: 0.49 X10*3/UL (ref 0.05–0.8)
MONOCYTES NFR BLD AUTO: 15.1 %
NEUTROPHILS # BLD AUTO: 1.7 X10*3/UL (ref 1.6–5.5)
NEUTROPHILS NFR BLD AUTO: 52.2 %
NRBC BLD-RTO: 0 /100 WBCS (ref 0–0)
PLATELET # BLD AUTO: 296 X10*3/UL (ref 150–450)
POTASSIUM SERPL-SCNC: 4 MMOL/L (ref 3.5–5.3)
PROT SERPL-MCNC: 5.6 G/DL (ref 6.4–8.2)
RBC # BLD AUTO: 3.07 X10*6/UL (ref 4–5.2)
SODIUM SERPL-SCNC: 143 MMOL/L (ref 136–145)
WBC # BLD AUTO: 3.3 X10*3/UL (ref 4.4–11.3)

## 2024-08-05 PROCEDURE — 99215 OFFICE O/P EST HI 40 MIN: CPT

## 2024-08-05 PROCEDURE — 1159F MED LIST DOCD IN RCRD: CPT

## 2024-08-05 PROCEDURE — 2500000005 HC RX 250 GENERAL PHARMACY W/O HCPCS: Performed by: INTERNAL MEDICINE

## 2024-08-05 PROCEDURE — 2500000004 HC RX 250 GENERAL PHARMACY W/ HCPCS (ALT 636 FOR OP/ED): Performed by: INTERNAL MEDICINE

## 2024-08-05 PROCEDURE — 80053 COMPREHEN METABOLIC PANEL: CPT

## 2024-08-05 PROCEDURE — 96417 CHEMO IV INFUS EACH ADDL SEQ: CPT

## 2024-08-05 PROCEDURE — 96375 TX/PRO/DX INJ NEW DRUG ADDON: CPT | Mod: INF

## 2024-08-05 PROCEDURE — 1125F AMNT PAIN NOTED PAIN PRSNT: CPT

## 2024-08-05 PROCEDURE — 36415 COLL VENOUS BLD VENIPUNCTURE: CPT

## 2024-08-05 PROCEDURE — 85025 COMPLETE CBC W/AUTO DIFF WBC: CPT

## 2024-08-05 PROCEDURE — 96413 CHEMO IV INFUSION 1 HR: CPT

## 2024-08-05 PROCEDURE — 1160F RVW MEDS BY RX/DR IN RCRD: CPT

## 2024-08-05 RX ORDER — HEPARIN 100 UNIT/ML
500 SYRINGE INTRAVENOUS AS NEEDED
OUTPATIENT
Start: 2024-08-05

## 2024-08-05 RX ORDER — PROCHLORPERAZINE EDISYLATE 5 MG/ML
10 INJECTION INTRAMUSCULAR; INTRAVENOUS EVERY 6 HOURS PRN
Status: DISCONTINUED | OUTPATIENT
Start: 2024-08-05 | End: 2024-08-05 | Stop reason: HOSPADM

## 2024-08-05 RX ORDER — ONDANSETRON HYDROCHLORIDE 8 MG/1
8 TABLET, FILM COATED ORAL EVERY 8 HOURS PRN
Qty: 30 TABLET | Refills: 5 | Status: SHIPPED | OUTPATIENT
Start: 2024-08-05

## 2024-08-05 RX ORDER — FAMOTIDINE 10 MG/ML
20 INJECTION INTRAVENOUS ONCE AS NEEDED
Status: DISCONTINUED | OUTPATIENT
Start: 2024-08-05 | End: 2024-08-05 | Stop reason: HOSPADM

## 2024-08-05 RX ORDER — DEXAMETHASONE 4 MG/1
20 TABLET ORAL
Qty: 5 TABLET | Refills: 7 | Status: SHIPPED | OUTPATIENT
Start: 2024-08-05 | End: 2025-03-17

## 2024-08-05 RX ORDER — ALBUTEROL SULFATE 0.83 MG/ML
3 SOLUTION RESPIRATORY (INHALATION) AS NEEDED
Status: DISCONTINUED | OUTPATIENT
Start: 2024-08-05 | End: 2024-08-05 | Stop reason: HOSPADM

## 2024-08-05 RX ORDER — PROCHLORPERAZINE MALEATE 10 MG
10 TABLET ORAL EVERY 6 HOURS PRN
Qty: 30 TABLET | Refills: 5 | Status: SHIPPED | OUTPATIENT
Start: 2024-08-05

## 2024-08-05 RX ORDER — HEPARIN SODIUM,PORCINE/PF 10 UNIT/ML
50 SYRINGE (ML) INTRAVENOUS AS NEEDED
OUTPATIENT
Start: 2024-08-05

## 2024-08-05 RX ORDER — EPINEPHRINE 0.3 MG/.3ML
0.3 INJECTION SUBCUTANEOUS EVERY 5 MIN PRN
Status: DISCONTINUED | OUTPATIENT
Start: 2024-08-05 | End: 2024-08-05 | Stop reason: HOSPADM

## 2024-08-05 RX ORDER — PROCHLORPERAZINE MALEATE 10 MG
10 TABLET ORAL EVERY 6 HOURS PRN
Status: DISCONTINUED | OUTPATIENT
Start: 2024-08-05 | End: 2024-08-05 | Stop reason: HOSPADM

## 2024-08-05 RX ORDER — DIPHENHYDRAMINE HYDROCHLORIDE 50 MG/ML
50 INJECTION INTRAMUSCULAR; INTRAVENOUS AS NEEDED
Status: DISCONTINUED | OUTPATIENT
Start: 2024-08-05 | End: 2024-08-05 | Stop reason: HOSPADM

## 2024-08-05 ASSESSMENT — ENCOUNTER SYMPTOMS
GASTROINTESTINAL NEGATIVE: 1
BACK PAIN: 1
NEUROLOGICAL NEGATIVE: 1
FATIGUE: 0

## 2024-08-05 ASSESSMENT — PAIN SCALES - GENERAL: PAINLEVEL: 1

## 2024-08-05 NOTE — PROGRESS NOTES
PT seen in infusion post MD visit; see provider note for assessment. PT received 1st dose regimen without incident. Drugs, side effects, and questions discussed and answered. AVS provided prior to discharge.

## 2024-08-12 ENCOUNTER — INFUSION (OUTPATIENT)
Dept: HEMATOLOGY/ONCOLOGY | Facility: CLINIC | Age: 84
End: 2024-08-12
Payer: MEDICARE

## 2024-08-12 VITALS
TEMPERATURE: 98.1 F | RESPIRATION RATE: 18 BRPM | HEART RATE: 78 BPM | SYSTOLIC BLOOD PRESSURE: 137 MMHG | WEIGHT: 154.87 LBS | DIASTOLIC BLOOD PRESSURE: 67 MMHG | BODY MASS INDEX: 28.79 KG/M2 | OXYGEN SATURATION: 97 %

## 2024-08-12 DIAGNOSIS — C90.00 MULTIPLE MYELOMA NOT HAVING ACHIEVED REMISSION (MULTI): ICD-10-CM

## 2024-08-12 LAB
ALBUMIN SERPL BCP-MCNC: 3.7 G/DL (ref 3.4–5)
ALP SERPL-CCNC: 53 U/L (ref 33–136)
ALT SERPL W P-5'-P-CCNC: 8 U/L (ref 7–45)
ANION GAP SERPL CALC-SCNC: 10 MMOL/L (ref 10–20)
AST SERPL W P-5'-P-CCNC: 15 U/L (ref 9–39)
BASOPHILS # BLD AUTO: 0.06 X10*3/UL (ref 0–0.1)
BASOPHILS NFR BLD AUTO: 1.5 %
BILIRUB SERPL-MCNC: 0.4 MG/DL (ref 0–1.2)
BUN SERPL-MCNC: 21 MG/DL (ref 6–23)
CALCIUM SERPL-MCNC: 9.1 MG/DL (ref 8.6–10.3)
CHLORIDE SERPL-SCNC: 107 MMOL/L (ref 98–107)
CO2 SERPL-SCNC: 28 MMOL/L (ref 21–32)
CREAT SERPL-MCNC: 1.12 MG/DL (ref 0.5–1.05)
EGFRCR SERPLBLD CKD-EPI 2021: 49 ML/MIN/1.73M*2
EOSINOPHIL # BLD AUTO: 0.05 X10*3/UL (ref 0–0.4)
EOSINOPHIL NFR BLD AUTO: 1.3 %
ERYTHROCYTE [DISTWIDTH] IN BLOOD BY AUTOMATED COUNT: 15.2 % (ref 11.5–14.5)
GLUCOSE SERPL-MCNC: 89 MG/DL (ref 74–99)
HCT VFR BLD AUTO: 32 % (ref 36–46)
HGB BLD-MCNC: 10.5 G/DL (ref 12–16)
IGA SERPL-MCNC: 71 MG/DL (ref 70–400)
IGG SERPL-MCNC: 324 MG/DL (ref 700–1600)
IGM SERPL-MCNC: 10 MG/DL (ref 40–230)
IMM GRANULOCYTES # BLD AUTO: 0.02 X10*3/UL (ref 0–0.5)
IMM GRANULOCYTES NFR BLD AUTO: 0.5 % (ref 0–0.9)
LYMPHOCYTES # BLD AUTO: 0.8 X10*3/UL (ref 0.8–3)
LYMPHOCYTES NFR BLD AUTO: 20.4 %
MCH RBC QN AUTO: 34.1 PG (ref 26–34)
MCHC RBC AUTO-ENTMCNC: 32.8 G/DL (ref 32–36)
MCV RBC AUTO: 104 FL (ref 80–100)
MONOCYTES # BLD AUTO: 0.28 X10*3/UL (ref 0.05–0.8)
MONOCYTES NFR BLD AUTO: 7.1 %
NEUTROPHILS # BLD AUTO: 2.72 X10*3/UL (ref 1.6–5.5)
NEUTROPHILS NFR BLD AUTO: 69.2 %
NRBC BLD-RTO: 0 /100 WBCS (ref 0–0)
PLATELET # BLD AUTO: 282 X10*3/UL (ref 150–450)
POTASSIUM SERPL-SCNC: 4.1 MMOL/L (ref 3.5–5.3)
PROT SERPL-MCNC: 5.4 G/DL (ref 6.4–8.2)
PROT SERPL-MCNC: 5.7 G/DL (ref 6.4–8.2)
RBC # BLD AUTO: 3.08 X10*6/UL (ref 4–5.2)
SODIUM SERPL-SCNC: 141 MMOL/L (ref 136–145)
WBC # BLD AUTO: 3.9 X10*3/UL (ref 4.4–11.3)

## 2024-08-12 PROCEDURE — 96417 CHEMO IV INFUS EACH ADDL SEQ: CPT

## 2024-08-12 PROCEDURE — 84165 PROTEIN E-PHORESIS SERUM: CPT

## 2024-08-12 PROCEDURE — 86334 IMMUNOFIX E-PHORESIS SERUM: CPT

## 2024-08-12 PROCEDURE — 2500000004 HC RX 250 GENERAL PHARMACY W/ HCPCS (ALT 636 FOR OP/ED): Performed by: INTERNAL MEDICINE

## 2024-08-12 PROCEDURE — 84155 ASSAY OF PROTEIN SERUM: CPT

## 2024-08-12 PROCEDURE — 96375 TX/PRO/DX INJ NEW DRUG ADDON: CPT | Mod: INF

## 2024-08-12 PROCEDURE — 83521 IG LIGHT CHAINS FREE EACH: CPT

## 2024-08-12 PROCEDURE — 96413 CHEMO IV INFUSION 1 HR: CPT

## 2024-08-12 PROCEDURE — 85025 COMPLETE CBC W/AUTO DIFF WBC: CPT | Performed by: INTERNAL MEDICINE

## 2024-08-12 PROCEDURE — 2500000005 HC RX 250 GENERAL PHARMACY W/O HCPCS: Performed by: INTERNAL MEDICINE

## 2024-08-12 PROCEDURE — 80053 COMPREHEN METABOLIC PANEL: CPT | Performed by: INTERNAL MEDICINE

## 2024-08-12 PROCEDURE — 82784 ASSAY IGA/IGD/IGG/IGM EACH: CPT

## 2024-08-12 RX ORDER — FAMOTIDINE 10 MG/ML
20 INJECTION INTRAVENOUS ONCE AS NEEDED
Status: DISCONTINUED | OUTPATIENT
Start: 2024-08-12 | End: 2024-08-12 | Stop reason: HOSPADM

## 2024-08-12 RX ORDER — EPINEPHRINE 0.3 MG/.3ML
0.3 INJECTION SUBCUTANEOUS EVERY 5 MIN PRN
Status: DISCONTINUED | OUTPATIENT
Start: 2024-08-12 | End: 2024-08-12 | Stop reason: HOSPADM

## 2024-08-12 RX ORDER — ALBUTEROL SULFATE 0.83 MG/ML
3 SOLUTION RESPIRATORY (INHALATION) AS NEEDED
Status: DISCONTINUED | OUTPATIENT
Start: 2024-08-12 | End: 2024-08-12 | Stop reason: HOSPADM

## 2024-08-12 RX ORDER — PROCHLORPERAZINE EDISYLATE 5 MG/ML
10 INJECTION INTRAMUSCULAR; INTRAVENOUS EVERY 6 HOURS PRN
Status: DISCONTINUED | OUTPATIENT
Start: 2024-08-12 | End: 2024-08-12 | Stop reason: HOSPADM

## 2024-08-12 RX ORDER — DIPHENHYDRAMINE HYDROCHLORIDE 50 MG/ML
50 INJECTION INTRAMUSCULAR; INTRAVENOUS AS NEEDED
Status: DISCONTINUED | OUTPATIENT
Start: 2024-08-12 | End: 2024-08-12 | Stop reason: HOSPADM

## 2024-08-12 RX ORDER — PROCHLORPERAZINE MALEATE 10 MG
10 TABLET ORAL EVERY 6 HOURS PRN
Status: DISCONTINUED | OUTPATIENT
Start: 2024-08-12 | End: 2024-08-12 | Stop reason: HOSPADM

## 2024-08-12 ASSESSMENT — PAIN SCALES - GENERAL: PAINLEVEL: 1

## 2024-08-12 NOTE — SIGNIFICANT EVENT
08/12/24 1151   Prechemo Checklist   Has the patient been in the hospital, ED, or urgent care since last date of service No   Chemo/Immuno Consent Completed and Signed Yes   Protocol/Indications Verified Yes   Confirmed to previous date/time of medication Yes   Compared to previous dose Yes  (Dose is to be increased)   All medications are dated accurately Yes   Pregnancy Test Negative Not applicable   Parameters Met Yes   BSA/Weight-Height Verified Yes   Dose Calculations Verified (current, total, cumulative) Yes

## 2024-08-13 LAB
KAPPA LC SERPL-MCNC: 0.83 MG/DL (ref 0.33–1.94)
KAPPA LC/LAMBDA SER: 0.01 {RATIO} (ref 0.26–1.65)
LAMBDA LC SERPL-MCNC: 85.03 MG/DL (ref 0.57–2.63)

## 2024-08-14 LAB
ALBUMIN: 3.3 G/DL (ref 3.4–5)
ALPHA 1 GLOBULIN: 0.3 G/DL (ref 0.2–0.6)
ALPHA 2 GLOBULIN: 0.8 G/DL (ref 0.4–1.1)
BETA GLOBULIN: 0.6 G/DL (ref 0.5–1.2)
GAMMA GLOBULIN: 0.3 G/DL (ref 0.5–1.4)
IMMUNOFIXATION COMMENT: NORMAL
M-PROTEIN 1: 0.1 G/DL
PATH REVIEW - SERUM IMMUNOFIXATION: NORMAL
PATH REVIEW-SERUM PROTEIN ELECTROPHORESIS: ABNORMAL
PROTEIN ELECTROPHORESIS COMMENT: ABNORMAL

## 2024-08-15 ENCOUNTER — HOSPITAL ENCOUNTER (OUTPATIENT)
Dept: RADIOLOGY | Facility: CLINIC | Age: 84
Discharge: HOME | End: 2024-08-15
Payer: MEDICARE

## 2024-08-15 DIAGNOSIS — E04.1 THYROID NODULE: ICD-10-CM

## 2024-08-15 PROCEDURE — 76536 US EXAM OF HEAD AND NECK: CPT

## 2024-08-15 PROCEDURE — 76536 US EXAM OF HEAD AND NECK: CPT | Performed by: RADIOLOGY

## 2024-08-16 ENCOUNTER — NURSE TRIAGE (OUTPATIENT)
Dept: ADMISSION | Facility: HOSPITAL | Age: 84
End: 2024-08-16
Payer: MEDICARE

## 2024-08-16 NOTE — TELEPHONE ENCOUNTER
Patient called stating she is having diarrhea started today.  She had nausea yesterday and took - took zofran with relief.  She is now worried she has diarrhea.  No abdominal pain.  Liquid, brown watery stools x3 today. No blood.  Foul odor.  Denies being around others that were sick.  Last received Carfilzomib/Cytoxan 8/12/24.  Drinking fluids and eating well. Urinating well.  No fever, no SOB, no chest pain.     Patient wants to confirm if okay to take Imodium?       Additional Information   Commented on: How many bowel movements have you had in the last 24 hours?     X3 watery stools started today.    Protocols used: Diarrhea

## 2024-08-19 ENCOUNTER — INFUSION (OUTPATIENT)
Dept: HEMATOLOGY/ONCOLOGY | Facility: CLINIC | Age: 84
End: 2024-08-19
Payer: MEDICARE

## 2024-08-19 VITALS
WEIGHT: 152.34 LBS | RESPIRATION RATE: 18 BRPM | BODY MASS INDEX: 28.32 KG/M2 | OXYGEN SATURATION: 97 % | TEMPERATURE: 97.5 F | SYSTOLIC BLOOD PRESSURE: 125 MMHG | HEART RATE: 91 BPM | DIASTOLIC BLOOD PRESSURE: 74 MMHG

## 2024-08-19 DIAGNOSIS — C90.00 MULTIPLE MYELOMA NOT HAVING ACHIEVED REMISSION (MULTI): ICD-10-CM

## 2024-08-19 LAB
ALBUMIN SERPL BCP-MCNC: 3.7 G/DL (ref 3.4–5)
ALP SERPL-CCNC: 53 U/L (ref 33–136)
ALT SERPL W P-5'-P-CCNC: 8 U/L (ref 7–45)
ANION GAP SERPL CALC-SCNC: 12 MMOL/L (ref 10–20)
AST SERPL W P-5'-P-CCNC: 11 U/L (ref 9–39)
BASOPHILS # BLD AUTO: 0.05 X10*3/UL (ref 0–0.1)
BASOPHILS NFR BLD AUTO: 1.1 %
BILIRUB SERPL-MCNC: 0.4 MG/DL (ref 0–1.2)
BUN SERPL-MCNC: 24 MG/DL (ref 6–23)
CALCIUM SERPL-MCNC: 9.3 MG/DL (ref 8.6–10.3)
CHLORIDE SERPL-SCNC: 106 MMOL/L (ref 98–107)
CO2 SERPL-SCNC: 28 MMOL/L (ref 21–32)
CREAT SERPL-MCNC: 1.04 MG/DL (ref 0.5–1.05)
EGFRCR SERPLBLD CKD-EPI 2021: 54 ML/MIN/1.73M*2
EOSINOPHIL # BLD AUTO: 0.1 X10*3/UL (ref 0–0.4)
EOSINOPHIL NFR BLD AUTO: 2.2 %
ERYTHROCYTE [DISTWIDTH] IN BLOOD BY AUTOMATED COUNT: 14.5 % (ref 11.5–14.5)
GLUCOSE SERPL-MCNC: 93 MG/DL (ref 74–99)
HCT VFR BLD AUTO: 32.9 % (ref 36–46)
HGB BLD-MCNC: 10.7 G/DL (ref 12–16)
IMM GRANULOCYTES # BLD AUTO: 0.04 X10*3/UL (ref 0–0.5)
IMM GRANULOCYTES NFR BLD AUTO: 0.9 % (ref 0–0.9)
LYMPHOCYTES # BLD AUTO: 0.65 X10*3/UL (ref 0.8–3)
LYMPHOCYTES NFR BLD AUTO: 14.3 %
MCH RBC QN AUTO: 33.3 PG (ref 26–34)
MCHC RBC AUTO-ENTMCNC: 32.5 G/DL (ref 32–36)
MCV RBC AUTO: 103 FL (ref 80–100)
MONOCYTES # BLD AUTO: 0.36 X10*3/UL (ref 0.05–0.8)
MONOCYTES NFR BLD AUTO: 7.9 %
NEUTROPHILS # BLD AUTO: 3.33 X10*3/UL (ref 1.6–5.5)
NEUTROPHILS NFR BLD AUTO: 73.6 %
NRBC BLD-RTO: 0 /100 WBCS (ref 0–0)
PLATELET # BLD AUTO: 238 X10*3/UL (ref 150–450)
POTASSIUM SERPL-SCNC: 3.9 MMOL/L (ref 3.5–5.3)
PROT SERPL-MCNC: 5.7 G/DL (ref 6.4–8.2)
RBC # BLD AUTO: 3.21 X10*6/UL (ref 4–5.2)
SODIUM SERPL-SCNC: 142 MMOL/L (ref 136–145)
WBC # BLD AUTO: 4.5 X10*3/UL (ref 4.4–11.3)

## 2024-08-19 PROCEDURE — 96375 TX/PRO/DX INJ NEW DRUG ADDON: CPT | Mod: INF

## 2024-08-19 PROCEDURE — 85025 COMPLETE CBC W/AUTO DIFF WBC: CPT

## 2024-08-19 PROCEDURE — 2500000004 HC RX 250 GENERAL PHARMACY W/ HCPCS (ALT 636 FOR OP/ED): Performed by: INTERNAL MEDICINE

## 2024-08-19 PROCEDURE — 96417 CHEMO IV INFUS EACH ADDL SEQ: CPT

## 2024-08-19 PROCEDURE — 96413 CHEMO IV INFUSION 1 HR: CPT

## 2024-08-19 PROCEDURE — 2500000005 HC RX 250 GENERAL PHARMACY W/O HCPCS: Performed by: INTERNAL MEDICINE

## 2024-08-19 PROCEDURE — 80053 COMPREHEN METABOLIC PANEL: CPT

## 2024-08-19 RX ORDER — PROCHLORPERAZINE MALEATE 10 MG
10 TABLET ORAL EVERY 6 HOURS PRN
Status: DISCONTINUED | OUTPATIENT
Start: 2024-08-19 | End: 2024-08-19 | Stop reason: HOSPADM

## 2024-08-19 RX ORDER — DIPHENHYDRAMINE HYDROCHLORIDE 50 MG/ML
50 INJECTION INTRAMUSCULAR; INTRAVENOUS AS NEEDED
Status: DISCONTINUED | OUTPATIENT
Start: 2024-08-19 | End: 2024-08-19 | Stop reason: HOSPADM

## 2024-08-19 RX ORDER — FAMOTIDINE 10 MG/ML
20 INJECTION INTRAVENOUS ONCE AS NEEDED
Status: DISCONTINUED | OUTPATIENT
Start: 2024-08-19 | End: 2024-08-19 | Stop reason: HOSPADM

## 2024-08-19 RX ORDER — PROCHLORPERAZINE EDISYLATE 5 MG/ML
10 INJECTION INTRAMUSCULAR; INTRAVENOUS EVERY 6 HOURS PRN
Status: DISCONTINUED | OUTPATIENT
Start: 2024-08-19 | End: 2024-08-19 | Stop reason: HOSPADM

## 2024-08-19 RX ORDER — HEPARIN 100 UNIT/ML
500 SYRINGE INTRAVENOUS AS NEEDED
Status: DISCONTINUED | OUTPATIENT
Start: 2024-08-19 | End: 2024-08-19 | Stop reason: HOSPADM

## 2024-08-19 RX ORDER — HEPARIN SODIUM,PORCINE/PF 10 UNIT/ML
50 SYRINGE (ML) INTRAVENOUS AS NEEDED
OUTPATIENT
Start: 2024-08-19

## 2024-08-19 RX ORDER — EPINEPHRINE 0.3 MG/.3ML
0.3 INJECTION SUBCUTANEOUS EVERY 5 MIN PRN
Status: DISCONTINUED | OUTPATIENT
Start: 2024-08-19 | End: 2024-08-19 | Stop reason: HOSPADM

## 2024-08-19 RX ORDER — HEPARIN 100 UNIT/ML
500 SYRINGE INTRAVENOUS AS NEEDED
OUTPATIENT
Start: 2024-08-19

## 2024-08-19 RX ORDER — ALBUTEROL SULFATE 0.83 MG/ML
3 SOLUTION RESPIRATORY (INHALATION) AS NEEDED
Status: DISCONTINUED | OUTPATIENT
Start: 2024-08-19 | End: 2024-08-19 | Stop reason: HOSPADM

## 2024-08-19 RX ORDER — HEPARIN SODIUM,PORCINE/PF 10 UNIT/ML
50 SYRINGE (ML) INTRAVENOUS AS NEEDED
Status: DISCONTINUED | OUTPATIENT
Start: 2024-08-19 | End: 2024-08-19 | Stop reason: HOSPADM

## 2024-08-19 ASSESSMENT — PAIN SCALES - GENERAL: PAINLEVEL: 0-NO PAIN

## 2024-08-19 NOTE — PATIENT INSTRUCTIONS
If constipation continues please try prune juice or over the counter colace.   Please call the office if you go past 4 days with no BM.     You have antinausea medications available:  Ondansetron (Zofran) every 8 hours as needed, do not take until Thursday if needed   Prochlorperazine (Compazine) every 6 hours as needed, take anytime   Note: These medications may worsen constipation    Please take dexamethasone 20 mg (5 tablets) on 8/26 (day 22 of treatment)

## 2024-09-02 ASSESSMENT — ENCOUNTER SYMPTOMS
DYSURIA: 0
UNEXPECTED WEIGHT CHANGE: 0
HEMATURIA: 0
BRUISES/BLEEDS EASILY: 1
FEVER: 0
ADENOPATHY: 0
NEUROLOGICAL NEGATIVE: 1
LEG SWELLING: 1
BACK PAIN: 1
RESPIRATORY NEGATIVE: 1
PALPITATIONS: 0
DIAPHORESIS: 0
APPETITE CHANGE: 0
CHILLS: 0
FATIGUE: 0

## 2024-09-03 ENCOUNTER — APPOINTMENT (OUTPATIENT)
Dept: HEMATOLOGY/ONCOLOGY | Facility: CLINIC | Age: 84
End: 2024-09-03
Payer: MEDICARE

## 2024-09-03 ENCOUNTER — APPOINTMENT (OUTPATIENT)
Dept: HEMATOLOGY/ONCOLOGY | Facility: HOSPITAL | Age: 84
End: 2024-09-03
Payer: MEDICARE

## 2024-09-03 ENCOUNTER — LAB (OUTPATIENT)
Dept: LAB | Facility: LAB | Age: 84
End: 2024-09-03
Payer: MEDICARE

## 2024-09-03 ENCOUNTER — INFUSION (OUTPATIENT)
Dept: HEMATOLOGY/ONCOLOGY | Facility: CLINIC | Age: 84
End: 2024-09-03
Payer: MEDICARE

## 2024-09-03 ENCOUNTER — OFFICE VISIT (OUTPATIENT)
Dept: HEMATOLOGY/ONCOLOGY | Facility: CLINIC | Age: 84
End: 2024-09-03
Payer: MEDICARE

## 2024-09-03 VITALS
SYSTOLIC BLOOD PRESSURE: 133 MMHG | WEIGHT: 153 LBS | TEMPERATURE: 99 F | HEART RATE: 64 BPM | OXYGEN SATURATION: 98 % | RESPIRATION RATE: 18 BRPM | DIASTOLIC BLOOD PRESSURE: 76 MMHG | BODY MASS INDEX: 27.98 KG/M2

## 2024-09-03 DIAGNOSIS — G89.29 OTHER CHRONIC PAIN: ICD-10-CM

## 2024-09-03 DIAGNOSIS — D84.821 IMMUNOSUPPRESSED DUE TO CHEMOTHERAPY (MULTI): ICD-10-CM

## 2024-09-03 DIAGNOSIS — C90.00 MULTIPLE MYELOMA NOT HAVING ACHIEVED REMISSION (MULTI): ICD-10-CM

## 2024-09-03 DIAGNOSIS — C90.00 MULTIPLE MYELOMA NOT HAVING ACHIEVED REMISSION (MULTI): Primary | ICD-10-CM

## 2024-09-03 DIAGNOSIS — E04.1 THYROID NODULE: ICD-10-CM

## 2024-09-03 DIAGNOSIS — T45.1X5A IMMUNOSUPPRESSED DUE TO CHEMOTHERAPY (MULTI): ICD-10-CM

## 2024-09-03 DIAGNOSIS — Z79.899 IMMUNOSUPPRESSED DUE TO CHEMOTHERAPY (MULTI): ICD-10-CM

## 2024-09-03 LAB
ALBUMIN SERPL BCP-MCNC: 3.9 G/DL (ref 3.4–5)
ALP SERPL-CCNC: 66 U/L (ref 33–136)
ALT SERPL W P-5'-P-CCNC: 9 U/L (ref 7–45)
ANION GAP SERPL CALC-SCNC: 14 MMOL/L (ref 10–20)
ANION GAP SERPL CALC-SCNC: 9 MMOL/L (ref 10–20)
AST SERPL W P-5'-P-CCNC: 11 U/L (ref 9–39)
BASOPHILS # BLD AUTO: 0.02 X10*3/UL (ref 0–0.1)
BASOPHILS NFR BLD AUTO: 0.4 %
BILIRUB SERPL-MCNC: 0.6 MG/DL (ref 0–1.2)
BUN SERPL-MCNC: 23 MG/DL (ref 6–23)
BUN SERPL-MCNC: 24 MG/DL (ref 6–23)
CA-I BLD-SCNC: 1.29 MMOL/L (ref 1.1–1.33)
CALCIUM SERPL-MCNC: 9.7 MG/DL (ref 8.6–10.6)
CHLORIDE SERPL-SCNC: 103 MMOL/L (ref 98–107)
CHLORIDE SERPL-SCNC: 106 MMOL/L (ref 98–107)
CO2 SERPL-SCNC: 27 MMOL/L (ref 21–32)
CO2 SERPL-SCNC: 29 MMOL/L (ref 21–32)
CREAT SERPL-MCNC: 1.05 MG/DL (ref 0.5–1.05)
CREAT SERPL-MCNC: 1.1 MG/DL (ref 0.6–1.3)
EGFRCR SERPLBLD CKD-EPI 2021: 53 ML/MIN/1.73M*2
EOSINOPHIL # BLD AUTO: 0.04 X10*3/UL (ref 0–0.4)
EOSINOPHIL NFR BLD AUTO: 0.7 %
ERYTHROCYTE [DISTWIDTH] IN BLOOD BY AUTOMATED COUNT: 14.2 % (ref 11.5–14.5)
GFR SERPL CREATININE-BSD FRML MDRD: 50 ML/MIN/1.73M*2
GLUCOSE SERPL-MCNC: 76 MG/DL (ref 74–99)
GLUCOSE SERPL-MCNC: 84 MG/DL (ref 74–99)
HCT VFR BLD AUTO: 32.8 % (ref 36–46)
HGB BLD-MCNC: 10.8 G/DL (ref 12–16)
IGA SERPL-MCNC: 34 MG/DL (ref 70–400)
IGG SERPL-MCNC: 297 MG/DL (ref 700–1600)
IGM SERPL-MCNC: 12 MG/DL (ref 40–230)
IMM GRANULOCYTES # BLD AUTO: 0.02 X10*3/UL (ref 0–0.5)
IMM GRANULOCYTES NFR BLD AUTO: 0.4 % (ref 0–0.9)
LYMPHOCYTES # BLD AUTO: 0.58 X10*3/UL (ref 0.8–3)
LYMPHOCYTES NFR BLD AUTO: 10.2 %
MCH RBC QN AUTO: 33.8 PG (ref 26–34)
MCHC RBC AUTO-ENTMCNC: 32.9 G/DL (ref 32–36)
MCV RBC AUTO: 103 FL (ref 80–100)
MONOCYTES # BLD AUTO: 0.51 X10*3/UL (ref 0.05–0.8)
MONOCYTES NFR BLD AUTO: 9 %
NEUTROPHILS # BLD AUTO: 4.51 X10*3/UL (ref 1.6–5.5)
NEUTROPHILS NFR BLD AUTO: 79.3 %
NRBC BLD-RTO: ABNORMAL /100{WBCS}
PLATELET # BLD AUTO: 295 X10*3/UL (ref 150–450)
POTASSIUM SERPL-SCNC: 4.1 MMOL/L (ref 3.5–5.3)
POTASSIUM SERPL-SCNC: 4.3 MMOL/L (ref 3.5–5.3)
PROT SERPL-MCNC: 5.7 G/DL (ref 6.4–8.2)
PROT SERPL-MCNC: 5.7 G/DL (ref 6.4–8.2)
RBC # BLD AUTO: 3.2 X10*6/UL (ref 4–5.2)
SODIUM SERPL-SCNC: 140 MMOL/L (ref 136–145)
SODIUM SERPL-SCNC: 140 MMOL/L (ref 136–145)
WBC # BLD AUTO: 5.7 X10*3/UL (ref 4.4–11.3)

## 2024-09-03 PROCEDURE — 99214 OFFICE O/P EST MOD 30 MIN: CPT

## 2024-09-03 PROCEDURE — 80047 BASIC METABLC PNL IONIZED CA: CPT

## 2024-09-03 PROCEDURE — 99214 OFFICE O/P EST MOD 30 MIN: CPT | Mod: 25

## 2024-09-03 PROCEDURE — 84155 ASSAY OF PROTEIN SERUM: CPT

## 2024-09-03 PROCEDURE — 82784 ASSAY IGA/IGD/IGG/IGM EACH: CPT

## 2024-09-03 PROCEDURE — 2500000004 HC RX 250 GENERAL PHARMACY W/ HCPCS (ALT 636 FOR OP/ED): Performed by: INTERNAL MEDICINE

## 2024-09-03 PROCEDURE — 80053 COMPREHEN METABOLIC PANEL: CPT

## 2024-09-03 PROCEDURE — 1159F MED LIST DOCD IN RCRD: CPT

## 2024-09-03 PROCEDURE — 2500000005 HC RX 250 GENERAL PHARMACY W/O HCPCS: Performed by: INTERNAL MEDICINE

## 2024-09-03 PROCEDURE — 83521 IG LIGHT CHAINS FREE EACH: CPT

## 2024-09-03 PROCEDURE — 85025 COMPLETE CBC W/AUTO DIFF WBC: CPT

## 2024-09-03 PROCEDURE — 1126F AMNT PAIN NOTED NONE PRSNT: CPT

## 2024-09-03 PROCEDURE — 96375 TX/PRO/DX INJ NEW DRUG ADDON: CPT | Mod: INF

## 2024-09-03 PROCEDURE — 36415 COLL VENOUS BLD VENIPUNCTURE: CPT

## 2024-09-03 PROCEDURE — 1160F RVW MEDS BY RX/DR IN RCRD: CPT

## 2024-09-03 PROCEDURE — 96417 CHEMO IV INFUS EACH ADDL SEQ: CPT

## 2024-09-03 PROCEDURE — 84165 PROTEIN E-PHORESIS SERUM: CPT

## 2024-09-03 PROCEDURE — 96413 CHEMO IV INFUSION 1 HR: CPT

## 2024-09-03 PROCEDURE — 96367 TX/PROPH/DG ADDL SEQ IV INF: CPT

## 2024-09-03 RX ORDER — EPINEPHRINE 0.3 MG/.3ML
0.3 INJECTION SUBCUTANEOUS EVERY 5 MIN PRN
OUTPATIENT
Start: 2024-10-14

## 2024-09-03 RX ORDER — FAMOTIDINE 10 MG/ML
20 INJECTION INTRAVENOUS ONCE AS NEEDED
OUTPATIENT
Start: 2024-10-14

## 2024-09-03 RX ORDER — ALBUTEROL SULFATE 0.83 MG/ML
3 SOLUTION RESPIRATORY (INHALATION) AS NEEDED
Status: DISCONTINUED | OUTPATIENT
Start: 2024-09-03 | End: 2024-09-03 | Stop reason: HOSPADM

## 2024-09-03 RX ORDER — ALBUTEROL SULFATE 0.83 MG/ML
3 SOLUTION RESPIRATORY (INHALATION) AS NEEDED
OUTPATIENT
Start: 2024-10-14

## 2024-09-03 RX ORDER — HEPARIN 100 UNIT/ML
500 SYRINGE INTRAVENOUS AS NEEDED
OUTPATIENT
Start: 2024-09-03

## 2024-09-03 RX ORDER — PROCHLORPERAZINE EDISYLATE 5 MG/ML
10 INJECTION INTRAMUSCULAR; INTRAVENOUS EVERY 6 HOURS PRN
Status: DISCONTINUED | OUTPATIENT
Start: 2024-09-03 | End: 2024-09-03 | Stop reason: HOSPADM

## 2024-09-03 RX ORDER — DIPHENHYDRAMINE HYDROCHLORIDE 50 MG/ML
50 INJECTION INTRAMUSCULAR; INTRAVENOUS AS NEEDED
OUTPATIENT
Start: 2024-10-14

## 2024-09-03 RX ORDER — FAMOTIDINE 10 MG/ML
20 INJECTION INTRAVENOUS ONCE AS NEEDED
Status: DISCONTINUED | OUTPATIENT
Start: 2024-09-03 | End: 2024-09-03 | Stop reason: HOSPADM

## 2024-09-03 RX ORDER — HEPARIN SODIUM,PORCINE/PF 10 UNIT/ML
50 SYRINGE (ML) INTRAVENOUS AS NEEDED
OUTPATIENT
Start: 2024-09-03

## 2024-09-03 RX ORDER — DIPHENHYDRAMINE HYDROCHLORIDE 50 MG/ML
50 INJECTION INTRAMUSCULAR; INTRAVENOUS AS NEEDED
Status: DISCONTINUED | OUTPATIENT
Start: 2024-09-03 | End: 2024-09-03 | Stop reason: HOSPADM

## 2024-09-03 RX ORDER — EPINEPHRINE 0.3 MG/.3ML
0.3 INJECTION SUBCUTANEOUS EVERY 5 MIN PRN
Status: DISCONTINUED | OUTPATIENT
Start: 2024-09-03 | End: 2024-09-03 | Stop reason: HOSPADM

## 2024-09-03 RX ORDER — PROCHLORPERAZINE MALEATE 10 MG
10 TABLET ORAL EVERY 6 HOURS PRN
Status: DISCONTINUED | OUTPATIENT
Start: 2024-09-03 | End: 2024-09-03 | Stop reason: HOSPADM

## 2024-09-03 ASSESSMENT — ENCOUNTER SYMPTOMS
DIARRHEA: 1
SORE THROAT: 0

## 2024-09-03 ASSESSMENT — PAIN SCALES - GENERAL: PAINLEVEL: 0-NO PAIN

## 2024-09-03 NOTE — PROGRESS NOTES
"Patient ID: Sil West \"Ines" is a 84 y.o. female.    Treatment:   Oncology History Overview Note   I. Diagnosis (1/2023):  IgG Lambda Multiple Myeloma  Presenting symptoms: Urinary incontinence and diffuse bony pain  II. Workup:  Bone Biopsy (1/6/23):  Plasma cells neoplasm  Repeat Bone Marrow Biopsy (1/26/23):  Hypercellular marrow, 80-90% plasma cells, lambda-restricted  FISH: 1q gain (high risk), trisomy 3  MRI Spine (12/18/22):  Osseous metastases, involvement in mid-cervical, mid-thoracic, and lumbar vertebral bodies, as well as the right iliac bone  PET Scan (1/23):  FDG avidity in right iliac bone, right sacral ala, left posterior 6th rib, and appendicular skeleton  Serum and Urine Studies (1/19-1/28/23):  FKLC 1.57, FLLC 2947.7, K/L ratio 0  IgG 537, IgA 221, IgM 19, b2M 23.4, Hgb 5.7  M protein IgA lambda 0.7 g/dL   U/L  Creatinine: 2.33 (peaked at 3.15)  UPEP: Monoclonal lambda light chain at 386.6 mg/24H  Hepatitis B and C negative  III. Treatment:  Radiation (2/2/23):  Right hip + T6-T8 x 5 fractions (total 2000 cGy)  Induction (12/24/22 - 2/16/23):  Bortezomib and dexamethasone + daratumumab  C1 (1/28/23): Bortezomib 1, 4, 8, 11 + daratumumab x 2 doses  C2 (2/16/23): Weekly dosing of daratumumab and bortezomib (1, 8, 15), with lenalidomide planned when renal function allows  Response Evaluation (5/25/23):  CR with M protein 0.1 (IgG kappa c/w roscoe) and free lyte chain 1.37  C8 Roscoe RVD (Completed 7/6/23):  Transitioned with a VGPR vs CR, ongoing multifocal bone pain  IV. Response Evaluation (7/13/23):  Marked improvement in bony lesions  Pathological fractures in right pelvis and ribs, possible infiltrate in the right lower lobe base, and hypodensity in the right thyroid gland  V. Treatment Adjustment (Cycle 9 and forward): 10/2023  Velcade omitted  Transitioned to a 4-week schedule with Roscoe (day 1) and Revlimid 15 mg days 1-21/28 days     Multiple myeloma not having achieved remission " (Multi)   9/13/2023 Initial Diagnosis    Multiple myeloma not having achieved remission (CMS/HCC)     10/2/2023 - 7/8/2024 Chemotherapy    Daratumumab, Pomalidomide and dexamethasone (oral meds managed outside treatment plan) 28 Day Cycles - Maintenance     8/5/2024 -  Chemotherapy    Carfilzomib and Cyclophosphamide (Weekly) / Dexamethasone, 28 Day Cycles       Past Medical History:  HTN, DLP  pre skin cancers,   diveriticulitis   peripheral artery disease not amenable to surgery    Surgical History:  squamous cell carcinoma removed to left hand and right arm 6/2024  Past Surgical History:   Procedure Laterality Date    CT GUIDED PERCUTANEOUS BIOPSY BONE DEEP  01/06/2023    CT GUIDED PERCUTANEOUS BIOPSY BONE DEEP 1/6/2023 [facility]    OTHER SURGICAL HISTORY      THYROID BIOPSY      Family History:     Family History   Problem Relation Name Age of Onset    Alzheimer's disease Mother      Colon cancer Father      Rashes / Skin problems Sister      Rashes / Skin problems Brother       Social History:  ,  passed away on 10/24/23.  3 grown children (1 daughter, 2 sons). 2 grandchildren.  Has 6 cats.  Enjoyed skiing.  Occupation: retired .  Social History     Tobacco Use    Smoking status: Never     Passive exposure: Never    Smokeless tobacco: Never   Vaping Use    Vaping status: Never Used   Substance Use Topics    Alcohol use: Never    Drug use: Never      -------------------------------------------------------------------------------------------------------  Subjective       Sil West is a 84 year old female with IgG lambda multiple myeloma.  She recently had an increase to her lambda free light chains.  PET imaging done 7/17/24 showed an new soft tissue uptake in right 11th rib and interestingly patient has pain there as well.      Sil presents to the clinic today (9/3/24) with her daughter Frances for follow up evaluation and is scheduled to receive C2D1 carfilzomib,  cytoxan, and dexamethasone.  Receiving zometa every 3 months, and last received 6/10/24 and is due for next dose today.      Energy level is okay.  Appetite is good and weight is stable.  Has been drinking boost intermittently.  Has been having diarrhea for the last few weeks on Thursdays after the infusion, taking imodium as needed.  Chronic back pain, not taking any pain medication.  Bruises easily.  Is following with dermatologist Dr. Vann, removed SCC in the past, got approved for removal of more skin lesions but has not made the appointment yet.       Review of Systems   Constitutional:  Negative for appetite change, chills, diaphoresis, fatigue, fever and unexpected weight change.   HENT:   Negative for mouth sores and sore throat.    Respiratory: Negative.     Cardiovascular:  Positive for leg swelling (mild swelling to legs at times). Negative for chest pain and palpitations.   Gastrointestinal:  Positive for diarrhea.   Genitourinary:  Positive for bladder incontinence. Negative for dysuria and hematuria.    Musculoskeletal:  Positive for back pain.   Skin: Negative.    Neurological: Negative.    Hematological:  Negative for adenopathy. Bruises/bleeds easily (bruises easily).   -------------------------------------------------------------------------------------------------------  Objective   BSA: 1.74 meters squared  /76   Pulse 64   Temp 37.2 °C (99 °F)   Resp 18   Wt 69.4 kg (153 lb)   SpO2 98%   BMI 27.98 kg/m²     Physical Exam  Vitals reviewed.   Constitutional:       Appearance: Normal appearance. She is well-developed and normal weight.   HENT:      Head: Normocephalic and atraumatic.      Nose: Nose normal.   Eyes:      General: No scleral icterus.     Extraocular Movements: Extraocular movements intact.      Conjunctiva/sclera: Conjunctivae normal.      Pupils: Pupils are equal, round, and reactive to light.   Cardiovascular:      Rate and Rhythm: Normal rate and regular rhythm.       Pulses: Normal pulses.      Heart sounds: Normal heart sounds. No murmur heard.     No friction rub. No gallop.   Pulmonary:      Effort: Pulmonary effort is normal.      Breath sounds: Normal breath sounds.   Abdominal:      General: Abdomen is flat. Bowel sounds are normal. There is no distension.      Palpations: Abdomen is soft. There is no mass.      Tenderness: There is no abdominal tenderness.   Musculoskeletal:         General: Normal range of motion.      Right lower leg: Edema (non-pitting) present.      Left lower leg: Edema (non-pitting) present.      Comments: No spine tenderness   Lymphadenopathy:      Comments: No lymphadenopathy   Skin:     General: Skin is warm and dry.   Neurological:      General: No focal deficit present.      Mental Status: She is alert and oriented to person, place, and time.      Comments: Normal strength and gait   Psychiatric:         Mood and Affect: Mood normal.         Behavior: Behavior normal.         Thought Content: Thought content normal.     Performance Status:  Symptomatic; fully ambulatory  -------------------------------------------------------------------------------------------------------  Assessment and Plan:    Multiple myeloma not having achieved remission (CMS/HCC)  IgG lambda MM    - Currently on daratumamab/Lenalidomide. Continued uptrending of free lambda light chain. M-protein still 0.1g IgG kappa, likely represents daratumumab. Will add weekly dex 10 mg to see if this could deepen her response.     5/13/24:  Continues to have increasing free lambda light chain, M-protein remains at 0.1.  Receiving C20 daratumumab today.  Discussed that free lyte chain continues to increase significantly and suggested switching revlimid to pomalidomide  4 mg 21/28 days since no other CRAB criteria,  -Taking aspirin 81 mg daily for VTE prophylaxis during treatment with pomalidomide      7/18/24:  Laboratory results show major increase in free lyte chains to 62 mg/dL up  from 18 in March 2024, only sx is right rib pain where PET from yesterday shows a new lesion, no other new lesions seen,  BM biopsy planned for next week.  Plan to transition to carfilzomib weekly and cyclophosphanude 300 mg/m2 IV weekly both days 1,8, and 15 with dex 20 mg weekly on dayy 1,8,15, and 22.  Side effects of carfilzomib and cyclophosphamide including fluid retention, cytopenias, nausea, hair loss reviewed and all questions answered.  Previously had provided Sil with printed off educational information on carfilzomib and cyclophosphamide.  Sil lives in [place] so goal would be to set up treatment at [facility] closer to home.  Chemo consent signed.  Baseline ECHO ordered due to potential cardiotoxicity with carfilzomib    Bone marrow biopsy results from 7/22/24 showing limited bone marrow (20% cellularity) with maturing trilineage hematopoiesis, minimal involvement by lambda-restricted plasma cells by flow cytometry.    9/3/24:  CBC results stable from today.  Lambda free light chain level decreased to 24.22 (8/30/24) from 67.22 (7/22/24).  M-protein 0.1 (8/12/24), level in process from 8/30/24.  Is scheduled to receive C2D1 today for carfilzomib, cytoxan, and dexamethasone 20 mg weekly.  Continue supportive medications ondansetron and prochlorperazine.  Sil will receive first cycle of chemotherapy at [facility] with C2D1 at [facility] then at [facility].  Sil does not want to be seen at [facility] anymore, prefers [facility] when needs to see provider and [facility] for infusion appts when not being seen by provider.  Follow up visit on 9/30/24 at [facility] with provider and C3D1.       Ig [place] Free Light Chain   Date Value Ref Range Status   09/03/2024 0.46 0.33 - 1.94 mg/dL Final   08/12/2024 0.83 0.33 - 1.94 mg/dL Final   07/22/2024 1.42 0.33 - 1.94 mg/dL Final   07/08/2024 1.12 0.33 - 1.94 mg/dL Final   06/10/2024 1.09 0.33 - 1.94 mg/dL Final     Ig Lambda  Free Light Chain   Date Value Ref Range Status   09/03/2024 24.22 (H) 0.57 - 2.63 mg/dL Final   08/12/2024 85.03 (H) 0.57 - 2.63 mg/dL Final   07/22/2024 67.22 (H) 0.57 - 2.63 mg/dL Final   07/08/2024 62.18 (H) 0.57 - 2.63 mg/dL Final   06/10/2024 37.52 (H) 0.57 - 2.63 mg/dL Final     Kappa/Lambda Ratio   Date Value Ref Range Status   09/03/2024 0.02 (L) 0.26 - 1.65 Final   08/12/2024 0.01 (L) 0.26 - 1.65 Final   07/22/2024 0.02 (L) 0.26 - 1.65 Final   07/08/2024 0.02 (L) 0.26 - 1.65 Final   06/10/2024 0.03 (L) 0.26 - 1.65 Final     M-PROTEIN 1   Date Value Ref Range Status   08/12/2024 0.1 (H)   g/dL Final   07/22/2024 0.1 (H)   g/dL Final   07/08/2024 0.1 (H)   g/dL Final   06/10/2024 0.1 (H)   g/dL Final   05/13/2024 0.1 (H)   g/dL Final      Dysuria:  urinalysis bland, suspect needs urologic evaluation for incontinence    Bone Health:  - Continue zoledronic acid 3.3 mg (renal dosing) every 3 months, receiving dose (6/10/24), scheduled to receive dose today (9/3/24).    - Continue vitamin D supplement -- 2000 units PO daily.  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  Thyroid biopsy 4/29/24 showing rare atypical cells from FNA of right mid lobe.  Repeat biopsy was nondiagnostic.  Dr. Magallanes recommended total thyroidectomy but Sil requested to having US monitoring for now.  Following with Dr. Magallanes, last seen 6/17/24.  Is scheduled to have thyroid US on 8/15/24 with stable bilateral thyroid nodules. Follow up visit with Dr. Magallanes on 9/9/24.       Right lower back pain  - seems musculoskeletal  - MRI lumbar spine (3/4/24): degenerative changes, no evidence of progressive disease or fracture.    - MRI thoracic spine (3/12/24): degenerative changes, redemonstration of multifocal marrow signal abnormalities compatible with the  given history of multiple myeloma, osseous expansion and epidural extension of neoplasm previously seen have improved, decrease in size and enhancement of previously seen lesions.      Health Care Maintenance:  Vaccines:  Advised to obtain influenza, updated covid, RSV, and prevnar 20 vaccines at local pharmacy.    RTC:   9/30/24:  Follow up visit with provider and due for C3D1 Carfilzomib, Cyclophosphamide, and Dex.    10/7/24:  Infusion visit at [facility] for C3D8  10/24/24:  Infusion visit at [facility] for C3D15  Plan 2 year course of zometa, continue every 3 months, no sx of ONJ completes course 3/2025.   - ------------------------------------------  CHANTEL Medrano-PAUL

## 2024-09-04 LAB
ALBUMIN: 3.7 G/DL (ref 3.4–5)
ALPHA 1 GLOBULIN: 0.3 G/DL (ref 0.2–0.6)
ALPHA 2 GLOBULIN: 0.8 G/DL (ref 0.4–1.1)
BETA GLOBULIN: 0.6 G/DL (ref 0.5–1.2)
GAMMA GLOBULIN: 0.3 G/DL (ref 0.5–1.4)
KAPPA LC SERPL-MCNC: 0.46 MG/DL (ref 0.33–1.94)
KAPPA LC/LAMBDA SER: 0.02 {RATIO} (ref 0.26–1.65)
LAMBDA LC SERPL-MCNC: 24.22 MG/DL (ref 0.57–2.63)
M-PROTEIN 1: 0.1 G/DL
PATH REVIEW-SERUM PROTEIN ELECTROPHORESIS: ABNORMAL
PROTEIN ELECTROPHORESIS COMMENT: ABNORMAL

## 2024-09-09 ENCOUNTER — INFUSION (OUTPATIENT)
Dept: HEMATOLOGY/ONCOLOGY | Facility: CLINIC | Age: 84
End: 2024-09-09
Payer: MEDICARE

## 2024-09-09 ENCOUNTER — APPOINTMENT (OUTPATIENT)
Dept: SURGERY | Facility: CLINIC | Age: 84
End: 2024-09-09
Payer: MEDICARE

## 2024-09-09 VITALS
RESPIRATION RATE: 17 BRPM | DIASTOLIC BLOOD PRESSURE: 73 MMHG | TEMPERATURE: 97.5 F | HEIGHT: 61 IN | BODY MASS INDEX: 29.51 KG/M2 | SYSTOLIC BLOOD PRESSURE: 137 MMHG | HEART RATE: 91 BPM | OXYGEN SATURATION: 97 % | WEIGHT: 156.31 LBS

## 2024-09-09 VITALS
BODY MASS INDEX: 29.04 KG/M2 | DIASTOLIC BLOOD PRESSURE: 71 MMHG | SYSTOLIC BLOOD PRESSURE: 164 MMHG | WEIGHT: 156 LBS | HEART RATE: 123 BPM

## 2024-09-09 DIAGNOSIS — E04.1 THYROID NODULE: ICD-10-CM

## 2024-09-09 DIAGNOSIS — C90.00 MULTIPLE MYELOMA NOT HAVING ACHIEVED REMISSION (MULTI): ICD-10-CM

## 2024-09-09 LAB
ALBUMIN SERPL BCP-MCNC: 3.4 G/DL (ref 3.4–5)
ALP SERPL-CCNC: 48 U/L (ref 33–136)
ALT SERPL W P-5'-P-CCNC: 8 U/L (ref 7–45)
ANION GAP SERPL CALC-SCNC: 11 MMOL/L (ref 10–20)
AST SERPL W P-5'-P-CCNC: 11 U/L (ref 9–39)
BASOPHILS # BLD AUTO: 0.01 X10*3/UL (ref 0–0.1)
BASOPHILS NFR BLD AUTO: 0.2 %
BILIRUB SERPL-MCNC: 0.4 MG/DL (ref 0–1.2)
BUN SERPL-MCNC: 26 MG/DL (ref 6–23)
CALCIUM SERPL-MCNC: 8.8 MG/DL (ref 8.6–10.3)
CHLORIDE SERPL-SCNC: 108 MMOL/L (ref 98–107)
CO2 SERPL-SCNC: 27 MMOL/L (ref 21–32)
CREAT SERPL-MCNC: 0.98 MG/DL (ref 0.5–1.05)
EGFRCR SERPLBLD CKD-EPI 2021: 58 ML/MIN/1.73M*2
EOSINOPHIL # BLD AUTO: 0.05 X10*3/UL (ref 0–0.4)
EOSINOPHIL NFR BLD AUTO: 1.1 %
ERYTHROCYTE [DISTWIDTH] IN BLOOD BY AUTOMATED COUNT: 13.9 % (ref 11.5–14.5)
GLUCOSE SERPL-MCNC: 84 MG/DL (ref 74–99)
HCT VFR BLD AUTO: 29.3 % (ref 36–46)
HGB BLD-MCNC: 9.8 G/DL (ref 12–16)
IMM GRANULOCYTES # BLD AUTO: 0.04 X10*3/UL (ref 0–0.5)
IMM GRANULOCYTES NFR BLD AUTO: 0.9 % (ref 0–0.9)
LYMPHOCYTES # BLD AUTO: 0.56 X10*3/UL (ref 0.8–3)
LYMPHOCYTES NFR BLD AUTO: 12.2 %
MCH RBC QN AUTO: 34.1 PG (ref 26–34)
MCHC RBC AUTO-ENTMCNC: 33.4 G/DL (ref 32–36)
MCV RBC AUTO: 102 FL (ref 80–100)
MONOCYTES # BLD AUTO: 0.35 X10*3/UL (ref 0.05–0.8)
MONOCYTES NFR BLD AUTO: 7.6 %
NEUTROPHILS # BLD AUTO: 3.57 X10*3/UL (ref 1.6–5.5)
NEUTROPHILS NFR BLD AUTO: 78 %
NRBC BLD-RTO: 0 /100 WBCS (ref 0–0)
PLATELET # BLD AUTO: 130 X10*3/UL (ref 150–450)
POTASSIUM SERPL-SCNC: 4 MMOL/L (ref 3.5–5.3)
PROT SERPL-MCNC: 5.2 G/DL (ref 6.4–8.2)
RBC # BLD AUTO: 2.87 X10*6/UL (ref 4–5.2)
SODIUM SERPL-SCNC: 142 MMOL/L (ref 136–145)
WBC # BLD AUTO: 4.6 X10*3/UL (ref 4.4–11.3)

## 2024-09-09 PROCEDURE — 2500000005 HC RX 250 GENERAL PHARMACY W/O HCPCS: Performed by: INTERNAL MEDICINE

## 2024-09-09 PROCEDURE — 1036F TOBACCO NON-USER: CPT | Performed by: SURGERY

## 2024-09-09 PROCEDURE — 96375 TX/PRO/DX INJ NEW DRUG ADDON: CPT | Mod: INF

## 2024-09-09 PROCEDURE — 99214 OFFICE O/P EST MOD 30 MIN: CPT | Performed by: SURGERY

## 2024-09-09 PROCEDURE — 2500000004 HC RX 250 GENERAL PHARMACY W/ HCPCS (ALT 636 FOR OP/ED): Performed by: INTERNAL MEDICINE

## 2024-09-09 PROCEDURE — 80053 COMPREHEN METABOLIC PANEL: CPT

## 2024-09-09 PROCEDURE — 1159F MED LIST DOCD IN RCRD: CPT | Performed by: SURGERY

## 2024-09-09 PROCEDURE — 1160F RVW MEDS BY RX/DR IN RCRD: CPT | Performed by: SURGERY

## 2024-09-09 PROCEDURE — 96417 CHEMO IV INFUS EACH ADDL SEQ: CPT

## 2024-09-09 PROCEDURE — 85025 COMPLETE CBC W/AUTO DIFF WBC: CPT

## 2024-09-09 PROCEDURE — 96413 CHEMO IV INFUSION 1 HR: CPT

## 2024-09-09 RX ORDER — PROCHLORPERAZINE MALEATE 10 MG
10 TABLET ORAL EVERY 6 HOURS PRN
Status: DISCONTINUED | OUTPATIENT
Start: 2024-09-09 | End: 2024-09-09 | Stop reason: HOSPADM

## 2024-09-09 RX ORDER — EPINEPHRINE 0.3 MG/.3ML
0.3 INJECTION SUBCUTANEOUS EVERY 5 MIN PRN
Status: DISCONTINUED | OUTPATIENT
Start: 2024-09-09 | End: 2024-09-09 | Stop reason: HOSPADM

## 2024-09-09 RX ORDER — ALBUTEROL SULFATE 0.83 MG/ML
3 SOLUTION RESPIRATORY (INHALATION) AS NEEDED
Status: DISCONTINUED | OUTPATIENT
Start: 2024-09-09 | End: 2024-09-09 | Stop reason: HOSPADM

## 2024-09-09 RX ORDER — FAMOTIDINE 10 MG/ML
20 INJECTION INTRAVENOUS ONCE AS NEEDED
Status: DISCONTINUED | OUTPATIENT
Start: 2024-09-09 | End: 2024-09-09 | Stop reason: HOSPADM

## 2024-09-09 RX ORDER — DIPHENHYDRAMINE HYDROCHLORIDE 50 MG/ML
50 INJECTION INTRAMUSCULAR; INTRAVENOUS AS NEEDED
Status: DISCONTINUED | OUTPATIENT
Start: 2024-09-09 | End: 2024-09-09 | Stop reason: HOSPADM

## 2024-09-09 RX ORDER — PROCHLORPERAZINE EDISYLATE 5 MG/ML
10 INJECTION INTRAMUSCULAR; INTRAVENOUS EVERY 6 HOURS PRN
Status: DISCONTINUED | OUTPATIENT
Start: 2024-09-09 | End: 2024-09-09 | Stop reason: HOSPADM

## 2024-09-09 ASSESSMENT — PAIN SCALES - GENERAL: PAINLEVEL: 0-NO PAIN

## 2024-09-09 NOTE — PROGRESS NOTES
"Subjective   Patient ID: Sil West \"Ines" is a 84 y.o. female who presents for follow-up of nodular thyroid disease.    HPI I saw Mrs. Roldan back in surgery clinic today.  Her last office visit with me was in June 2024.  At that time she had a known history of melanoma she had a PET scan positive right sided thyroid nodule.  We did a biopsy of that lesion.  It came back with low cellularity and some mild atypia.  This was the same result that we had from a biopsy in April 2024.    Based on that we had some discussions.  Options were to move forward with total thyroidectomy versus ongoing imaging.  She and her daughter were nervous about the idea of surgery for her.  Therefore we did go with the plan of repeat imaging.  She just completed a thyroid ultrasound August 15, 2024.  The nodule in the right thyroid lobe that was previously 3.2 x 1.8 x 2.7 cm was stable at 3.3 x 1.5 x 2.7 cm in size.  She still has a 1.4 cm nodule in the left side stable compared to 1.5 previously.  She and her daughter come in today for further discussion of these findings.    She denies any real changes in her neck.  No new pain or pressure no difficulty breathing or swallowing no troubles with her voice.  She said that she is on a new chemotherapy regimen related to her bone metastases from her melanoma.  She said she is now doing an infusion every Monday.  She said she feels good for a few days and then has diarrhea typically over the entire weekend.  She said she is really feeling pretty tired.      Objective   Physical Exam  Vitals reviewed.   Constitutional:       Appearance: Normal appearance.   Neck:      Comments: Palpable fullness right thyroid lobe consistent with her known nodule.  Trachea midline.  Neurological:      Mental Status: She is alert.         US THYROID;  8/15/2024 9:28 am      INDICATION:  Signs/Symptoms:THYROID NODULE SURVEILLANCE.      COMPARISON:  02/19/2024      ACCESSION NUMBER(S):  [id number]    "   ORDERING CLINICIAN:  PAUL MAGALLANES      TECHNIQUE:  Multiple ultrasonographic images of the thyroid gland were obtained.      FINDINGS:          RIGHT LOBE:  5.3 x 1.9 x 2.8 cm. Mid zone TR 3 nodule measuring 3.3 x 1.5 x 2.7  cm, previously 3.2 x 1.8 x 2.7 cm.      LEFT LOBE:  4.6 x 1.3 x 1.4 cm. Lower pole TR 3 nodule measuring 1.4 x 0.7 x 1.1  cm, previously 1.5 x 0.8 x 1.2 cm.      ISTHMUS:  0.3 cm.      IMPRESSION:  Stable bilateral thyroid nodules.    Assessment/Plan    Mrs. West has a known history of a right thyroid nodule which showed up on PET scan imaging related to her melanoma.  On ultrasound this is a 3 cm nodule.  She has had 2 biopsies with both showed some mild atypia but both were limited cellularity specimens.  After her second biopsy we discussed options of repeat imaging versus thyroid surgery.  She and her daughter were in favor of repeat imaging.    Her most recent thyroid ultrasound shows the nodule is stable with no growth no change.  Her nodule in the left lobe is also stable.    I discussed with her that we can continue with a repeat ultrasound again in another 6 months to monitor for stability.  Other options would be to consider a core needle biopsy in radiology for additional tissue or still consider thyroid surgery.    Given her level of fatigue with her current chemotherapy, she said that she would be very much in favor of a 6-month follow-up ultrasound.  I think this is perfectly reasonable.  We will get that ordered for her.  I can call her in mid February with results.  If it were to grow or change we could always move forward with biopsy at that time.    She and her daughter are comfortable with this plan.             Paul Magallanes MD 09/09/24 3:57 PM

## 2024-09-16 ENCOUNTER — INFUSION (OUTPATIENT)
Dept: HEMATOLOGY/ONCOLOGY | Facility: CLINIC | Age: 84
End: 2024-09-16
Payer: MEDICARE

## 2024-09-16 VITALS
BODY MASS INDEX: 28.87 KG/M2 | OXYGEN SATURATION: 99 % | TEMPERATURE: 97.9 F | HEART RATE: 106 BPM | DIASTOLIC BLOOD PRESSURE: 80 MMHG | SYSTOLIC BLOOD PRESSURE: 137 MMHG | WEIGHT: 155.09 LBS

## 2024-09-16 DIAGNOSIS — R30.0 DYSURIA: ICD-10-CM

## 2024-09-16 DIAGNOSIS — R30.0 DYSURIA: Primary | ICD-10-CM

## 2024-09-16 DIAGNOSIS — R68.89 OTHER GENERAL SYMPTOMS AND SIGNS: ICD-10-CM

## 2024-09-16 DIAGNOSIS — C90.00 MULTIPLE MYELOMA NOT HAVING ACHIEVED REMISSION (MULTI): Primary | ICD-10-CM

## 2024-09-16 DIAGNOSIS — C90.00 MULTIPLE MYELOMA NOT HAVING ACHIEVED REMISSION (MULTI): ICD-10-CM

## 2024-09-16 LAB
ALBUMIN SERPL BCP-MCNC: 3.6 G/DL (ref 3.4–5)
ALP SERPL-CCNC: 62 U/L (ref 33–136)
ALT SERPL W P-5'-P-CCNC: 10 U/L (ref 7–45)
ANION GAP SERPL CALC-SCNC: 13 MMOL/L (ref 10–20)
APPEARANCE UR: CLEAR
AST SERPL W P-5'-P-CCNC: 13 U/L (ref 9–39)
BASOPHILS # BLD AUTO: 0.03 X10*3/UL (ref 0–0.1)
BASOPHILS NFR BLD AUTO: 0.4 %
BILIRUB SERPL-MCNC: 0.6 MG/DL (ref 0–1.2)
BILIRUB UR STRIP.AUTO-MCNC: NEGATIVE MG/DL
BUN SERPL-MCNC: 20 MG/DL (ref 6–23)
CALCIUM SERPL-MCNC: 8.8 MG/DL (ref 8.6–10.3)
CHLORIDE SERPL-SCNC: 107 MMOL/L (ref 98–107)
CO2 SERPL-SCNC: 25 MMOL/L (ref 21–32)
COLOR UR: NORMAL
CREAT SERPL-MCNC: 0.99 MG/DL (ref 0.5–1.05)
EGFRCR SERPLBLD CKD-EPI 2021: 57 ML/MIN/1.73M*2
EOSINOPHIL # BLD AUTO: 0.04 X10*3/UL (ref 0–0.4)
EOSINOPHIL NFR BLD AUTO: 0.5 %
ERYTHROCYTE [DISTWIDTH] IN BLOOD BY AUTOMATED COUNT: 14 % (ref 11.5–14.5)
GLUCOSE SERPL-MCNC: 129 MG/DL (ref 74–99)
GLUCOSE UR STRIP.AUTO-MCNC: NORMAL MG/DL
HCT VFR BLD AUTO: 31.1 % (ref 36–46)
HGB BLD-MCNC: 10.1 G/DL (ref 12–16)
IMM GRANULOCYTES # BLD AUTO: 0.09 X10*3/UL (ref 0–0.5)
IMM GRANULOCYTES NFR BLD AUTO: 1.2 % (ref 0–0.9)
KETONES UR STRIP.AUTO-MCNC: NEGATIVE MG/DL
LEUKOCYTE ESTERASE UR QL STRIP.AUTO: NEGATIVE
LYMPHOCYTES # BLD AUTO: 0.53 X10*3/UL (ref 0.8–3)
LYMPHOCYTES NFR BLD AUTO: 7 %
MCH RBC QN AUTO: 33.7 PG (ref 26–34)
MCHC RBC AUTO-ENTMCNC: 32.5 G/DL (ref 32–36)
MCV RBC AUTO: 104 FL (ref 80–100)
MONOCYTES # BLD AUTO: 0.56 X10*3/UL (ref 0.05–0.8)
MONOCYTES NFR BLD AUTO: 7.4 %
MUCOUS THREADS #/AREA URNS AUTO: NORMAL /LPF
NEUTROPHILS # BLD AUTO: 6.28 X10*3/UL (ref 1.6–5.5)
NEUTROPHILS NFR BLD AUTO: 83.5 %
NITRITE UR QL STRIP.AUTO: NEGATIVE
NRBC BLD-RTO: 0 /100 WBCS (ref 0–0)
PH UR STRIP.AUTO: 6 [PH]
PLATELET # BLD AUTO: 220 X10*3/UL (ref 150–450)
POTASSIUM SERPL-SCNC: 4 MMOL/L (ref 3.5–5.3)
PROT SERPL-MCNC: 5.7 G/DL (ref 6.4–8.2)
PROT UR STRIP.AUTO-MCNC: NORMAL MG/DL
RBC # BLD AUTO: 3 X10*6/UL (ref 4–5.2)
RBC # UR STRIP.AUTO: NEGATIVE /UL
RBC #/AREA URNS AUTO: NORMAL /HPF
SODIUM SERPL-SCNC: 141 MMOL/L (ref 136–145)
SP GR UR STRIP.AUTO: 1.02
UROBILINOGEN UR STRIP.AUTO-MCNC: NORMAL MG/DL
WBC # BLD AUTO: 7.5 X10*3/UL (ref 4.4–11.3)
WBC #/AREA URNS AUTO: NORMAL /HPF

## 2024-09-16 PROCEDURE — 96417 CHEMO IV INFUS EACH ADDL SEQ: CPT

## 2024-09-16 PROCEDURE — 2500000004 HC RX 250 GENERAL PHARMACY W/ HCPCS (ALT 636 FOR OP/ED): Performed by: INTERNAL MEDICINE

## 2024-09-16 PROCEDURE — 96375 TX/PRO/DX INJ NEW DRUG ADDON: CPT | Mod: INF

## 2024-09-16 PROCEDURE — 96413 CHEMO IV INFUSION 1 HR: CPT

## 2024-09-16 PROCEDURE — 2500000005 HC RX 250 GENERAL PHARMACY W/O HCPCS: Performed by: INTERNAL MEDICINE

## 2024-09-16 PROCEDURE — 85025 COMPLETE CBC W/AUTO DIFF WBC: CPT

## 2024-09-16 PROCEDURE — 80053 COMPREHEN METABOLIC PANEL: CPT

## 2024-09-16 PROCEDURE — 81003 URINALYSIS AUTO W/O SCOPE: CPT

## 2024-09-16 RX ORDER — PROCHLORPERAZINE EDISYLATE 5 MG/ML
10 INJECTION INTRAMUSCULAR; INTRAVENOUS EVERY 6 HOURS PRN
Status: DISCONTINUED | OUTPATIENT
Start: 2024-09-16 | End: 2024-09-16 | Stop reason: HOSPADM

## 2024-09-16 RX ORDER — PROCHLORPERAZINE MALEATE 10 MG
10 TABLET ORAL EVERY 6 HOURS PRN
Status: DISCONTINUED | OUTPATIENT
Start: 2024-09-16 | End: 2024-09-16 | Stop reason: HOSPADM

## 2024-09-16 RX ORDER — EPINEPHRINE 0.3 MG/.3ML
0.3 INJECTION SUBCUTANEOUS EVERY 5 MIN PRN
Status: DISCONTINUED | OUTPATIENT
Start: 2024-09-16 | End: 2024-09-16 | Stop reason: HOSPADM

## 2024-09-16 RX ORDER — ALBUTEROL SULFATE 0.83 MG/ML
3 SOLUTION RESPIRATORY (INHALATION) AS NEEDED
Status: DISCONTINUED | OUTPATIENT
Start: 2024-09-16 | End: 2024-09-16 | Stop reason: HOSPADM

## 2024-09-16 RX ORDER — FAMOTIDINE 10 MG/ML
20 INJECTION INTRAVENOUS ONCE AS NEEDED
Status: DISCONTINUED | OUTPATIENT
Start: 2024-09-16 | End: 2024-09-16 | Stop reason: HOSPADM

## 2024-09-16 RX ORDER — DIPHENHYDRAMINE HYDROCHLORIDE 50 MG/ML
50 INJECTION INTRAMUSCULAR; INTRAVENOUS AS NEEDED
Status: DISCONTINUED | OUTPATIENT
Start: 2024-09-16 | End: 2024-09-16 | Stop reason: HOSPADM

## 2024-09-16 ASSESSMENT — PAIN SCALES - GENERAL: PAINLEVEL: 3

## 2024-09-16 NOTE — PROGRESS NOTES
Pt with complaints of diarrhea for a couple of days after treatment, takes immodium then isn't able to go for a couple of days. She also has had left lower abdominal pain since yesterday, she thinks it may be gas pain from food she ate on Saturday. Made Dr. Marshall aware, she'd like one of our providers to assess pt. Marlon Cruz CNP assessed pt & states patients bowel sounds are hypoactive, but she's still passing gas & stool - minimal complaints of pain with palpation - Marlon educated pt to take simethicone for gas pains & to call us if she spikes any fevers or starts to not pass gas or stool - Carmencita Marshall was made aware of Melisa assessment & states to proceed with treatment today. Dr. Marshall would also like a UA sent due to elevated ANC - UA collected & sent today.

## 2024-09-18 ENCOUNTER — DOCUMENTATION (OUTPATIENT)
Dept: HEMATOLOGY/ONCOLOGY | Facility: CLINIC | Age: 84
End: 2024-09-18
Payer: MEDICARE

## 2024-09-18 DIAGNOSIS — C90.00 MULTIPLE MYELOMA NOT HAVING ACHIEVED REMISSION (MULTI): Primary | ICD-10-CM

## 2024-09-18 RX ORDER — ALBUTEROL SULFATE 0.83 MG/ML
3 SOLUTION RESPIRATORY (INHALATION) AS NEEDED
OUTPATIENT
Start: 2024-09-30

## 2024-09-18 RX ORDER — EPINEPHRINE 0.3 MG/.3ML
0.3 INJECTION SUBCUTANEOUS EVERY 5 MIN PRN
OUTPATIENT
Start: 2024-09-30

## 2024-09-18 RX ORDER — HEPARIN SODIUM,PORCINE/PF 10 UNIT/ML
50 SYRINGE (ML) INTRAVENOUS AS NEEDED
OUTPATIENT
Start: 2024-09-18

## 2024-09-18 RX ORDER — HEPARIN 100 UNIT/ML
500 SYRINGE INTRAVENOUS AS NEEDED
OUTPATIENT
Start: 2024-09-18

## 2024-09-18 RX ORDER — DIPHENHYDRAMINE HYDROCHLORIDE 50 MG/ML
50 INJECTION INTRAMUSCULAR; INTRAVENOUS AS NEEDED
OUTPATIENT
Start: 2024-10-15

## 2024-09-18 RX ORDER — DIPHENHYDRAMINE HCL 25 MG
25 CAPSULE ORAL ONCE
OUTPATIENT
Start: 2024-09-30

## 2024-09-18 RX ORDER — ACETAMINOPHEN 325 MG/1
650 TABLET ORAL ONCE
OUTPATIENT
Start: 2024-09-30

## 2024-09-18 RX ORDER — DIPHENHYDRAMINE HYDROCHLORIDE 50 MG/ML
50 INJECTION INTRAMUSCULAR; INTRAVENOUS AS NEEDED
OUTPATIENT
Start: 2024-09-30

## 2024-09-18 RX ORDER — EPINEPHRINE 0.3 MG/.3ML
0.3 INJECTION SUBCUTANEOUS EVERY 5 MIN PRN
OUTPATIENT
Start: 2024-10-15

## 2024-09-18 RX ORDER — FAMOTIDINE 10 MG/ML
20 INJECTION INTRAVENOUS ONCE AS NEEDED
OUTPATIENT
Start: 2024-10-15

## 2024-09-18 RX ORDER — FAMOTIDINE 10 MG/ML
20 INJECTION INTRAVENOUS ONCE AS NEEDED
OUTPATIENT
Start: 2024-09-30

## 2024-09-18 RX ORDER — ALBUTEROL SULFATE 0.83 MG/ML
3 SOLUTION RESPIRATORY (INHALATION) AS NEEDED
OUTPATIENT
Start: 2024-10-15

## 2024-09-18 NOTE — PROGRESS NOTES
Called patient to review IgG level of 297 (9/3/24).  Discussed increased risk of infection when IgG level is decreased.  Advised starting monthly IVIG infusions, explained rationale for use, possible side effects such as allergic reaction, fevers, chills, increased risk of blood clots.  Orders placed for IVIG infusion with plan to start with next visit on 9/30/24.  Understanding verbalized.

## 2024-09-20 PROBLEM — D80.1 HYPOGAMMAGLOBULINEMIA (MULTI): Status: ACTIVE | Noted: 2024-09-20

## 2024-09-23 ENCOUNTER — PATIENT MESSAGE (OUTPATIENT)
Dept: HEMATOLOGY/ONCOLOGY | Facility: HOSPITAL | Age: 84
End: 2024-09-23
Payer: MEDICARE

## 2024-09-24 NOTE — PATIENT COMMUNICATION
Phone note-   Spoke to patients daughter about the role of IVIG in helping to prevent infection and that this could be given at [facility].  Side effects reviewed.  We also discussed vaccine recommendations including influenza vaccine, prevnar 20, RSV vaccine and COVID vaccine as well as zoster vaccine.  Beulah stated her mother does not generally take vaccines but that a family member recently had a bad case of shingles.

## 2024-09-24 NOTE — PATIENT COMMUNICATION
Patients daughter Frances West called back returning Dr. Phillip phone call. Please call back  [phone].

## 2024-09-29 RX ORDER — ALBUTEROL SULFATE 0.83 MG/ML
3 SOLUTION RESPIRATORY (INHALATION) AS NEEDED
OUTPATIENT
Start: 2024-11-11

## 2024-09-29 RX ORDER — FAMOTIDINE 10 MG/ML
20 INJECTION INTRAVENOUS ONCE AS NEEDED
OUTPATIENT
Start: 2024-10-28

## 2024-09-29 RX ORDER — DIPHENHYDRAMINE HYDROCHLORIDE 50 MG/ML
50 INJECTION INTRAMUSCULAR; INTRAVENOUS AS NEEDED
OUTPATIENT
Start: 2024-10-28

## 2024-09-29 RX ORDER — FAMOTIDINE 10 MG/ML
20 INJECTION INTRAVENOUS ONCE AS NEEDED
OUTPATIENT
Start: 2024-11-11

## 2024-09-29 RX ORDER — FAMOTIDINE 10 MG/ML
20 INJECTION INTRAVENOUS ONCE AS NEEDED
OUTPATIENT
Start: 2024-11-04

## 2024-09-29 RX ORDER — PROCHLORPERAZINE EDISYLATE 5 MG/ML
10 INJECTION INTRAMUSCULAR; INTRAVENOUS EVERY 6 HOURS PRN
OUTPATIENT
Start: 2024-10-28

## 2024-09-29 RX ORDER — PROCHLORPERAZINE MALEATE 10 MG
10 TABLET ORAL EVERY 6 HOURS PRN
OUTPATIENT
Start: 2024-11-04

## 2024-09-29 RX ORDER — DEXAMETHASONE 4 MG/1
20 TABLET ORAL ONCE
OUTPATIENT
Start: 2024-11-11

## 2024-09-29 RX ORDER — PROCHLORPERAZINE EDISYLATE 5 MG/ML
10 INJECTION INTRAMUSCULAR; INTRAVENOUS EVERY 6 HOURS PRN
OUTPATIENT
Start: 2024-11-11

## 2024-09-29 RX ORDER — PROCHLORPERAZINE MALEATE 10 MG
10 TABLET ORAL EVERY 6 HOURS PRN
OUTPATIENT
Start: 2024-10-28

## 2024-09-29 RX ORDER — PROCHLORPERAZINE EDISYLATE 5 MG/ML
10 INJECTION INTRAMUSCULAR; INTRAVENOUS EVERY 6 HOURS PRN
OUTPATIENT
Start: 2024-11-04

## 2024-09-29 RX ORDER — EPINEPHRINE 0.3 MG/.3ML
0.3 INJECTION SUBCUTANEOUS EVERY 5 MIN PRN
OUTPATIENT
Start: 2024-11-11

## 2024-09-29 RX ORDER — DEXAMETHASONE 4 MG/1
20 TABLET ORAL ONCE
OUTPATIENT
Start: 2024-10-28

## 2024-09-29 RX ORDER — DIPHENHYDRAMINE HYDROCHLORIDE 50 MG/ML
50 INJECTION INTRAMUSCULAR; INTRAVENOUS AS NEEDED
OUTPATIENT
Start: 2024-11-11

## 2024-09-29 RX ORDER — PROCHLORPERAZINE MALEATE 10 MG
10 TABLET ORAL EVERY 6 HOURS PRN
OUTPATIENT
Start: 2024-11-11

## 2024-09-29 RX ORDER — EPINEPHRINE 0.3 MG/.3ML
0.3 INJECTION SUBCUTANEOUS EVERY 5 MIN PRN
OUTPATIENT
Start: 2024-10-28

## 2024-09-29 RX ORDER — EPINEPHRINE 0.3 MG/.3ML
0.3 INJECTION SUBCUTANEOUS EVERY 5 MIN PRN
OUTPATIENT
Start: 2024-11-04

## 2024-09-29 RX ORDER — DIPHENHYDRAMINE HYDROCHLORIDE 50 MG/ML
50 INJECTION INTRAMUSCULAR; INTRAVENOUS AS NEEDED
OUTPATIENT
Start: 2024-11-04

## 2024-09-29 RX ORDER — ALBUTEROL SULFATE 0.83 MG/ML
3 SOLUTION RESPIRATORY (INHALATION) AS NEEDED
OUTPATIENT
Start: 2024-11-04

## 2024-09-29 RX ORDER — DEXAMETHASONE 4 MG/1
20 TABLET ORAL ONCE
OUTPATIENT
Start: 2024-11-04

## 2024-09-29 RX ORDER — ALBUTEROL SULFATE 0.83 MG/ML
3 SOLUTION RESPIRATORY (INHALATION) AS NEEDED
OUTPATIENT
Start: 2024-10-28

## 2024-09-29 ASSESSMENT — ENCOUNTER SYMPTOMS
DIAPHORESIS: 0
BRUISES/BLEEDS EASILY: 1
BACK PAIN: 1
NEUROLOGICAL NEGATIVE: 1
FEVER: 0
DYSURIA: 0
CHILLS: 0
RESPIRATORY NEGATIVE: 1
LEG SWELLING: 1
HEMATURIA: 0
APPETITE CHANGE: 0
UNEXPECTED WEIGHT CHANGE: 0
FATIGUE: 0
SORE THROAT: 0
PALPITATIONS: 0
DIARRHEA: 1
ADENOPATHY: 0

## 2024-09-29 NOTE — PROGRESS NOTES
"Patient ID: Sil West \"Ines" is a 84 y.o. female.    Treatment:   Oncology History Overview Note   I. Diagnosis (1/2023):  IgG Lambda Multiple Myeloma  Presenting symptoms: Urinary incontinence and diffuse bony pain  II. Workup:  Bone Biopsy (1/6/23):  Plasma cells neoplasm  Repeat Bone Marrow Biopsy (1/26/23):  Hypercellular marrow, 80-90% plasma cells, lambda-restricted  FISH: 1q gain (high risk), trisomy 3  MRI Spine (12/18/22):  Osseous metastases, involvement in mid-cervical, mid-thoracic, and lumbar vertebral bodies, as well as the right iliac bone  PET Scan (1/23):  FDG avidity in right iliac bone, right sacral ala, left posterior 6th rib, and appendicular skeleton  Serum and Urine Studies (1/19-1/28/23):  FKLC 1.57, FLLC 2947.7, K/L ratio 0  IgG 537, IgA 221, IgM 19, b2M 23.4, Hgb 5.7  M protein IgA lambda 0.7 g/dL   U/L  Creatinine: 2.33 (peaked at 3.15)  UPEP: Monoclonal lambda light chain at 386.6 mg/24H  Hepatitis B and C negative  III. Treatment:  Radiation (2/2/23):  Right hip + T6-T8 x 5 fractions (total 2000 cGy)  Induction (12/24/22 - 2/16/23):  Bortezomib and dexamethasone + daratumumab  C1 (1/28/23): Bortezomib 1, 4, 8, 11 + daratumumab x 2 doses  C2 (2/16/23): Weekly dosing of daratumumab and bortezomib (1, 8, 15), with lenalidomide planned when renal function allows  Response Evaluation (5/25/23):  CR with M protein 0.1 (IgG kappa c/w roscoe) and free lyte chain 1.37  C8 Roscoe RVD (Completed 7/6/23):  Transitioned with a VGPR vs CR, ongoing multifocal bone pain  IV. Response Evaluation (7/13/23):  Marked improvement in bony lesions  Pathological fractures in right pelvis and ribs, possible infiltrate in the right lower lobe base, and hypodensity in the right thyroid gland  V. Treatment Adjustment (Cycle 9 and forward): 10/2023  Velcade omitted  Transitioned to a 4-week schedule with Roscoe (day 1) and Revlimid 15 mg days 1-21/28 days     Multiple myeloma not having achieved remission " (Multi)   9/13/2023 Initial Diagnosis    Multiple myeloma not having achieved remission (CMS/HCC)     10/2/2023 - 7/8/2024 Chemotherapy    Daratumumab, Pomalidomide and dexamethasone (oral meds managed outside treatment plan) 28 Day Cycles - Maintenance     8/5/2024 -  Chemotherapy    Carfilzomib and Cyclophosphamide (Weekly) / Dexamethasone, 28 Day Cycles       Past Medical History:  HTN, DLP  pre skin cancers,   diveriticulitis   peripheral artery disease not amenable to surgery    Surgical History:  squamous cell carcinoma removed to left hand and right arm 6/2024  Past Surgical History:   Procedure Laterality Date    CT GUIDED PERCUTANEOUS BIOPSY BONE DEEP  01/06/2023    CT GUIDED PERCUTANEOUS BIOPSY BONE DEEP 1/6/2023 [facility]    OTHER SURGICAL HISTORY      THYROID BIOPSY      Family History:     Family History   Problem Relation Name Age of Onset    Alzheimer's disease Mother      Colon cancer Father      Rashes / Skin problems Sister      Rashes / Skin problems Brother       Social History:  ,  passed away on 10/24/23.  3 grown children (1 daughter, 2 sons). 2 grandchildren.  Has 6 cats.  Enjoyed skiing.  Occupation: retired .  Social History     Tobacco Use    Smoking status: Never     Passive exposure: Never    Smokeless tobacco: Never   Vaping Use    Vaping status: Never Used   Substance Use Topics    Alcohol use: Never    Drug use: Never      -------------------------------------------------------------------------------------------------------  Subjective       Sil West is a 84 year old female with IgG lambda multiple myeloma.  She recently had an increase to her lambda free light chains.  PET imaging done 7/17/24 showed an new soft tissue uptake in right 11th rib and interestingly patient has pain there as well.  Transitioned to carfilzomib, cyclophophamide, dex , cycle 1 8/5/24     Sil presents to the clinic today (9/30/24) with her daughter Frances for  follow up evaluation and is scheduled to receive C3D1 carfilzomib, cytoxan, and dexamethasone.  Receiving zometa every 3 months, and last received 9/3/24..      Energy level is okay on her week off. Gets diarrhea after her treatments and is taking immodium. taking imodium as needed.  Chronic back pain, not taking any pain medication.  Bruises easily from activity.  Is following with dermatologist Dr. Vann, removed SCC in the past, got approved for removal of more skin lesions but has not made the appointment yet.  On baby aspirin for DVT prophylaxis.  She has been very busy, using a chain saw on her trees, bruising appears related to her activities.      Review of Systems   Constitutional:  Negative for appetite change, chills, diaphoresis, fatigue, fever and unexpected weight change.   HENT:   Negative for mouth sores and sore throat.    Respiratory: Negative.     Cardiovascular:  Positive for leg swelling (mild swelling to legs at times). Negative for chest pain and palpitations.   Gastrointestinal:  Positive for diarrhea.   Genitourinary:  Positive for bladder incontinence. Negative for dysuria and hematuria.    Musculoskeletal:  Positive for back pain.   Skin: Negative.    Neurological: Negative.    Hematological:  Negative for adenopathy. Bruises/bleeds easily (bruises easily).   -------------------------------------------------------------------------------------------------------  Objective   BSA: 1.74 meters squared  /65   Pulse 90   Temp 36.1 °C (97 °F)   Resp 16   Wt 69.9 kg (154 lb 1.6 oz)   SpO2 100%   BMI 28.69 kg/m²     Physical Exam  Vitals reviewed.   Constitutional:       Appearance: Normal appearance. She is well-developed and normal weight.   HENT:      Head: Normocephalic and atraumatic.      Nose: Nose normal.   Eyes:      General: No scleral icterus.     Extraocular Movements: Extraocular movements intact.      Conjunctiva/sclera: Conjunctivae normal.      Pupils: Pupils are equal,  round, and reactive to light.   Cardiovascular:      Rate and Rhythm: Normal rate and regular rhythm.      Pulses: Normal pulses.      Heart sounds: Normal heart sounds. No murmur heard.     No friction rub. No gallop.   Pulmonary:      Effort: Pulmonary effort is normal.      Breath sounds: Normal breath sounds.   Abdominal:      General: Abdomen is flat. Bowel sounds are normal. There is no distension.      Palpations: Abdomen is soft. There is no mass.      Tenderness: There is no abdominal tenderness.   Musculoskeletal:         General: Normal range of motion.      Right lower leg: Edema (non-pitting) present.      Left lower leg: Edema (non-pitting) present.      Comments: No spine tenderness   Lymphadenopathy:      Comments: No lymphadenopathy   Skin:     General: Skin is warm and dry.   Neurological:      General: No focal deficit present.      Mental Status: She is alert and oriented to person, place, and time.      Comments: Normal strength and gait   Psychiatric:         Mood and Affect: Mood normal.         Behavior: Behavior normal.         Thought Content: Thought content normal.   Performance Status:  Symptomatic; fully ambulatory  -------------------------------------------------------------------------------------------------------  Assessment and Plan:    Multiple myeloma not having achieved remission (CMS/HCC)  IgG lambda MM    - Currently on daratumamab/Lenalidomide. Continued uptrending of free lambda light chain. M-protein still 0.1g IgG kappa, likely represents daratumumab. Will add weekly dex 10 mg to see if this could deepen her response.     5/13/24:  Continues to have increasing free lambda light chain, M-protein remains at 0.1.  Receiving C20 daratumumab today.  Discussed that free lyte chain continues to increase significantly and suggested switching revlimid to pomalidomide  4 mg 21/28 days since no other CRAB criteria,  -Taking aspirin 81 mg daily for VTE prophylaxis during treatment  with pomalidomide      7/18/24:  Laboratory results show major increase in free lyte chains to 62 mg/dL up from 18 in March 2024, only sx is right rib pain where PET from yesterday shows a new lesion, no other new lesions seen,      Bone marrow biopsy results from 7/22/24 showing limited bone marrow (20% cellularity) with maturing trilineage hematopoiesis, minimal involvement by lambda-restricted plasma cells by flow cytometry. ECHO 8/2/2024 LVEF 60-65%    Started cycle 1 carfilzomib, cyclophosphamide, dexamethasone 8//24 9/30/24 cycle 3 day 1 of carfilzomib, cyclophosphamide, and dexamethasone, tolerating it well and nice reduction in free lyte chains.  Chronic hypogammaglobulinemia will start weekly IVIG, side effects reviewed, patient agreeable, will receive on day 7 of chemo cycles.      Ig [place] Free Light Chain   Date Value Ref Range Status   09/03/2024 0.46 0.33 - 1.94 mg/dL Final   08/12/2024 0.83 0.33 - 1.94 mg/dL Final   07/22/2024 1.42 0.33 - 1.94 mg/dL Final   07/08/2024 1.12 0.33 - 1.94 mg/dL Final   06/10/2024 1.09 0.33 - 1.94 mg/dL Final     Ig Lambda Free Light Chain   Date Value Ref Range Status   09/03/2024 24.22 (H) 0.57 - 2.63 mg/dL Final   08/12/2024 85.03 (H) 0.57 - 2.63 mg/dL Final   07/22/2024 67.22 (H) 0.57 - 2.63 mg/dL Final   07/08/2024 62.18 (H) 0.57 - 2.63 mg/dL Final   06/10/2024 37.52 (H) 0.57 - 2.63 mg/dL Final     Kappa/Lambda Ratio   Date Value Ref Range Status   09/03/2024 0.02 (L) 0.26 - 1.65 Final   08/12/2024 0.01 (L) 0.26 - 1.65 Final   07/22/2024 0.02 (L) 0.26 - 1.65 Final   07/08/2024 0.02 (L) 0.26 - 1.65 Final   06/10/2024 0.03 (L) 0.26 - 1.65 Final     M-PROTEIN 1   Date Value Ref Range Status   09/03/2024 0.1 (H)   g/dL Final   08/12/2024 0.1 (H)   g/dL Final   07/22/2024 0.1 (H)   g/dL Final   07/08/2024 0.1 (H)   g/dL Final   06/10/2024 0.1 (H)   g/dL Final      Dysuria:  urinalysis bland, suspect needs urologic evaluation for incontinence    Bone Health:  - Continue  zoledronic acid 3.3 mg (renal dosing) every 3 months, receiving dose (6/10/24),  received last on (9/3/24).    - Continue vitamin D supplement -- 2000 units PO daily.  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     Thyroid nodule  - repeat thyroid US on 2/19/24, 2 nodules noted with FNA recommended.  Thyroid biopsy 4/29/24 showing rare atypical cells from FNA of right mid lobe.  Repeat biopsy was nondiagnostic.  Dr. Magallanes recommended total thyroidectomy but Sil requested to having US monitoring for now.  Following with Dr. Magallanes, last seen 6/17/24.  Is scheduled to have thyroid US on 8/15/24 with stable bilateral thyroid nodules. Follow up visit with Dr. Magallanes on 9/9/24.       Right lower back pain  - seems musculoskeletal  - MRI lumbar spine (3/4/24): degenerative changes, no evidence of progressive disease or fracture.    - MRI thoracic spine (3/12/24): degenerative changes, redemonstration of multifocal marrow signal abnormalities compatible with the given history of multiple myeloma, osseous expansion and epidural extension of neoplasm previously seen have improved, decrease in size and enhancement of previously seen lesions.      Health Care Maintenance:  Vaccines:  Advised to obtain influenza, updated covid, RSV, and prevnar 20 vaccines at local pharmacy And zoster vaccine.    RTC: 10/28/24:  Follow up visit with provider and due for C4D1 Carfilzomib, Cyclophosphamide, and Dex.  Starting IVIG  10/7/24:  Infusion visit at [facility] for C3D8  10/24/24:  Infusion visit at [facility] for C3D15  Plan 2 year course of zometa, continue every 3 months, no sx of ONJ completes course 3/2025.   - ------------------------------------------  Carmencita Marshall MD

## 2024-09-30 ENCOUNTER — LAB (OUTPATIENT)
Dept: LAB | Facility: HOSPITAL | Age: 84
End: 2024-09-30
Payer: MEDICARE

## 2024-09-30 ENCOUNTER — OFFICE VISIT (OUTPATIENT)
Dept: HEMATOLOGY/ONCOLOGY | Facility: HOSPITAL | Age: 84
End: 2024-09-30
Payer: MEDICARE

## 2024-09-30 ENCOUNTER — INFUSION (OUTPATIENT)
Dept: HEMATOLOGY/ONCOLOGY | Facility: HOSPITAL | Age: 84
End: 2024-09-30
Payer: MEDICARE

## 2024-09-30 VITALS
BODY MASS INDEX: 28.69 KG/M2 | TEMPERATURE: 97 F | OXYGEN SATURATION: 100 % | DIASTOLIC BLOOD PRESSURE: 65 MMHG | HEART RATE: 90 BPM | SYSTOLIC BLOOD PRESSURE: 135 MMHG | WEIGHT: 154.1 LBS | RESPIRATION RATE: 16 BRPM

## 2024-09-30 DIAGNOSIS — C90.00 MULTIPLE MYELOMA NOT HAVING ACHIEVED REMISSION (MULTI): ICD-10-CM

## 2024-09-30 DIAGNOSIS — D80.1 HYPOGAMMAGLOBULINEMIA (MULTI): ICD-10-CM

## 2024-09-30 DIAGNOSIS — C90.00 MULTIPLE MYELOMA NOT HAVING ACHIEVED REMISSION (MULTI): Primary | ICD-10-CM

## 2024-09-30 LAB
ALBUMIN SERPL BCP-MCNC: 3.9 G/DL (ref 3.4–5)
ALP SERPL-CCNC: 53 U/L (ref 33–136)
ALT SERPL W P-5'-P-CCNC: 7 U/L (ref 7–45)
ANION GAP SERPL CALC-SCNC: 14 MMOL/L (ref 10–20)
AST SERPL W P-5'-P-CCNC: 10 U/L (ref 9–39)
BASOPHILS # BLD AUTO: 0.03 X10*3/UL (ref 0–0.1)
BASOPHILS NFR BLD AUTO: 0.7 %
BILIRUB SERPL-MCNC: 0.4 MG/DL (ref 0–1.2)
BUN SERPL-MCNC: 23 MG/DL (ref 6–23)
CALCIUM SERPL-MCNC: 9.4 MG/DL (ref 8.6–10.3)
CHLORIDE SERPL-SCNC: 107 MMOL/L (ref 98–107)
CO2 SERPL-SCNC: 28 MMOL/L (ref 21–32)
CREAT SERPL-MCNC: 1.13 MG/DL (ref 0.5–1.05)
EGFRCR SERPLBLD CKD-EPI 2021: 49 ML/MIN/1.73M*2
EOSINOPHIL # BLD AUTO: 0.03 X10*3/UL (ref 0–0.4)
EOSINOPHIL NFR BLD AUTO: 0.7 %
ERYTHROCYTE [DISTWIDTH] IN BLOOD BY AUTOMATED COUNT: 13.9 % (ref 11.5–14.5)
GLUCOSE SERPL-MCNC: 100 MG/DL (ref 74–99)
HCT VFR BLD AUTO: 33.3 % (ref 36–46)
HGB BLD-MCNC: 10.7 G/DL (ref 12–16)
IGA SERPL-MCNC: 15 MG/DL (ref 70–400)
IGG SERPL-MCNC: 237 MG/DL (ref 700–1600)
IGM SERPL-MCNC: 10 MG/DL (ref 40–230)
IMM GRANULOCYTES # BLD AUTO: 0.04 X10*3/UL (ref 0–0.5)
IMM GRANULOCYTES NFR BLD AUTO: 0.9 % (ref 0–0.9)
LYMPHOCYTES # BLD AUTO: 0.54 X10*3/UL (ref 0.8–3)
LYMPHOCYTES NFR BLD AUTO: 11.7 %
MAGNESIUM SERPL-MCNC: 2.04 MG/DL (ref 1.6–2.4)
MCH RBC QN AUTO: 33.8 PG (ref 26–34)
MCHC RBC AUTO-ENTMCNC: 32.1 G/DL (ref 32–36)
MCV RBC AUTO: 105 FL (ref 80–100)
MONOCYTES # BLD AUTO: 0.34 X10*3/UL (ref 0.05–0.8)
MONOCYTES NFR BLD AUTO: 7.4 %
NEUTROPHILS # BLD AUTO: 3.62 X10*3/UL (ref 1.6–5.5)
NEUTROPHILS NFR BLD AUTO: 78.6 %
NRBC BLD-RTO: 0 /100 WBCS (ref 0–0)
PHOSPHATE SERPL-MCNC: 3.3 MG/DL (ref 2.5–4.9)
PLATELET # BLD AUTO: 302 X10*3/UL (ref 150–450)
POTASSIUM SERPL-SCNC: 4.6 MMOL/L (ref 3.5–5.3)
PROT SERPL-MCNC: 5.7 G/DL (ref 6.4–8.2)
RBC # BLD AUTO: 3.17 X10*6/UL (ref 4–5.2)
SODIUM SERPL-SCNC: 144 MMOL/L (ref 136–145)
WBC # BLD AUTO: 4.6 X10*3/UL (ref 4.4–11.3)

## 2024-09-30 PROCEDURE — 96366 THER/PROPH/DIAG IV INF ADDON: CPT | Mod: INF

## 2024-09-30 PROCEDURE — 1036F TOBACCO NON-USER: CPT | Performed by: INTERNAL MEDICINE

## 2024-09-30 PROCEDURE — 2500000004 HC RX 250 GENERAL PHARMACY W/ HCPCS (ALT 636 FOR OP/ED): Mod: JZ

## 2024-09-30 PROCEDURE — 82784 ASSAY IGA/IGD/IGG/IGM EACH: CPT

## 2024-09-30 PROCEDURE — 2500000001 HC RX 250 WO HCPCS SELF ADMINISTERED DRUGS (ALT 637 FOR MEDICARE OP)

## 2024-09-30 PROCEDURE — 1126F AMNT PAIN NOTED NONE PRSNT: CPT | Performed by: INTERNAL MEDICINE

## 2024-09-30 PROCEDURE — 96413 CHEMO IV INFUSION 1 HR: CPT

## 2024-09-30 PROCEDURE — 96417 CHEMO IV INFUS EACH ADDL SEQ: CPT

## 2024-09-30 PROCEDURE — 84100 ASSAY OF PHOSPHORUS: CPT

## 2024-09-30 PROCEDURE — 36415 COLL VENOUS BLD VENIPUNCTURE: CPT

## 2024-09-30 PROCEDURE — 2500000004 HC RX 250 GENERAL PHARMACY W/ HCPCS (ALT 636 FOR OP/ED): Performed by: INTERNAL MEDICINE

## 2024-09-30 PROCEDURE — 99215 OFFICE O/P EST HI 40 MIN: CPT | Mod: 25 | Performed by: INTERNAL MEDICINE

## 2024-09-30 PROCEDURE — 85025 COMPLETE CBC W/AUTO DIFF WBC: CPT

## 2024-09-30 PROCEDURE — 96367 TX/PROPH/DG ADDL SEQ IV INF: CPT

## 2024-09-30 PROCEDURE — 99215 OFFICE O/P EST HI 40 MIN: CPT | Performed by: INTERNAL MEDICINE

## 2024-09-30 PROCEDURE — 83735 ASSAY OF MAGNESIUM: CPT

## 2024-09-30 PROCEDURE — 1159F MED LIST DOCD IN RCRD: CPT | Performed by: INTERNAL MEDICINE

## 2024-09-30 PROCEDURE — 80053 COMPREHEN METABOLIC PANEL: CPT

## 2024-09-30 RX ORDER — ALBUTEROL SULFATE 0.83 MG/ML
3 SOLUTION RESPIRATORY (INHALATION) AS NEEDED
Status: DISCONTINUED | OUTPATIENT
Start: 2024-09-30 | End: 2024-09-30 | Stop reason: HOSPADM

## 2024-09-30 RX ORDER — DIPHENHYDRAMINE HCL 25 MG
25 CAPSULE ORAL ONCE
Status: COMPLETED | OUTPATIENT
Start: 2024-09-30 | End: 2024-09-30

## 2024-09-30 RX ORDER — DIPHENHYDRAMINE HYDROCHLORIDE 50 MG/ML
50 INJECTION INTRAMUSCULAR; INTRAVENOUS AS NEEDED
Status: CANCELLED | OUTPATIENT
Start: 2024-10-07

## 2024-09-30 RX ORDER — FAMOTIDINE 10 MG/ML
20 INJECTION INTRAVENOUS ONCE AS NEEDED
Status: DISCONTINUED | OUTPATIENT
Start: 2024-09-30 | End: 2024-09-30 | Stop reason: HOSPADM

## 2024-09-30 RX ORDER — DIPHENHYDRAMINE HCL 25 MG
25 CAPSULE ORAL ONCE
Status: CANCELLED | OUTPATIENT
Start: 2024-10-27

## 2024-09-30 RX ORDER — DIPHENHYDRAMINE HCL 25 MG
25 CAPSULE ORAL ONCE
Status: CANCELLED | OUTPATIENT
Start: 2024-10-07

## 2024-09-30 RX ORDER — ACETAMINOPHEN 325 MG/1
650 TABLET ORAL ONCE
Status: COMPLETED | OUTPATIENT
Start: 2024-09-30 | End: 2024-09-30

## 2024-09-30 RX ORDER — PROCHLORPERAZINE MALEATE 10 MG
10 TABLET ORAL EVERY 6 HOURS PRN
Status: DISCONTINUED | OUTPATIENT
Start: 2024-09-30 | End: 2024-09-30 | Stop reason: HOSPADM

## 2024-09-30 RX ORDER — EPINEPHRINE 0.3 MG/.3ML
0.3 INJECTION SUBCUTANEOUS EVERY 5 MIN PRN
Status: CANCELLED | OUTPATIENT
Start: 2024-10-27

## 2024-09-30 RX ORDER — ACETAMINOPHEN 325 MG/1
650 TABLET ORAL ONCE
Status: CANCELLED | OUTPATIENT
Start: 2024-10-27

## 2024-09-30 RX ORDER — EPINEPHRINE 0.3 MG/.3ML
0.3 INJECTION SUBCUTANEOUS EVERY 5 MIN PRN
Status: DISCONTINUED | OUTPATIENT
Start: 2024-09-30 | End: 2024-09-30 | Stop reason: HOSPADM

## 2024-09-30 RX ORDER — HEPARIN SODIUM,PORCINE/PF 10 UNIT/ML
50 SYRINGE (ML) INTRAVENOUS AS NEEDED
OUTPATIENT
Start: 2024-09-30

## 2024-09-30 RX ORDER — ACETAMINOPHEN 325 MG/1
650 TABLET ORAL ONCE
Status: CANCELLED | OUTPATIENT
Start: 2024-10-07

## 2024-09-30 RX ORDER — PROCHLORPERAZINE EDISYLATE 5 MG/ML
10 INJECTION INTRAMUSCULAR; INTRAVENOUS EVERY 6 HOURS PRN
Status: DISCONTINUED | OUTPATIENT
Start: 2024-09-30 | End: 2024-09-30 | Stop reason: HOSPADM

## 2024-09-30 RX ORDER — HEPARIN 100 UNIT/ML
500 SYRINGE INTRAVENOUS AS NEEDED
OUTPATIENT
Start: 2024-09-30

## 2024-09-30 RX ORDER — EPINEPHRINE 0.3 MG/.3ML
0.3 INJECTION SUBCUTANEOUS EVERY 5 MIN PRN
Status: CANCELLED | OUTPATIENT
Start: 2024-10-07

## 2024-09-30 RX ORDER — DIPHENHYDRAMINE HYDROCHLORIDE 50 MG/ML
50 INJECTION INTRAMUSCULAR; INTRAVENOUS AS NEEDED
Status: CANCELLED | OUTPATIENT
Start: 2024-10-27

## 2024-09-30 RX ORDER — DIPHENHYDRAMINE HYDROCHLORIDE 50 MG/ML
50 INJECTION INTRAMUSCULAR; INTRAVENOUS AS NEEDED
Status: DISCONTINUED | OUTPATIENT
Start: 2024-09-30 | End: 2024-09-30 | Stop reason: HOSPADM

## 2024-09-30 RX ORDER — FAMOTIDINE 10 MG/ML
20 INJECTION INTRAVENOUS ONCE AS NEEDED
Status: CANCELLED | OUTPATIENT
Start: 2024-10-07

## 2024-09-30 RX ORDER — ALBUTEROL SULFATE 0.83 MG/ML
3 SOLUTION RESPIRATORY (INHALATION) AS NEEDED
Status: CANCELLED | OUTPATIENT
Start: 2024-10-07

## 2024-09-30 RX ORDER — FAMOTIDINE 10 MG/ML
20 INJECTION INTRAVENOUS ONCE AS NEEDED
Status: CANCELLED | OUTPATIENT
Start: 2024-10-27

## 2024-09-30 RX ORDER — ALBUTEROL SULFATE 0.83 MG/ML
3 SOLUTION RESPIRATORY (INHALATION) AS NEEDED
Status: CANCELLED | OUTPATIENT
Start: 2024-10-27

## 2024-09-30 ASSESSMENT — PAIN SCALES - GENERAL: PAINLEVEL: 0-NO PAIN

## 2024-09-30 NOTE — PROGRESS NOTES
Pt arrived for C3D1 Carfilzomib/Cyclophosphamide treatment and first dose of IVIG treatment. Pt ambulated back to chair without difficulty. IV started to right AC, positive blood return, flushes without resistance. Pre-meds given as ordered. Carfilzomib and Cytoxan infusions completed without complications. Pre-meds given for IVIG as ordered. IVIG completed without complication. IV removed, gauze and coban applied. Pt aware of RTC dates and times. Pt discharged from clinic.

## 2024-10-01 DIAGNOSIS — C90.00 MULTIPLE MYELOMA NOT HAVING ACHIEVED REMISSION (MULTI): Primary | ICD-10-CM

## 2024-10-01 DIAGNOSIS — D80.1 HYPOGAMMAGLOBULINEMIA (MULTI): ICD-10-CM

## 2024-10-01 RX ORDER — ACETAMINOPHEN 325 MG/1
650 TABLET ORAL ONCE
OUTPATIENT
Start: 2024-11-04

## 2024-10-01 RX ORDER — DIPHENHYDRAMINE HCL 25 MG
25 CAPSULE ORAL ONCE
OUTPATIENT
Start: 2024-11-04

## 2024-10-01 RX ORDER — DIPHENHYDRAMINE HCL 25 MG
25 CAPSULE ORAL ONCE
Status: CANCELLED | OUTPATIENT
Start: 2024-10-28

## 2024-10-01 RX ORDER — FAMOTIDINE 10 MG/ML
20 INJECTION INTRAVENOUS ONCE AS NEEDED
Status: CANCELLED | OUTPATIENT
Start: 2024-10-28

## 2024-10-01 RX ORDER — DIPHENHYDRAMINE HYDROCHLORIDE 50 MG/ML
50 INJECTION INTRAMUSCULAR; INTRAVENOUS AS NEEDED
Status: CANCELLED | OUTPATIENT
Start: 2024-10-28

## 2024-10-01 RX ORDER — EPINEPHRINE 0.3 MG/.3ML
0.3 INJECTION SUBCUTANEOUS EVERY 5 MIN PRN
OUTPATIENT
Start: 2024-11-04

## 2024-10-01 RX ORDER — ALBUTEROL SULFATE 0.83 MG/ML
3 SOLUTION RESPIRATORY (INHALATION) AS NEEDED
OUTPATIENT
Start: 2024-11-04

## 2024-10-01 RX ORDER — DIPHENHYDRAMINE HYDROCHLORIDE 50 MG/ML
50 INJECTION INTRAMUSCULAR; INTRAVENOUS AS NEEDED
OUTPATIENT
Start: 2024-11-04

## 2024-10-01 RX ORDER — EPINEPHRINE 0.3 MG/.3ML
0.3 INJECTION SUBCUTANEOUS EVERY 5 MIN PRN
Status: CANCELLED | OUTPATIENT
Start: 2024-10-28

## 2024-10-01 RX ORDER — FAMOTIDINE 10 MG/ML
20 INJECTION INTRAVENOUS ONCE AS NEEDED
OUTPATIENT
Start: 2024-11-04

## 2024-10-01 RX ORDER — ALBUTEROL SULFATE 0.83 MG/ML
3 SOLUTION RESPIRATORY (INHALATION) AS NEEDED
Status: CANCELLED | OUTPATIENT
Start: 2024-10-28

## 2024-10-01 RX ORDER — ACETAMINOPHEN 325 MG/1
650 TABLET ORAL ONCE
Status: CANCELLED | OUTPATIENT
Start: 2024-10-28

## 2024-10-07 ENCOUNTER — INFUSION (OUTPATIENT)
Dept: HEMATOLOGY/ONCOLOGY | Facility: CLINIC | Age: 84
End: 2024-10-07
Payer: MEDICARE

## 2024-10-07 VITALS
BODY MASS INDEX: 29.03 KG/M2 | OXYGEN SATURATION: 97 % | TEMPERATURE: 97.9 F | RESPIRATION RATE: 18 BRPM | DIASTOLIC BLOOD PRESSURE: 76 MMHG | HEART RATE: 90 BPM | WEIGHT: 155.98 LBS | SYSTOLIC BLOOD PRESSURE: 137 MMHG

## 2024-10-07 DIAGNOSIS — C90.00 MULTIPLE MYELOMA NOT HAVING ACHIEVED REMISSION (MULTI): ICD-10-CM

## 2024-10-07 LAB
ALBUMIN SERPL BCP-MCNC: 3.7 G/DL (ref 3.4–5)
ALP SERPL-CCNC: 48 U/L (ref 33–136)
ALT SERPL W P-5'-P-CCNC: 9 U/L (ref 7–45)
ANION GAP SERPL CALC-SCNC: 11 MMOL/L (ref 10–20)
AST SERPL W P-5'-P-CCNC: 13 U/L (ref 9–39)
BASOPHILS # BLD AUTO: 0.02 X10*3/UL (ref 0–0.1)
BASOPHILS NFR BLD AUTO: 0.4 %
BILIRUB SERPL-MCNC: 0.4 MG/DL (ref 0–1.2)
BUN SERPL-MCNC: 21 MG/DL (ref 6–23)
CALCIUM SERPL-MCNC: 8.9 MG/DL (ref 8.6–10.3)
CHLORIDE SERPL-SCNC: 109 MMOL/L (ref 98–107)
CO2 SERPL-SCNC: 27 MMOL/L (ref 21–32)
CREAT SERPL-MCNC: 0.95 MG/DL (ref 0.5–1.05)
EGFRCR SERPLBLD CKD-EPI 2021: 60 ML/MIN/1.73M*2
EOSINOPHIL # BLD AUTO: 0.03 X10*3/UL (ref 0–0.4)
EOSINOPHIL NFR BLD AUTO: 0.6 %
ERYTHROCYTE [DISTWIDTH] IN BLOOD BY AUTOMATED COUNT: 13.8 % (ref 11.5–14.5)
GLUCOSE SERPL-MCNC: 76 MG/DL (ref 74–99)
HCT VFR BLD AUTO: 31.5 % (ref 36–46)
HGB BLD-MCNC: 10.2 G/DL (ref 12–16)
IMM GRANULOCYTES # BLD AUTO: 0.06 X10*3/UL (ref 0–0.5)
IMM GRANULOCYTES NFR BLD AUTO: 1.3 % (ref 0–0.9)
LYMPHOCYTES # BLD AUTO: 0.53 X10*3/UL (ref 0.8–3)
LYMPHOCYTES NFR BLD AUTO: 11.1 %
MCH RBC QN AUTO: 33.8 PG (ref 26–34)
MCHC RBC AUTO-ENTMCNC: 32.4 G/DL (ref 32–36)
MCV RBC AUTO: 104 FL (ref 80–100)
MONOCYTES # BLD AUTO: 0.47 X10*3/UL (ref 0.05–0.8)
MONOCYTES NFR BLD AUTO: 9.8 %
NEUTROPHILS # BLD AUTO: 3.68 X10*3/UL (ref 1.6–5.5)
NEUTROPHILS NFR BLD AUTO: 76.8 %
NRBC BLD-RTO: 0 /100 WBCS (ref 0–0)
PLATELET # BLD AUTO: 164 X10*3/UL (ref 150–450)
POTASSIUM SERPL-SCNC: 4.2 MMOL/L (ref 3.5–5.3)
PROT SERPL-MCNC: 5.7 G/DL (ref 6.4–8.2)
RBC # BLD AUTO: 3.02 X10*6/UL (ref 4–5.2)
SODIUM SERPL-SCNC: 143 MMOL/L (ref 136–145)
WBC # BLD AUTO: 4.8 X10*3/UL (ref 4.4–11.3)

## 2024-10-07 PROCEDURE — 96417 CHEMO IV INFUS EACH ADDL SEQ: CPT

## 2024-10-07 PROCEDURE — 84075 ASSAY ALKALINE PHOSPHATASE: CPT

## 2024-10-07 PROCEDURE — 2500000004 HC RX 250 GENERAL PHARMACY W/ HCPCS (ALT 636 FOR OP/ED): Performed by: INTERNAL MEDICINE

## 2024-10-07 PROCEDURE — 85025 COMPLETE CBC W/AUTO DIFF WBC: CPT

## 2024-10-07 PROCEDURE — 96367 TX/PROPH/DG ADDL SEQ IV INF: CPT

## 2024-10-07 PROCEDURE — 96413 CHEMO IV INFUSION 1 HR: CPT

## 2024-10-07 RX ORDER — EPINEPHRINE 0.3 MG/.3ML
0.3 INJECTION SUBCUTANEOUS EVERY 5 MIN PRN
Status: DISCONTINUED | OUTPATIENT
Start: 2024-10-07 | End: 2024-10-07 | Stop reason: HOSPADM

## 2024-10-07 RX ORDER — FAMOTIDINE 10 MG/ML
20 INJECTION INTRAVENOUS ONCE AS NEEDED
Status: DISCONTINUED | OUTPATIENT
Start: 2024-10-07 | End: 2024-10-07 | Stop reason: HOSPADM

## 2024-10-07 RX ORDER — PROCHLORPERAZINE EDISYLATE 5 MG/ML
10 INJECTION INTRAMUSCULAR; INTRAVENOUS EVERY 6 HOURS PRN
Status: DISCONTINUED | OUTPATIENT
Start: 2024-10-07 | End: 2024-10-07 | Stop reason: HOSPADM

## 2024-10-07 RX ORDER — HEPARIN SODIUM,PORCINE/PF 10 UNIT/ML
50 SYRINGE (ML) INTRAVENOUS AS NEEDED
OUTPATIENT
Start: 2024-10-07

## 2024-10-07 RX ORDER — DIPHENHYDRAMINE HYDROCHLORIDE 50 MG/ML
50 INJECTION INTRAMUSCULAR; INTRAVENOUS AS NEEDED
Status: DISCONTINUED | OUTPATIENT
Start: 2024-10-07 | End: 2024-10-07 | Stop reason: HOSPADM

## 2024-10-07 RX ORDER — PROCHLORPERAZINE MALEATE 10 MG
10 TABLET ORAL EVERY 6 HOURS PRN
Status: DISCONTINUED | OUTPATIENT
Start: 2024-10-07 | End: 2024-10-07 | Stop reason: HOSPADM

## 2024-10-07 RX ORDER — ALBUTEROL SULFATE 0.83 MG/ML
3 SOLUTION RESPIRATORY (INHALATION) AS NEEDED
Status: DISCONTINUED | OUTPATIENT
Start: 2024-10-07 | End: 2024-10-07 | Stop reason: HOSPADM

## 2024-10-07 RX ORDER — HEPARIN 100 UNIT/ML
500 SYRINGE INTRAVENOUS AS NEEDED
OUTPATIENT
Start: 2024-10-07

## 2024-10-07 ASSESSMENT — PAIN SCALES - GENERAL: PAINLEVEL: 2

## 2024-10-11 DIAGNOSIS — C90.00 MULTIPLE MYELOMA NOT HAVING ACHIEVED REMISSION (MULTI): Primary | ICD-10-CM

## 2024-10-14 ENCOUNTER — INFUSION (OUTPATIENT)
Dept: HEMATOLOGY/ONCOLOGY | Facility: CLINIC | Age: 84
End: 2024-10-14
Payer: MEDICARE

## 2024-10-14 VITALS
TEMPERATURE: 98.6 F | DIASTOLIC BLOOD PRESSURE: 85 MMHG | OXYGEN SATURATION: 97 % | SYSTOLIC BLOOD PRESSURE: 149 MMHG | BODY MASS INDEX: 29.1 KG/M2 | HEART RATE: 98 BPM | WEIGHT: 156.31 LBS | RESPIRATION RATE: 18 BRPM

## 2024-10-14 DIAGNOSIS — C90.00 MULTIPLE MYELOMA NOT HAVING ACHIEVED REMISSION (MULTI): ICD-10-CM

## 2024-10-14 LAB
ALBUMIN SERPL BCP-MCNC: 3.8 G/DL (ref 3.4–5)
ALP SERPL-CCNC: 46 U/L (ref 33–136)
ALT SERPL W P-5'-P-CCNC: 10 U/L (ref 7–45)
ANION GAP SERPL CALC-SCNC: 11 MMOL/L (ref 10–20)
AST SERPL W P-5'-P-CCNC: 15 U/L (ref 9–39)
BASOPHILS # BLD AUTO: 0.02 X10*3/UL (ref 0–0.1)
BASOPHILS NFR BLD AUTO: 0.4 %
BILIRUB SERPL-MCNC: 0.5 MG/DL (ref 0–1.2)
BUN SERPL-MCNC: 30 MG/DL (ref 6–23)
CALCIUM SERPL-MCNC: 9.1 MG/DL (ref 8.6–10.3)
CHLORIDE SERPL-SCNC: 108 MMOL/L (ref 98–107)
CO2 SERPL-SCNC: 26 MMOL/L (ref 21–32)
CREAT SERPL-MCNC: 0.92 MG/DL (ref 0.5–1.05)
EGFRCR SERPLBLD CKD-EPI 2021: 62 ML/MIN/1.73M*2
EOSINOPHIL # BLD AUTO: 0.05 X10*3/UL (ref 0–0.4)
EOSINOPHIL NFR BLD AUTO: 0.9 %
ERYTHROCYTE [DISTWIDTH] IN BLOOD BY AUTOMATED COUNT: 14.4 % (ref 11.5–14.5)
GLUCOSE SERPL-MCNC: 84 MG/DL (ref 74–99)
HCT VFR BLD AUTO: 30.1 % (ref 36–46)
HGB BLD-MCNC: 9.8 G/DL (ref 12–16)
IGA SERPL-MCNC: 9 MG/DL (ref 70–400)
IGG SERPL-MCNC: 448 MG/DL (ref 700–1600)
IGM SERPL-MCNC: 6 MG/DL (ref 40–230)
IMM GRANULOCYTES # BLD AUTO: 0.05 X10*3/UL (ref 0–0.5)
IMM GRANULOCYTES NFR BLD AUTO: 0.9 % (ref 0–0.9)
LYMPHOCYTES # BLD AUTO: 0.42 X10*3/UL (ref 0.8–3)
LYMPHOCYTES NFR BLD AUTO: 7.9 %
MCH RBC QN AUTO: 34.1 PG (ref 26–34)
MCHC RBC AUTO-ENTMCNC: 32.6 G/DL (ref 32–36)
MCV RBC AUTO: 105 FL (ref 80–100)
MONOCYTES # BLD AUTO: 0.49 X10*3/UL (ref 0.05–0.8)
MONOCYTES NFR BLD AUTO: 9.2 %
NEUTROPHILS # BLD AUTO: 4.29 X10*3/UL (ref 1.6–5.5)
NEUTROPHILS NFR BLD AUTO: 80.7 %
NRBC BLD-RTO: 0 /100 WBCS (ref 0–0)
PLATELET # BLD AUTO: 204 X10*3/UL (ref 150–450)
POTASSIUM SERPL-SCNC: 4.4 MMOL/L (ref 3.5–5.3)
PROT SERPL-MCNC: 5.7 G/DL (ref 6.4–8.2)
PROT SERPL-MCNC: 5.9 G/DL (ref 6.4–8.2)
RBC # BLD AUTO: 2.87 X10*6/UL (ref 4–5.2)
SODIUM SERPL-SCNC: 141 MMOL/L (ref 136–145)
WBC # BLD AUTO: 5.3 X10*3/UL (ref 4.4–11.3)

## 2024-10-14 PROCEDURE — 2500000005 HC RX 250 GENERAL PHARMACY W/O HCPCS: Performed by: INTERNAL MEDICINE

## 2024-10-14 PROCEDURE — 85025 COMPLETE CBC W/AUTO DIFF WBC: CPT

## 2024-10-14 PROCEDURE — 96375 TX/PRO/DX INJ NEW DRUG ADDON: CPT | Mod: INF

## 2024-10-14 PROCEDURE — 82784 ASSAY IGA/IGD/IGG/IGM EACH: CPT

## 2024-10-14 PROCEDURE — 83521 IG LIGHT CHAINS FREE EACH: CPT

## 2024-10-14 PROCEDURE — 84165 PROTEIN E-PHORESIS SERUM: CPT

## 2024-10-14 PROCEDURE — 84075 ASSAY ALKALINE PHOSPHATASE: CPT

## 2024-10-14 PROCEDURE — 84155 ASSAY OF PROTEIN SERUM: CPT | Mod: 59

## 2024-10-14 PROCEDURE — 96413 CHEMO IV INFUSION 1 HR: CPT

## 2024-10-14 PROCEDURE — 96417 CHEMO IV INFUS EACH ADDL SEQ: CPT

## 2024-10-14 PROCEDURE — 2500000004 HC RX 250 GENERAL PHARMACY W/ HCPCS (ALT 636 FOR OP/ED): Mod: JZ,JG | Performed by: INTERNAL MEDICINE

## 2024-10-14 RX ORDER — ALBUTEROL SULFATE 0.83 MG/ML
3 SOLUTION RESPIRATORY (INHALATION) AS NEEDED
Status: DISCONTINUED | OUTPATIENT
Start: 2024-10-14 | End: 2024-10-14 | Stop reason: HOSPADM

## 2024-10-14 RX ORDER — DIPHENHYDRAMINE HYDROCHLORIDE 50 MG/ML
50 INJECTION INTRAMUSCULAR; INTRAVENOUS AS NEEDED
Status: DISCONTINUED | OUTPATIENT
Start: 2024-10-14 | End: 2024-10-14 | Stop reason: HOSPADM

## 2024-10-14 RX ORDER — PROCHLORPERAZINE MALEATE 10 MG
10 TABLET ORAL EVERY 6 HOURS PRN
Status: DISCONTINUED | OUTPATIENT
Start: 2024-10-14 | End: 2024-10-14 | Stop reason: HOSPADM

## 2024-10-14 RX ORDER — EPINEPHRINE 0.3 MG/.3ML
0.3 INJECTION SUBCUTANEOUS EVERY 5 MIN PRN
Status: DISCONTINUED | OUTPATIENT
Start: 2024-10-14 | End: 2024-10-14 | Stop reason: HOSPADM

## 2024-10-14 RX ORDER — FAMOTIDINE 10 MG/ML
20 INJECTION INTRAVENOUS ONCE AS NEEDED
Status: DISCONTINUED | OUTPATIENT
Start: 2024-10-14 | End: 2024-10-14 | Stop reason: HOSPADM

## 2024-10-14 RX ORDER — PROCHLORPERAZINE EDISYLATE 5 MG/ML
10 INJECTION INTRAMUSCULAR; INTRAVENOUS EVERY 6 HOURS PRN
Status: DISCONTINUED | OUTPATIENT
Start: 2024-10-14 | End: 2024-10-14 | Stop reason: HOSPADM

## 2024-10-14 ASSESSMENT — PAIN SCALES - GENERAL: PAINLEVEL: 0-NO PAIN

## 2024-10-15 ENCOUNTER — APPOINTMENT (OUTPATIENT)
Dept: DERMATOLOGY | Facility: CLINIC | Age: 84
End: 2024-10-15
Payer: MEDICARE

## 2024-10-15 DIAGNOSIS — C44.92 SCC (SQUAMOUS CELL CARCINOMA): ICD-10-CM

## 2024-10-15 DIAGNOSIS — D48.5 NEOPLASM OF UNCERTAIN BEHAVIOR OF SKIN: Primary | ICD-10-CM

## 2024-10-15 DIAGNOSIS — C44.629 SQUAMOUS CELL CARCINOMA OF DORSUM OF LEFT HAND: ICD-10-CM

## 2024-10-15 LAB
ALBUMIN: 3.7 G/DL (ref 3.4–5)
ALPHA 1 GLOBULIN: 0.3 G/DL (ref 0.2–0.6)
ALPHA 2 GLOBULIN: 0.7 G/DL (ref 0.4–1.1)
BETA GLOBULIN: 0.6 G/DL (ref 0.5–1.2)
GAMMA GLOBULIN: 0.4 G/DL (ref 0.5–1.4)
KAPPA LC SERPL-MCNC: 0.31 MG/DL (ref 0.33–1.94)
KAPPA LC/LAMBDA SER: 0.06 {RATIO} (ref 0.26–1.65)
LAMBDA LC SERPL-MCNC: 5.06 MG/DL (ref 0.57–2.63)
M-PROTEIN 1: 0.1 G/DL
PATH REVIEW-SERUM PROTEIN ELECTROPHORESIS: ABNORMAL
PROTEIN ELECTROPHORESIS COMMENT: ABNORMAL

## 2024-10-15 PROCEDURE — 1036F TOBACCO NON-USER: CPT | Performed by: NURSE PRACTITIONER

## 2024-10-15 PROCEDURE — 11102 TANGNTL BX SKIN SINGLE LES: CPT | Performed by: NURSE PRACTITIONER

## 2024-10-15 PROCEDURE — 11103 TANGNTL BX SKIN EA SEP/ADDL: CPT | Performed by: NURSE PRACTITIONER

## 2024-10-15 PROCEDURE — 1159F MED LIST DOCD IN RCRD: CPT | Performed by: NURSE PRACTITIONER

## 2024-10-15 PROCEDURE — 1160F RVW MEDS BY RX/DR IN RCRD: CPT | Performed by: NURSE PRACTITIONER

## 2024-10-15 NOTE — PROGRESS NOTES
Subjective     Sherie West is a 84 y.o. female who presents for the following: Suspicious Skin Lesion (Arms, hands.).     Review of Systems:  No other skin or systemic complaints other than what is documented elsewhere in the note.    The following portions of the chart were reviewed this encounter and updated as appropriate:   Tobacco  Allergies  Meds  Problems  Med Hx  Surg Hx         Skin Cancer History  Biopsy Date Type Location Status   5/22/24 SCC Left Dorsal Hand Refer Mohs/Surgeon  6/5/24       Specialty Problems          Dermatology Problems    History of SCC (squamous cell carcinoma) of skin    Hx of basal cell carcinoma    Skin lesion of right arm        Objective   Well appearing patient in no apparent distress; mood and affect are within normal limits.    A focused skin examination was performed. All findings within normal limits unless otherwise noted below.    Assessment/Plan   1. Neoplasm of uncertain behavior of skin (4)  Left 3rd Finger Proximal Interphalangeal Joint  4 mm cutaneous horn          Lesion biopsy  Type of biopsy: tangential    Informed consent: discussed and consent obtained    Timeout: patient name, date of birth, surgical site, and procedure verified    Procedure prep:  Patient was prepped and draped  Anesthesia: the lesion was anesthetized in a standard fashion    Anesthetic:  1% lidocaine plain local infiltration  Instrument used: DermaBlade    Hemostasis achieved with: electrodesiccation    Outcome: patient tolerated procedure well    Post-procedure details: wound care instructions given    Additional details:  Cleaned area with isopropyl alcohol prior to anesthesia or biopsy. Applied thin layer of vaseline and covered with bandaid after procedure      Staff Communication: Dermatology Local Anesthesia: 1 % Plain Lidocaine - Amount: 0.5 ml per lesion.    Specimen 1 - Dermatopathology- DERM LAB  Differential Diagnosis: NMSC  Check Margins Yes/No?:    Comments:    Dermpath  Lab: Routine Histopathology (formalin-fixed tissue)    Left 3rd Finger Metacarpophalangeal Joint  6.5 mm red crusty papule          Lesion biopsy  Type of biopsy: tangential    Informed consent: discussed and consent obtained    Timeout: patient name, date of birth, surgical site, and procedure verified    Procedure prep:  Patient was prepped and draped  Anesthesia: the lesion was anesthetized in a standard fashion    Anesthetic:  1% lidocaine plain local infiltration  Instrument used: DermaBlade    Hemostasis achieved with: electrodesiccation    Outcome: patient tolerated procedure well    Post-procedure details: wound care instructions given    Additional details:  Cleaned area with isopropyl alcohol prior to anesthesia or biopsy. Applied thin layer of vaseline and covered with bandaid after procedure      Staff Communication: Dermatology Local Anesthesia: 1 % Plain Lidocaine - Amount: 0.5 ml per lesion.    Specimen 2 - Dermatopathology- DERM LAB  Differential Diagnosis: NMSC  Check Margins Yes/No?:    Comments:    Dermpath Lab: Routine Histopathology (formalin-fixed tissue)    Left Forearm - Posterior  3 x 1.8 cm white plaque with  red crusty areas.               Lesion biopsy  Type of biopsy: tangential    Informed consent: discussed and consent obtained    Timeout: patient name, date of birth, surgical site, and procedure verified    Procedure prep:  Patient was prepped and draped  Anesthesia: the lesion was anesthetized in a standard fashion    Anesthetic:  1% lidocaine plain local infiltration  Instrument used: DermaBlade    Hemostasis achieved with: electrodesiccation    Outcome: patient tolerated procedure well    Post-procedure details: wound care instructions given    Additional details:  Cleaned area with isopropyl alcohol prior to anesthesia or biopsy. Applied thin layer of vaseline and covered with bandaid after procedure      Staff Communication: Dermatology Local Anesthesia: 1 % Plain Lidocaine -  Amount: 0.5 ml per lesion.    Specimen 3 - Dermatopathology- DERM LAB  Differential Diagnosis: NMSC  Check Margins Yes/No?:    Comments:    Dermpath Lab: Routine Histopathology (formalin-fixed tissue)    Right Forearm - Posterior  3 mm cutaneous horn              Lesion biopsy  Type of biopsy: tangential    Informed consent: discussed and consent obtained    Timeout: patient name, date of birth, surgical site, and procedure verified    Procedure prep:  Patient was prepped and draped  Anesthesia: the lesion was anesthetized in a standard fashion    Anesthetic:  1% lidocaine plain local infiltration  Instrument used: DermaBlade    Hemostasis achieved with: electrodesiccation    Outcome: patient tolerated procedure well    Post-procedure details: wound care instructions given    Additional details:  Cleaned area with isopropyl alcohol prior to anesthesia or biopsy. Applied thin layer of vaseline and covered with bandaid after procedure      Staff Communication: Dermatology Local Anesthesia: 1 % Plain Lidocaine - Amount: 0.5 ml per lesion.    Specimen 4 - Dermatopathology- DERM LAB  Differential Diagnosis: NMSC  Check Margins Yes/No?:    Comments:    Dermpath Lab: Routine Histopathology (formalin-fixed tissue)    NUB  - Given uncertainty in clinical diagnosis, shave biopsy is recommended in clinic today.  - The patient expressed understanding, is in agreement with this plan, and wishes to proceed with biopsy.  - Oral and written wound care instructions provided.  - Advised patient that the office will call within 2 weeks to discuss biopsy results.          Return in 4 weeks for skin check.

## 2024-10-27 RX ORDER — FAMOTIDINE 10 MG/ML
20 INJECTION INTRAVENOUS ONCE AS NEEDED
OUTPATIENT
Start: 2025-01-06

## 2024-10-27 RX ORDER — DIPHENHYDRAMINE HYDROCHLORIDE 50 MG/ML
50 INJECTION INTRAMUSCULAR; INTRAVENOUS AS NEEDED
OUTPATIENT
Start: 2024-12-23

## 2024-10-27 RX ORDER — FAMOTIDINE 10 MG/ML
20 INJECTION INTRAVENOUS ONCE AS NEEDED
OUTPATIENT
Start: 2024-12-30

## 2024-10-27 RX ORDER — DEXAMETHASONE 4 MG/1
20 TABLET ORAL ONCE
OUTPATIENT
Start: 2024-12-23

## 2024-10-27 RX ORDER — DIPHENHYDRAMINE HYDROCHLORIDE 50 MG/ML
50 INJECTION INTRAMUSCULAR; INTRAVENOUS AS NEEDED
OUTPATIENT
Start: 2025-01-06

## 2024-10-27 RX ORDER — DEXAMETHASONE 4 MG/1
20 TABLET ORAL ONCE
OUTPATIENT
Start: 2025-01-06

## 2024-10-27 RX ORDER — FAMOTIDINE 10 MG/ML
20 INJECTION INTRAVENOUS ONCE AS NEEDED
OUTPATIENT
Start: 2024-12-23

## 2024-10-27 RX ORDER — ONDANSETRON HYDROCHLORIDE 8 MG/1
12 TABLET, FILM COATED ORAL ONCE
OUTPATIENT
Start: 2024-12-30

## 2024-10-27 RX ORDER — EPINEPHRINE 0.3 MG/.3ML
0.3 INJECTION SUBCUTANEOUS EVERY 5 MIN PRN
OUTPATIENT
Start: 2024-12-23

## 2024-10-27 RX ORDER — PROCHLORPERAZINE MALEATE 10 MG
10 TABLET ORAL EVERY 6 HOURS PRN
OUTPATIENT
Start: 2024-12-23

## 2024-10-27 RX ORDER — PROCHLORPERAZINE EDISYLATE 5 MG/ML
10 INJECTION INTRAMUSCULAR; INTRAVENOUS EVERY 6 HOURS PRN
OUTPATIENT
Start: 2025-01-06

## 2024-10-27 RX ORDER — ALBUTEROL SULFATE 0.83 MG/ML
3 SOLUTION RESPIRATORY (INHALATION) AS NEEDED
OUTPATIENT
Start: 2025-01-06

## 2024-10-27 RX ORDER — PROCHLORPERAZINE EDISYLATE 5 MG/ML
10 INJECTION INTRAMUSCULAR; INTRAVENOUS EVERY 6 HOURS PRN
OUTPATIENT
Start: 2024-12-30

## 2024-10-27 RX ORDER — PROCHLORPERAZINE MALEATE 10 MG
10 TABLET ORAL EVERY 6 HOURS PRN
OUTPATIENT
Start: 2024-12-30

## 2024-10-27 RX ORDER — ALBUTEROL SULFATE 0.83 MG/ML
3 SOLUTION RESPIRATORY (INHALATION) AS NEEDED
OUTPATIENT
Start: 2024-12-23

## 2024-10-27 RX ORDER — ONDANSETRON HYDROCHLORIDE 8 MG/1
12 TABLET, FILM COATED ORAL ONCE
OUTPATIENT
Start: 2025-01-06

## 2024-10-27 RX ORDER — DEXAMETHASONE 4 MG/1
20 TABLET ORAL ONCE
OUTPATIENT
Start: 2024-12-30

## 2024-10-27 RX ORDER — EPINEPHRINE 0.3 MG/.3ML
0.3 INJECTION SUBCUTANEOUS EVERY 5 MIN PRN
OUTPATIENT
Start: 2024-12-30

## 2024-10-27 RX ORDER — ONDANSETRON HYDROCHLORIDE 8 MG/1
12 TABLET, FILM COATED ORAL ONCE
OUTPATIENT
Start: 2024-12-23

## 2024-10-27 RX ORDER — EPINEPHRINE 0.3 MG/.3ML
0.3 INJECTION SUBCUTANEOUS EVERY 5 MIN PRN
OUTPATIENT
Start: 2025-01-06

## 2024-10-27 RX ORDER — PROCHLORPERAZINE EDISYLATE 5 MG/ML
10 INJECTION INTRAMUSCULAR; INTRAVENOUS EVERY 6 HOURS PRN
OUTPATIENT
Start: 2024-12-23

## 2024-10-27 RX ORDER — ALBUTEROL SULFATE 0.83 MG/ML
3 SOLUTION RESPIRATORY (INHALATION) AS NEEDED
OUTPATIENT
Start: 2024-12-30

## 2024-10-27 RX ORDER — PROCHLORPERAZINE MALEATE 10 MG
10 TABLET ORAL EVERY 6 HOURS PRN
OUTPATIENT
Start: 2025-01-06

## 2024-10-27 RX ORDER — DIPHENHYDRAMINE HYDROCHLORIDE 50 MG/ML
50 INJECTION INTRAMUSCULAR; INTRAVENOUS AS NEEDED
OUTPATIENT
Start: 2024-12-30

## 2024-10-27 ASSESSMENT — ENCOUNTER SYMPTOMS
RESPIRATORY NEGATIVE: 1
NEUROLOGICAL NEGATIVE: 1
FEVER: 0
DYSURIA: 0
DIARRHEA: 1
CHILLS: 0
BACK PAIN: 1
BRUISES/BLEEDS EASILY: 1
APPETITE CHANGE: 0
FATIGUE: 0
PALPITATIONS: 0
ADENOPATHY: 0
HEMATURIA: 0
DIAPHORESIS: 0
UNEXPECTED WEIGHT CHANGE: 0
LEG SWELLING: 1

## 2024-10-28 ENCOUNTER — LAB (OUTPATIENT)
Dept: LAB | Facility: HOSPITAL | Age: 84
End: 2024-10-28
Payer: MEDICARE

## 2024-10-28 ENCOUNTER — INFUSION (OUTPATIENT)
Dept: HEMATOLOGY/ONCOLOGY | Facility: HOSPITAL | Age: 84
End: 2024-10-28
Payer: MEDICARE

## 2024-10-28 ENCOUNTER — OFFICE VISIT (OUTPATIENT)
Dept: HEMATOLOGY/ONCOLOGY | Facility: HOSPITAL | Age: 84
End: 2024-10-28
Payer: MEDICARE

## 2024-10-28 VITALS
DIASTOLIC BLOOD PRESSURE: 72 MMHG | OXYGEN SATURATION: 100 % | TEMPERATURE: 99 F | BODY MASS INDEX: 29.3 KG/M2 | SYSTOLIC BLOOD PRESSURE: 141 MMHG | WEIGHT: 157.41 LBS | RESPIRATION RATE: 16 BRPM | HEART RATE: 93 BPM

## 2024-10-28 DIAGNOSIS — C90.00 MULTIPLE MYELOMA NOT HAVING ACHIEVED REMISSION (MULTI): Primary | ICD-10-CM

## 2024-10-28 DIAGNOSIS — C90.00 MULTIPLE MYELOMA NOT HAVING ACHIEVED REMISSION (MULTI): ICD-10-CM

## 2024-10-28 DIAGNOSIS — T45.1X5A IMMUNOSUPPRESSED DUE TO CHEMOTHERAPY: ICD-10-CM

## 2024-10-28 DIAGNOSIS — D80.1 HYPOGAMMAGLOBULINEMIA (MULTI): ICD-10-CM

## 2024-10-28 DIAGNOSIS — Z79.899 IMMUNOSUPPRESSED DUE TO CHEMOTHERAPY: ICD-10-CM

## 2024-10-28 DIAGNOSIS — E04.1 THYROID NODULE: ICD-10-CM

## 2024-10-28 DIAGNOSIS — D84.821 IMMUNOSUPPRESSED DUE TO CHEMOTHERAPY: ICD-10-CM

## 2024-10-28 DIAGNOSIS — Z85.828 HISTORY OF SCC (SQUAMOUS CELL CARCINOMA) OF SKIN: ICD-10-CM

## 2024-10-28 LAB
ALBUMIN SERPL BCP-MCNC: 3.9 G/DL (ref 3.4–5)
ALP SERPL-CCNC: 50 U/L (ref 33–136)
ALT SERPL W P-5'-P-CCNC: 9 U/L (ref 7–45)
ANION GAP SERPL CALC-SCNC: 13 MMOL/L (ref 10–20)
AST SERPL W P-5'-P-CCNC: 11 U/L (ref 9–39)
BASOPHILS # BLD AUTO: 0.03 X10*3/UL (ref 0–0.1)
BASOPHILS NFR BLD AUTO: 0.6 %
BILIRUB SERPL-MCNC: 0.4 MG/DL (ref 0–1.2)
BUN SERPL-MCNC: 27 MG/DL (ref 6–23)
CALCIUM SERPL-MCNC: 9.3 MG/DL (ref 8.6–10.3)
CHLORIDE SERPL-SCNC: 107 MMOL/L (ref 98–107)
CO2 SERPL-SCNC: 27 MMOL/L (ref 21–32)
CREAT SERPL-MCNC: 1.04 MG/DL (ref 0.5–1.05)
EGFRCR SERPLBLD CKD-EPI 2021: 54 ML/MIN/1.73M*2
EOSINOPHIL # BLD AUTO: 0.05 X10*3/UL (ref 0–0.4)
EOSINOPHIL NFR BLD AUTO: 1 %
ERYTHROCYTE [DISTWIDTH] IN BLOOD BY AUTOMATED COUNT: 14.1 % (ref 11.5–14.5)
GLUCOSE SERPL-MCNC: 154 MG/DL (ref 74–99)
HCT VFR BLD AUTO: 32.1 % (ref 36–46)
HGB BLD-MCNC: 10.4 G/DL (ref 12–16)
IGA SERPL-MCNC: 10 MG/DL (ref 70–400)
IGG SERPL-MCNC: 386 MG/DL (ref 700–1600)
IGM SERPL-MCNC: 5 MG/DL (ref 40–230)
IMM GRANULOCYTES # BLD AUTO: 0.03 X10*3/UL (ref 0–0.5)
IMM GRANULOCYTES NFR BLD AUTO: 0.6 % (ref 0–0.9)
LYMPHOCYTES # BLD AUTO: 0.49 X10*3/UL (ref 0.8–3)
LYMPHOCYTES NFR BLD AUTO: 9.9 %
MCH RBC QN AUTO: 34.7 PG (ref 26–34)
MCHC RBC AUTO-ENTMCNC: 32.4 G/DL (ref 32–36)
MCV RBC AUTO: 107 FL (ref 80–100)
MONOCYTES # BLD AUTO: 0.35 X10*3/UL (ref 0.05–0.8)
MONOCYTES NFR BLD AUTO: 7.1 %
NEUTROPHILS # BLD AUTO: 4.01 X10*3/UL (ref 1.6–5.5)
NEUTROPHILS NFR BLD AUTO: 80.8 %
NRBC BLD-RTO: 0 /100 WBCS (ref 0–0)
PLATELET # BLD AUTO: 318 X10*3/UL (ref 150–450)
POTASSIUM SERPL-SCNC: 4.2 MMOL/L (ref 3.5–5.3)
PROT SERPL-MCNC: 5.7 G/DL (ref 6.4–8.2)
PROT SERPL-MCNC: 5.9 G/DL (ref 6.4–8.2)
RBC # BLD AUTO: 3 X10*6/UL (ref 4–5.2)
SODIUM SERPL-SCNC: 143 MMOL/L (ref 136–145)
WBC # BLD AUTO: 5 X10*3/UL (ref 4.4–11.3)

## 2024-10-28 PROCEDURE — 1125F AMNT PAIN NOTED PAIN PRSNT: CPT

## 2024-10-28 PROCEDURE — 84165 PROTEIN E-PHORESIS SERUM: CPT

## 2024-10-28 PROCEDURE — 1160F RVW MEDS BY RX/DR IN RCRD: CPT

## 2024-10-28 PROCEDURE — 82784 ASSAY IGA/IGD/IGG/IGM EACH: CPT

## 2024-10-28 PROCEDURE — 99214 OFFICE O/P EST MOD 30 MIN: CPT | Mod: 25

## 2024-10-28 PROCEDURE — 1159F MED LIST DOCD IN RCRD: CPT

## 2024-10-28 PROCEDURE — 36415 COLL VENOUS BLD VENIPUNCTURE: CPT

## 2024-10-28 PROCEDURE — 85025 COMPLETE CBC W/AUTO DIFF WBC: CPT

## 2024-10-28 PROCEDURE — 96417 CHEMO IV INFUS EACH ADDL SEQ: CPT

## 2024-10-28 PROCEDURE — 2500000004 HC RX 250 GENERAL PHARMACY W/ HCPCS (ALT 636 FOR OP/ED): Performed by: INTERNAL MEDICINE

## 2024-10-28 PROCEDURE — 84155 ASSAY OF PROTEIN SERUM: CPT | Mod: 59

## 2024-10-28 PROCEDURE — 83521 IG LIGHT CHAINS FREE EACH: CPT

## 2024-10-28 PROCEDURE — 96413 CHEMO IV INFUSION 1 HR: CPT

## 2024-10-28 PROCEDURE — 2500000005 HC RX 250 GENERAL PHARMACY W/O HCPCS: Performed by: INTERNAL MEDICINE

## 2024-10-28 PROCEDURE — 2500000004 HC RX 250 GENERAL PHARMACY W/ HCPCS (ALT 636 FOR OP/ED): Mod: JZ | Performed by: INTERNAL MEDICINE

## 2024-10-28 PROCEDURE — 84075 ASSAY ALKALINE PHOSPHATASE: CPT

## 2024-10-28 PROCEDURE — 99214 OFFICE O/P EST MOD 30 MIN: CPT

## 2024-10-28 RX ORDER — HEPARIN 100 UNIT/ML
500 SYRINGE INTRAVENOUS AS NEEDED
OUTPATIENT
Start: 2024-10-28

## 2024-10-28 RX ORDER — HEPARIN SODIUM,PORCINE/PF 10 UNIT/ML
50 SYRINGE (ML) INTRAVENOUS AS NEEDED
OUTPATIENT
Start: 2024-10-28

## 2024-10-28 ASSESSMENT — PAIN SCALES - GENERAL: PAINLEVEL_OUTOF10: 4

## 2024-10-28 ASSESSMENT — ENCOUNTER SYMPTOMS: CONSTIPATION: 1

## 2024-10-29 LAB
KAPPA LC SERPL-MCNC: 0.43 MG/DL (ref 0.33–1.94)
KAPPA LC/LAMBDA SER: 0.09 {RATIO} (ref 0.26–1.65)
LAMBDA LC SERPL-MCNC: 4.56 MG/DL (ref 0.57–2.63)

## 2024-10-30 LAB
ALBUMIN: 3.6 G/DL (ref 3.4–5)
ALPHA 1 GLOBULIN: 0.3 G/DL (ref 0.2–0.6)
ALPHA 2 GLOBULIN: 0.7 G/DL (ref 0.4–1.1)
BETA GLOBULIN: 0.6 G/DL (ref 0.5–1.2)
GAMMA GLOBULIN: 0.4 G/DL (ref 0.5–1.4)
M-PROTEIN 1: 0.1 G/DL
PATH REVIEW-SERUM PROTEIN ELECTROPHORESIS: ABNORMAL
PROTEIN ELECTROPHORESIS COMMENT: ABNORMAL

## 2024-11-04 ENCOUNTER — INFUSION (OUTPATIENT)
Dept: HEMATOLOGY/ONCOLOGY | Facility: CLINIC | Age: 84
End: 2024-11-04
Payer: MEDICARE

## 2024-11-04 VITALS
WEIGHT: 156.2 LBS | TEMPERATURE: 97.7 F | RESPIRATION RATE: 18 BRPM | OXYGEN SATURATION: 98 % | SYSTOLIC BLOOD PRESSURE: 142 MMHG | BODY MASS INDEX: 29.08 KG/M2 | DIASTOLIC BLOOD PRESSURE: 71 MMHG | HEART RATE: 88 BPM

## 2024-11-04 DIAGNOSIS — D80.1 HYPOGAMMAGLOBULINEMIA (MULTI): ICD-10-CM

## 2024-11-04 DIAGNOSIS — C90.00 MULTIPLE MYELOMA NOT HAVING ACHIEVED REMISSION (MULTI): ICD-10-CM

## 2024-11-04 LAB
ALBUMIN SERPL BCP-MCNC: 3.8 G/DL (ref 3.4–5)
ALP SERPL-CCNC: 50 U/L (ref 33–136)
ALT SERPL W P-5'-P-CCNC: 11 U/L (ref 7–45)
ANION GAP SERPL CALC-SCNC: 10 MMOL/L (ref 10–20)
AST SERPL W P-5'-P-CCNC: 13 U/L (ref 9–39)
BASOPHILS # BLD AUTO: 0.03 X10*3/UL (ref 0–0.1)
BASOPHILS NFR BLD AUTO: 0.7 %
BILIRUB SERPL-MCNC: 0.5 MG/DL (ref 0–1.2)
BUN SERPL-MCNC: 27 MG/DL (ref 6–23)
CALCIUM SERPL-MCNC: 9.6 MG/DL (ref 8.6–10.3)
CHLORIDE SERPL-SCNC: 107 MMOL/L (ref 98–107)
CO2 SERPL-SCNC: 31 MMOL/L (ref 21–32)
CREAT SERPL-MCNC: 0.98 MG/DL (ref 0.5–1.05)
EGFRCR SERPLBLD CKD-EPI 2021: 58 ML/MIN/1.73M*2
EOSINOPHIL # BLD AUTO: 0.03 X10*3/UL (ref 0–0.4)
EOSINOPHIL NFR BLD AUTO: 0.7 %
ERYTHROCYTE [DISTWIDTH] IN BLOOD BY AUTOMATED COUNT: 14 % (ref 11.5–14.5)
ERYTHROCYTE [DISTWIDTH] IN BLOOD BY AUTOMATED COUNT: 14 % (ref 11.5–14.5)
GLUCOSE SERPL-MCNC: 76 MG/DL (ref 74–99)
HCT VFR BLD AUTO: 32 % (ref 36–46)
HCT VFR BLD AUTO: 32 % (ref 36–46)
HGB BLD-MCNC: 10.4 G/DL (ref 12–16)
HGB BLD-MCNC: 10.4 G/DL (ref 12–16)
IGA SERPL-MCNC: 9 MG/DL (ref 70–400)
IGG SERPL-MCNC: 347 MG/DL (ref 700–1600)
IGM SERPL-MCNC: <5 MG/DL (ref 40–230)
IMM GRANULOCYTES # BLD AUTO: 0.06 X10*3/UL (ref 0–0.5)
IMM GRANULOCYTES NFR BLD AUTO: 1.3 % (ref 0–0.9)
LYMPHOCYTES # BLD AUTO: 0.39 X10*3/UL (ref 0.8–3)
LYMPHOCYTES NFR BLD AUTO: 8.8 %
MCH RBC QN AUTO: 34.4 PG (ref 26–34)
MCH RBC QN AUTO: 34.4 PG (ref 26–34)
MCHC RBC AUTO-ENTMCNC: 32.5 G/DL (ref 32–36)
MCHC RBC AUTO-ENTMCNC: 32.5 G/DL (ref 32–36)
MCV RBC AUTO: 106 FL (ref 80–100)
MCV RBC AUTO: 106 FL (ref 80–100)
MONOCYTES # BLD AUTO: 0.48 X10*3/UL (ref 0.05–0.8)
MONOCYTES NFR BLD AUTO: 10.8 %
NEUTROPHILS # BLD AUTO: 3.46 X10*3/UL (ref 1.6–5.5)
NEUTROPHILS NFR BLD AUTO: 77.7 %
NRBC BLD-RTO: 0 /100 WBCS (ref 0–0)
NRBC BLD-RTO: 0 /100 WBCS (ref 0–0)
PLATELET # BLD AUTO: 185 X10*3/UL (ref 150–450)
PLATELET # BLD AUTO: 185 X10*3/UL (ref 150–450)
POTASSIUM SERPL-SCNC: 4.6 MMOL/L (ref 3.5–5.3)
PROT SERPL-MCNC: 5.7 G/DL (ref 6.4–8.2)
RBC # BLD AUTO: 3.02 X10*6/UL (ref 4–5.2)
RBC # BLD AUTO: 3.02 X10*6/UL (ref 4–5.2)
SODIUM SERPL-SCNC: 143 MMOL/L (ref 136–145)
WBC # BLD AUTO: 4.4 X10*3/UL (ref 4.4–11.3)
WBC # BLD AUTO: 4.4 X10*3/UL (ref 4.4–11.3)

## 2024-11-04 PROCEDURE — 82784 ASSAY IGA/IGD/IGG/IGM EACH: CPT

## 2024-11-04 PROCEDURE — 96413 CHEMO IV INFUSION 1 HR: CPT

## 2024-11-04 PROCEDURE — 2500000004 HC RX 250 GENERAL PHARMACY W/ HCPCS (ALT 636 FOR OP/ED): Mod: JZ,JG | Performed by: INTERNAL MEDICINE

## 2024-11-04 PROCEDURE — 96366 THER/PROPH/DIAG IV INF ADDON: CPT | Mod: INF

## 2024-11-04 PROCEDURE — 2500000005 HC RX 250 GENERAL PHARMACY W/O HCPCS: Performed by: INTERNAL MEDICINE

## 2024-11-04 PROCEDURE — 96367 TX/PROPH/DG ADDL SEQ IV INF: CPT

## 2024-11-04 PROCEDURE — 85025 COMPLETE CBC W/AUTO DIFF WBC: CPT

## 2024-11-04 PROCEDURE — 85027 COMPLETE CBC AUTOMATED: CPT

## 2024-11-04 PROCEDURE — 96417 CHEMO IV INFUS EACH ADDL SEQ: CPT

## 2024-11-04 PROCEDURE — 84075 ASSAY ALKALINE PHOSPHATASE: CPT

## 2024-11-04 PROCEDURE — 2500000004 HC RX 250 GENERAL PHARMACY W/ HCPCS (ALT 636 FOR OP/ED): Mod: JZ,JG

## 2024-11-04 PROCEDURE — 2500000001 HC RX 250 WO HCPCS SELF ADMINISTERED DRUGS (ALT 637 FOR MEDICARE OP): Performed by: INTERNAL MEDICINE

## 2024-11-04 RX ORDER — HEPARIN SODIUM,PORCINE/PF 10 UNIT/ML
50 SYRINGE (ML) INTRAVENOUS AS NEEDED
OUTPATIENT
Start: 2024-11-04

## 2024-11-04 RX ORDER — DIPHENHYDRAMINE HYDROCHLORIDE 50 MG/ML
50 INJECTION INTRAMUSCULAR; INTRAVENOUS AS NEEDED
Status: DISCONTINUED | OUTPATIENT
Start: 2024-11-04 | End: 2024-11-04 | Stop reason: HOSPADM

## 2024-11-04 RX ORDER — FAMOTIDINE 10 MG/ML
20 INJECTION INTRAVENOUS ONCE AS NEEDED
Status: DISCONTINUED | OUTPATIENT
Start: 2024-11-04 | End: 2024-11-04 | Stop reason: HOSPADM

## 2024-11-04 RX ORDER — DIPHENHYDRAMINE HYDROCHLORIDE 50 MG/ML
50 INJECTION INTRAMUSCULAR; INTRAVENOUS AS NEEDED
OUTPATIENT
Start: 2024-12-02

## 2024-11-04 RX ORDER — ALBUTEROL SULFATE 0.83 MG/ML
3 SOLUTION RESPIRATORY (INHALATION) AS NEEDED
OUTPATIENT
Start: 2024-11-24

## 2024-11-04 RX ORDER — ALBUTEROL SULFATE 0.83 MG/ML
3 SOLUTION RESPIRATORY (INHALATION) AS NEEDED
OUTPATIENT
Start: 2024-12-02

## 2024-11-04 RX ORDER — ALBUTEROL SULFATE 0.83 MG/ML
3 SOLUTION RESPIRATORY (INHALATION) AS NEEDED
Status: DISCONTINUED | OUTPATIENT
Start: 2024-11-04 | End: 2024-11-04 | Stop reason: HOSPADM

## 2024-11-04 RX ORDER — DIPHENHYDRAMINE HCL 25 MG
25 CAPSULE ORAL ONCE
Status: COMPLETED | OUTPATIENT
Start: 2024-11-04 | End: 2024-11-04

## 2024-11-04 RX ORDER — FAMOTIDINE 10 MG/ML
20 INJECTION INTRAVENOUS ONCE AS NEEDED
OUTPATIENT
Start: 2024-12-02

## 2024-11-04 RX ORDER — DIPHENHYDRAMINE HCL 25 MG
25 CAPSULE ORAL ONCE
OUTPATIENT
Start: 2024-12-02

## 2024-11-04 RX ORDER — ACETAMINOPHEN 325 MG/1
650 TABLET ORAL ONCE
Status: COMPLETED | OUTPATIENT
Start: 2024-11-04 | End: 2024-11-04

## 2024-11-04 RX ORDER — EPINEPHRINE 0.3 MG/.3ML
0.3 INJECTION SUBCUTANEOUS EVERY 5 MIN PRN
OUTPATIENT
Start: 2024-12-02

## 2024-11-04 RX ORDER — EPINEPHRINE 0.3 MG/.3ML
0.3 INJECTION SUBCUTANEOUS EVERY 5 MIN PRN
OUTPATIENT
Start: 2024-11-24

## 2024-11-04 RX ORDER — ACETAMINOPHEN 325 MG/1
650 TABLET ORAL ONCE
OUTPATIENT
Start: 2024-12-02

## 2024-11-04 RX ORDER — PROCHLORPERAZINE MALEATE 10 MG
10 TABLET ORAL EVERY 6 HOURS PRN
Status: DISCONTINUED | OUTPATIENT
Start: 2024-11-04 | End: 2024-11-04 | Stop reason: HOSPADM

## 2024-11-04 RX ORDER — EPINEPHRINE 0.3 MG/.3ML
0.3 INJECTION SUBCUTANEOUS EVERY 5 MIN PRN
Status: DISCONTINUED | OUTPATIENT
Start: 2024-11-04 | End: 2024-11-04 | Stop reason: HOSPADM

## 2024-11-04 RX ORDER — HEPARIN 100 UNIT/ML
500 SYRINGE INTRAVENOUS AS NEEDED
OUTPATIENT
Start: 2024-11-04

## 2024-11-04 RX ORDER — DIPHENHYDRAMINE HYDROCHLORIDE 50 MG/ML
50 INJECTION INTRAMUSCULAR; INTRAVENOUS AS NEEDED
OUTPATIENT
Start: 2024-11-24

## 2024-11-04 RX ORDER — PROCHLORPERAZINE EDISYLATE 5 MG/ML
10 INJECTION INTRAMUSCULAR; INTRAVENOUS EVERY 6 HOURS PRN
Status: DISCONTINUED | OUTPATIENT
Start: 2024-11-04 | End: 2024-11-04 | Stop reason: HOSPADM

## 2024-11-04 RX ORDER — FAMOTIDINE 10 MG/ML
20 INJECTION INTRAVENOUS ONCE AS NEEDED
OUTPATIENT
Start: 2024-11-24

## 2024-11-04 ASSESSMENT — PAIN SCALES - GENERAL
PAINLEVEL_OUTOF10: 0-NO PAIN
PAINLEVEL_OUTOF10: 0-NO PAIN

## 2024-11-05 ENCOUNTER — APPOINTMENT (OUTPATIENT)
Dept: DERMATOLOGY | Facility: CLINIC | Age: 84
End: 2024-11-05
Payer: MEDICARE

## 2024-11-05 DIAGNOSIS — C44.629: ICD-10-CM

## 2024-11-05 PROCEDURE — 17311 MOHS 1 STAGE H/N/HF/G: CPT | Performed by: DERMATOLOGY

## 2024-11-05 NOTE — PROGRESS NOTES
"Office Visit Note  Date: 11/5/2024  Surgeon:  Sophia Vann MD  Office Location:  [facility]  [address]  [address]  [place] [address]  Dept: [phone]  Dept Fax: [phone]  Referring Provider: Esequiel Stiles, APRN-CNP  [address]  [address]  [place] [address]    Subjective   Sil ALBA West \"Sherie\" is a 84 y.o. female who presents for the following: MOHS Surgery    According to the patient, the lesion has been present for approximately 6 months at the time of diagnosis.  The lesion is itchy.  The lesion has not been treated previously.    The patient does not have a pacemaker / defibrillator.  The patient does not have a heart valve / joint replacement.    The patient is on blood thinners.  The patient does not have a history of hepatitis B or C.  The patient does not have a history of HIV.  The patient does have a history of immunosuppression (e.g. organ transplantation, malignancy, medications)    Review of Systems:  No other skin or systemic complaints other than what is documented elsewhere in the note.    MEDICAL HISTORY: clinically relevant history including significant past medical history, medications and allergies was reviewed and documented in Epic.    Objective   Well appearing patient in no apparent distress; mood and affect are within normal limits.  Vital signs: See record.  Noted on the Left 3RD Finger Proximal Interphalangeal Joint  Is a 0.9 x 0.9 cm scar    The patient confirmed the identified site.    Discussion:  The nature of the diagnosis was explained. The lesion is a skin cancer.  It has a risk of local growth and distant spread. The condition is associated with sun exposure.  Warning signs of non-melanoma skin cancer discussed. Patient was instructed to perform monthly self skin examination.  We recommended that the patient have regular full skin exams given an increased risk of subsequent skin cancers. The patient was instructed to " use sun protective behaviors including use of broad spectrum sunscreens and sun protective clothing to reduce risk of skin cancers.      Risks, benefits, side effects of Mohs surgery were discussed with patient and the patient voiced understanding.  It was explained that even though the cure rate of Mohs is very high it is not 100%. Risks of surgery including but not limited to bleeding, infection, numbness, nerve damage, and scar were reviewed.  Discussion included wound care requirements, activity restrictions, likely scar outcome and time to heal.     After Mohs surgery, the defect may need to be repaired surgically and the scar may be longer than the original lesion.  Reconstruction options, risks, and benefits were reviewed including second intention healing, linear repair (4-1 ratio was explained), local flaps, skin grafts, cartilage grafts and interpolation flaps (the need for multiple surgeries was explained). Possible outcomes were reviewed including likely scar appearance, failure of flap survival, infection, bleeding and the need for revision surgery.     The pathology was reviewed, the photograph was reviewed, and the referring physician's note was reviewed.    Patient elected for Mohs surgery.

## 2024-11-05 NOTE — PROGRESS NOTES
Mohs Surgery Operative Note    Date of Surgery:  11/5/2024  Surgeon:  Sophia Vann MD  Office Location:  [address] [facility]  [address]  [address]  [facility] [address]  Dept: [phone]  Dept Fax: [phone]  Referring Provider: Esequiel Stiles, APRN-CNP  [address] [facility] [address]  [place] [address]      Assessment/Plan   Pre-procedure:   Obtained informed consent: written from patient  The surgical site was identified and confirmed with the patient.     Intra-operative:   Audible time out called at : 8:41AM 11/05/24  by: Teresa Danielle RN   Verified patient name, birthdate, site, specimen bottle label & requisition.    The planned procedure(s) was again reviewed with the patient. The risks of bleeding, infection, nerve damage and scarring were reviewed. Written authorization was obtained. The patient identity, surgical site, and planned procedure(s) were verified. The provider acted as both surgeon and pathologist.     Squamous cell carcinoma of skin of left middle finger  Left 3RD Finger Proximal Interphalangeal Joint  Mohs surgery  Consent obtained: written    Universal Protocol:  Procedure explained and questions answered to patient or proxy's satisfaction: Yes    Test results available and properly labeled: Yes    Pathology report reviewed: Yes    External notes reviewed: Yes    Photo or diagram used for site identification: Yes    Site/side marked: Yes    Slide independently reviewed by Mohs surgeon: Yes    Immediately prior to procedure a time out was called: Yes    Patient identity confirmed: verbally with patient  Preparation: Patient was prepped and draped in usual sterile fashion      Anticoagulation:  Is the patient taking prescription anticoagulant and/or aspirin prescribed/recommended by a physician? Yes    Was the anticoagulation regimen changed prior to Mohs? No      Anesthesia:  Anesthesia method: local infiltration  Local anesthetic: lidocaine 2% WITH  epi    Procedure Details:  Case ID Number: [id number]  Biopsy accession number: [id number]  Date of biopsy: 10/15/2024  Pre-Op diagnosis: squamous cell carcinoma  SCC subtype: invasive.  Surgical site (from skin exam): Left 3RD Finger Proximal Interphalangeal Joint  Pre-operative length (cm): 0.9  Pre-operative width (cm): 0.9  Indications for Mohs surgery: anatomic location where tissue conservation is critical  Previously treated? Yes    Previous treatment type: cryotherapy    Micrographic Surgery Details:  Post-operative length (cm): 0.9  Post-operative width (cm): 0.7  Number of Mohs stages: 1    Stage 1  Comments: The patient was brought into the operating room and placed in the procedure chair in the appropriate position.  The area positive by previous biopsy was identified and confirmed with the patient. The area of clinically obvious tumor was debulked using a curette and/or scalpel as needed. An incision was made following the Mohs approach through the skin. The specimen was taken to the lab, divided into 2 piece(s) and appropriately chromacoded and processed.  Tumor features identified on Mohs section: no tumor identified  Depth of defect: subcutaneous fat    Patient tolerance of procedure: tolerated well, no immediate complications    Reconstruction:  Was the defect reconstructed?: No   Repair: After a discussion with the patient regarding the options for wound closure, a decision was made to proceed with second intention healing.  Dressing/Follow-up: Surgifoam was placed in the wound. A pressure dressing was placed to help stabilize the wound and to minimize the risk of postoperative bleeding. Wound care was discussed, and the patient was given written post-operative wound care instructions.  Hemostasis achieved with: Gelfoam and electrodesiccation  Outcome: patient tolerated procedure well with no complications    Post-procedure details: sterile dressing applied and wound care instructions given     Dressing type: Coban, pressure dressing, Gelfoam, petrolatum, Hypafix and Telfa pad                Wound care was discussed, and the patient was given written post-operative wound care instructions.      The patient will follow up with Sophia Vann MD as needed for any post operative problems or concerns, and will follow up with their primary dermatologist as scheduled.

## 2024-11-11 ENCOUNTER — INFUSION (OUTPATIENT)
Dept: HEMATOLOGY/ONCOLOGY | Facility: CLINIC | Age: 84
End: 2024-11-11
Payer: MEDICARE

## 2024-11-11 VITALS
RESPIRATION RATE: 18 BRPM | OXYGEN SATURATION: 100 % | BODY MASS INDEX: 29.47 KG/M2 | HEART RATE: 99 BPM | DIASTOLIC BLOOD PRESSURE: 82 MMHG | TEMPERATURE: 98.6 F | WEIGHT: 158.29 LBS | SYSTOLIC BLOOD PRESSURE: 141 MMHG

## 2024-11-11 DIAGNOSIS — C90.00 MULTIPLE MYELOMA NOT HAVING ACHIEVED REMISSION (MULTI): ICD-10-CM

## 2024-11-11 LAB
ALBUMIN SERPL BCP-MCNC: 3.6 G/DL (ref 3.4–5)
ALP SERPL-CCNC: 42 U/L (ref 33–136)
ALT SERPL W P-5'-P-CCNC: 10 U/L (ref 7–45)
ANION GAP SERPL CALC-SCNC: 11 MMOL/L (ref 10–20)
AST SERPL W P-5'-P-CCNC: 18 U/L (ref 9–39)
BASOPHILS # BLD AUTO: 0.02 X10*3/UL (ref 0–0.1)
BASOPHILS NFR BLD AUTO: 0.5 %
BILIRUB SERPL-MCNC: 0.4 MG/DL (ref 0–1.2)
BUN SERPL-MCNC: 24 MG/DL (ref 6–23)
CALCIUM SERPL-MCNC: 9 MG/DL (ref 8.6–10.3)
CHLORIDE SERPL-SCNC: 108 MMOL/L (ref 98–107)
CO2 SERPL-SCNC: 25 MMOL/L (ref 21–32)
CREAT SERPL-MCNC: 0.94 MG/DL (ref 0.5–1.05)
EGFRCR SERPLBLD CKD-EPI 2021: 61 ML/MIN/1.73M*2
EOSINOPHIL # BLD AUTO: 0.03 X10*3/UL (ref 0–0.4)
EOSINOPHIL NFR BLD AUTO: 0.8 %
ERYTHROCYTE [DISTWIDTH] IN BLOOD BY AUTOMATED COUNT: 14 % (ref 11.5–14.5)
GLUCOSE SERPL-MCNC: 92 MG/DL (ref 74–99)
HCT VFR BLD AUTO: 27.9 % (ref 36–46)
HGB BLD-MCNC: 9 G/DL (ref 12–16)
IMM GRANULOCYTES # BLD AUTO: 0.05 X10*3/UL (ref 0–0.5)
IMM GRANULOCYTES NFR BLD AUTO: 1.3 % (ref 0–0.9)
LYMPHOCYTES # BLD AUTO: 0.34 X10*3/UL (ref 0.8–3)
LYMPHOCYTES NFR BLD AUTO: 8.6 %
MCH RBC QN AUTO: 34.4 PG (ref 26–34)
MCHC RBC AUTO-ENTMCNC: 32.3 G/DL (ref 32–36)
MCV RBC AUTO: 107 FL (ref 80–100)
MONOCYTES # BLD AUTO: 0.35 X10*3/UL (ref 0.05–0.8)
MONOCYTES NFR BLD AUTO: 8.9 %
NEUTROPHILS # BLD AUTO: 3.16 X10*3/UL (ref 1.6–5.5)
NEUTROPHILS NFR BLD AUTO: 79.9 %
NRBC BLD-RTO: 0 /100 WBCS (ref 0–0)
PLATELET # BLD AUTO: 198 X10*3/UL (ref 150–450)
POTASSIUM SERPL-SCNC: 4 MMOL/L (ref 3.5–5.3)
PROT SERPL-MCNC: 5.9 G/DL (ref 6.4–8.2)
RBC # BLD AUTO: 2.62 X10*6/UL (ref 4–5.2)
SODIUM SERPL-SCNC: 140 MMOL/L (ref 136–145)
WBC # BLD AUTO: 4 X10*3/UL (ref 4.4–11.3)

## 2024-11-11 PROCEDURE — 2500000004 HC RX 250 GENERAL PHARMACY W/ HCPCS (ALT 636 FOR OP/ED): Performed by: INTERNAL MEDICINE

## 2024-11-11 PROCEDURE — 96417 CHEMO IV INFUS EACH ADDL SEQ: CPT

## 2024-11-11 PROCEDURE — 85025 COMPLETE CBC W/AUTO DIFF WBC: CPT

## 2024-11-11 PROCEDURE — 2500000005 HC RX 250 GENERAL PHARMACY W/O HCPCS: Performed by: INTERNAL MEDICINE

## 2024-11-11 PROCEDURE — 80053 COMPREHEN METABOLIC PANEL: CPT

## 2024-11-11 PROCEDURE — 96413 CHEMO IV INFUSION 1 HR: CPT

## 2024-11-11 RX ORDER — ALBUTEROL SULFATE 0.83 MG/ML
3 SOLUTION RESPIRATORY (INHALATION) AS NEEDED
Status: DISCONTINUED | OUTPATIENT
Start: 2024-11-11 | End: 2024-11-11 | Stop reason: HOSPADM

## 2024-11-11 RX ORDER — DIPHENHYDRAMINE HYDROCHLORIDE 50 MG/ML
50 INJECTION INTRAMUSCULAR; INTRAVENOUS AS NEEDED
Status: DISCONTINUED | OUTPATIENT
Start: 2024-11-11 | End: 2024-11-11 | Stop reason: HOSPADM

## 2024-11-11 RX ORDER — PROCHLORPERAZINE EDISYLATE 5 MG/ML
10 INJECTION INTRAMUSCULAR; INTRAVENOUS EVERY 6 HOURS PRN
Status: DISCONTINUED | OUTPATIENT
Start: 2024-11-11 | End: 2024-11-11 | Stop reason: HOSPADM

## 2024-11-11 RX ORDER — PROCHLORPERAZINE MALEATE 10 MG
10 TABLET ORAL EVERY 6 HOURS PRN
Status: DISCONTINUED | OUTPATIENT
Start: 2024-11-11 | End: 2024-11-11 | Stop reason: HOSPADM

## 2024-11-11 RX ORDER — FAMOTIDINE 10 MG/ML
20 INJECTION INTRAVENOUS ONCE AS NEEDED
Status: DISCONTINUED | OUTPATIENT
Start: 2024-11-11 | End: 2024-11-11 | Stop reason: HOSPADM

## 2024-11-11 RX ORDER — EPINEPHRINE 0.3 MG/.3ML
0.3 INJECTION SUBCUTANEOUS EVERY 5 MIN PRN
Status: DISCONTINUED | OUTPATIENT
Start: 2024-11-11 | End: 2024-11-11 | Stop reason: HOSPADM

## 2024-11-11 ASSESSMENT — PAIN SCALES - GENERAL: PAINLEVEL_OUTOF10: 0-NO PAIN

## 2024-11-12 ENCOUNTER — APPOINTMENT (OUTPATIENT)
Dept: DERMATOLOGY | Facility: CLINIC | Age: 84
End: 2024-11-12
Payer: MEDICARE

## 2024-11-12 DIAGNOSIS — C44.629 SQUAMOUS CELL CANCER OF SKIN OF LEFT FOREARM: ICD-10-CM

## 2024-11-12 PROCEDURE — 13121 CMPLX RPR S/A/L 2.6-7.5 CM: CPT | Performed by: DERMATOLOGY

## 2024-11-12 PROCEDURE — 17313 MOHS 1 STAGE T/A/L: CPT | Performed by: DERMATOLOGY

## 2024-11-12 NOTE — PROGRESS NOTES
Mohs Surgery Operative Note    Date of Surgery:  11/12/2024  Surgeon:  Sophia Vann MD  Office Location:  [address] [facility]  [address]  [address]  [facility] [address]  Dept: [phone]  Dept Fax: [phone]  Referring Provider: Esequiel Stiles, APRN-CNP  [address] [facility] [address]  [place] [address]      Assessment/Plan   Pre-procedure:   Obtained informed consent: written from patient  The surgical site was identified and confirmed with the patient.     Intra-operative:   Audible time out called at : 9:09AM 11/12/24  by: Teresa Danielle RN   Verified patient name, birthdate, site, specimen bottle label & requisition.    The planned procedure(s) was again reviewed with the patient. The risks of bleeding, infection, nerve damage and scarring were reviewed. Written authorization was obtained. The patient identity, surgical site, and planned procedure(s) were verified. The provider acted as both surgeon and pathologist.     Squamous cell cancer of skin of left forearm  Left Forearm - Posterior  Mohs surgery  Consent obtained: written    Universal Protocol:  Procedure explained and questions answered to patient or proxy's satisfaction: Yes    Test results available and properly labeled: Yes    Pathology report reviewed: Yes    External notes reviewed: Yes    Photo or diagram used for site identification: Yes    Site/side marked: Yes    Slide independently reviewed by Mohs surgeon: Yes    Immediately prior to procedure a time out was called: Yes    Patient identity confirmed: verbally with patient  Preparation: Patient was prepped and draped in usual sterile fashion      Anticoagulation:  Is the patient taking prescription anticoagulant and/or aspirin prescribed/recommended by a physician? Yes    Was the anticoagulation regimen changed prior to Mohs? No      Anesthesia:  Anesthesia method: local infiltration  Local anesthetic: lidocaine 2% WITH epi    Procedure Details:  Case ID  Number: [id number]  Biopsy accession number: [id number]  Date of biopsy: 10/15/2024  Pre-Op diagnosis: squamous cell carcinoma  SCC subtype: moderately differentiated (invasive)  Surgical site (from skin exam): Left Forearm - Posterior  Pre-operative length (cm): 4  Pre-operative width (cm): 2.1  Indications for Mohs surgery: tumor size greater than 2 cm and aggressive histology    Micrographic Surgery Details:  Post-operative length (cm): 4  Post-operative width (cm): 3.7  Number of Mohs stages: 2    Stage 1  Comments: The patient was brought into the operating room and placed in the procedure chair in the appropriate position.  The area positive by previous biopsy was identified and confirmed with the patient. The area of clinically obvious tumor was debulked using a curette and/or scalpel as needed. An incision was made following the Mohs approach through the skin. The specimen was taken to the lab, divided into 4 piece(s) and appropriately chromacoded and processed.  Tumor features identified on Mohs section: no tumor identified  Depth of defect: subcutaneous fat  Patient tolerance of procedure: tolerated well, no immediate complications    Reconstruction:  Was the defect reconstructed? Yes    Was reconstruction performed by the same Mohs surgeon? Yes    Setting of reconstruction: outpatient office  When was reconstruction performed? same day  Type of reconstruction: linear  Linear reconstruction: complex  Length of linear repair (cm): 7  Subcutaneous Layers (Deep Stitches)   Suture size:  3-0  Suture type:  Vicryl  Stitches:  Buried vertical mattress  Fine/surface layer approximation (top stitches)   Epidermal/Superficial suture size:  5-0  Epidermal/Superficial suture type:  Fast-absorbing gut  Stitches: simple running    Hemostasis achieved with: electrodesiccation  Outcome: patient tolerated procedure well with no complications    Post-procedure details: sterile dressing applied and wound care instructions  given    Dressing type: Ace wrap, pressure dressing, petrolatum, Hypafix and Telfa pad      Complex Linear Repair - Wide Undermining:  Given the location and size of the defect, it was determined that a complex layered linear closure was required to restore normal anatomy and function. The repair was considered complex because extensive undermining was required and performed. The amount of undermining performed was greater than the maximum width of the defect as measured perpendicular to the closure line along at least one entire edge of the defect. Standing cutaneous cones were removed using Burow's triangles. The wound edges were brought into close approximation with buried vertical mattress sutures. The remainder of the wound was then closed with epidermal top sutures.    The final repair measured 7 cm              Wound care was discussed, and the patient was given written post-operative wound care instructions.      The patient will follow up with Sophia Vann MD as needed for any post operative problems or concerns, and will follow up with their primary dermatologist as scheduled.

## 2024-11-12 NOTE — PROGRESS NOTES
"Office Visit Note  Date: 11/12/2024  Surgeon:  Sophia Vann MD  Office Location:  [address] [facility]  [address]  [address]  [facility] [address]  Dept: [phone]  Dept Fax: [phone]  Referring Provider: Esequiel Stiles, APRN-CNP  [address]  [address]  [place] [address]    Subjective   Sil ALBA West \"Sherie\" is a 84 y.o. female who presents for the following: MOHS Surgery    According to the patient, the lesion has been present for approximately 1 month at the time of diagnosis.  The lesion is itchy.  The lesion has not been treated previously.    The patient does not have a pacemaker / defibrillator.  The patient does not have a heart valve / joint replacement.    The patient is on blood thinners.  The patient does not have a history of hepatitis B or C.  The patient does not have a history of HIV.  The patient does have a history of immunosuppression (e.g. organ transplantation, malignancy, medications)    Review of Systems:  No other skin or systemic complaints other than what is documented elsewhere in the note.    MEDICAL HISTORY: clinically relevant history including significant past medical history, medications and allergies was reviewed and documented in Epic.    Objective   Well appearing patient in no apparent distress; mood and affect are within normal limits.  Vital signs: See record.  Noted on the Left Forearm - Posterior  Is a 4.0 x 2.1 cm scar      The patient confirmed the identified site.    Discussion:  The nature of the diagnosis was explained. The lesion is a skin cancer.  It has a risk of local growth and distant spread. The condition is associated with sun exposure.  Warning signs of non-melanoma skin cancer discussed. Patient was instructed to perform monthly self skin examination.  We recommended that the patient have regular full skin exams given an increased risk of subsequent skin cancers. The patient was instructed to use sun protective " behaviors including use of broad spectrum sunscreens and sun protective clothing to reduce risk of skin cancers.      Risks, benefits, side effects of Mohs surgery were discussed with patient and the patient voiced understanding.  It was explained that even though the cure rate of Mohs is very high it is not 100%. Risks of surgery including but not limited to bleeding, infection, numbness, nerve damage, and scar were reviewed.  Discussion included wound care requirements, activity restrictions, likely scar outcome and time to heal.     After Mohs surgery, the defect may need to be repaired surgically and the scar may be longer than the original lesion.  Reconstruction options, risks, and benefits were reviewed including second intention healing, linear repair (4-1 ratio was explained), local flaps, skin grafts, cartilage grafts and interpolation flaps (the need for multiple surgeries was explained). Possible outcomes were reviewed including likely scar appearance, failure of flap survival, infection, bleeding and the need for revision surgery.     The pathology was reviewed, the photograph was reviewed, and the referring physician's note was reviewed.    Patient elected for Mohs surgery.

## 2024-11-13 ENCOUNTER — OFFICE VISIT (OUTPATIENT)
Dept: PRIMARY CARE | Facility: CLINIC | Age: 84
End: 2024-11-13
Payer: MEDICARE

## 2024-11-13 VITALS
RESPIRATION RATE: 18 BRPM | TEMPERATURE: 96.7 F | SYSTOLIC BLOOD PRESSURE: 130 MMHG | OXYGEN SATURATION: 98 % | HEART RATE: 85 BPM | WEIGHT: 157 LBS | DIASTOLIC BLOOD PRESSURE: 68 MMHG | HEIGHT: 61 IN | BODY MASS INDEX: 29.64 KG/M2

## 2024-11-13 DIAGNOSIS — I10 PRIMARY HYPERTENSION: ICD-10-CM

## 2024-11-13 DIAGNOSIS — C90.00 MULTIPLE MYELOMA NOT HAVING ACHIEVED REMISSION (MULTI): ICD-10-CM

## 2024-11-13 PROCEDURE — 1125F AMNT PAIN NOTED PAIN PRSNT: CPT | Performed by: NURSE PRACTITIONER

## 2024-11-13 PROCEDURE — 99213 OFFICE O/P EST LOW 20 MIN: CPT | Performed by: NURSE PRACTITIONER

## 2024-11-13 PROCEDURE — 3078F DIAST BP <80 MM HG: CPT | Performed by: NURSE PRACTITIONER

## 2024-11-13 PROCEDURE — 1159F MED LIST DOCD IN RCRD: CPT | Performed by: NURSE PRACTITIONER

## 2024-11-13 PROCEDURE — 3075F SYST BP GE 130 - 139MM HG: CPT | Performed by: NURSE PRACTITIONER

## 2024-11-13 PROCEDURE — 99417 PROLNG OP E/M EACH 15 MIN: CPT | Performed by: NURSE PRACTITIONER

## 2024-11-13 RX ORDER — AMLODIPINE BESYLATE 5 MG/1
5 TABLET ORAL DAILY
Qty: 90 TABLET | Refills: 3 | Status: SHIPPED | OUTPATIENT
Start: 2024-11-13 | End: 2025-11-08

## 2024-11-13 RX ORDER — ATORVASTATIN CALCIUM 20 MG/1
20 TABLET, FILM COATED ORAL DAILY
Qty: 90 TABLET | Refills: 3 | Status: SHIPPED | OUTPATIENT
Start: 2024-11-13 | End: 2025-11-13

## 2024-11-13 ASSESSMENT — ENCOUNTER SYMPTOMS
CARDIOVASCULAR NEGATIVE: 1
GASTROINTESTINAL NEGATIVE: 1
RESPIRATORY NEGATIVE: 1
CONSTITUTIONAL NEGATIVE: 1
MUSCULOSKELETAL NEGATIVE: 1

## 2024-11-13 ASSESSMENT — PATIENT HEALTH QUESTIONNAIRE - PHQ9
1. LITTLE INTEREST OR PLEASURE IN DOING THINGS: NOT AT ALL
2. FEELING DOWN, DEPRESSED OR HOPELESS: NOT AT ALL
SUM OF ALL RESPONSES TO PHQ9 QUESTIONS 1 AND 2: 0

## 2024-11-13 ASSESSMENT — PAIN SCALES - GENERAL: PAINLEVEL_OUTOF10: 2

## 2024-11-13 NOTE — PROGRESS NOTES
"Chief Complaint  Sil West \"Sherie\" is a 84 y.o. female presenting for \"Headache (Pt states she has been having  head throbbing when she lays down. Was told by hem/onc to see pcp).\"    HPI     Sil West \"Sherie\" is a 84 y.o. female presenting for a throbbing pain in her head, she does have MM she does chemo three weeks and has one weeks off,oncology did not think it was a side effect she has been getting chemo for two years.  They said she is doing well, pain is worse when she was laying down, she has not had the pain in three weeks.   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    Encounter for antineoplastic chemotherapy 11/25/2024    Hypogammaglobulinemia (Multi) 09/20/2024    CKD stage 3a, GFR 45-59 ml/min (Multi) 05/21/2024    Hyperglycemia 05/21/2024    Impacted cerumen, right ear 05/21/2024    Skin lesion of right arm 03/22/2024    Other chronic pain 03/22/2024    Back pain 03/22/2024    Other constipation 11/28/2023    At risk for osteopenia 10/05/2023    Immunosuppressed due to chemotherapy 10/05/2023    Thyroid nodule 10/05/2023    Bilateral carpal tunnel syndrome 09/26/2023    Chronic right SI joint pain 09/26/2023    Hearing difficulty of both ears 09/26/2023    Hyperlipidemia, unspecified 09/26/2023    Lesion of pelvic bone 09/26/2023    Lumbar spondylosis 09/26/2023    Neuropathy 09/26/2023    Osteoarthritis of left knee 09/26/2023    Paresthesia of skin 09/26/2023    Polymyalgia rheumatica (Multi) 09/26/2023    Vitamin D deficiency 09/26/2023    Multiple myeloma not having achieved remission (Multi) 09/13/2023    History of SCC (squamous cell carcinoma) of skin 03/21/2014    Hx of basal cell carcinoma 03/21/2014    "     Medications  Current Outpatient Medications   Medication Instructions    acetaminophen (TylenoL) 325 mg tablet 2 tablets, Every 4 hours PRN    acyclovir (ZOVIRAX) 400 mg, oral, 2 times daily    amLODIPine (NORVASC) 5 mg, oral, Daily    aspirin 81 mg, Daily    atorvastatin (LIPITOR) 20 mg, oral, Daily    cholecalciferol, vitamin D3, 25 mcg (1,000 unit) tablet,chewable 2 tablets, Daily    dexAMETHasone (DECADRON) 20 mg, oral, Every 28 days, Take on day 22 of 28 day treatment cycle (on the day that you are not getting IV treatment). First dose 8/26/24.    ondansetron (ZOFRAN) 8 mg, oral, Every 8 hours PRN    prochlorperazine (COMPAZINE) 10 mg, oral, Every 6 hours PRN        Surgical History  She has a past surgical history that includes CT guided percutaneous biopsy bone deep (01/06/2023) and Other surgical history.     Social History  She reports that she has never smoked. She has never been exposed to tobacco smoke. She has never used smokeless tobacco. She reports that she does not drink alcohol and does not use drugs.    Family History  Family History   Problem Relation Name Age of Onset    Alzheimer's disease Mother      Colon cancer Father      Rashes / Skin problems Sister      Rashes / Skin problems Brother          Allergies  Lobster and Shellfish derived    ROS  Review of Systems   Constitutional: Negative.    Respiratory: Negative.     Cardiovascular: Negative.    Gastrointestinal: Negative.    Genitourinary: Negative.    Musculoskeletal: Negative.         Last Recorded Vitals  /68 (BP Location: Right arm, Patient Position: Sitting, BP Cuff Size: Adult)   Pulse 85   Temp 35.9 °C (96.7 °F)   Resp 18   Wt 71.2 kg (157 lb)   SpO2 98%     Physical Exam  Vitals and nursing note reviewed.   Constitutional:       Appearance: Normal appearance.   Cardiovascular:      Rate and Rhythm: Normal rate and regular rhythm.      Pulses: Normal pulses.      Heart sounds: Normal heart sounds.   Pulmonary:       "Effort: Pulmonary effort is normal.      Breath sounds: Normal breath sounds.   Neurological:      Mental Status: She is alert.         Relevant Results      Assessment/Plan   Sil \"Sherie\" was seen today for headache.  Diagnoses and all orders for this visit:  Secondary malignant neoplasm of bone (Multi) (Primary)  Comments:  Being actively treated by oncology  Multiple myeloma not having achieved remission (Multi)  -     atorvastatin (Lipitor) 20 mg tablet; Take 1 tablet (20 mg) by mouth once daily.  Primary hypertension  -     amLODIPine (Norvasc) 5 mg tablet; Take 1 tablet (5 mg) by mouth once daily.  Polymyalgia rheumatica (Multi)  Comments:  Sees Rheum          COUNSELING      Medication education:              Education:  The patient is counseled regarding potential side-effects of any and all new medications             Understanding:  Patient expressed understanding             Adherence:  No barriers to adherence identified        Terra Baires, APRN-CNP         "

## 2024-11-19 ENCOUNTER — APPOINTMENT (OUTPATIENT)
Dept: DERMATOLOGY | Facility: CLINIC | Age: 84
End: 2024-11-19
Payer: MEDICARE

## 2024-11-19 VITALS — DIASTOLIC BLOOD PRESSURE: 90 MMHG | SYSTOLIC BLOOD PRESSURE: 168 MMHG | HEART RATE: 111 BPM

## 2024-11-19 DIAGNOSIS — C44.629 SQUAMOUS CELL CANCER OF SKIN OF LEFT HAND: ICD-10-CM

## 2024-11-19 PROCEDURE — 17312 MOHS ADDL STAGE: CPT | Performed by: DERMATOLOGY

## 2024-11-19 PROCEDURE — 17311 MOHS 1 STAGE H/N/HF/G: CPT | Performed by: DERMATOLOGY

## 2024-11-19 NOTE — PROGRESS NOTES
Mohs Surgery Operative Note    Date of Surgery:  11/19/2024  Surgeon:  Sophia Vann MD  Office Location:  [address] [facility]  [address]  [address]  [facility] [address]  Dept: [phone]  Dept Fax: [phone]  Referring Provider: Esequiel Stiles, APRN-CNP  [address] [facility] [address]  [place] [address]      Assessment/Plan   Pre-procedure:   Obtained informed consent: written from patient  The surgical site was identified and confirmed with the patient.     Intra-operative:   Audible time out called at : 9:00 AM 11/19/24  by: Sigrid Seay RN   Verified patient name, birthdate, site, specimen bottle label & requisition.    The planned procedure(s) was again reviewed with the patient. The risks of bleeding, infection, nerve damage and scarring were reviewed. Written authorization was obtained. The patient identity, surgical site, and planned procedure(s) were verified. The provider acted as both surgeon and pathologist.     Squamous cell cancer of skin of left hand  Left 3rd Finger Metacarpophalangeal Joint  Mohs surgery  Consent obtained: written    Universal Protocol:  Procedure explained and questions answered to patient or proxy's satisfaction: Yes    Test results available and properly labeled: Yes    Pathology report reviewed: Yes    External notes reviewed: Yes    Photo or diagram used for site identification: Yes    Site/side marked: Yes    Slide independently reviewed by Mohs surgeon: Yes    Immediately prior to procedure a time out was called: Yes    Patient identity confirmed: verbally with patient  Preparation: Patient was prepped and draped in usual sterile fashion      Anticoagulation:  Is the patient taking prescription anticoagulant and/or aspirin prescribed/recommended by a physician? No    Was the anticoagulation regimen changed prior to Mohs? No      Anesthesia:  Anesthesia method: local infiltration  Local anesthetic: lidocaine 2% WITH epi    Procedure  Details:  Case ID Number: [id number]  Biopsy accession number: [id number]  Date of biopsy: 10/15/2024  Pre-Op diagnosis: squamous cell carcinoma  Surgical site (from skin exam): Left 3rd Finger Metacarpophalangeal Joint  Pre-operative length (cm): 0.8  Pre-operative width (cm): 0.8  Indications for Mohs surgery: anatomic location where tissue conservation is critical  Previously treated? No      Micrographic Surgery Details:  Post-operative length (cm): 2  Post-operative width (cm): 1.3  Number of Mohs stages: 4    Stage 1     Comments: The patient was brought into the operating room and placed in the procedure chair in the appropriate position.  The area positive by previous biopsy was identified and confirmed with the patient. The area of clinically obvious tumor was debulked using a curette and/or scalpel as needed. An incision was made following the Mohs approach through the skin. The specimen was taken to the lab, divided into 1 piece(s) and appropriately chromacoded and processed.  Tumor was seen on the lateral margins as indicated on the on the Mohs map.  Squamous cell carcinoma in situ. Histologic examination revealed enlarged, atypical keratinocytes with large nuclear to cytoplasmic ratio extending throughout the full thickness of an epidermis.   Tumor features identified on Mohs section: squamous cell carcinoma      Depth of invasion: Epidermis    Stage 2     Comments: The area of positivity as noted on the Mohs map in the previous stage was identified and removed using the Mohs technique. The specimen was taken to the lab and appropriately chromacoded and processed in 1 piece(s).  Tumor was seen on the lateral margins as indicated on the on the Mohs map.  Squamous cell carcinoma in situ. Histologic examination revealed enlarged, atypical keratinocytes with large nuclear to cytoplasmic ratio extending throughout the full thickness of an epidermis.   Tumor features identified on Mohs section: squamous cell  carcinoma     Depth of invasion: Epidermis    Stage 3     Comments: The area of positivity as noted on the Mohs map in the previous stage was identified and removed using the Mohs technique. The specimen was taken to the lab and appropriately chromacoded and processed in 1 piece(s).  Tumor was seen on the lateral margins as indicated on the on the Mohs map.  Squamous cell carcinoma in situ. Histologic examination revealed enlarged, atypical keratinocytes with large nuclear to cytoplasmic ratio extending throughout the full thickness of an epidermis.   Tumor features identified on Mohs section: squamous cell carcinoma     Depth of invasion: Epidermis    Stage 4     Comments: The area of positivity as noted on the Mohs map in the previous stage was identified and removed using the Mohs technique. The specimen was taken to the lab and appropriately chromacoded and processed in 1 piece(s).  Tumor features identified on Mohs section: no tumor identified  Depth of defect: subcutaneous fat    Patient tolerance of procedure: tolerated well, no immediate complications    Reconstruction:  Was the defect reconstructed?: No   Repair: After a discussion with the patient regarding the options for wound closure, a decision was made to proceed with second intention healing.  Dressing/Follow-up: Surgifoam was placed in the wound. A pressure dressing was placed to help stabilize the wound and to minimize the risk of postoperative bleeding. Wound care was discussed, and the patient was given written post-operative wound care instructions.  Hemostasis achieved with: electrodesiccation  Outcome: patient tolerated procedure well with no complications    Post-procedure details: sterile dressing applied and wound care instructions given    Dressing type: Gelfoam, Telfa pad, pressure dressing and Hypafix      The final repair measured 2 x 1.3 cm                Wound care was discussed, and the patient was given written post-operative wound  care instructions.      The patient will follow up with Sophia Vann MD as needed for any post operative problems or concerns, and will follow up with their primary dermatologist as scheduled.

## 2024-11-22 ENCOUNTER — APPOINTMENT (OUTPATIENT)
Dept: DERMATOLOGY | Facility: CLINIC | Age: 84
End: 2024-11-22
Payer: MEDICARE

## 2024-11-22 DIAGNOSIS — L82.1 SEBORRHEIC KERATOSIS: ICD-10-CM

## 2024-11-22 DIAGNOSIS — L57.0 ACTINIC KERATOSIS: ICD-10-CM

## 2024-11-22 DIAGNOSIS — D18.01 ANGIOMA OF SKIN: ICD-10-CM

## 2024-11-22 DIAGNOSIS — D48.5 NEOPLASM OF UNCERTAIN BEHAVIOR OF SKIN: ICD-10-CM

## 2024-11-22 DIAGNOSIS — L81.4 LENTIGO: ICD-10-CM

## 2024-11-22 DIAGNOSIS — Z85.828 HISTORY OF NONMELANOMA SKIN CANCER: ICD-10-CM

## 2024-11-22 DIAGNOSIS — L90.5 SCAR CONDITIONS AND FIBROSIS OF SKIN: ICD-10-CM

## 2024-11-22 DIAGNOSIS — L57.9 SKIN CHANGES DUE TO CHRONIC EXPOSURE TO NONIONIZING RADIATION: ICD-10-CM

## 2024-11-22 PROCEDURE — 99213 OFFICE O/P EST LOW 20 MIN: CPT | Performed by: NURSE PRACTITIONER

## 2024-11-22 PROCEDURE — 11102 TANGNTL BX SKIN SINGLE LES: CPT | Performed by: NURSE PRACTITIONER

## 2024-11-22 PROCEDURE — 1036F TOBACCO NON-USER: CPT | Performed by: NURSE PRACTITIONER

## 2024-11-22 PROCEDURE — 1159F MED LIST DOCD IN RCRD: CPT | Performed by: NURSE PRACTITIONER

## 2024-11-22 PROCEDURE — 17003 DESTRUCT PREMALG LES 2-14: CPT | Performed by: NURSE PRACTITIONER

## 2024-11-22 PROCEDURE — 17000 DESTRUCT PREMALG LESION: CPT | Performed by: NURSE PRACTITIONER

## 2024-11-22 PROCEDURE — 1160F RVW MEDS BY RX/DR IN RCRD: CPT | Performed by: NURSE PRACTITIONER

## 2024-11-22 NOTE — PATIENT INSTRUCTIONS

## 2024-11-22 NOTE — PROGRESS NOTES
Subjective     Sherie West is a 84 y.o. female who presents for the following: Skin Check.     Review of Systems:  No other skin or systemic complaints other than what is documented elsewhere in the note.    The following portions of the chart were reviewed this encounter and updated as appropriate:   Tobacco  Allergies  Meds  Problems  Med Hx  Surg Hx         Skin Cancer History  Biopsy Date Type Location Status   5/22/24 SCC Left Dorsal Hand Refer Mohs/Surgeon  11/19/24   10/15/24 SCC Left 3rd Finger Proximal Interphalangeal Joint Refer Mohs/Surgeon  11/19/24   10/15/24 SCC Left 3rd Finger Metacarpophalangeal Joint Refer Mohs/Surgeon  11/19/24   10/15/24 SCC Left Forearm - Posterior Refer Mohs/Surgeon  11/19/24   10/15/24 SCC Right Forearm - Posterior Refer Mohs/Surgeon       Specialty Problems          Dermatology Problems    History of SCC (squamous cell carcinoma) of skin    Hx of basal cell carcinoma    Skin lesion of right arm        Objective   Well appearing patient in no apparent distress; mood and affect are within normal limits.    A full examination was performed including scalp, head, eyes, ears, nose, lips, neck, chest, axillae, abdomen, back, buttocks, bilateral upper extremities, bilateral lower extremities, hands, feet, fingers, toes, fingernails, and toenails. All findings within normal limits unless otherwise noted below.      Assessment/Plan   1. Neoplasm of uncertain behavior of skin  Mid Tip of Nose  5 mm red crusty papule with semi cutaneous horn              Lesion biopsy  Type of biopsy: tangential    Informed consent: discussed and consent obtained    Timeout: patient name, date of birth, surgical site, and procedure verified    Procedure prep:  Patient was prepped and draped  Anesthesia: the lesion was anesthetized in a standard fashion    Anesthetic:  1% lidocaine plain local infiltration  Instrument used: DermaBlade    Hemostasis achieved with: electrodesiccation    Outcome:  patient tolerated procedure well    Post-procedure details: wound care instructions given    Additional details:  Cleaned area with isopropyl alcohol prior to anesthesia or biopsy. Applied thin layer of vaseline and covered with bandaid after procedure      Staff Communication: Dermatology Local Anesthesia: 1 % Plain Lidocaine - Amount: 0. 5ml    Specimen 1 - Dermatopathology- DERM LAB  Differential Diagnosis: NMSC  Check Margins Yes/No?:    Comments:    Dermpath Lab: Routine Histopathology (formalin-fixed tissue)    NUB  - Given uncertainty in clinical diagnosis, shave biopsy is recommended in clinic today.  - The patient expressed understanding, is in agreement with this plan, and wishes to proceed with biopsy.  - Oral and written wound care instructions provided.  - Advised patient that the office will call within 2 weeks to discuss biopsy results.      Related Procedures  Follow Up In Dermatology - Established Patient    2. Actinic keratosis (8)  Arms, Shins, Neck, Face., Right Buccal Cheek (3), Right Forehead, Right Lower Leg - Posterior, Right Parotid Area, Right Zygomatic Area  Thin erythematous papules with gritty scale    WHAT IS ACTINIC KERATOSIS?   - Actinic keratosis (AK) is a skin condition caused by sun damage. It causes scaly, rough, or bumpy spots on the skin.  - If left alone, AKs may turn into a skin cancer. People who burn easily or have trouble tanning are at more risk for developing AKs.   - There is no one test for AKs and diagnosis is made by clinical appearance. Treatment options include cryotherapy, therapy with lights, and various creams (e.g., topical 5-fluorocuracil, imiquimod).       To lower the chance of getting AK, you can:       ?  Stay out of the sun in the middle of the day (from 10 a.m. to 4 p.m.)       ?  Wear sunscreen - An SPF of at least 30 is best. The SPF number is on the sunscreen bottle or tube.       ?  Wear a wide-brimmed hat, long-sleeved shirt, long pants, or long  skirt outside. A baseball hat does not give much protection.        ?  Do not use tanning beds.        ?  Keep a low-fat diet, less than 21% of calories should come from fat       ?  Take Vitamin B3 (nicotinomide) 500mg twice daily.      YOUR TREATMENT PLAN  - At this time I recommend treatment with cryotherapy.  - Possible side effects of liquid nitrogen treatment reviewed including formation of blisters, crusting, tenderness, scar, and discoloration which may be permanent.  - Patient advised to return the office for re-evaluation if the treated lesion(s) do not resolve within 4-6 weeks. Patient verbalizes understanding.    For efudex, patient is going to start treatment in small sections. For example, treating right forehead BID for 2 weeks then left forehead after finishing right forehead. She will do the forehead over the next month and then will start cheeks/nose after the holidays. Then over the winter we will work on the neck, arms, chest, and shin/calf areas. For the arms and shins/calves will treat with efudex BID x3 weeks.     Destr of lesion - Right Buccal Cheek (3), Right Forehead, Right Lower Leg - Posterior, Right Parotid Area, Right Zygomatic Area  Complexity: simple    Destruction method: cryotherapy    Informed consent: discussed and consent obtained    Timeout:  patient name, date of birth, surgical site, and procedure verified  Lesion destroyed using liquid nitrogen: Yes    Cryotherapy cycles:  2  Outcome: patient tolerated procedure well with no complications    Post-procedure details: wound care instructions given      Related Procedures  Follow Up In Dermatology - Established Patient    3. Angioma of skin  Scattered cherry-red papule(s).    A cherry hemangioma is a small macule (small, flat, smooth area) or papule (small, solid bump) formed from an overgrowth of tiny blood vessels in the skin. Cherry hemangiomas are characteristically red or purplish in color. They often first appear in middle  adulthood and usually increase in number with age. Cherry hemangiomas are noncancerous (benign) and are common in adults.    The present appearance of the lesion is not worrisome but it should continue to be observed and testing/treatment may be warranted if change occurs.    Related Procedures  Follow Up In Dermatology - Established Patient    4. Seborrheic keratosis  Stuck on verrucous, tan-brown papules and plaques.      Seborrheic keratoses are common noncancerous (benign) growths of unknown cause seen in adults due to a thickening of an area of the top skin layer. Seborrheic keratoses may appear as if they are stuck on to the skin. They have distinct borders, and they may appear as papules (small, solid bumps) or plaques (solid, raised patches that are bigger than a thumbnail). They may be the same color as your skin, or they may be pink, light brown, darker brown, or very dark brown, or sometimes may appear black.    There is no way to prevent new seborrheic keratoses from forming. Seborrheic keratoses can be removed, but removal is considered a cosmetic issue and is usually not covered by insurance.    PLAN  No treatment is needed unless there is irritation from clothing, such as itching or bleeding.  2.   Some lotions containing alpha hydroxy acids, salicylic acid, or urea may make the areas feel smoother with regular use but will not eliminate them.    Related Procedures  Follow Up In Dermatology - Established Patient    5. Lentigo  Scattered tan macules in sun-exposed areas.    A solar lentigo (plural, solar lentigines), also known as a sun-induced freckle or senile lentigo, is a dark (hyperpigmented) lesion caused by natural or artificial ultraviolet (UV) light. Solar lentigines may be single or multiple. This type of lentigo is different from a simple lentigo (lentigo simplex) because it is caused by exposure to UV light. Solar lentigines are benign, but they do indicate excessive sun exposure, a risk  factor for the development of skin cancer.    To prevent solar lentigines, avoid exposure to sunlight in midday (10 AM to 3 PM), wear sun-protective clothing (tightly woven clothes and hats), and apply sunscreen (SPF 30 UVA and UVB block).    The present appearance of the lesion is not worrisome but it should continue to be observed and testing/treatment may be warranted if change occurs.    Related Procedures  Follow Up In Dermatology - Established Patient    6. Scar conditions and fibrosis of skin  Well healed scar at the site(s) of prior treatment with no evidence of recurrence.          The scar is clear, there is no evidence of recurrence.  The present appearance of the scar is not worrisome but it should continue to be observed and testing/treatment may be warranted if change occurs.      Related Procedures  Follow Up In Dermatology - Established Patient    7. History of nonmelanoma skin cancer    ABCDEs of melanoma and atypical moles were discussed with the patient.    Patient was instructed to perform monthly self skin examination.  We recommended that the patient have regular full skin exams given an increased risk of subsequent skin cancers.    The patient was instructed to use sun protective behaviors including use of broad spectrum sunscreens and sun protective clothing to reduce risk of skin cancers.    Warning signs of non-melanoma skin cancer discussed.    Related Procedures  Follow Up In Dermatology - Established Patient    8. Skin changes due to chronic exposure to nonionizing radiation  Actinic changes in the form of freckles, lentigines and hyper/hypopigmentation     ABCDEs of melanoma and atypical moles were discussed with the patient.    Patient was instructed to perform monthly self skin examination.  We recommended that the patient have regular full skin exams given an increased risk of subsequent skin cancers.    The patient was instructed to use sun protective behaviors including use of  broad spectrum sunscreens and sun protective clothing to reduce risk of skin cancers.    Warning signs of non-melanoma skin cancer discussed.    Related Procedures  Follow Up In Dermatology - Established Patient        Return to clinic in mid February 2025 s/p efudex and 6 months for routine skin check or sooner if needed.

## 2024-11-24 RX ORDER — PROCHLORPERAZINE EDISYLATE 5 MG/ML
10 INJECTION INTRAMUSCULAR; INTRAVENOUS EVERY 6 HOURS PRN
OUTPATIENT
Start: 2025-01-27

## 2024-11-24 RX ORDER — ALBUTEROL SULFATE 0.83 MG/ML
3 SOLUTION RESPIRATORY (INHALATION) AS NEEDED
OUTPATIENT
Start: 2025-01-27

## 2024-11-24 RX ORDER — DEXAMETHASONE 4 MG/1
20 TABLET ORAL ONCE
OUTPATIENT
Start: 2025-01-20

## 2024-11-24 RX ORDER — FAMOTIDINE 10 MG/ML
20 INJECTION INTRAVENOUS ONCE AS NEEDED
OUTPATIENT
Start: 2025-02-03

## 2024-11-24 RX ORDER — PROCHLORPERAZINE MALEATE 10 MG
10 TABLET ORAL EVERY 6 HOURS PRN
OUTPATIENT
Start: 2025-02-03

## 2024-11-24 RX ORDER — FAMOTIDINE 10 MG/ML
20 INJECTION INTRAVENOUS ONCE AS NEEDED
OUTPATIENT
Start: 2025-01-27

## 2024-11-24 RX ORDER — ONDANSETRON HYDROCHLORIDE 8 MG/1
12 TABLET, FILM COATED ORAL ONCE
OUTPATIENT
Start: 2025-01-20

## 2024-11-24 RX ORDER — PROCHLORPERAZINE MALEATE 10 MG
10 TABLET ORAL EVERY 6 HOURS PRN
OUTPATIENT
Start: 2025-01-20

## 2024-11-24 RX ORDER — ONDANSETRON HYDROCHLORIDE 8 MG/1
12 TABLET, FILM COATED ORAL ONCE
OUTPATIENT
Start: 2025-02-03

## 2024-11-24 RX ORDER — DIPHENHYDRAMINE HYDROCHLORIDE 50 MG/ML
50 INJECTION INTRAMUSCULAR; INTRAVENOUS AS NEEDED
OUTPATIENT
Start: 2025-01-27

## 2024-11-24 RX ORDER — PROCHLORPERAZINE EDISYLATE 5 MG/ML
10 INJECTION INTRAMUSCULAR; INTRAVENOUS EVERY 6 HOURS PRN
OUTPATIENT
Start: 2025-02-03

## 2024-11-24 RX ORDER — ONDANSETRON HYDROCHLORIDE 8 MG/1
12 TABLET, FILM COATED ORAL ONCE
OUTPATIENT
Start: 2025-01-27

## 2024-11-24 RX ORDER — EPINEPHRINE 0.3 MG/.3ML
0.3 INJECTION SUBCUTANEOUS EVERY 5 MIN PRN
OUTPATIENT
Start: 2025-02-03

## 2024-11-24 RX ORDER — ALBUTEROL SULFATE 0.83 MG/ML
3 SOLUTION RESPIRATORY (INHALATION) AS NEEDED
OUTPATIENT
Start: 2025-02-03

## 2024-11-24 RX ORDER — DEXAMETHASONE 4 MG/1
20 TABLET ORAL ONCE
OUTPATIENT
Start: 2025-02-03

## 2024-11-24 RX ORDER — PROCHLORPERAZINE EDISYLATE 5 MG/ML
10 INJECTION INTRAMUSCULAR; INTRAVENOUS EVERY 6 HOURS PRN
OUTPATIENT
Start: 2025-01-20

## 2024-11-24 RX ORDER — DIPHENHYDRAMINE HYDROCHLORIDE 50 MG/ML
50 INJECTION INTRAMUSCULAR; INTRAVENOUS AS NEEDED
OUTPATIENT
Start: 2025-02-03

## 2024-11-24 RX ORDER — PROCHLORPERAZINE MALEATE 10 MG
10 TABLET ORAL EVERY 6 HOURS PRN
OUTPATIENT
Start: 2025-01-27

## 2024-11-24 RX ORDER — DEXAMETHASONE 4 MG/1
20 TABLET ORAL ONCE
OUTPATIENT
Start: 2025-01-27

## 2024-11-24 RX ORDER — EPINEPHRINE 0.3 MG/.3ML
0.3 INJECTION SUBCUTANEOUS EVERY 5 MIN PRN
OUTPATIENT
Start: 2025-01-27

## 2024-11-24 RX ORDER — FAMOTIDINE 10 MG/ML
20 INJECTION INTRAVENOUS ONCE AS NEEDED
OUTPATIENT
Start: 2025-01-20

## 2024-11-24 RX ORDER — EPINEPHRINE 0.3 MG/.3ML
0.3 INJECTION SUBCUTANEOUS EVERY 5 MIN PRN
OUTPATIENT
Start: 2025-01-20

## 2024-11-24 RX ORDER — DIPHENHYDRAMINE HYDROCHLORIDE 50 MG/ML
50 INJECTION INTRAMUSCULAR; INTRAVENOUS AS NEEDED
OUTPATIENT
Start: 2025-01-20

## 2024-11-24 RX ORDER — ALBUTEROL SULFATE 0.83 MG/ML
3 SOLUTION RESPIRATORY (INHALATION) AS NEEDED
OUTPATIENT
Start: 2025-01-20

## 2024-11-24 ASSESSMENT — ENCOUNTER SYMPTOMS
UNEXPECTED WEIGHT CHANGE: 0
DIARRHEA: 1
DIAPHORESIS: 0
NEUROLOGICAL NEGATIVE: 1
APPETITE CHANGE: 0
HEMATURIA: 0
DYSURIA: 0
ADENOPATHY: 0
FATIGUE: 0
BRUISES/BLEEDS EASILY: 1
RESPIRATORY NEGATIVE: 1
BACK PAIN: 1
CHILLS: 0
FEVER: 0
CONSTIPATION: 1

## 2024-11-25 ENCOUNTER — OFFICE VISIT (OUTPATIENT)
Dept: HEMATOLOGY/ONCOLOGY | Facility: HOSPITAL | Age: 84
End: 2024-11-25
Payer: MEDICARE

## 2024-11-25 ENCOUNTER — LAB (OUTPATIENT)
Dept: LAB | Facility: HOSPITAL | Age: 84
End: 2024-11-25
Payer: MEDICARE

## 2024-11-25 ENCOUNTER — INFUSION (OUTPATIENT)
Dept: HEMATOLOGY/ONCOLOGY | Facility: HOSPITAL | Age: 84
End: 2024-11-25
Payer: MEDICARE

## 2024-11-25 VITALS
WEIGHT: 158.29 LBS | DIASTOLIC BLOOD PRESSURE: 67 MMHG | HEART RATE: 90 BPM | OXYGEN SATURATION: 100 % | BODY MASS INDEX: 29.91 KG/M2 | RESPIRATION RATE: 16 BRPM | TEMPERATURE: 97.7 F | SYSTOLIC BLOOD PRESSURE: 144 MMHG

## 2024-11-25 DIAGNOSIS — T45.1X5A IMMUNOSUPPRESSED DUE TO CHEMOTHERAPY: ICD-10-CM

## 2024-11-25 DIAGNOSIS — C90.00 MULTIPLE MYELOMA NOT HAVING ACHIEVED REMISSION (MULTI): Primary | ICD-10-CM

## 2024-11-25 DIAGNOSIS — E04.1 THYROID NODULE: ICD-10-CM

## 2024-11-25 DIAGNOSIS — C90.00 MULTIPLE MYELOMA NOT HAVING ACHIEVED REMISSION (MULTI): ICD-10-CM

## 2024-11-25 DIAGNOSIS — Z79.899 IMMUNOSUPPRESSED DUE TO CHEMOTHERAPY: ICD-10-CM

## 2024-11-25 DIAGNOSIS — D80.1 HYPOGAMMAGLOBULINEMIA (MULTI): ICD-10-CM

## 2024-11-25 DIAGNOSIS — Z51.11 ENCOUNTER FOR ANTINEOPLASTIC CHEMOTHERAPY: ICD-10-CM

## 2024-11-25 DIAGNOSIS — D84.821 IMMUNOSUPPRESSED DUE TO CHEMOTHERAPY: ICD-10-CM

## 2024-11-25 LAB
ALBUMIN SERPL BCP-MCNC: 4 G/DL (ref 3.4–5)
ALP SERPL-CCNC: 49 U/L (ref 33–136)
ALT SERPL W P-5'-P-CCNC: 9 U/L (ref 7–45)
ANION GAP SERPL CALC-SCNC: 12 MMOL/L (ref 10–20)
AST SERPL W P-5'-P-CCNC: 12 U/L (ref 9–39)
BASOPHILS # BLD AUTO: 0.05 X10*3/UL (ref 0–0.1)
BASOPHILS NFR BLD AUTO: 1.1 %
BILIRUB SERPL-MCNC: 0.4 MG/DL (ref 0–1.2)
BUN SERPL-MCNC: 27 MG/DL (ref 6–23)
CALCIUM SERPL-MCNC: 9.8 MG/DL (ref 8.6–10.3)
CHLORIDE SERPL-SCNC: 106 MMOL/L (ref 98–107)
CO2 SERPL-SCNC: 28 MMOL/L (ref 21–32)
CREAT SERPL-MCNC: 1 MG/DL (ref 0.5–1.05)
EGFRCR SERPLBLD CKD-EPI 2021: 56 ML/MIN/1.73M*2
EOSINOPHIL # BLD AUTO: 0.05 X10*3/UL (ref 0–0.4)
EOSINOPHIL NFR BLD AUTO: 1.1 %
ERYTHROCYTE [DISTWIDTH] IN BLOOD BY AUTOMATED COUNT: 13.5 % (ref 11.5–14.5)
GLUCOSE SERPL-MCNC: 93 MG/DL (ref 74–99)
HCT VFR BLD AUTO: 33 % (ref 36–46)
HGB BLD-MCNC: 10.7 G/DL (ref 12–16)
IGA SERPL-MCNC: 8 MG/DL (ref 70–400)
IGG SERPL-MCNC: 606 MG/DL (ref 700–1600)
IGM SERPL-MCNC: 6 MG/DL (ref 40–230)
IMM GRANULOCYTES # BLD AUTO: 0.03 X10*3/UL (ref 0–0.5)
IMM GRANULOCYTES NFR BLD AUTO: 0.6 % (ref 0–0.9)
LYMPHOCYTES # BLD AUTO: 0.42 X10*3/UL (ref 0.8–3)
LYMPHOCYTES NFR BLD AUTO: 8.8 %
MCH RBC QN AUTO: 34.3 PG (ref 26–34)
MCHC RBC AUTO-ENTMCNC: 32.4 G/DL (ref 32–36)
MCV RBC AUTO: 106 FL (ref 80–100)
MONOCYTES # BLD AUTO: 0.45 X10*3/UL (ref 0.05–0.8)
MONOCYTES NFR BLD AUTO: 9.5 %
NEUTROPHILS # BLD AUTO: 3.75 X10*3/UL (ref 1.6–5.5)
NEUTROPHILS NFR BLD AUTO: 78.9 %
NRBC BLD-RTO: 0 /100 WBCS (ref 0–0)
PLATELET # BLD AUTO: 306 X10*3/UL (ref 150–450)
POTASSIUM SERPL-SCNC: 4.4 MMOL/L (ref 3.5–5.3)
PROT SERPL-MCNC: 6 G/DL (ref 6.4–8.2)
PROT SERPL-MCNC: 6.4 G/DL (ref 6.4–8.2)
RBC # BLD AUTO: 3.12 X10*6/UL (ref 4–5.2)
SODIUM SERPL-SCNC: 142 MMOL/L (ref 136–145)
WBC # BLD AUTO: 4.8 X10*3/UL (ref 4.4–11.3)

## 2024-11-25 PROCEDURE — 86334 IMMUNOFIX E-PHORESIS SERUM: CPT

## 2024-11-25 PROCEDURE — 80053 COMPREHEN METABOLIC PANEL: CPT

## 2024-11-25 PROCEDURE — 2500000005 HC RX 250 GENERAL PHARMACY W/O HCPCS: Performed by: INTERNAL MEDICINE

## 2024-11-25 PROCEDURE — 2500000004 HC RX 250 GENERAL PHARMACY W/ HCPCS (ALT 636 FOR OP/ED): Mod: JZ,JG | Performed by: INTERNAL MEDICINE

## 2024-11-25 PROCEDURE — 1126F AMNT PAIN NOTED NONE PRSNT: CPT

## 2024-11-25 PROCEDURE — 82784 ASSAY IGA/IGD/IGG/IGM EACH: CPT

## 2024-11-25 PROCEDURE — 96367 TX/PROPH/DG ADDL SEQ IV INF: CPT

## 2024-11-25 PROCEDURE — 96413 CHEMO IV INFUSION 1 HR: CPT

## 2024-11-25 PROCEDURE — 1036F TOBACCO NON-USER: CPT

## 2024-11-25 PROCEDURE — 84165 PROTEIN E-PHORESIS SERUM: CPT

## 2024-11-25 PROCEDURE — 2500000004 HC RX 250 GENERAL PHARMACY W/ HCPCS (ALT 636 FOR OP/ED): Performed by: INTERNAL MEDICINE

## 2024-11-25 PROCEDURE — 85025 COMPLETE CBC W/AUTO DIFF WBC: CPT

## 2024-11-25 PROCEDURE — 84155 ASSAY OF PROTEIN SERUM: CPT

## 2024-11-25 PROCEDURE — 2500000004 HC RX 250 GENERAL PHARMACY W/ HCPCS (ALT 636 FOR OP/ED)

## 2024-11-25 PROCEDURE — 1160F RVW MEDS BY RX/DR IN RCRD: CPT

## 2024-11-25 PROCEDURE — 36415 COLL VENOUS BLD VENIPUNCTURE: CPT

## 2024-11-25 PROCEDURE — 1159F MED LIST DOCD IN RCRD: CPT

## 2024-11-25 PROCEDURE — 83521 IG LIGHT CHAINS FREE EACH: CPT

## 2024-11-25 PROCEDURE — 96417 CHEMO IV INFUS EACH ADDL SEQ: CPT

## 2024-11-25 PROCEDURE — 99214 OFFICE O/P EST MOD 30 MIN: CPT

## 2024-11-25 PROCEDURE — 99214 OFFICE O/P EST MOD 30 MIN: CPT | Mod: 25

## 2024-11-25 RX ORDER — EPINEPHRINE 0.3 MG/.3ML
0.3 INJECTION SUBCUTANEOUS EVERY 5 MIN PRN
OUTPATIENT
Start: 2024-12-23

## 2024-11-25 RX ORDER — HEPARIN SODIUM,PORCINE/PF 10 UNIT/ML
50 SYRINGE (ML) INTRAVENOUS AS NEEDED
OUTPATIENT
Start: 2024-11-25

## 2024-11-25 RX ORDER — DIPHENHYDRAMINE HYDROCHLORIDE 50 MG/ML
50 INJECTION INTRAMUSCULAR; INTRAVENOUS AS NEEDED
OUTPATIENT
Start: 2024-12-23

## 2024-11-25 RX ORDER — HEPARIN 100 UNIT/ML
500 SYRINGE INTRAVENOUS AS NEEDED
OUTPATIENT
Start: 2024-11-25

## 2024-11-25 RX ORDER — FAMOTIDINE 10 MG/ML
20 INJECTION INTRAVENOUS ONCE AS NEEDED
OUTPATIENT
Start: 2024-12-23

## 2024-11-25 RX ORDER — ALBUTEROL SULFATE 0.83 MG/ML
3 SOLUTION RESPIRATORY (INHALATION) AS NEEDED
OUTPATIENT
Start: 2024-12-23

## 2024-11-25 ASSESSMENT — PAIN SCALES - GENERAL: PAINLEVEL_OUTOF10: 0-NO PAIN

## 2024-11-25 ASSESSMENT — ENCOUNTER SYMPTOMS: CARDIOVASCULAR NEGATIVE: 1

## 2024-11-25 NOTE — PROGRESS NOTES
"Patient ID: Sil West \"Ines" is a 84 y.o. female.    Treatment:   Oncology History Overview Note   I. Diagnosis (1/2023):  IgG Lambda Multiple Myeloma  Presenting symptoms: Urinary incontinence and diffuse bony pain  II. Workup:  Bone Biopsy (1/6/23):  Plasma cells neoplasm  Repeat Bone Marrow Biopsy (1/26/23):  Hypercellular marrow, 80-90% plasma cells, lambda-restricted  FISH: 1q gain (high risk), trisomy 3  MRI Spine (12/18/22):  Osseous metastases, involvement in mid-cervical, mid-thoracic, and lumbar vertebral bodies, as well as the right iliac bone  PET Scan (1/23):  FDG avidity in right iliac bone, right sacral ala, left posterior 6th rib, and appendicular skeleton  Serum and Urine Studies (1/19-1/28/23):  FKLC 1.57, FLLC 2947.7, K/L ratio 0  IgG 537, IgA 221, IgM 19, b2M 23.4, Hgb 5.7  M protein IgA lambda 0.7 g/dL   U/L  Creatinine: 2.33 (peaked at 3.15)  UPEP: Monoclonal lambda light chain at 386.6 mg/24H  Hepatitis B and C negative  III. Treatment:  Radiation (2/2/23):  Right hip + T6-T8 x 5 fractions (total 2000 cGy)  Induction (12/24/22 - 2/16/23):  Bortezomib and dexamethasone + daratumumab  C1 (1/28/23): Bortezomib 1, 4, 8, 11 + daratumumab x 2 doses  C2 (2/16/23): Weekly dosing of daratumumab and bortezomib (1, 8, 15), with lenalidomide planned when renal function allows  Response Evaluation (5/25/23):  CR with M protein 0.1 (IgG kappa c/w roscoe) and free lyte chain 1.37  C8 Roscoe RVD (Completed 7/6/23):  Transitioned with a VGPR vs CR, ongoing multifocal bone pain  IV. Response Evaluation (7/13/23):  Marked improvement in bony lesions  Pathological fractures in right pelvis and ribs, possible infiltrate in the right lower lobe base, and hypodensity in the right thyroid gland  V. Treatment Adjustment (Cycle 9 and forward): 10/2023  Velcade omitted  Transitioned to a 4-week schedule with Roscoe (day 1) and Revlimid 15 mg days 1-21/28 days     Multiple myeloma not having achieved remission " (Multi)   9/13/2023 Initial Diagnosis    Multiple myeloma not having achieved remission (CMS/HCC)     10/2/2023 - 7/8/2024 Chemotherapy    Daratumumab, Pomalidomide and dexamethasone (oral meds managed outside treatment plan) 28 Day Cycles - Maintenance     8/5/2024 -  Chemotherapy    Carfilzomib and Cyclophosphamide (Weekly) / Dexamethasone, 28 Day Cycles       Past Medical History:  HTN, DLP  pre skin cancers,   diveriticulitis   peripheral artery disease not amenable to surgery    Surgical History:  squamous cell carcinoma removed to left hand and right arm 6/2024  Past Surgical History:   Procedure Laterality Date    CT GUIDED PERCUTANEOUS BIOPSY BONE DEEP  01/06/2023    CT GUIDED PERCUTANEOUS BIOPSY BONE DEEP 1/6/2023 [facility]    OTHER SURGICAL HISTORY      THYROID BIOPSY      Family History:     Family History   Problem Relation Name Age of Onset    Alzheimer's disease Mother      Colon cancer Father      Rashes / Skin problems Sister      Rashes / Skin problems Brother       Social History:  ,  passed away on 10/24/23.  3 grown children (1 daughter, 2 sons). 2 grandchildren.  Has 6 cats.  Enjoyed skiing.  Occupation: retired .  Social History     Tobacco Use    Smoking status: Never     Passive exposure: Never    Smokeless tobacco: Never   Vaping Use    Vaping status: Never Used   Substance Use Topics    Alcohol use: Never    Drug use: Never      -------------------------------------------------------------------------------------------------------  Subjective       Sil West is a 84 year old female with IgG lambda multiple myeloma.  She recently had an increase to her lambda free light chains.  PET imaging done 7/17/24 showed an new soft tissue uptake in right 11th rib and interestingly patient has pain there as well.  Transitioned to carfilzomib, cyclophophamide, dex, cycle 1 8/5/24     Sil presents to the clinic today (11/25/24) unaccompanied for follow up  evaluation and is scheduled to receive C5D1 carfilzomib, cytoxan, and dexamethasone.  Receiving IVIG every month, last received 11/4/24.  Receiving zometa every 3 months, and last received 9/3/24.    Reports that today would have been her 60th wedding anniversary.  Energy level is good.  Staying active by mowing lawn and taking care of leaves.  Appetite is good. Weight is going up a few pounds.  Has diarrhea a few days after getting chemotherapy.  Takes imodium as needed.  Has constipation at times. Chronic back pain, not taking any pain medication.  On Asprin 81 mg daily for DVT prophylaxis.  Bruises easily.  Has Mohs surgery done by Dr. Vann to remove SCC.  In total will have 4 lesion removed, having 1 lesion removed each week.  Did more skin biopsies done at last visit.       Review of Systems   Constitutional:  Negative for appetite change, chills, diaphoresis, fatigue, fever and unexpected weight change.   Respiratory: Negative.     Cardiovascular: Negative.    Gastrointestinal:  Positive for constipation and diarrhea.   Genitourinary:  Positive for bladder incontinence. Negative for dysuria and hematuria.    Musculoskeletal:  Positive for back pain.   Skin: Negative.         Had multiple spots of SCC removed by dermatology.   Neurological: Negative.    Hematological:  Negative for adenopathy. Bruises/bleeds easily (bruises easily).   -------------------------------------------------------------------------------------------------------  Objective   BSA: 1.76 meters squared  /67   Pulse 90   Temp 36.5 °C (97.7 °F)   Resp 16   Wt 71.8 kg (158 lb 4.6 oz)   SpO2 100%   BMI 29.91 kg/m²     Physical Exam  Vitals reviewed.   Constitutional:       Appearance: Normal appearance. She is well-developed and normal weight.   HENT:      Head: Normocephalic and atraumatic.      Nose: Nose normal.   Eyes:      General: No scleral icterus.     Extraocular Movements: Extraocular movements intact.       Conjunctiva/sclera: Conjunctivae normal.      Pupils: Pupils are equal, round, and reactive to light.   Cardiovascular:      Rate and Rhythm: Normal rate and regular rhythm.      Pulses: Normal pulses.      Heart sounds: Normal heart sounds.   Pulmonary:      Effort: Pulmonary effort is normal.      Breath sounds: Normal breath sounds.   Abdominal:      General: Abdomen is flat. Bowel sounds are normal. There is no distension.      Palpations: Abdomen is soft. There is no mass.      Tenderness: There is no abdominal tenderness.   Musculoskeletal:         General: Normal range of motion.      Right lower leg: Edema (non-pitting) present.      Left lower leg: Edema (non-pitting) present.      Comments: No spine tenderness   Lymphadenopathy:      Comments: No lymphadenopathy   Skin:     General: Skin is warm and dry.      Comments: Multiple healing lesion from Mohls procedure and skin biopsies from dermatology to right side of face and left hand.   Neurological:      General: No focal deficit present.      Mental Status: She is alert and oriented to person, place, and time.      Comments: Normal strength and gait   Psychiatric:         Mood and Affect: Mood normal.         Behavior: Behavior normal.         Thought Content: Thought content normal.     Performance Status:  Symptomatic; fully ambulatory  -------------------------------------------------------------------------------------------------------  Assessment and Plan:    Multiple myeloma not having achieved remission (CMS/HCC)  IgG lambda MM    - Currently on daratumamab/Lenalidomide. Continued uptrending of free lambda light chain. M-protein still 0.1g IgG kappa, likely represents daratumumab. Will add weekly dex 10 mg to see if this could deepen her response.     5/13/24:  Continues to have increasing free lambda light chain, M-protein remains at 0.1.  Receiving C20 daratumumab today.  Discussed that free lyte chain continues to increase significantly and  suggested switching revlimid to pomalidomide  4 mg 21/28 days since no other CRAB criteria,  -Taking aspirin 81 mg daily for VTE prophylaxis during treatment with pomalidomide      7/18/24:  Laboratory results show major increase in free lyte chains to 62 mg/dL up from 18 in March 2024, only sx is right rib pain where PET from yesterday shows a new lesion, no other new lesions seen,      Bone marrow biopsy results from 7/22/24 showing limited bone marrow (20% cellularity) with maturing trilineage hematopoiesis, minimal involvement by lambda-restricted plasma cells by flow cytometry. ECHO 8/2/2024 LVEF 60-65%    Started cycle 1 carfilzomib, cyclophosphamide, dexamethasone 8//24 11/25/24:  CBC results stable from today.  [place] free light chain down to 0.43 and lambda free light chain down to 4.56 (10/28/24)  M-protein 0.1 (10/28/24).  Kappa and lambda free light chains and SPEP in process from today.  No concerning findings on physical examination.  Scheduled to receive C5D1 of carfilzomib, cyclophosphamide, and dexamethasone.  Continue dexamethasone 20 mg on day 22 of 28 day cycle.  Follow up visit 12/23/24 for C6.     Ig [place] Free Light Chain   Date Value Ref Range Status   10/28/2024 0.43 0.33 - 1.94 mg/dL Final   10/14/2024 0.31 (L) 0.33 - 1.94 mg/dL Final   09/03/2024 0.46 0.33 - 1.94 mg/dL Final   08/12/2024 0.83 0.33 - 1.94 mg/dL Final   07/22/2024 1.42 0.33 - 1.94 mg/dL Final     Ig Lambda Free Light Chain   Date Value Ref Range Status   10/28/2024 4.56 (H) 0.57 - 2.63 mg/dL Final   10/14/2024 5.06 (H) 0.57 - 2.63 mg/dL Final   09/03/2024 24.22 (H) 0.57 - 2.63 mg/dL Final   08/12/2024 85.03 (H) 0.57 - 2.63 mg/dL Final   07/22/2024 67.22 (H) 0.57 - 2.63 mg/dL Final     Kappa/Lambda Ratio   Date Value Ref Range Status   10/28/2024 0.09 (L) 0.26 - 1.65 Final   10/14/2024 0.06 (L) 0.26 - 1.65 Final   09/03/2024 0.02 (L) 0.26 - 1.65 Final   08/12/2024 0.01 (L) 0.26 - 1.65 Final   07/22/2024 0.02 (L) 0.26 - 1.65  Final     M-PROTEIN 1   Date Value Ref Range Status   10/28/2024 0.1 (H)   g/dL Final   10/14/2024 0.1 (H)   g/dL Final   09/03/2024 0.1 (H)   g/dL Final   08/12/2024 0.1 (H)   g/dL Final   07/22/2024 0.1 (H)   g/dL Final      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  IgG level 386 (10/28/24), IgG level in process from today.  Last received IVIG on 11/4/24, next IVIG due 12/2/24 at [place].      Dysuria:  urinalysis bland, suspect needs urologic evaluation for incontinence    Bone Health:  - Continue zoledronic acid 3.3 mg (renal dosing) every 3 months, received last on 9/3/24.    - Continue vitamin D supplement -- 2000 units PO daily.  Started zometa 3/30/23 will complete 3/2025.     Thyroid nodule  - repeat thyroid US on 2/19/24, 2 nodules noted with FNA recommended.  Thyroid biopsy 4/29/24 showing rare atypical cells from FNA of right mid lobe.  Repeat biopsy was nondiagnostic.  Dr. Magallanes recommended thyroid surgery vs continued observation.  Sil requested to having US monitoring for now.  Last visit with Dr. Magallanes on 9/9/24.  Is scheduled to have thyroid US in 6 months.       Right lower back pain  - seems musculoskeletal  - MRI lumbar spine (3/4/24): degenerative changes, no evidence of progressive disease or fracture.    - MRI thoracic spine (3/12/24): degenerative changes, redemonstration of multifocal marrow signal abnormalities compatible with the given history of multiple myeloma, osseous expansion and epidural extension of neoplasm previously seen have improved, decrease in size and enhancement of previously seen lesions.      Health Care Maintenance:  Vaccines:    Received influenza- vaccine 10/9/24.  Advised to obtain updated covid, RSV, shingrix, and prevnar 20 vaccines at local pharmacy.    RTC:   12/23/24: Follow up visit with provider and due for C6D1 Carfilzomib, Cyclophosphamide, and  Dex.    Plan 2 year course of zometa, continue every 3 months, no sx of ONJ completes course 3/2025.   - ------------------------------------------  Bisi Arboleda, CHANTEL-CNP

## 2024-11-25 NOTE — PROGRESS NOTES
Pt received ordered Dex/Carfilz/CTX, and Zometa without incidence after provider visit. Feels well, denies complaints. Ambulated from clinic with printed copy of upcoming appts, and all concerns addressed. Subsequent appts reviewed in detail, and pt verbalized understanding.

## 2024-11-26 LAB
KAPPA LC SERPL-MCNC: 0.35 MG/DL (ref 0.33–1.94)
KAPPA LC/LAMBDA SER: 0.09 {RATIO} (ref 0.26–1.65)
LABORATORY COMMENT REPORT: NORMAL
LAMBDA LC SERPL-MCNC: 3.71 MG/DL (ref 0.57–2.63)
PATH REPORT.FINAL DX SPEC: NORMAL
PATH REPORT.GROSS SPEC: NORMAL
PATH REPORT.MICROSCOPIC SPEC OTHER STN: NORMAL
PATH REPORT.RELEVANT HX SPEC: NORMAL
PATH REPORT.TOTAL CANCER: NORMAL

## 2024-11-27 ENCOUNTER — TELEPHONE (OUTPATIENT)
Dept: DERMATOLOGY | Facility: CLINIC | Age: 84
End: 2024-11-27
Payer: MEDICARE

## 2024-11-27 LAB
ALBUMIN: 3.8 G/DL (ref 3.4–5)
ALPHA 1 GLOBULIN: 0.3 G/DL (ref 0.2–0.6)
ALPHA 2 GLOBULIN: 0.7 G/DL (ref 0.4–1.1)
BETA GLOBULIN: 0.7 G/DL (ref 0.5–1.2)
GAMMA GLOBULIN: 0.5 G/DL (ref 0.5–1.4)
IMMUNOFIXATION COMMENT: NORMAL
PATH REVIEW - SERUM IMMUNOFIXATION: NORMAL
PATH REVIEW-SERUM PROTEIN ELECTROPHORESIS: NORMAL
PROTEIN ELECTROPHORESIS COMMENT: NORMAL

## 2024-12-02 ENCOUNTER — INFUSION (OUTPATIENT)
Dept: HEMATOLOGY/ONCOLOGY | Facility: CLINIC | Age: 84
End: 2024-12-02
Payer: MEDICARE

## 2024-12-02 VITALS
DIASTOLIC BLOOD PRESSURE: 76 MMHG | TEMPERATURE: 99 F | OXYGEN SATURATION: 96 % | WEIGHT: 157.63 LBS | RESPIRATION RATE: 18 BRPM | SYSTOLIC BLOOD PRESSURE: 142 MMHG | BODY MASS INDEX: 29.78 KG/M2 | HEART RATE: 110 BPM

## 2024-12-02 DIAGNOSIS — D80.1 HYPOGAMMAGLOBULINEMIA (MULTI): ICD-10-CM

## 2024-12-02 DIAGNOSIS — C90.00 MULTIPLE MYELOMA NOT HAVING ACHIEVED REMISSION (MULTI): ICD-10-CM

## 2024-12-02 LAB
ALBUMIN SERPL BCP-MCNC: 3.9 G/DL (ref 3.4–5)
ALP SERPL-CCNC: 50 U/L (ref 33–136)
ALT SERPL W P-5'-P-CCNC: 9 U/L (ref 7–45)
ANION GAP SERPL CALC-SCNC: 12 MMOL/L (ref 10–20)
AST SERPL W P-5'-P-CCNC: 13 U/L (ref 9–39)
BASOPHILS # BLD AUTO: 0.02 X10*3/UL (ref 0–0.1)
BASOPHILS NFR BLD AUTO: 0.3 %
BILIRUB SERPL-MCNC: 0.5 MG/DL (ref 0–1.2)
BUN SERPL-MCNC: 29 MG/DL (ref 6–23)
CALCIUM SERPL-MCNC: 9.4 MG/DL (ref 8.6–10.3)
CHLORIDE SERPL-SCNC: 105 MMOL/L (ref 98–107)
CO2 SERPL-SCNC: 30 MMOL/L (ref 21–32)
CREAT SERPL-MCNC: 1.09 MG/DL (ref 0.5–1.05)
EGFRCR SERPLBLD CKD-EPI 2021: 51 ML/MIN/1.73M*2
EOSINOPHIL # BLD AUTO: 0.04 X10*3/UL (ref 0–0.4)
EOSINOPHIL NFR BLD AUTO: 0.6 %
ERYTHROCYTE [DISTWIDTH] IN BLOOD BY AUTOMATED COUNT: 13.5 % (ref 11.5–14.5)
ERYTHROCYTE [DISTWIDTH] IN BLOOD BY AUTOMATED COUNT: 13.5 % (ref 11.5–14.5)
GLUCOSE SERPL-MCNC: 113 MG/DL (ref 74–99)
HCT VFR BLD AUTO: 32.4 % (ref 36–46)
HCT VFR BLD AUTO: 32.4 % (ref 36–46)
HGB BLD-MCNC: 10.6 G/DL (ref 12–16)
HGB BLD-MCNC: 10.6 G/DL (ref 12–16)
IMM GRANULOCYTES # BLD AUTO: 0.08 X10*3/UL (ref 0–0.5)
IMM GRANULOCYTES NFR BLD AUTO: 1.1 % (ref 0–0.9)
LYMPHOCYTES # BLD AUTO: 0.4 X10*3/UL (ref 0.8–3)
LYMPHOCYTES NFR BLD AUTO: 5.5 %
MCH RBC QN AUTO: 34.6 PG (ref 26–34)
MCH RBC QN AUTO: 34.6 PG (ref 26–34)
MCHC RBC AUTO-ENTMCNC: 32.7 G/DL (ref 32–36)
MCHC RBC AUTO-ENTMCNC: 32.7 G/DL (ref 32–36)
MCV RBC AUTO: 106 FL (ref 80–100)
MCV RBC AUTO: 106 FL (ref 80–100)
MONOCYTES # BLD AUTO: 0.58 X10*3/UL (ref 0.05–0.8)
MONOCYTES NFR BLD AUTO: 8 %
NEUTROPHILS # BLD AUTO: 6.13 X10*3/UL (ref 1.6–5.5)
NEUTROPHILS NFR BLD AUTO: 84.5 %
NRBC BLD-RTO: 0 /100 WBCS (ref 0–0)
NRBC BLD-RTO: 0 /100 WBCS (ref 0–0)
PLATELET # BLD AUTO: 165 X10*3/UL (ref 150–450)
PLATELET # BLD AUTO: 165 X10*3/UL (ref 150–450)
POTASSIUM SERPL-SCNC: 4.3 MMOL/L (ref 3.5–5.3)
PROT SERPL-MCNC: 6 G/DL (ref 6.4–8.2)
RBC # BLD AUTO: 3.06 X10*6/UL (ref 4–5.2)
RBC # BLD AUTO: 3.06 X10*6/UL (ref 4–5.2)
SODIUM SERPL-SCNC: 143 MMOL/L (ref 136–145)
WBC # BLD AUTO: 7.2 X10*3/UL (ref 4.4–11.3)
WBC # BLD AUTO: 7.2 X10*3/UL (ref 4.4–11.3)

## 2024-12-02 PROCEDURE — 2500000005 HC RX 250 GENERAL PHARMACY W/O HCPCS: Performed by: INTERNAL MEDICINE

## 2024-12-02 PROCEDURE — 96366 THER/PROPH/DIAG IV INF ADDON: CPT | Mod: INF

## 2024-12-02 PROCEDURE — 96413 CHEMO IV INFUSION 1 HR: CPT

## 2024-12-02 PROCEDURE — 84075 ASSAY ALKALINE PHOSPHATASE: CPT

## 2024-12-02 PROCEDURE — 2500000004 HC RX 250 GENERAL PHARMACY W/ HCPCS (ALT 636 FOR OP/ED): Mod: JZ,JG

## 2024-12-02 PROCEDURE — 2500000001 HC RX 250 WO HCPCS SELF ADMINISTERED DRUGS (ALT 637 FOR MEDICARE OP): Performed by: INTERNAL MEDICINE

## 2024-12-02 PROCEDURE — 85025 COMPLETE CBC W/AUTO DIFF WBC: CPT

## 2024-12-02 PROCEDURE — 2500000004 HC RX 250 GENERAL PHARMACY W/ HCPCS (ALT 636 FOR OP/ED): Performed by: INTERNAL MEDICINE

## 2024-12-02 PROCEDURE — 96417 CHEMO IV INFUS EACH ADDL SEQ: CPT

## 2024-12-02 PROCEDURE — 96367 TX/PROPH/DG ADDL SEQ IV INF: CPT

## 2024-12-02 RX ORDER — FAMOTIDINE 10 MG/ML
20 INJECTION INTRAVENOUS ONCE AS NEEDED
OUTPATIENT
Start: 2024-12-30

## 2024-12-02 RX ORDER — EPINEPHRINE 0.3 MG/.3ML
0.3 INJECTION SUBCUTANEOUS EVERY 5 MIN PRN
OUTPATIENT
Start: 2024-12-30

## 2024-12-02 RX ORDER — ALBUTEROL SULFATE 0.83 MG/ML
3 SOLUTION RESPIRATORY (INHALATION) AS NEEDED
Status: DISCONTINUED | OUTPATIENT
Start: 2024-12-02 | End: 2024-12-02 | Stop reason: HOSPADM

## 2024-12-02 RX ORDER — EPINEPHRINE 0.3 MG/.3ML
0.3 INJECTION SUBCUTANEOUS EVERY 5 MIN PRN
Status: DISCONTINUED | OUTPATIENT
Start: 2024-12-02 | End: 2024-12-02 | Stop reason: HOSPADM

## 2024-12-02 RX ORDER — DIPHENHYDRAMINE HCL 25 MG
25 CAPSULE ORAL ONCE
Status: COMPLETED | OUTPATIENT
Start: 2024-12-02 | End: 2024-12-02

## 2024-12-02 RX ORDER — DIPHENHYDRAMINE HYDROCHLORIDE 50 MG/ML
50 INJECTION INTRAMUSCULAR; INTRAVENOUS AS NEEDED
Status: DISCONTINUED | OUTPATIENT
Start: 2024-12-02 | End: 2024-12-02 | Stop reason: HOSPADM

## 2024-12-02 RX ORDER — PROCHLORPERAZINE EDISYLATE 5 MG/ML
10 INJECTION INTRAMUSCULAR; INTRAVENOUS EVERY 6 HOURS PRN
Status: DISCONTINUED | OUTPATIENT
Start: 2024-12-02 | End: 2024-12-02 | Stop reason: HOSPADM

## 2024-12-02 RX ORDER — ACETAMINOPHEN 325 MG/1
650 TABLET ORAL ONCE
OUTPATIENT
Start: 2024-12-30

## 2024-12-02 RX ORDER — DIPHENHYDRAMINE HYDROCHLORIDE 50 MG/ML
50 INJECTION INTRAMUSCULAR; INTRAVENOUS AS NEEDED
OUTPATIENT
Start: 2024-12-30

## 2024-12-02 RX ORDER — DIPHENHYDRAMINE HCL 25 MG
25 CAPSULE ORAL ONCE
OUTPATIENT
Start: 2024-12-30

## 2024-12-02 RX ORDER — FAMOTIDINE 10 MG/ML
20 INJECTION INTRAVENOUS ONCE AS NEEDED
Status: DISCONTINUED | OUTPATIENT
Start: 2024-12-02 | End: 2024-12-02 | Stop reason: HOSPADM

## 2024-12-02 RX ORDER — ACETAMINOPHEN 325 MG/1
650 TABLET ORAL ONCE
Status: COMPLETED | OUTPATIENT
Start: 2024-12-02 | End: 2024-12-02

## 2024-12-02 RX ORDER — ALBUTEROL SULFATE 0.83 MG/ML
3 SOLUTION RESPIRATORY (INHALATION) AS NEEDED
OUTPATIENT
Start: 2024-12-30

## 2024-12-02 RX ORDER — PROCHLORPERAZINE MALEATE 10 MG
10 TABLET ORAL EVERY 6 HOURS PRN
Status: DISCONTINUED | OUTPATIENT
Start: 2024-12-02 | End: 2024-12-02 | Stop reason: HOSPADM

## 2024-12-02 ASSESSMENT — PAIN SCALES - GENERAL: PAINLEVEL_OUTOF10: 0-NO PAIN

## 2024-12-02 NOTE — PROGRESS NOTES
Pt tolerated infusions without incident. Pt instructed to stop at scheduling for AVS. Pt left infusion ambulatory.

## 2024-12-03 ENCOUNTER — APPOINTMENT (OUTPATIENT)
Dept: DERMATOLOGY | Facility: CLINIC | Age: 84
End: 2024-12-03
Payer: MEDICARE

## 2024-12-03 DIAGNOSIS — C44.622 SQUAMOUS CELL CANCER OF SKIN OF RIGHT FOREARM: ICD-10-CM

## 2024-12-03 PROCEDURE — 13121 CMPLX RPR S/A/L 2.6-7.5 CM: CPT | Performed by: DERMATOLOGY

## 2024-12-03 PROCEDURE — 17313 MOHS 1 STAGE T/A/L: CPT | Performed by: DERMATOLOGY

## 2024-12-03 NOTE — PROGRESS NOTES
Mohs Surgery Operative Note    Date of Surgery:  12/3/2024  Surgeon:  Sophia Vann MD  Office Location:  [address] [facility]  [address]  [address]  [facility] [address]  Dept: [phone]  Dept Fax: [phone]  Referring Provider: Esequiel Stiles, APRN-CNP  [address] [facility] [address]  [place] [address]    Assessment/Plan   Pre-procedure:   Obtained informed consent: written from patient  The surgical site was identified and confirmed with the patient.     Intra-operative:   Audible time out called at : 10:25 AM 12/03/24  by: Teresa Danielle RN   Verified patient name, birthdate, site, specimen bottle label & requisition.    The planned procedure(s) was again reviewed with the patient. The risks of bleeding, infection, nerve damage and scarring were reviewed. Written authorization was obtained. The patient identity, surgical site, and planned procedure(s) were verified. The provider acted as both surgeon and pathologist.     Squamous cell cancer of skin of right forearm  Right Forearm - Posterior  Mohs surgery  Consent obtained: written    Universal Protocol:  Procedure explained and questions answered to patient or proxy's satisfaction: Yes    Test results available and properly labeled: Yes    Pathology report reviewed: Yes    External notes reviewed: Yes    Photo or diagram used for site identification: Yes    Site/side marked: Yes    Slide independently reviewed by Mohs surgeon: Yes    Immediately prior to procedure a time out was called: Yes    Patient identity confirmed: verbally with patient  Preparation: Patient was prepped and draped in usual sterile fashion      Anticoagulation:  Is the patient taking prescription anticoagulant and/or aspirin prescribed/recommended by a physician? Yes    Was the anticoagulation regimen changed prior to Mohs? No      Anesthesia:  Anesthesia method: local infiltration  Local anesthetic: lidocaine 2% WITH epi    Procedure Details:  Case  ID Number: [id number]  Biopsy accession number: [id number]  Date of biopsy: 10/15/2024  Pre-Op diagnosis: squamous cell carcinoma  SCC subtype: moderately differentiated (invasive)  Surgical site (from skin exam): Right Forearm - Posterior  Pre-operative length (cm): 2  Pre-operative width (cm): 1.1  Indications for Mohs surgery: tumor size greater than 2 cm and immunocompromised  Previously treated? No      Micrographic Surgery Details:  Post-operative length (cm): 2.5  Post-operative width (cm): 1.5  Number of Mohs stages: 1    Stage 1     Comments: The patient was brought into the operating room and placed in the procedure chair in the appropriate position.  The area positive by previous biopsy was identified and confirmed with the patient. The area of clinically obvious tumor was debulked using a curette and/or scalpel as needed. An incision was made following the Mohs approach through the skin. The specimen was taken to the lab, divided into 2 piece(s) and appropriately chromacoded and processed.  Tumor features identified on Mohs section: no tumor identified  Depth of defect: subcutaneous fat    Patient tolerance of procedure: tolerated well, no immediate complications    Reconstruction:  Was the defect reconstructed? Yes    Was reconstruction performed by the same Mohs surgeon? Yes    Setting of reconstruction: outpatient office  When was reconstruction performed? same day  Type of reconstruction: linear  Linear reconstruction: complex  Length of linear repair (cm): 4.5  Subcutaneous Layers (Deep Stitches)   Suture size:  3-0  Suture type:  Vicryl  Stitches:  Buried vertical mattress  Fine/surface layer approximation (top stitches)   Epidermal/Superficial suture size:  5-0  Epidermal/Superficial suture type:  Fast-absorbing gut  Stitches: simple running    Hemostasis achieved with: electrodesiccation  Outcome: patient tolerated procedure well with no complications    Post-procedure details: sterile dressing  applied and wound care instructions given    Dressing type: Telfa pad, pressure dressing and Hypafix      Complex Linear Repair - Wide Undermining:  Given the location and size of the defect, it was determined that a complex layered linear closure was required to restore normal anatomy and function. The repair was considered complex because extensive undermining was required and performed. The amount of undermining performed was greater than the maximum width of the defect as measured perpendicular to the closure line along at least one entire edge of the defect. Standing cutaneous cones were removed using Burow's triangles. The wound edges were brought into close approximation with buried vertical mattress sutures. The remainder of the wound was then closed with epidermal top sutures.    The final repair measured 4.5 cm                  Wound care was discussed, and the patient was given written post-operative wound care instructions.      The patient will follow up with Sophia Vann MD as needed for any post operative problems or concerns, and will follow up with their primary dermatologist as scheduled.

## 2024-12-03 NOTE — PROGRESS NOTES
"Office Visit Note  Date: 12/3/2024  Surgeon:  Sophia Vann MD  Office Location:  [address] [facility]  [address]  [address]  [facility] [address]  Dept: [phone]  Dept Fax: [phone]  Referring Provider: Esequiel Stiles, APRN-CNP  [address]  [address]  [place] [address]    Subjective   Sil ALBA West \"Sherie\" is a 84 y.o. female who presents for the following: MOHS Surgery    According to the patient, the lesion has been present for approximately 6 months at the time of diagnosis.  The lesion is itchy.  The lesion has not been treated previously.    The patient does not have a pacemaker / defibrillator.  The patient does not have a heart valve / joint replacement.    The patient is on blood thinners.  The patient does not have a history of hepatitis B or C.  The patient does not have a history of HIV.  The patient does have a history of immunosuppression (e.g. organ transplantation, malignancy, medications)    Review of Systems:  No other skin or systemic complaints other than what is documented elsewhere in the note.    MEDICAL HISTORY: clinically relevant history including significant past medical history, medications and allergies was reviewed and documented in Epic.    Objective   Well appearing patient in no apparent distress; mood and affect are within normal limits.  Vital signs: See record.  Noted on the Right Forearm - Posterior  Is a 2 x 1.1 cm scar    The patient confirmed the identified site.    Discussion:  The nature of the diagnosis was explained. The lesion is a skin cancer.  It has a risk of local growth and distant spread. The condition is associated with sun exposure.  Warning signs of non-melanoma skin cancer discussed. Patient was instructed to perform monthly self skin examination.  We recommended that the patient have regular full skin exams given an increased risk of subsequent skin cancers. The patient was instructed to use sun protective " behaviors including use of broad spectrum sunscreens and sun protective clothing to reduce risk of skin cancers.      Risks, benefits, side effects of Mohs surgery were discussed with patient and the patient voiced understanding.  It was explained that even though the cure rate of Mohs is very high it is not 100%. Risks of surgery including but not limited to bleeding, infection, numbness, nerve damage, and scar were reviewed.  Discussion included wound care requirements, activity restrictions, likely scar outcome and time to heal.     After Mohs surgery, the defect may need to be repaired surgically and the scar may be longer than the original lesion.  Reconstruction options, risks, and benefits were reviewed including second intention healing, linear repair (4-1 ratio was explained), local flaps, skin grafts, cartilage grafts and interpolation flaps (the need for multiple surgeries was explained). Possible outcomes were reviewed including likely scar appearance, failure of flap survival, infection, bleeding and the need for revision surgery.     The pathology was reviewed, the photograph was reviewed, and the referring physician's note was reviewed.    Patient elected for Mohs surgery.

## 2024-12-09 ENCOUNTER — INFUSION (OUTPATIENT)
Dept: HEMATOLOGY/ONCOLOGY | Facility: CLINIC | Age: 84
End: 2024-12-09
Payer: MEDICARE

## 2024-12-09 VITALS
SYSTOLIC BLOOD PRESSURE: 149 MMHG | WEIGHT: 159.61 LBS | RESPIRATION RATE: 18 BRPM | HEART RATE: 97 BPM | BODY MASS INDEX: 30.16 KG/M2 | DIASTOLIC BLOOD PRESSURE: 68 MMHG | OXYGEN SATURATION: 97 % | TEMPERATURE: 96.8 F

## 2024-12-09 DIAGNOSIS — C90.00 MULTIPLE MYELOMA NOT HAVING ACHIEVED REMISSION (MULTI): ICD-10-CM

## 2024-12-09 LAB
ALBUMIN SERPL BCP-MCNC: 3.6 G/DL (ref 3.4–5)
ALP SERPL-CCNC: 48 U/L (ref 33–136)
ALT SERPL W P-5'-P-CCNC: 11 U/L (ref 7–45)
ANION GAP SERPL CALC-SCNC: 13 MMOL/L (ref 10–20)
AST SERPL W P-5'-P-CCNC: 16 U/L (ref 9–39)
BASOPHILS # BLD AUTO: 0.02 X10*3/UL (ref 0–0.1)
BASOPHILS NFR BLD AUTO: 0.4 %
BILIRUB SERPL-MCNC: 0.3 MG/DL (ref 0–1.2)
BUN SERPL-MCNC: 25 MG/DL (ref 6–23)
CALCIUM SERPL-MCNC: 8.7 MG/DL (ref 8.6–10.3)
CHLORIDE SERPL-SCNC: 108 MMOL/L (ref 98–107)
CO2 SERPL-SCNC: 24 MMOL/L (ref 21–32)
CREAT SERPL-MCNC: 0.92 MG/DL (ref 0.5–1.05)
EGFRCR SERPLBLD CKD-EPI 2021: 62 ML/MIN/1.73M*2
EOSINOPHIL # BLD AUTO: 0.05 X10*3/UL (ref 0–0.4)
EOSINOPHIL NFR BLD AUTO: 1 %
ERYTHROCYTE [DISTWIDTH] IN BLOOD BY AUTOMATED COUNT: 13.4 % (ref 11.5–14.5)
GLUCOSE SERPL-MCNC: 99 MG/DL (ref 74–99)
HCT VFR BLD AUTO: 29.5 % (ref 36–46)
HGB BLD-MCNC: 9.6 G/DL (ref 12–16)
IMM GRANULOCYTES # BLD AUTO: 0.05 X10*3/UL (ref 0–0.5)
IMM GRANULOCYTES NFR BLD AUTO: 1 % (ref 0–0.9)
LYMPHOCYTES # BLD AUTO: 0.33 X10*3/UL (ref 0.8–3)
LYMPHOCYTES NFR BLD AUTO: 6.6 %
MCH RBC QN AUTO: 34.5 PG (ref 26–34)
MCHC RBC AUTO-ENTMCNC: 32.5 G/DL (ref 32–36)
MCV RBC AUTO: 106 FL (ref 80–100)
MONOCYTES # BLD AUTO: 0.44 X10*3/UL (ref 0.05–0.8)
MONOCYTES NFR BLD AUTO: 8.8 %
NEUTROPHILS # BLD AUTO: 4.13 X10*3/UL (ref 1.6–5.5)
NEUTROPHILS NFR BLD AUTO: 82.2 %
NRBC BLD-RTO: 0 /100 WBCS (ref 0–0)
PLATELET # BLD AUTO: 213 X10*3/UL (ref 150–450)
POTASSIUM SERPL-SCNC: 4 MMOL/L (ref 3.5–5.3)
PROT SERPL-MCNC: 5.8 G/DL (ref 6.4–8.2)
RBC # BLD AUTO: 2.78 X10*6/UL (ref 4–5.2)
SODIUM SERPL-SCNC: 141 MMOL/L (ref 136–145)
WBC # BLD AUTO: 5 X10*3/UL (ref 4.4–11.3)

## 2024-12-09 PROCEDURE — 80053 COMPREHEN METABOLIC PANEL: CPT

## 2024-12-09 PROCEDURE — 2500000004 HC RX 250 GENERAL PHARMACY W/ HCPCS (ALT 636 FOR OP/ED): Mod: JZ,JG | Performed by: INTERNAL MEDICINE

## 2024-12-09 PROCEDURE — 96417 CHEMO IV INFUS EACH ADDL SEQ: CPT

## 2024-12-09 PROCEDURE — 85025 COMPLETE CBC W/AUTO DIFF WBC: CPT

## 2024-12-09 PROCEDURE — 2500000005 HC RX 250 GENERAL PHARMACY W/O HCPCS: Performed by: INTERNAL MEDICINE

## 2024-12-09 PROCEDURE — 96413 CHEMO IV INFUSION 1 HR: CPT

## 2024-12-09 RX ORDER — PROCHLORPERAZINE EDISYLATE 5 MG/ML
10 INJECTION INTRAMUSCULAR; INTRAVENOUS EVERY 6 HOURS PRN
Status: DISCONTINUED | OUTPATIENT
Start: 2024-12-09 | End: 2024-12-09 | Stop reason: HOSPADM

## 2024-12-09 RX ORDER — EPINEPHRINE 0.3 MG/.3ML
0.3 INJECTION SUBCUTANEOUS EVERY 5 MIN PRN
Status: DISCONTINUED | OUTPATIENT
Start: 2024-12-09 | End: 2024-12-09 | Stop reason: HOSPADM

## 2024-12-09 RX ORDER — ALBUTEROL SULFATE 0.83 MG/ML
3 SOLUTION RESPIRATORY (INHALATION) AS NEEDED
Status: DISCONTINUED | OUTPATIENT
Start: 2024-12-09 | End: 2024-12-09 | Stop reason: HOSPADM

## 2024-12-09 RX ORDER — DIPHENHYDRAMINE HYDROCHLORIDE 50 MG/ML
50 INJECTION INTRAMUSCULAR; INTRAVENOUS AS NEEDED
Status: DISCONTINUED | OUTPATIENT
Start: 2024-12-09 | End: 2024-12-09 | Stop reason: HOSPADM

## 2024-12-09 RX ORDER — FAMOTIDINE 10 MG/ML
20 INJECTION INTRAVENOUS ONCE AS NEEDED
Status: DISCONTINUED | OUTPATIENT
Start: 2024-12-09 | End: 2024-12-09 | Stop reason: HOSPADM

## 2024-12-09 RX ORDER — PROCHLORPERAZINE MALEATE 10 MG
10 TABLET ORAL EVERY 6 HOURS PRN
Status: DISCONTINUED | OUTPATIENT
Start: 2024-12-09 | End: 2024-12-09 | Stop reason: HOSPADM

## 2024-12-09 ASSESSMENT — PAIN SCALES - GENERAL: PAINLEVEL_OUTOF10: 0-NO PAIN

## 2024-12-10 PROBLEM — C79.51 SECONDARY MALIGNANT NEOPLASM OF BONE (MULTI): Status: ACTIVE | Noted: 2024-12-10

## 2024-12-12 ENCOUNTER — TELEPHONE (OUTPATIENT)
Dept: ADMISSION | Facility: HOSPITAL | Age: 84
End: 2024-12-12
Payer: MEDICARE

## 2024-12-12 NOTE — TELEPHONE ENCOUNTER
Pt's dtr calling to advise that pt has nasal congestion, some chest congestion- mild productive cough with clear mucous. Pt also c/o fatigue.   No fever, body aches, chills. No n/v, constipation/diarrhea.   Last infusion 12/2, next planned FUV and infusion   12/23  Pt has not done covid swab. Dtr encouraged to have pt seen by PCP or urgent care to evaluate for RSV or covid.   Dtr in agreement, will call back if new/worsening symptoms, or unable to obtain timely evaluation of symptoms.

## 2024-12-12 NOTE — TELEPHONE ENCOUNTER
Message left on identified VM for dtr that Dr Marshall team advising pt can do at home covid and influenza testing, as well as use OTC products for symptom management.   Pt /family should call back if symptoms worsen or pt tests positive to testing.

## 2024-12-13 ENCOUNTER — TELEMEDICINE (OUTPATIENT)
Dept: PRIMARY CARE | Facility: CLINIC | Age: 84
End: 2024-12-13
Payer: MEDICARE

## 2024-12-13 VITALS — TEMPERATURE: 100 F

## 2024-12-13 DIAGNOSIS — B34.9 VIRAL SYNDROME: Primary | ICD-10-CM

## 2024-12-13 PROCEDURE — 1124F ACP DISCUSS-NO DSCNMKR DOCD: CPT | Performed by: NURSE PRACTITIONER

## 2024-12-13 PROCEDURE — 1159F MED LIST DOCD IN RCRD: CPT | Performed by: NURSE PRACTITIONER

## 2024-12-13 PROCEDURE — 1036F TOBACCO NON-USER: CPT | Performed by: NURSE PRACTITIONER

## 2024-12-13 PROCEDURE — 99442 PR PHYS/QHP TELEPHONE EVALUATION 11-20 MIN: CPT | Performed by: NURSE PRACTITIONER

## 2024-12-13 PROCEDURE — 1125F AMNT PAIN NOTED PAIN PRSNT: CPT | Performed by: NURSE PRACTITIONER

## 2024-12-13 ASSESSMENT — PATIENT HEALTH QUESTIONNAIRE - PHQ9
SUM OF ALL RESPONSES TO PHQ9 QUESTIONS 1 AND 2: 2
1. LITTLE INTEREST OR PLEASURE IN DOING THINGS: SEVERAL DAYS
2. FEELING DOWN, DEPRESSED OR HOPELESS: SEVERAL DAYS
10. IF YOU CHECKED OFF ANY PROBLEMS, HOW DIFFICULT HAVE THESE PROBLEMS MADE IT FOR YOU TO DO YOUR WORK, TAKE CARE OF THINGS AT HOME, OR GET ALONG WITH OTHER PEOPLE: NOT DIFFICULT AT ALL

## 2024-12-13 ASSESSMENT — PAIN SCALES - GENERAL: PAINLEVEL_OUTOF10: 7

## 2024-12-13 NOTE — PROGRESS NOTES
"With patient's permission this is a telemedicine visit with video and audio.  History Of Present Illness  Sil West \"Sherie\" is a 84 y.o. female who calls in for URI (Pt states unable to do video as she is not sure how. Attempted to explain to patient how to do virtual, pt is asking for phone call. Pt has c/o chills, sore throat, fever this morning 100.0, dry cough. Began two days ago. ).    HPI 84-year-old female calling for sore throat and fever and chills along with a dry cough started feeling sick 2 days ago has not COVID tested only taking Tylenol to feel better advised patient to get a COVID/flu test and to test so that we can make sure that she does not have COVID if she does have COVID we will start her on Paxlovid patient is currently being treated for cancer total time of visit 11 minutes    Past Medical History  She has a past medical history of Diverticular disease, Hyperlipidemia, Hypertension, and Neuropathy.    Medications  Current Outpatient Medications   Medication Instructions    acetaminophen (TylenoL) 325 mg tablet 2 tablets, Every 4 hours PRN    acyclovir (ZOVIRAX) 400 mg, oral, 2 times daily    amLODIPine (NORVASC) 5 mg, oral, Daily    aspirin 81 mg, Daily    atorvastatin (LIPITOR) 20 mg, oral, Daily    cholecalciferol, vitamin D3, 25 mcg (1,000 unit) tablet,chewable 2 tablets, Daily    dexAMETHasone (DECADRON) 20 mg, oral, Every 28 days, Take on day 22 of 28 day treatment cycle (on the day that you are not getting IV treatment). First dose 8/26/24.    ondansetron (ZOFRAN) 8 mg, oral, Every 8 hours PRN    prochlorperazine (COMPAZINE) 10 mg, oral, Every 6 hours PRN        Surgical History  She has a past surgical history that includes CT guided percutaneous biopsy bone deep (01/06/2023) and Other surgical history.     Social History  She reports that she has never smoked. She has never been exposed to tobacco smoke. She has never used smokeless tobacco. She reports that she does not drink " "alcohol and does not use drugs.    Family History  Family History   Problem Relation Name Age of Onset    Alzheimer's disease Mother      Colon cancer Father      Rashes / Skin problems Sister      Rashes / Skin problems Brother          Allergies  Lobster and Shellfish derived    On video:     Appearance: Normal appearance.      Effort: Respiratory effort is normal. Speaking in full sentences. No respiratory distress.     Mood and Affect: Mood normal.         Thought Content: Thought content normal.         Judgment: Judgment normal.      Last Recorded Vitals  Temp 37.8 °C (100 °F)   (Vitals are taken by patient at home, if any reported)    Relevant Results      Assessment/Plan   Sil \"Sherie\" was seen today for uri.  Diagnoses and all orders for this visit:  Viral syndrome (Primary)      Patient to COVID test and if positive call back for Paxlovid    Counseling    Medication education:              Education:  The patient is counseled regarding potential side-effects of any and all new medications             Understanding:  Patient expressed understanding             Adherence:  No barriers to adherence identified      Terra Baires, APRN-CNP   "

## 2024-12-22 DIAGNOSIS — C90.00 MULTIPLE MYELOMA NOT HAVING ACHIEVED REMISSION (MULTI): Primary | ICD-10-CM

## 2024-12-22 RX ORDER — FAMOTIDINE 10 MG/ML
20 INJECTION INTRAVENOUS ONCE AS NEEDED
OUTPATIENT
Start: 2025-02-24

## 2024-12-22 RX ORDER — PROCHLORPERAZINE EDISYLATE 5 MG/ML
10 INJECTION INTRAMUSCULAR; INTRAVENOUS EVERY 6 HOURS PRN
OUTPATIENT
Start: 2025-02-24

## 2024-12-22 RX ORDER — FAMOTIDINE 10 MG/ML
20 INJECTION INTRAVENOUS ONCE AS NEEDED
OUTPATIENT
Start: 2025-02-17

## 2024-12-22 RX ORDER — DEXAMETHASONE 4 MG/1
20 TABLET ORAL ONCE
OUTPATIENT
Start: 2025-03-03

## 2024-12-22 RX ORDER — PROCHLORPERAZINE EDISYLATE 5 MG/ML
10 INJECTION INTRAMUSCULAR; INTRAVENOUS EVERY 6 HOURS PRN
OUTPATIENT
Start: 2025-02-17

## 2024-12-22 RX ORDER — DIPHENHYDRAMINE HYDROCHLORIDE 50 MG/ML
50 INJECTION INTRAMUSCULAR; INTRAVENOUS AS NEEDED
OUTPATIENT
Start: 2025-02-17

## 2024-12-22 RX ORDER — ALBUTEROL SULFATE 0.83 MG/ML
3 SOLUTION RESPIRATORY (INHALATION) AS NEEDED
OUTPATIENT
Start: 2025-02-17

## 2024-12-22 RX ORDER — PROCHLORPERAZINE MALEATE 10 MG
10 TABLET ORAL EVERY 6 HOURS PRN
OUTPATIENT
Start: 2025-03-03

## 2024-12-22 RX ORDER — PROCHLORPERAZINE EDISYLATE 5 MG/ML
10 INJECTION INTRAMUSCULAR; INTRAVENOUS EVERY 6 HOURS PRN
OUTPATIENT
Start: 2025-03-03

## 2024-12-22 RX ORDER — EPINEPHRINE 0.3 MG/.3ML
0.3 INJECTION SUBCUTANEOUS EVERY 5 MIN PRN
OUTPATIENT
Start: 2025-02-24

## 2024-12-22 RX ORDER — FAMOTIDINE 10 MG/ML
20 INJECTION INTRAVENOUS ONCE AS NEEDED
OUTPATIENT
Start: 2025-03-03

## 2024-12-22 RX ORDER — DIPHENHYDRAMINE HYDROCHLORIDE 50 MG/ML
50 INJECTION INTRAMUSCULAR; INTRAVENOUS AS NEEDED
OUTPATIENT
Start: 2025-02-24

## 2024-12-22 RX ORDER — DEXAMETHASONE 4 MG/1
20 TABLET ORAL ONCE
OUTPATIENT
Start: 2025-02-24

## 2024-12-22 RX ORDER — ALBUTEROL SULFATE 0.83 MG/ML
3 SOLUTION RESPIRATORY (INHALATION) AS NEEDED
OUTPATIENT
Start: 2025-03-03

## 2024-12-22 RX ORDER — ONDANSETRON HYDROCHLORIDE 8 MG/1
12 TABLET, FILM COATED ORAL ONCE
OUTPATIENT
Start: 2025-02-24

## 2024-12-22 RX ORDER — DEXAMETHASONE 4 MG/1
20 TABLET ORAL ONCE
OUTPATIENT
Start: 2025-02-17

## 2024-12-22 RX ORDER — PROCHLORPERAZINE MALEATE 10 MG
10 TABLET ORAL EVERY 6 HOURS PRN
OUTPATIENT
Start: 2025-02-24

## 2024-12-22 RX ORDER — EPINEPHRINE 0.3 MG/.3ML
0.3 INJECTION SUBCUTANEOUS EVERY 5 MIN PRN
OUTPATIENT
Start: 2025-03-03

## 2024-12-22 RX ORDER — ONDANSETRON HYDROCHLORIDE 8 MG/1
12 TABLET, FILM COATED ORAL ONCE
OUTPATIENT
Start: 2025-02-17

## 2024-12-22 RX ORDER — EPINEPHRINE 0.3 MG/.3ML
0.3 INJECTION SUBCUTANEOUS EVERY 5 MIN PRN
OUTPATIENT
Start: 2025-02-17

## 2024-12-22 RX ORDER — ONDANSETRON HYDROCHLORIDE 8 MG/1
12 TABLET, FILM COATED ORAL ONCE
OUTPATIENT
Start: 2025-03-03

## 2024-12-22 RX ORDER — ALBUTEROL SULFATE 0.83 MG/ML
3 SOLUTION RESPIRATORY (INHALATION) AS NEEDED
OUTPATIENT
Start: 2025-02-24

## 2024-12-22 RX ORDER — PROCHLORPERAZINE MALEATE 10 MG
10 TABLET ORAL EVERY 6 HOURS PRN
OUTPATIENT
Start: 2025-02-17

## 2024-12-22 RX ORDER — DIPHENHYDRAMINE HYDROCHLORIDE 50 MG/ML
50 INJECTION INTRAMUSCULAR; INTRAVENOUS AS NEEDED
OUTPATIENT
Start: 2025-03-03

## 2024-12-22 ASSESSMENT — ENCOUNTER SYMPTOMS
UNEXPECTED WEIGHT CHANGE: 0
DYSURIA: 0
ADENOPATHY: 0
DIAPHORESIS: 0
BACK PAIN: 1
CHILLS: 0
CARDIOVASCULAR NEGATIVE: 1
BRUISES/BLEEDS EASILY: 1
FEVER: 0
APPETITE CHANGE: 0
DIARRHEA: 1
NEUROLOGICAL NEGATIVE: 1
FATIGUE: 0
HEMATURIA: 0

## 2024-12-22 NOTE — PROGRESS NOTES
"Patient ID: Sil West \"Ines" is a 84 y.o. female.    Treatment:   Oncology History Overview Note   I. Diagnosis (1/2023):  IgG Lambda Multiple Myeloma  Presenting symptoms: Urinary incontinence and diffuse bony pain  II. Workup:  Bone Biopsy (1/6/23):  Plasma cells neoplasm  Repeat Bone Marrow Biopsy (1/26/23):  Hypercellular marrow, 80-90% plasma cells, lambda-restricted  FISH: 1q gain (high risk), trisomy 3  MRI Spine (12/18/22):  Osseous metastases, involvement in mid-cervical, mid-thoracic, and lumbar vertebral bodies, as well as the right iliac bone  PET Scan (1/23):  FDG avidity in right iliac bone, right sacral ala, left posterior 6th rib, and appendicular skeleton  Serum and Urine Studies (1/19-1/28/23):  FKLC 1.57, FLLC 2947.7, K/L ratio 0  IgG 537, IgA 221, IgM 19, b2M 23.4, Hgb 5.7  M protein IgA lambda 0.7 g/dL   U/L  Creatinine: 2.33 (peaked at 3.15)  UPEP: Monoclonal lambda light chain at 386.6 mg/24H  Hepatitis B and C negative  III. Treatment:  Radiation (2/2/23):  Right hip + T6-T8 x 5 fractions (total 2000 cGy)  Induction (12/24/22 - 2/16/23):  Bortezomib and dexamethasone + daratumumab  C1 (1/28/23): Bortezomib 1, 4, 8, 11 + daratumumab x 2 doses  C2 (2/16/23): Weekly dosing of daratumumab and bortezomib (1, 8, 15), with lenalidomide planned when renal function allows  Response Evaluation (5/25/23):  CR with M protein 0.1 (IgG kappa c/w roscoe) and free lyte chain 1.37  C8 Roscoe RVD (Completed 7/6/23):  Transitioned with a VGPR vs CR, ongoing multifocal bone pain  IV. Response Evaluation (7/13/23):  Marked improvement in bony lesions  Pathological fractures in right pelvis and ribs, possible infiltrate in the right lower lobe base, and hypodensity in the right thyroid gland  V. Treatment Adjustment (Cycle 9 and forward): 10/2023  Velcade omitted  Transitioned to a 4-week schedule with Roscoe (day 1) and Revlimid 15 mg days 1-21/28 days     Multiple myeloma not having achieved remission " (Multi)   9/13/2023 Initial Diagnosis    Multiple myeloma not having achieved remission (CMS/HCC)     10/2/2023 - 7/8/2024 Chemotherapy    Daratumumab, Pomalidomide and dexamethasone (oral meds managed outside treatment plan) 28 Day Cycles - Maintenance     8/5/2024 -  Chemotherapy    Carfilzomib and Cyclophosphamide (Weekly) / Dexamethasone, 28 Day Cycles       Past Medical History:  HTN, DLP  pre skin cancers,   diveriticulitis   peripheral artery disease not amenable to surgery    Surgical History:  squamous cell carcinoma removed to left hand and right arm 6/2024  Past Surgical History:   Procedure Laterality Date    CT GUIDED PERCUTANEOUS BIOPSY BONE DEEP  01/06/2023    CT GUIDED PERCUTANEOUS BIOPSY BONE DEEP 1/6/2023 [facility]    OTHER SURGICAL HISTORY      THYROID BIOPSY      Family History:     Family History   Problem Relation Name Age of Onset    Alzheimer's disease Mother      Colon cancer Father      Rashes / Skin problems Sister      Rashes / Skin problems Brother       Social History:  ,  passed away on 10/24/23.  3 grown children (1 daughter, 2 sons). 2 grandchildren.  Has 6 cats.  Enjoyed skiing.  Occupation: retired .  Social History     Tobacco Use    Smoking status: Never     Passive exposure: Never    Smokeless tobacco: Never   Vaping Use    Vaping status: Never Used   Substance Use Topics    Alcohol use: Never    Drug use: Never      -------------------------------------------------------------------------------------------------------  Subjective       Sil West is a 84 year old female with IgG lambda multiple myeloma.  She recently had an increase to her lambda free light chains.  PET imaging done 7/17/24 showed an new soft tissue uptake in right 11th rib and interestingly patient has pain there as well.  Transitioned to carfilzomib, cyclophophamide, dex, cycle 1 8/5/24     Sil presents to the clinic today (12/23/24) unaccompanied for follow up  evaluation and is scheduled to receive C6D1 carfilzomib, cytoxan, and dexamethasone.  Receiving IVIG every month, last received 12/2/24.  Receiving zometa every 3 months.  Reports she is doing well, but developed a cold about 2 weeks ago.  Energy level is better, was lower when first got sick.   Had Temp of 101.0 for a few days when symptoms first started around December 11th.  Has had no recent fevers.  Reports no shortness of breath.  Continues to have mild productive cough, taking OTC delsym.  Taking  tylenol PM at night to help with sleeping.  Did a home covid test which was negative.      Appetite is good.  Lost a few pounds since last visit.  Continues to have intermittent diarrhea, especially after receiving chemotherapy or feels it may be diet related.  Taking imodium as needed.  Chronic back pain, not taking any pain medication.  On Asprin 81 mg daily for DVT prophylaxis.  Bruises easily.  Has had multiple Mohs surgery done by Dr. Vann to remove SCC.  Had 4 lesion removed recently and plans to go back in early January.  States that her crown fell out and states she may need to have a root canal.  Waiting until after the holidays to call dentist office to schedule appointment.     Review of Systems   Constitutional:  Negative for appetite change, chills, diaphoresis, fatigue, fever and unexpected weight change.   Respiratory:  Positive for cough. Negative for shortness of breath and wheezing.    Cardiovascular: Negative.    Gastrointestinal:  Positive for diarrhea.   Genitourinary:  Negative for dysuria and hematuria.    Musculoskeletal:  Positive for back pain.   Skin: Negative.         Had multiple spots of SCC removed by dermatology.   Neurological: Negative.    Hematological:  Negative for adenopathy. Bruises/bleeds easily (bruises easily).   All other systems reviewed and are negative.  -------------------------------------------------------------------------------------------------------  Objective    BSA: 1.75 meters squared  /62   Pulse 104   Temp 37.1 °C (98.8 °F)   Resp 17   Wt 71.2 kg (156 lb 15.5 oz)   SpO2 99%   BMI 29.66 kg/m²     Physical Exam  Vitals reviewed.   Constitutional:       Appearance: Normal appearance. She is well-developed and normal weight.   HENT:      Head: Normocephalic and atraumatic.      Nose: Nose normal.   Eyes:      General: No scleral icterus.     Extraocular Movements: Extraocular movements intact.      Conjunctiva/sclera: Conjunctivae normal.      Pupils: Pupils are equal, round, and reactive to light.   Cardiovascular:      Rate and Rhythm: Normal rate and regular rhythm.      Pulses: Normal pulses.      Heart sounds: Normal heart sounds.   Pulmonary:      Effort: Pulmonary effort is normal.      Breath sounds: Normal breath sounds.   Abdominal:      General: Abdomen is flat. Bowel sounds are normal. There is no distension.      Palpations: Abdomen is soft. There is no mass.      Tenderness: There is no abdominal tenderness.   Musculoskeletal:         General: Normal range of motion.      Right lower leg: Edema (non-pitting) present.      Left lower leg: Edema (non-pitting) present.      Comments: No spine tenderness   Lymphadenopathy:      Comments: No lymphadenopathy   Skin:     General: Skin is warm and dry.      Comments: Multiple healing lesion from Mohls procedure and skin biopsies from dermatology to right side of face and left hand.   Neurological:      General: No focal deficit present.      Mental Status: She is alert and oriented to person, place, and time.      Comments: Normal strength and gait   Psychiatric:         Mood and Affect: Mood normal.         Behavior: Behavior normal.         Thought Content: Thought content normal.     Performance Status:  Symptomatic; fully ambulatory  -------------------------------------------------------------------------------------------------------  Assessment and Plan:    Multiple myeloma not having achieved  remission (CMS/HCC)  IgG lambda MM    - Currently on daratumamab/Lenalidomide. Continued uptrending of free lambda light chain. M-protein still 0.1g IgG kappa, likely represents daratumumab. Will add weekly dex 10 mg to see if this could deepen her response.     5/13/24:  Continues to have increasing free lambda light chain, M-protein remains at 0.1.  Receiving C20 daratumumab today.  Discussed that free lyte chain continues to increase significantly and suggested switching revlimid to pomalidomide  4 mg 21/28 days since no other CRAB criteria,  -Taking aspirin 81 mg daily for VTE prophylaxis during treatment with pomalidomide      PET scan (7/17/24)   IMPRESSION:  1. Redemonstration of an FDG avid right posterolateral 11th rib  fracture now with evidence of an underlying soft tissue lesion.  2. Previously described hypermetabolic osseous lesions in the right  iliac, right superior pubic ramus, and right aspect of the pubic  symphysis have continued to decrease in metabolic activity with  persistent FDG avidity likely representing continued treatment  response with residual viable disease, attention on follow-up imaging.  3. Healing right inferior pubic ramus fracture with decreased extent  of hypermetabolic activity.    7/18/24:  Laboratory results show major increase in free lyte chains to 62 mg/dL up from 18 in March 2024, only sx is right rib pain where PET from yesterday shows a new lesion, no other new lesions seen,      Bone marrow biopsy results from 7/22/24 showing limited bone marrow (20% cellularity) with maturing trilineage hematopoiesis, minimal involvement by lambda-restricted plasma cells by flow cytometry. ECHO 8/2/2024 LVEF 60-65%    Started cycle 1 carfilzomib, cyclophosphamide, dexamethasone 8//24 12/23/24:  CBC results stable from today.  Kappa and lambda free light chains stable from 11/25/24, levels in process from today.  M-protein 0.1 from 11/25/24, SPEP in process from today.  No  concerning findings on physical examination.  Scheduled to receive C6D1 of carfilzomib, cyclophosphamide, and dexamethasone.  Continue dexamethasone 20 mg on day 22 of 28 day cycle.  Follow up visit 1/20/25 for C7.     Ig [place] Free Light Chain   Date Value Ref Range Status   11/25/2024 0.35 0.33 - 1.94 mg/dL Final   10/28/2024 0.43 0.33 - 1.94 mg/dL Final   10/14/2024 0.31 (L) 0.33 - 1.94 mg/dL Final   09/03/2024 0.46 0.33 - 1.94 mg/dL Final   08/12/2024 0.83 0.33 - 1.94 mg/dL Final     Ig Lambda Free Light Chain   Date Value Ref Range Status   11/25/2024 3.71 (H) 0.57 - 2.63 mg/dL Final   10/28/2024 4.56 (H) 0.57 - 2.63 mg/dL Final   10/14/2024 5.06 (H) 0.57 - 2.63 mg/dL Final   09/03/2024 24.22 (H) 0.57 - 2.63 mg/dL Final   08/12/2024 85.03 (H) 0.57 - 2.63 mg/dL Final     Kappa/Lambda Ratio   Date Value Ref Range Status   11/25/2024 0.09 (L) 0.26 - 1.65 Final   10/28/2024 0.09 (L) 0.26 - 1.65 Final   10/14/2024 0.06 (L) 0.26 - 1.65 Final   09/03/2024 0.02 (L) 0.26 - 1.65 Final   08/12/2024 0.01 (L) 0.26 - 1.65 Final     M-PROTEIN 1   Date Value Ref Range Status   10/28/2024 0.1 (H)   g/dL Final   10/14/2024 0.1 (H)   g/dL Final   09/03/2024 0.1 (H)   g/dL Final   08/12/2024 0.1 (H)   g/dL Final   07/22/2024 0.1 (H)   g/dL Final      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  IgG level  599 today. Last received IVIG on 12/2/24, continue monthly at [facility].    Dysuria:  urinalysis bland, suspect needs urologic evaluation for incontinence    Bone Health:  - Continue zoledronic acid 3.3 mg (renal dosing) every 3 months, received last on 9/3/24.    - Continue vitamin D supplement -- 2000 units PO daily.  Started zometa 3/30/23 will complete 3/2025.  12/23/24:  Sil reports she had a crown fall out and may need a root canal.  Placed zometa on hold until dental work has been completed.       Thyroid  nodule  - repeat thyroid US on 2/19/24, 2 nodules noted with FNA recommended.  Thyroid biopsy 4/29/24 showing rare atypical cells from FNA of right mid lobe.  Repeat biopsy was nondiagnostic.  Dr. Magallanes recommended thyroid surgery vs continued observation.  Sil requested to having US monitoring for now.  Last visit with Dr. Magallanes on 9/9/24.  Is scheduled to have thyroid US in 6 months.       Right lower back pain  - seems musculoskeletal  - MRI lumbar spine (3/4/24): degenerative changes, no evidence of progressive disease or fracture.    - MRI thoracic spine (3/12/24): degenerative changes, redemonstration of multifocal marrow signal abnormalities compatible with the given history of multiple myeloma, osseous expansion and epidural extension of neoplasm previously seen have improved, decrease in size and enhancement of previously seen lesions.      Health Care Maintenance:  Vaccines:    Received influenza- vaccine 10/9/24.  Advised to obtain updated covid, RSV, shingrix, and prevnar 20 vaccines at local pharmacy.    RTC:   Continue monthly IVIG at [place]  1/20/25: : Follow up visit with provider and due for C7D1 Carfilzomib, Cyclophosphamide, and Dex.    Holding Zometa due to needing dental work.    - ------------------------------------------  CHANTEL Medrano-PAUL

## 2024-12-23 ENCOUNTER — OFFICE VISIT (OUTPATIENT)
Dept: HEMATOLOGY/ONCOLOGY | Facility: HOSPITAL | Age: 84
End: 2024-12-23
Payer: MEDICARE

## 2024-12-23 ENCOUNTER — LAB (OUTPATIENT)
Dept: LAB | Facility: HOSPITAL | Age: 84
End: 2024-12-23
Payer: MEDICARE

## 2024-12-23 ENCOUNTER — INFUSION (OUTPATIENT)
Dept: HEMATOLOGY/ONCOLOGY | Facility: HOSPITAL | Age: 84
End: 2024-12-23
Payer: MEDICARE

## 2024-12-23 VITALS
OXYGEN SATURATION: 99 % | DIASTOLIC BLOOD PRESSURE: 62 MMHG | TEMPERATURE: 98.8 F | HEART RATE: 104 BPM | BODY MASS INDEX: 29.66 KG/M2 | SYSTOLIC BLOOD PRESSURE: 143 MMHG | RESPIRATION RATE: 17 BRPM | WEIGHT: 156.97 LBS

## 2024-12-23 DIAGNOSIS — D80.1 HYPOGAMMAGLOBULINEMIA (MULTI): ICD-10-CM

## 2024-12-23 DIAGNOSIS — D84.821 IMMUNOSUPPRESSED DUE TO CHEMOTHERAPY: ICD-10-CM

## 2024-12-23 DIAGNOSIS — M54.9 BACK PAIN, UNSPECIFIED BACK LOCATION, UNSPECIFIED BACK PAIN LATERALITY, UNSPECIFIED CHRONICITY: ICD-10-CM

## 2024-12-23 DIAGNOSIS — C90.00 MULTIPLE MYELOMA NOT HAVING ACHIEVED REMISSION (MULTI): Primary | ICD-10-CM

## 2024-12-23 DIAGNOSIS — E04.1 THYROID NODULE: ICD-10-CM

## 2024-12-23 DIAGNOSIS — Z79.899 IMMUNOSUPPRESSED DUE TO CHEMOTHERAPY: ICD-10-CM

## 2024-12-23 DIAGNOSIS — C90.00 MULTIPLE MYELOMA NOT HAVING ACHIEVED REMISSION (MULTI): ICD-10-CM

## 2024-12-23 DIAGNOSIS — T45.1X5A IMMUNOSUPPRESSED DUE TO CHEMOTHERAPY: ICD-10-CM

## 2024-12-23 LAB
ALBUMIN SERPL BCP-MCNC: 3.7 G/DL (ref 3.4–5)
ALP SERPL-CCNC: 58 U/L (ref 33–136)
ALT SERPL W P-5'-P-CCNC: 9 U/L (ref 7–45)
ANION GAP SERPL CALC-SCNC: 15 MMOL/L (ref 10–20)
AST SERPL W P-5'-P-CCNC: 13 U/L (ref 9–39)
BASOPHILS # BLD AUTO: 0.01 X10*3/UL (ref 0–0.1)
BASOPHILS NFR BLD AUTO: 0.2 %
BILIRUB SERPL-MCNC: 0.4 MG/DL (ref 0–1.2)
BUN SERPL-MCNC: 27 MG/DL (ref 6–23)
CALCIUM SERPL-MCNC: 9.8 MG/DL (ref 8.6–10.6)
CHLORIDE SERPL-SCNC: 108 MMOL/L (ref 98–107)
CO2 SERPL-SCNC: 27 MMOL/L (ref 21–32)
CREAT SERPL-MCNC: 1.04 MG/DL (ref 0.5–1.05)
EGFRCR SERPLBLD CKD-EPI 2021: 54 ML/MIN/1.73M*2
EOSINOPHIL # BLD AUTO: 0.02 X10*3/UL (ref 0–0.4)
EOSINOPHIL NFR BLD AUTO: 0.4 %
ERYTHROCYTE [DISTWIDTH] IN BLOOD BY AUTOMATED COUNT: 13.2 % (ref 11.5–14.5)
GLUCOSE SERPL-MCNC: 105 MG/DL (ref 74–99)
HCT VFR BLD AUTO: 28.9 % (ref 36–46)
HGB BLD-MCNC: 9.4 G/DL (ref 12–16)
IGA SERPL-MCNC: 7 MG/DL (ref 70–400)
IGG SERPL-MCNC: 599 MG/DL (ref 700–1600)
IGM SERPL-MCNC: 6 MG/DL (ref 40–230)
IMM GRANULOCYTES # BLD AUTO: 0.06 X10*3/UL (ref 0–0.5)
IMM GRANULOCYTES NFR BLD AUTO: 1.3 % (ref 0–0.9)
LYMPHOCYTES # BLD AUTO: 0.28 X10*3/UL (ref 0.8–3)
LYMPHOCYTES NFR BLD AUTO: 6 %
MAGNESIUM SERPL-MCNC: 1.99 MG/DL (ref 1.6–2.4)
MCH RBC QN AUTO: 33.9 PG (ref 26–34)
MCHC RBC AUTO-ENTMCNC: 32.5 G/DL (ref 32–36)
MCV RBC AUTO: 104 FL (ref 80–100)
MONOCYTES # BLD AUTO: 0.43 X10*3/UL (ref 0.05–0.8)
MONOCYTES NFR BLD AUTO: 9.3 %
NEUTROPHILS # BLD AUTO: 3.83 X10*3/UL (ref 1.6–5.5)
NEUTROPHILS NFR BLD AUTO: 82.8 %
NRBC BLD-RTO: 0 /100 WBCS (ref 0–0)
PHOSPHATE SERPL-MCNC: 2.8 MG/DL (ref 2.5–4.9)
PLATELET # BLD AUTO: 284 X10*3/UL (ref 150–450)
POTASSIUM SERPL-SCNC: 4.3 MMOL/L (ref 3.5–5.3)
PROT SERPL-MCNC: 5.7 G/DL (ref 6.4–8.2)
PROT SERPL-MCNC: 6.2 G/DL (ref 6.4–8.2)
RBC # BLD AUTO: 2.77 X10*6/UL (ref 4–5.2)
SODIUM SERPL-SCNC: 146 MMOL/L (ref 136–145)
WBC # BLD AUTO: 4.6 X10*3/UL (ref 4.4–11.3)

## 2024-12-23 PROCEDURE — 36415 COLL VENOUS BLD VENIPUNCTURE: CPT

## 2024-12-23 PROCEDURE — 1126F AMNT PAIN NOTED NONE PRSNT: CPT

## 2024-12-23 PROCEDURE — 80053 COMPREHEN METABOLIC PANEL: CPT

## 2024-12-23 PROCEDURE — 2500000004 HC RX 250 GENERAL PHARMACY W/ HCPCS (ALT 636 FOR OP/ED): Performed by: INTERNAL MEDICINE

## 2024-12-23 PROCEDURE — 96417 CHEMO IV INFUS EACH ADDL SEQ: CPT

## 2024-12-23 PROCEDURE — 84100 ASSAY OF PHOSPHORUS: CPT

## 2024-12-23 PROCEDURE — 2500000004 HC RX 250 GENERAL PHARMACY W/ HCPCS (ALT 636 FOR OP/ED): Mod: JZ,JG | Performed by: INTERNAL MEDICINE

## 2024-12-23 PROCEDURE — 99215 OFFICE O/P EST HI 40 MIN: CPT

## 2024-12-23 PROCEDURE — 99215 OFFICE O/P EST HI 40 MIN: CPT | Mod: 25

## 2024-12-23 PROCEDURE — 1159F MED LIST DOCD IN RCRD: CPT

## 2024-12-23 PROCEDURE — 1160F RVW MEDS BY RX/DR IN RCRD: CPT

## 2024-12-23 PROCEDURE — 84155 ASSAY OF PROTEIN SERUM: CPT

## 2024-12-23 PROCEDURE — 82784 ASSAY IGA/IGD/IGG/IGM EACH: CPT

## 2024-12-23 PROCEDURE — 85025 COMPLETE CBC W/AUTO DIFF WBC: CPT

## 2024-12-23 PROCEDURE — 83735 ASSAY OF MAGNESIUM: CPT

## 2024-12-23 PROCEDURE — 83521 IG LIGHT CHAINS FREE EACH: CPT

## 2024-12-23 PROCEDURE — 96413 CHEMO IV INFUSION 1 HR: CPT

## 2024-12-23 RX ORDER — DIPHENHYDRAMINE HYDROCHLORIDE 50 MG/ML
50 INJECTION INTRAMUSCULAR; INTRAVENOUS AS NEEDED
Status: DISCONTINUED | OUTPATIENT
Start: 2024-12-23 | End: 2024-12-23 | Stop reason: HOSPADM

## 2024-12-23 RX ORDER — PROCHLORPERAZINE MALEATE 10 MG
10 TABLET ORAL EVERY 6 HOURS PRN
Status: DISCONTINUED | OUTPATIENT
Start: 2024-12-23 | End: 2024-12-23 | Stop reason: HOSPADM

## 2024-12-23 RX ORDER — ALBUTEROL SULFATE 0.83 MG/ML
3 SOLUTION RESPIRATORY (INHALATION) AS NEEDED
Status: DISCONTINUED | OUTPATIENT
Start: 2024-12-23 | End: 2024-12-23 | Stop reason: HOSPADM

## 2024-12-23 RX ORDER — EPINEPHRINE 0.3 MG/.3ML
0.3 INJECTION SUBCUTANEOUS EVERY 5 MIN PRN
Status: DISCONTINUED | OUTPATIENT
Start: 2024-12-23 | End: 2024-12-23 | Stop reason: HOSPADM

## 2024-12-23 RX ORDER — FAMOTIDINE 10 MG/ML
20 INJECTION INTRAVENOUS ONCE AS NEEDED
Status: DISCONTINUED | OUTPATIENT
Start: 2024-12-23 | End: 2024-12-23 | Stop reason: HOSPADM

## 2024-12-23 RX ORDER — PROCHLORPERAZINE EDISYLATE 5 MG/ML
10 INJECTION INTRAMUSCULAR; INTRAVENOUS EVERY 6 HOURS PRN
Status: DISCONTINUED | OUTPATIENT
Start: 2024-12-23 | End: 2024-12-23 | Stop reason: HOSPADM

## 2024-12-23 ASSESSMENT — PAIN SCALES - GENERAL: PAINLEVEL_OUTOF10: 0-NO PAIN

## 2024-12-23 ASSESSMENT — ENCOUNTER SYMPTOMS
SHORTNESS OF BREATH: 0
WHEEZING: 0
COUGH: 1

## 2024-12-23 NOTE — PROGRESS NOTES
[facility] Infusion Nursing Note  12/23/24    Sil West is a 84 y.o. year old female patient presenting to outpatient infusion for cycle 6 day 1 of the following regimen:    Treatment Plans       Name Type Plan Dates Plan Provider         Active    Carfilzomib and Cyclophosphamide (Weekly) / Dexamethasone, 28 Day Cycles Oncology Treatment 8/5/2024 - Present Carmencita Marshall MD                    Administrations This Visit       carfilzomib (Kyprolis) 120 mg in dextrose 5% 118 mL IV       Admin Date  12/23/2024 Action  New Bag Dose  120 mg Rate  236 mL/hr Route  intravenous Documented By  Markus Uribe RN              cycloPHOSphamide (Cytoxan) 500 mg in sodium chloride 0.9% 135 mL IV       Admin Date  12/23/2024 Action  New Bag Dose  500 mg Rate  270 mL/hr Route  intravenous Documented By  Markus Uribe RN              dexAMETHasone (Decadron) tablet 20 mg       Admin Date  12/23/2024 Action  Given Dose  20 mg Route  oral Documented By  Samara Paulson RN              ondansetron (Zofran) tablet 12 mg       Admin Date  12/23/2024 Action  Given Dose  12 mg Route  oral Documented By  Samara Paulson RN                  Hypersensitivity reaction noted: No.    Patient tolerated treatment well. PIV removed. Pt discharged in stable condition and ambulated off the unit independently.     Follow-up Plan: 12/30    MARKUS URIBE RN

## 2024-12-24 LAB
KAPPA LC SERPL-MCNC: 0.43 MG/DL (ref 0.33–1.94)
KAPPA LC/LAMBDA SER: 0.13 {RATIO} (ref 0.26–1.65)
LAMBDA LC SERPL-MCNC: 3.38 MG/DL (ref 0.57–2.63)

## 2024-12-26 ENCOUNTER — TELEPHONE (OUTPATIENT)
Dept: DERMATOLOGY | Facility: CLINIC | Age: 84
End: 2024-12-26
Payer: MEDICARE

## 2024-12-26 DIAGNOSIS — L57.0 ACTINIC KERATOSIS: Primary | ICD-10-CM

## 2024-12-26 RX ORDER — FLUOROURACIL 50 MG/G
CREAM TOPICAL
Qty: 40 G | Refills: 0 | Status: SHIPPED | OUTPATIENT
Start: 2024-12-26

## 2024-12-26 NOTE — TELEPHONE ENCOUNTER
Patient would like a rx for 5 FU so she can start the treatment in January.  Please send to pharmacy.

## 2024-12-30 ENCOUNTER — INFUSION (OUTPATIENT)
Dept: HEMATOLOGY/ONCOLOGY | Facility: CLINIC | Age: 84
End: 2024-12-30
Payer: MEDICARE

## 2024-12-30 VITALS
RESPIRATION RATE: 18 BRPM | SYSTOLIC BLOOD PRESSURE: 130 MMHG | DIASTOLIC BLOOD PRESSURE: 79 MMHG | WEIGHT: 158.95 LBS | BODY MASS INDEX: 30.03 KG/M2 | HEART RATE: 96 BPM | OXYGEN SATURATION: 97 % | TEMPERATURE: 97.3 F

## 2024-12-30 DIAGNOSIS — D80.1 HYPOGAMMAGLOBULINEMIA (MULTI): ICD-10-CM

## 2024-12-30 DIAGNOSIS — C90.00 MULTIPLE MYELOMA NOT HAVING ACHIEVED REMISSION (MULTI): ICD-10-CM

## 2024-12-30 LAB
ALBUMIN SERPL BCP-MCNC: 3.7 G/DL (ref 3.4–5)
ALBUMIN: 3.4 G/DL (ref 3.4–5)
ALP SERPL-CCNC: 53 U/L (ref 33–136)
ALPHA 1 GLOBULIN: 0.3 G/DL (ref 0.2–0.6)
ALPHA 2 GLOBULIN: 0.8 G/DL (ref 0.4–1.1)
ALT SERPL W P-5'-P-CCNC: 10 U/L (ref 7–45)
ANION GAP SERPL CALC-SCNC: 13 MMOL/L (ref 10–20)
AST SERPL W P-5'-P-CCNC: 15 U/L (ref 9–39)
BASOPHILS # BLD AUTO: 0.02 X10*3/UL (ref 0–0.1)
BASOPHILS NFR BLD AUTO: 0.4 %
BETA GLOBULIN: 0.6 G/DL (ref 0.5–1.2)
BILIRUB SERPL-MCNC: 0.3 MG/DL (ref 0–1.2)
BUN SERPL-MCNC: 26 MG/DL (ref 6–23)
CALCIUM SERPL-MCNC: 9.2 MG/DL (ref 8.6–10.3)
CHLORIDE SERPL-SCNC: 109 MMOL/L (ref 98–107)
CO2 SERPL-SCNC: 25 MMOL/L (ref 21–32)
CREAT SERPL-MCNC: 1.04 MG/DL (ref 0.5–1.05)
EGFRCR SERPLBLD CKD-EPI 2021: 54 ML/MIN/1.73M*2
EOSINOPHIL # BLD AUTO: 0.03 X10*3/UL (ref 0–0.4)
EOSINOPHIL NFR BLD AUTO: 0.6 %
ERYTHROCYTE [DISTWIDTH] IN BLOOD BY AUTOMATED COUNT: 13.6 % (ref 11.5–14.5)
GAMMA GLOBULIN: 0.5 G/DL (ref 0.5–1.4)
GLUCOSE SERPL-MCNC: 147 MG/DL (ref 74–99)
HCT VFR BLD AUTO: 31.1 % (ref 36–46)
HGB BLD-MCNC: 10.1 G/DL (ref 12–16)
IGA SERPL-MCNC: 8 MG/DL (ref 70–400)
IGG SERPL-MCNC: 538 MG/DL (ref 700–1600)
IGM SERPL-MCNC: 11 MG/DL (ref 40–230)
IMM GRANULOCYTES # BLD AUTO: 0.13 X10*3/UL (ref 0–0.5)
IMM GRANULOCYTES NFR BLD AUTO: 2.6 % (ref 0–0.9)
IMMUNOFIXATION COMMENT: NORMAL
LYMPHOCYTES # BLD AUTO: 0.45 X10*3/UL (ref 0.8–3)
LYMPHOCYTES NFR BLD AUTO: 8.8 %
MCH RBC QN AUTO: 33.8 PG (ref 26–34)
MCHC RBC AUTO-ENTMCNC: 32.5 G/DL (ref 32–36)
MCV RBC AUTO: 104 FL (ref 80–100)
MONOCYTES # BLD AUTO: 0.49 X10*3/UL (ref 0.05–0.8)
MONOCYTES NFR BLD AUTO: 9.6 %
NEUTROPHILS # BLD AUTO: 3.97 X10*3/UL (ref 1.6–5.5)
NEUTROPHILS NFR BLD AUTO: 78 %
NRBC BLD-RTO: 0 /100 WBCS (ref 0–0)
PATH REVIEW - SERUM IMMUNOFIXATION: NORMAL
PATH REVIEW-SERUM PROTEIN ELECTROPHORESIS: NORMAL
PLATELET # BLD AUTO: 192 X10*3/UL (ref 150–450)
POTASSIUM SERPL-SCNC: 4.2 MMOL/L (ref 3.5–5.3)
PROT SERPL-MCNC: 5.9 G/DL (ref 6.4–8.2)
PROTEIN ELECTROPHORESIS COMMENT: NORMAL
RBC # BLD AUTO: 2.99 X10*6/UL (ref 4–5.2)
SODIUM SERPL-SCNC: 143 MMOL/L (ref 136–145)
WBC # BLD AUTO: 5.1 X10*3/UL (ref 4.4–11.3)

## 2024-12-30 PROCEDURE — 82784 ASSAY IGA/IGD/IGG/IGM EACH: CPT

## 2024-12-30 PROCEDURE — 2500000004 HC RX 250 GENERAL PHARMACY W/ HCPCS (ALT 636 FOR OP/ED): Performed by: INTERNAL MEDICINE

## 2024-12-30 PROCEDURE — 96413 CHEMO IV INFUSION 1 HR: CPT

## 2024-12-30 PROCEDURE — 2500000004 HC RX 250 GENERAL PHARMACY W/ HCPCS (ALT 636 FOR OP/ED): Mod: JZ,JG

## 2024-12-30 PROCEDURE — 96366 THER/PROPH/DIAG IV INF ADDON: CPT | Mod: INF

## 2024-12-30 PROCEDURE — 96417 CHEMO IV INFUS EACH ADDL SEQ: CPT

## 2024-12-30 PROCEDURE — 96367 TX/PROPH/DG ADDL SEQ IV INF: CPT

## 2024-12-30 PROCEDURE — 80053 COMPREHEN METABOLIC PANEL: CPT

## 2024-12-30 PROCEDURE — 2500000001 HC RX 250 WO HCPCS SELF ADMINISTERED DRUGS (ALT 637 FOR MEDICARE OP): Performed by: INTERNAL MEDICINE

## 2024-12-30 PROCEDURE — 85025 COMPLETE CBC W/AUTO DIFF WBC: CPT

## 2024-12-30 RX ORDER — DIPHENHYDRAMINE HCL 25 MG
25 CAPSULE ORAL ONCE
OUTPATIENT
Start: 2025-01-27

## 2024-12-30 RX ORDER — FAMOTIDINE 10 MG/ML
20 INJECTION INTRAVENOUS ONCE AS NEEDED
Status: DISCONTINUED | OUTPATIENT
Start: 2024-12-30 | End: 2024-12-30 | Stop reason: HOSPADM

## 2024-12-30 RX ORDER — PROCHLORPERAZINE EDISYLATE 5 MG/ML
10 INJECTION INTRAMUSCULAR; INTRAVENOUS EVERY 6 HOURS PRN
Status: DISCONTINUED | OUTPATIENT
Start: 2024-12-30 | End: 2024-12-30 | Stop reason: HOSPADM

## 2024-12-30 RX ORDER — EPINEPHRINE 0.3 MG/.3ML
0.3 INJECTION SUBCUTANEOUS EVERY 5 MIN PRN
OUTPATIENT
Start: 2025-01-27

## 2024-12-30 RX ORDER — ALBUTEROL SULFATE 0.83 MG/ML
3 SOLUTION RESPIRATORY (INHALATION) AS NEEDED
OUTPATIENT
Start: 2025-01-27

## 2024-12-30 RX ORDER — ACETAMINOPHEN 325 MG/1
650 TABLET ORAL ONCE
OUTPATIENT
Start: 2025-01-27

## 2024-12-30 RX ORDER — ACETAMINOPHEN 325 MG/1
650 TABLET ORAL ONCE
Status: COMPLETED | OUTPATIENT
Start: 2024-12-30 | End: 2024-12-30

## 2024-12-30 RX ORDER — DIPHENHYDRAMINE HCL 25 MG
25 CAPSULE ORAL ONCE
Status: COMPLETED | OUTPATIENT
Start: 2024-12-30 | End: 2024-12-30

## 2024-12-30 RX ORDER — DIPHENHYDRAMINE HYDROCHLORIDE 50 MG/ML
50 INJECTION INTRAMUSCULAR; INTRAVENOUS AS NEEDED
OUTPATIENT
Start: 2025-01-27

## 2024-12-30 RX ORDER — FAMOTIDINE 10 MG/ML
20 INJECTION INTRAVENOUS ONCE AS NEEDED
OUTPATIENT
Start: 2025-01-27

## 2024-12-30 RX ORDER — ALBUTEROL SULFATE 0.83 MG/ML
3 SOLUTION RESPIRATORY (INHALATION) AS NEEDED
Status: DISCONTINUED | OUTPATIENT
Start: 2024-12-30 | End: 2024-12-30 | Stop reason: HOSPADM

## 2024-12-30 RX ORDER — PROCHLORPERAZINE MALEATE 10 MG
10 TABLET ORAL EVERY 6 HOURS PRN
Status: DISCONTINUED | OUTPATIENT
Start: 2024-12-30 | End: 2024-12-30 | Stop reason: HOSPADM

## 2024-12-30 RX ORDER — DIPHENHYDRAMINE HYDROCHLORIDE 50 MG/ML
50 INJECTION INTRAMUSCULAR; INTRAVENOUS AS NEEDED
Status: DISCONTINUED | OUTPATIENT
Start: 2024-12-30 | End: 2024-12-30 | Stop reason: HOSPADM

## 2024-12-30 RX ORDER — EPINEPHRINE 0.3 MG/.3ML
0.3 INJECTION SUBCUTANEOUS EVERY 5 MIN PRN
Status: DISCONTINUED | OUTPATIENT
Start: 2024-12-30 | End: 2024-12-30 | Stop reason: HOSPADM

## 2024-12-30 RX ADMIN — DEXAMETHASONE 20 MG: 6 TABLET ORAL at 11:29

## 2024-12-30 RX ADMIN — CYCLOPHOSPHAMIDE 500 MG: 500 INJECTION, POWDER, FOR SOLUTION INTRAVENOUS; ORAL at 12:38

## 2024-12-30 RX ADMIN — ONDANSETRON HYDROCHLORIDE 12 MG: 8 TABLET, FILM COATED ORAL at 11:29

## 2024-12-30 RX ADMIN — IMMUNE GLOBULIN (HUMAN) 25 G: 10 INJECTION INTRAVENOUS; SUBCUTANEOUS at 09:20

## 2024-12-30 RX ADMIN — CARFILZOMIB 120 MG: 60 INJECTION, POWDER, LYOPHILIZED, FOR SOLUTION INTRAVENOUS at 12:01

## 2024-12-30 RX ADMIN — ACETAMINOPHEN 650 MG: 325 TABLET ORAL at 08:48

## 2024-12-30 RX ADMIN — DIPHENHYDRAMINE HYDROCHLORIDE 25 MG: 25 CAPSULE ORAL at 08:48

## 2024-12-30 ASSESSMENT — PAIN SCALES - GENERAL: PAINLEVEL_OUTOF10: 0-NO PAIN

## 2025-01-06 ENCOUNTER — TELEPHONE (OUTPATIENT)
Dept: ADMISSION | Facility: HOSPITAL | Age: 85
End: 2025-01-06

## 2025-01-06 ENCOUNTER — INFUSION (OUTPATIENT)
Dept: HEMATOLOGY/ONCOLOGY | Facility: CLINIC | Age: 85
End: 2025-01-06
Payer: MEDICARE

## 2025-01-06 VITALS
SYSTOLIC BLOOD PRESSURE: 156 MMHG | BODY MASS INDEX: 30.1 KG/M2 | TEMPERATURE: 97.3 F | WEIGHT: 159.28 LBS | RESPIRATION RATE: 18 BRPM | OXYGEN SATURATION: 99 % | DIASTOLIC BLOOD PRESSURE: 81 MMHG

## 2025-01-06 DIAGNOSIS — C90.00 MULTIPLE MYELOMA NOT HAVING ACHIEVED REMISSION (MULTI): ICD-10-CM

## 2025-01-06 LAB
ALBUMIN SERPL BCP-MCNC: 3.7 G/DL (ref 3.4–5)
ALP SERPL-CCNC: 49 U/L (ref 33–136)
ALT SERPL W P-5'-P-CCNC: 10 U/L (ref 7–45)
ANION GAP SERPL CALC-SCNC: 11 MMOL/L (ref 10–20)
AST SERPL W P-5'-P-CCNC: 15 U/L (ref 9–39)
BASOPHILS # BLD AUTO: 0.01 X10*3/UL (ref 0–0.1)
BASOPHILS NFR BLD AUTO: 0.2 %
BILIRUB SERPL-MCNC: 0.4 MG/DL (ref 0–1.2)
BUN SERPL-MCNC: 23 MG/DL (ref 6–23)
CALCIUM SERPL-MCNC: 9.3 MG/DL (ref 8.6–10.3)
CHLORIDE SERPL-SCNC: 108 MMOL/L (ref 98–107)
CO2 SERPL-SCNC: 28 MMOL/L (ref 21–32)
CREAT SERPL-MCNC: 0.95 MG/DL (ref 0.5–1.05)
EGFRCR SERPLBLD CKD-EPI 2021: 60 ML/MIN/1.73M*2
EOSINOPHIL # BLD AUTO: 0.04 X10*3/UL (ref 0–0.4)
EOSINOPHIL NFR BLD AUTO: 0.7 %
ERYTHROCYTE [DISTWIDTH] IN BLOOD BY AUTOMATED COUNT: 14.1 % (ref 11.5–14.5)
GLUCOSE SERPL-MCNC: 95 MG/DL (ref 74–99)
HCT VFR BLD AUTO: 30.8 % (ref 36–46)
HGB BLD-MCNC: 9.9 G/DL (ref 12–16)
IMM GRANULOCYTES # BLD AUTO: 0.04 X10*3/UL (ref 0–0.5)
IMM GRANULOCYTES NFR BLD AUTO: 0.7 % (ref 0–0.9)
LYMPHOCYTES # BLD AUTO: 0.28 X10*3/UL (ref 0.8–3)
LYMPHOCYTES NFR BLD AUTO: 5.2 %
MCH RBC QN AUTO: 33.6 PG (ref 26–34)
MCHC RBC AUTO-ENTMCNC: 32.1 G/DL (ref 32–36)
MCV RBC AUTO: 104 FL (ref 80–100)
MONOCYTES # BLD AUTO: 0.49 X10*3/UL (ref 0.05–0.8)
MONOCYTES NFR BLD AUTO: 9.2 %
NEUTROPHILS # BLD AUTO: 4.48 X10*3/UL (ref 1.6–5.5)
NEUTROPHILS NFR BLD AUTO: 84 %
NRBC BLD-RTO: 0 /100 WBCS (ref 0–0)
PLATELET # BLD AUTO: 189 X10*3/UL (ref 150–450)
POTASSIUM SERPL-SCNC: 4.3 MMOL/L (ref 3.5–5.3)
PROT SERPL-MCNC: 6.2 G/DL (ref 6.4–8.2)
RBC # BLD AUTO: 2.95 X10*6/UL (ref 4–5.2)
SODIUM SERPL-SCNC: 143 MMOL/L (ref 136–145)
WBC # BLD AUTO: 5.3 X10*3/UL (ref 4.4–11.3)

## 2025-01-06 PROCEDURE — 85025 COMPLETE CBC W/AUTO DIFF WBC: CPT

## 2025-01-06 PROCEDURE — 2500000004 HC RX 250 GENERAL PHARMACY W/ HCPCS (ALT 636 FOR OP/ED): Performed by: INTERNAL MEDICINE

## 2025-01-06 PROCEDURE — 80053 COMPREHEN METABOLIC PANEL: CPT

## 2025-01-06 PROCEDURE — 2500000005 HC RX 250 GENERAL PHARMACY W/O HCPCS: Performed by: INTERNAL MEDICINE

## 2025-01-06 PROCEDURE — 96417 CHEMO IV INFUS EACH ADDL SEQ: CPT

## 2025-01-06 PROCEDURE — 96413 CHEMO IV INFUSION 1 HR: CPT

## 2025-01-06 RX ORDER — DEXAMETHASONE 4 MG/1
8 TABLET ORAL ONCE
OUTPATIENT
Start: 2025-01-27

## 2025-01-06 RX ORDER — FAMOTIDINE 10 MG/ML
20 INJECTION INTRAVENOUS ONCE AS NEEDED
Status: DISCONTINUED | OUTPATIENT
Start: 2025-01-06 | End: 2025-01-06 | Stop reason: HOSPADM

## 2025-01-06 RX ORDER — DEXAMETHASONE 4 MG/1
8 TABLET ORAL ONCE
OUTPATIENT
Start: 2025-02-17

## 2025-01-06 RX ORDER — PROCHLORPERAZINE MALEATE 10 MG
10 TABLET ORAL EVERY 6 HOURS PRN
Status: DISCONTINUED | OUTPATIENT
Start: 2025-01-06 | End: 2025-01-06 | Stop reason: HOSPADM

## 2025-01-06 RX ORDER — DEXAMETHASONE 4 MG/1
8 TABLET ORAL ONCE
OUTPATIENT
Start: 2025-02-03

## 2025-01-06 RX ORDER — ALBUTEROL SULFATE 0.83 MG/ML
3 SOLUTION RESPIRATORY (INHALATION) AS NEEDED
Status: DISCONTINUED | OUTPATIENT
Start: 2025-01-06 | End: 2025-01-06 | Stop reason: HOSPADM

## 2025-01-06 RX ORDER — EPINEPHRINE 0.3 MG/.3ML
0.3 INJECTION SUBCUTANEOUS EVERY 5 MIN PRN
Status: DISCONTINUED | OUTPATIENT
Start: 2025-01-06 | End: 2025-01-06 | Stop reason: HOSPADM

## 2025-01-06 RX ORDER — DEXAMETHASONE 4 MG/1
8 TABLET ORAL ONCE
OUTPATIENT
Start: 2025-01-20

## 2025-01-06 RX ORDER — DEXAMETHASONE 4 MG/1
8 TABLET ORAL ONCE
OUTPATIENT
Start: 2025-02-24

## 2025-01-06 RX ORDER — DIPHENHYDRAMINE HYDROCHLORIDE 50 MG/ML
50 INJECTION INTRAMUSCULAR; INTRAVENOUS AS NEEDED
Status: DISCONTINUED | OUTPATIENT
Start: 2025-01-06 | End: 2025-01-06 | Stop reason: HOSPADM

## 2025-01-06 RX ORDER — DEXAMETHASONE 4 MG/1
8 TABLET ORAL ONCE
OUTPATIENT
Start: 2025-03-03

## 2025-01-06 RX ORDER — PROCHLORPERAZINE EDISYLATE 5 MG/ML
10 INJECTION INTRAMUSCULAR; INTRAVENOUS EVERY 6 HOURS PRN
Status: DISCONTINUED | OUTPATIENT
Start: 2025-01-06 | End: 2025-01-06 | Stop reason: HOSPADM

## 2025-01-06 RX ADMIN — ONDANSETRON HYDROCHLORIDE 12 MG: 8 TABLET, FILM COATED ORAL at 08:59

## 2025-01-06 RX ADMIN — DEXAMETHASONE 20 MG: 6 TABLET ORAL at 08:59

## 2025-01-06 RX ADMIN — CYCLOPHOSPHAMIDE 500 MG: 500 INJECTION, POWDER, FOR SOLUTION INTRAVENOUS; ORAL at 10:01

## 2025-01-06 RX ADMIN — CARFILZOMIB 120 MG: 60 INJECTION, POWDER, LYOPHILIZED, FOR SOLUTION INTRAVENOUS at 09:27

## 2025-01-06 ASSESSMENT — PAIN SCALES - GENERAL: PAINLEVEL_OUTOF10: 0-NO PAIN

## 2025-01-06 NOTE — PROGRESS NOTES
Patient here for chemo infusion. Obtained IV access, sent blood to lab. Reviewed BP labs and weight, patient ready for treatment. Tolerated without complication, reviewed schedule, denies questions.

## 2025-01-06 NOTE — TELEPHONE ENCOUNTER
Pt's dtr updated per POLY Arboleda CNP directive.   Last zometa dose 11/25, dentist can plan accordingly.   Team will discuss dosing of steroids with upcoming infusions.   Encouraged Frances  to report and adverse reactions/side effects so team can adjust medication dosing accordingly.   Dtr in agreement, will call back with any further concerns.
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
## 2025-01-07 ENCOUNTER — APPOINTMENT (OUTPATIENT)
Dept: DERMATOLOGY | Facility: CLINIC | Age: 85
End: 2025-01-07
Payer: MEDICARE

## 2025-01-07 VITALS — DIASTOLIC BLOOD PRESSURE: 80 MMHG | HEART RATE: 108 BPM | SYSTOLIC BLOOD PRESSURE: 132 MMHG

## 2025-01-07 DIAGNOSIS — D04.39 SQUAMOUS CELL CARCINOMA IN SITU (SCCIS) OF SKIN OF NOSE: ICD-10-CM

## 2025-01-07 PROCEDURE — 17311 MOHS 1 STAGE H/N/HF/G: CPT | Performed by: DERMATOLOGY

## 2025-01-07 PROCEDURE — 13151 CMPLX RPR E/N/E/L 1.1-2.5 CM: CPT | Performed by: DERMATOLOGY

## 2025-01-07 NOTE — PROGRESS NOTES
" Office Visit Note  Date: 1/7/2025  Surgeon:  Sophia Vann MD  Office Location:  [address] [facility]  [address]  [address]  [facility] [address]  Dept: [phone]  Dept Fax: [phone]  Referring Provider: Esequiel Stiles, APRN-CNP  [address]  [address]  [place] [address]    Subjective   Sil ALBA West \"Sherie\" is a 84 y.o. female who presents for the following: MOHS Surgery    According to the patient, the lesion has been present for approximately greater than 1 year at the time of diagnosis.  The lesion is not causing symptoms.  The lesion has not been treated previously.    The patient does not have a pacemaker / defibrillator.  The patient does not have a heart valve / joint replacement.    The patient is on blood thinners.  The patient does not have a history of hepatitis B or C.  The patient does not have a history of HIV.  The patient does have a history of immunosuppression (e.g. organ transplantation, malignancy, medications)    Review of Systems:  No other skin or systemic complaints other than what is documented elsewhere in the note.    MEDICAL HISTORY: clinically relevant history including significant past medical history, medications and allergies was reviewed and documented in Epic.    Objective   Well appearing patient in no apparent distress; mood and affect are within normal limits.  Vital signs: See record.  Noted on the mid tip of nose is a scar c/w recent biopsy    The patient confirmed the identified site.    Discussion:  The nature of the diagnosis was explained. The lesion is a skin cancer.  It has a risk of local growth and distant spread. The condition is associated with sun exposure.  Warning signs of non-melanoma skin cancer discussed. Patient was instructed to perform monthly self skin examination.  We recommended that the patient have regular full skin exams given an increased risk of subsequent skin cancers. The patient was instructed to " use sun protective behaviors including use of broad spectrum sunscreens and sun protective clothing to reduce risk of skin cancers.      Risks, benefits, side effects of Mohs surgery were discussed with patient and the patient voiced understanding.  It was explained that even though the cure rate of Mohs is very high it is not 100%. Risks of surgery including but not limited to bleeding, infection, numbness, nerve damage, and scar were reviewed.  Discussion included wound care requirements, activity restrictions, likely scar outcome and time to heal.     After Mohs surgery, the defect may need to be repaired surgically and the scar may be longer than the original lesion.  Reconstruction options, risks, and benefits were reviewed including second intention healing, linear repair (4-1 ratio was explained), local flaps, skin grafts, cartilage grafts and interpolation flaps (the need for multiple surgeries was explained). Possible outcomes were reviewed including likely scar appearance, failure of flap survival, infection, bleeding and the need for revision surgery.     The pathology was reviewed, the photograph was reviewed, and the referring physician's note was reviewed.    Patient elected for Mohs surgery.

## 2025-01-07 NOTE — PROGRESS NOTES
Mohs Surgery Operative Note    Date of Surgery:  1/7/2025  Surgeon:  Sophia Vann MD  Office Location:  [address] [facility]  [address]  [address]  [facility] [address]  Dept: [phone]  Dept Fax: [phone]  Referring Provider: Esequiel Stiles, APRN-CNP  [address] [facility] [address]  [place] [address]      Assessment/Plan   Pre-procedure:   Obtained informed consent: written from patient  The surgical site was identified and confirmed with the patient.     Intra-operative:   Audible time out called at : 10:37 AM 01/07/25  by: Wendi Castro MA   Verified patient name, birthdate, site, specimen bottle label & requisition.    The planned procedure(s) was again reviewed with the patient. The risks of bleeding, infection, nerve damage and scarring were reviewed. Written authorization was obtained. The patient identity, surgical site, and planned procedure(s) were verified. The provider acted as both surgeon and pathologist.     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  Mid Tip of Nose  Mohs surgery - Mid Tip of Nose  Consent obtained: written    Universal Protocol:  Procedure explained and questions answered to patient or proxy's satisfaction: Yes    Test results available and properly labeled: Yes    Pathology report reviewed: Yes    External notes reviewed: Yes    Photo or diagram used for site identification: Yes    Site/side marked: Yes    Slide independently reviewed by Mohs surgeon: Yes    Immediately prior to procedure a time out was called: Yes    Patient identity confirmed: verbally with patient  Preparation: Patient was prepped and draped in usual sterile fashion      Anticoagulation:  Is the patient taking prescription anticoagulant and/or aspirin prescribed/recommended by a physician? Yes    Was the anticoagulation regimen changed prior to Mohs? No      Anesthesia:  Anesthesia method: local infiltration  Local anesthetic: lidocaine 2% WITH  epi    Procedure Details:  Case ID Number: [id number]  Biopsy accession number: [id number]  Date of biopsy: 11/22/2024  Pre-Op diagnosis: squamous cell carcinoma  SCC subtype: in situ  Surgical site (from skin exam): Mid Tip of Nose  Pre-operative length (cm): 0.9  Pre-operative width (cm): 0.9  Indications for Mohs surgery: anatomic location where tissue conservation is critical  Previously treated? No      Micrographic Surgery Details:  Post-operative length (cm): 0.9  Post-operative width (cm): 0.9  Number of Mohs stages: 1    Stage 1     Comments: The patient was brought into the operating room and placed in the procedure chair in the appropriate position.  The area positive by previous biopsy was identified and confirmed with the patient. The area of clinically obvious tumor was debulked using a curette and/or scalpel as needed. An incision was made following the Mohs approach through the skin. The specimen was taken to the lab, divided into 2 piece(s) and appropriately chromacoded and processed.     Tumor features identified on Mohs section: no tumor identified  Depth of defect: subcutaneous fat  Patient tolerance of procedure: tolerated well, no immediate complications    Reconstruction:  Was the defect reconstructed? Yes    Was reconstruction performed by the same Mohs surgeon? Yes    Setting of reconstruction: hospital-based outpatient clinical space  When was reconstruction performed? same day  Type of reconstruction: linear  Linear reconstruction: simple  Length of linear repair (cm): 1.7  Subcutaneous Layers (Deep Stitches)   Suture size:  5-0  Suture type:  Vicryl  Stitches:  Buried horizontal mattress  Fine/surface layer approximation (top stitches)   Epidermal/Superficial suture size:  5-0  Epidermal/Superficial suture type:  Fast-absorbing gut  Stitches: simple running    Hemostasis achieved with: suture and pressure  Outcome: patient tolerated procedure well with no complications    Post-procedure  details: sterile dressing applied and wound care instructions given    Dressing type: pressure dressing      Complex Linear Repair - Wide Undermining:  Given the location and size of the defect, it was determined that a complex layered linear closure was required to restore normal anatomy and function. The repair was considered complex because extensive undermining was required and performed. The amount of undermining performed was greater than the maximum width of the defect as measured perpendicular to the closure line along at least one entire edge of the defect. Standing cutaneous cones were removed using Burow's triangles. The wound edges were brought into close approximation with buried vertical mattress sutures. The remainder of the wound was then closed with epidermal top sutures.  The final repair measured 1.7 cm              Wound care was discussed, and the patient was given written post-operative wound care instructions.      The patient will follow up with Sophia Vann MD as needed for any post operative problems or concerns, and will follow up with their primary dermatologist as scheduled.

## 2025-01-08 ENCOUNTER — APPOINTMENT (OUTPATIENT)
Dept: DERMATOLOGY | Facility: CLINIC | Age: 85
End: 2025-01-08
Payer: MEDICARE

## 2025-01-18 ASSESSMENT — ENCOUNTER SYMPTOMS
BACK PAIN: 1
CARDIOVASCULAR NEGATIVE: 1
DYSURIA: 0
DIARRHEA: 1
UNEXPECTED WEIGHT CHANGE: 0
APPETITE CHANGE: 0
DIAPHORESIS: 0
WHEEZING: 0
SHORTNESS OF BREATH: 0
ADENOPATHY: 0
FEVER: 0
NEUROLOGICAL NEGATIVE: 1
FATIGUE: 0
CHILLS: 0
HEMATURIA: 0
BRUISES/BLEEDS EASILY: 1
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
## 2025-01-20 ENCOUNTER — APPOINTMENT (OUTPATIENT)
Dept: HEMATOLOGY/ONCOLOGY | Facility: HOSPITAL | Age: 85
End: 2025-01-20
Payer: MEDICARE

## 2025-01-20 ENCOUNTER — LAB (OUTPATIENT)
Dept: LAB | Facility: HOSPITAL | Age: 85
End: 2025-01-20
Payer: MEDICARE

## 2025-01-20 ENCOUNTER — INFUSION (OUTPATIENT)
Dept: HEMATOLOGY/ONCOLOGY | Facility: HOSPITAL | Age: 85
End: 2025-01-20
Payer: MEDICARE

## 2025-01-20 ENCOUNTER — OFFICE VISIT (OUTPATIENT)
Dept: HEMATOLOGY/ONCOLOGY | Facility: HOSPITAL | Age: 85
End: 2025-01-20
Payer: MEDICARE

## 2025-01-20 VITALS
SYSTOLIC BLOOD PRESSURE: 138 MMHG | OXYGEN SATURATION: 99 % | TEMPERATURE: 99.1 F | HEART RATE: 88 BPM | BODY MASS INDEX: 30.62 KG/M2 | RESPIRATION RATE: 16 BRPM | DIASTOLIC BLOOD PRESSURE: 67 MMHG | WEIGHT: 162.04 LBS

## 2025-01-20 DIAGNOSIS — D84.821 IMMUNOSUPPRESSED DUE TO CHEMOTHERAPY: ICD-10-CM

## 2025-01-20 DIAGNOSIS — Z51.11 ENCOUNTER FOR ANTINEOPLASTIC CHEMOTHERAPY: ICD-10-CM

## 2025-01-20 DIAGNOSIS — C90.00 MULTIPLE MYELOMA NOT HAVING ACHIEVED REMISSION (MULTI): Primary | ICD-10-CM

## 2025-01-20 DIAGNOSIS — T45.1X5A IMMUNOSUPPRESSED DUE TO CHEMOTHERAPY: ICD-10-CM

## 2025-01-20 DIAGNOSIS — C90.00 MULTIPLE MYELOMA NOT HAVING ACHIEVED REMISSION (MULTI): ICD-10-CM

## 2025-01-20 DIAGNOSIS — Z79.899 IMMUNOSUPPRESSED DUE TO CHEMOTHERAPY: ICD-10-CM

## 2025-01-20 DIAGNOSIS — Z85.828 HISTORY OF SCC (SQUAMOUS CELL CARCINOMA) OF SKIN: ICD-10-CM

## 2025-01-20 DIAGNOSIS — E55.9 VITAMIN D DEFICIENCY: ICD-10-CM

## 2025-01-20 DIAGNOSIS — Z85.828 HX OF BASAL CELL CARCINOMA: ICD-10-CM

## 2025-01-20 DIAGNOSIS — D80.1 HYPOGAMMAGLOBULINEMIA (MULTI): ICD-10-CM

## 2025-01-20 LAB
ALBUMIN SERPL BCP-MCNC: 3.8 G/DL (ref 3.4–5)
ALP SERPL-CCNC: 47 U/L (ref 33–136)
ALT SERPL W P-5'-P-CCNC: 8 U/L (ref 7–45)
ANION GAP SERPL CALC-SCNC: 12 MMOL/L (ref 10–20)
AST SERPL W P-5'-P-CCNC: 14 U/L (ref 9–39)
BASOPHILS # BLD AUTO: 0.02 X10*3/UL (ref 0–0.1)
BASOPHILS NFR BLD AUTO: 0.6 %
BILIRUB SERPL-MCNC: 0.5 MG/DL (ref 0–1.2)
BUN SERPL-MCNC: 23 MG/DL (ref 6–23)
CALCIUM SERPL-MCNC: 9.3 MG/DL (ref 8.6–10.3)
CHLORIDE SERPL-SCNC: 106 MMOL/L (ref 98–107)
CO2 SERPL-SCNC: 27 MMOL/L (ref 21–32)
CREAT SERPL-MCNC: 0.94 MG/DL (ref 0.5–1.05)
EGFRCR SERPLBLD CKD-EPI 2021: 61 ML/MIN/1.73M*2
EOSINOPHIL # BLD AUTO: 0.03 X10*3/UL (ref 0–0.4)
EOSINOPHIL NFR BLD AUTO: 0.9 %
ERYTHROCYTE [DISTWIDTH] IN BLOOD BY AUTOMATED COUNT: 14.1 % (ref 11.5–14.5)
GLUCOSE SERPL-MCNC: 126 MG/DL (ref 74–99)
HCT VFR BLD AUTO: 31.6 % (ref 36–46)
HGB BLD-MCNC: 10.3 G/DL (ref 12–16)
IGA SERPL-MCNC: <7 MG/DL (ref 70–400)
IGG SERPL-MCNC: 677 MG/DL (ref 700–1600)
IGM SERPL-MCNC: 6 MG/DL (ref 40–230)
IMM GRANULOCYTES # BLD AUTO: 0.01 X10*3/UL (ref 0–0.5)
IMM GRANULOCYTES NFR BLD AUTO: 0.3 % (ref 0–0.9)
LYMPHOCYTES # BLD AUTO: 0.31 X10*3/UL (ref 0.8–3)
LYMPHOCYTES NFR BLD AUTO: 9.4 %
MCH RBC QN AUTO: 34.2 PG (ref 26–34)
MCHC RBC AUTO-ENTMCNC: 32.6 G/DL (ref 32–36)
MCV RBC AUTO: 105 FL (ref 80–100)
MONOCYTES # BLD AUTO: 0.27 X10*3/UL (ref 0.05–0.8)
MONOCYTES NFR BLD AUTO: 8.2 %
NEUTROPHILS # BLD AUTO: 2.66 X10*3/UL (ref 1.6–5.5)
NEUTROPHILS NFR BLD AUTO: 80.6 %
NRBC BLD-RTO: 0 /100 WBCS (ref 0–0)
PLATELET # BLD AUTO: 244 X10*3/UL (ref 150–450)
POTASSIUM SERPL-SCNC: 4 MMOL/L (ref 3.5–5.3)
PROT SERPL-MCNC: 6 G/DL (ref 6.4–8.2)
PROT SERPL-MCNC: 6.2 G/DL (ref 6.4–8.2)
RBC # BLD AUTO: 3.01 X10*6/UL (ref 4–5.2)
SODIUM SERPL-SCNC: 141 MMOL/L (ref 136–145)
WBC # BLD AUTO: 3.3 X10*3/UL (ref 4.4–11.3)

## 2025-01-20 PROCEDURE — 1159F MED LIST DOCD IN RCRD: CPT

## 2025-01-20 PROCEDURE — 2500000004 HC RX 250 GENERAL PHARMACY W/ HCPCS (ALT 636 FOR OP/ED): Performed by: INTERNAL MEDICINE

## 2025-01-20 PROCEDURE — 84155 ASSAY OF PROTEIN SERUM: CPT | Mod: 59

## 2025-01-20 PROCEDURE — 1126F AMNT PAIN NOTED NONE PRSNT: CPT

## 2025-01-20 PROCEDURE — 83521 IG LIGHT CHAINS FREE EACH: CPT

## 2025-01-20 PROCEDURE — 96417 CHEMO IV INFUS EACH ADDL SEQ: CPT

## 2025-01-20 PROCEDURE — 85025 COMPLETE CBC W/AUTO DIFF WBC: CPT

## 2025-01-20 PROCEDURE — 82784 ASSAY IGA/IGD/IGG/IGM EACH: CPT

## 2025-01-20 PROCEDURE — 99215 OFFICE O/P EST HI 40 MIN: CPT

## 2025-01-20 PROCEDURE — 80053 COMPREHEN METABOLIC PANEL: CPT

## 2025-01-20 PROCEDURE — 96413 CHEMO IV INFUSION 1 HR: CPT

## 2025-01-20 PROCEDURE — 1160F RVW MEDS BY RX/DR IN RCRD: CPT

## 2025-01-20 PROCEDURE — 2500000004 HC RX 250 GENERAL PHARMACY W/ HCPCS (ALT 636 FOR OP/ED)

## 2025-01-20 PROCEDURE — 36415 COLL VENOUS BLD VENIPUNCTURE: CPT

## 2025-01-20 PROCEDURE — 84165 PROTEIN E-PHORESIS SERUM: CPT

## 2025-01-20 RX ORDER — DEXAMETHASONE 4 MG/1
8 TABLET ORAL ONCE
Status: COMPLETED | OUTPATIENT
Start: 2025-01-20 | End: 2025-01-20

## 2025-01-20 RX ORDER — DIPHENHYDRAMINE HYDROCHLORIDE 50 MG/ML
50 INJECTION INTRAMUSCULAR; INTRAVENOUS AS NEEDED
Status: DISCONTINUED | OUTPATIENT
Start: 2025-01-20 | End: 2025-01-20 | Stop reason: HOSPADM

## 2025-01-20 RX ORDER — ALBUTEROL SULFATE 0.83 MG/ML
3 SOLUTION RESPIRATORY (INHALATION) AS NEEDED
Status: DISCONTINUED | OUTPATIENT
Start: 2025-01-20 | End: 2025-01-20 | Stop reason: HOSPADM

## 2025-01-20 RX ORDER — FAMOTIDINE 10 MG/ML
20 INJECTION INTRAVENOUS ONCE AS NEEDED
Status: DISCONTINUED | OUTPATIENT
Start: 2025-01-20 | End: 2025-01-20 | Stop reason: HOSPADM

## 2025-01-20 RX ORDER — PROCHLORPERAZINE MALEATE 10 MG
10 TABLET ORAL EVERY 6 HOURS PRN
Status: DISCONTINUED | OUTPATIENT
Start: 2025-01-20 | End: 2025-01-20 | Stop reason: HOSPADM

## 2025-01-20 RX ORDER — EPINEPHRINE 0.3 MG/.3ML
0.3 INJECTION SUBCUTANEOUS EVERY 5 MIN PRN
Status: DISCONTINUED | OUTPATIENT
Start: 2025-01-20 | End: 2025-01-20 | Stop reason: HOSPADM

## 2025-01-20 RX ORDER — DEXAMETHASONE 4 MG/1
8 TABLET ORAL
Qty: 6 TABLET | Refills: 3 | Status: SHIPPED | OUTPATIENT
Start: 2025-01-20 | End: 2025-12-22

## 2025-01-20 RX ORDER — PROCHLORPERAZINE EDISYLATE 5 MG/ML
10 INJECTION INTRAMUSCULAR; INTRAVENOUS EVERY 6 HOURS PRN
Status: DISCONTINUED | OUTPATIENT
Start: 2025-01-20 | End: 2025-01-20 | Stop reason: HOSPADM

## 2025-01-20 RX ADMIN — ONDANSETRON HYDROCHLORIDE 12 MG: 8 TABLET, FILM COATED ORAL at 10:29

## 2025-01-20 RX ADMIN — DEXAMETHASONE 8 MG: 4 TABLET ORAL at 10:29

## 2025-01-20 RX ADMIN — CYCLOPHOSPHAMIDE 500 MG: 500 INJECTION, POWDER, FOR SOLUTION INTRAVENOUS; ORAL at 11:46

## 2025-01-20 RX ADMIN — CARFILZOMIB 120 MG: 60 INJECTION, POWDER, LYOPHILIZED, FOR SOLUTION INTRAVENOUS at 11:03

## 2025-01-20 ASSESSMENT — ENCOUNTER SYMPTOMS: COUGH: 0

## 2025-01-20 ASSESSMENT — PAIN SCALES - GENERAL: PAINLEVEL_OUTOF10: 0-NO PAIN

## 2025-01-20 NOTE — PROGRESS NOTES
Sherie West is a 84 y.o. female who arrived to infusion from provider visit for   Treatment Plans       Name Type Plan Dates Plan Provider         Active    Carfilzomib and Cyclophosphamide (Weekly) / Dexamethasone, 28 Day Cycles Oncology Treatment 8/5/2024 - Present Carmencita Marshall MD                  See provider note for assessment. Tolerated infusion without incident, ambulated off unit independently.

## 2025-01-21 LAB
KAPPA LC SERPL-MCNC: 0.59 MG/DL (ref 0.33–1.94)
KAPPA LC/LAMBDA SER: 0.17 {RATIO} (ref 0.26–1.65)
LAMBDA LC SERPL-MCNC: 3.56 MG/DL (ref 0.57–2.63)

## 2025-01-22 LAB
ALBUMIN: 3.7 G/DL (ref 3.4–5)
ALPHA 1 GLOBULIN: 0.3 G/DL (ref 0.2–0.6)
ALPHA 2 GLOBULIN: 0.7 G/DL (ref 0.4–1.1)
BETA GLOBULIN: 0.7 G/DL (ref 0.5–1.2)
GAMMA GLOBULIN: 0.6 G/DL (ref 0.5–1.4)
PATH REVIEW-SERUM PROTEIN ELECTROPHORESIS: NORMAL
PROTEIN ELECTROPHORESIS COMMENT: NORMAL

## 2025-01-27 ENCOUNTER — INFUSION (OUTPATIENT)
Dept: HEMATOLOGY/ONCOLOGY | Facility: CLINIC | Age: 85
End: 2025-01-27
Payer: MEDICARE

## 2025-01-27 ENCOUNTER — APPOINTMENT (OUTPATIENT)
Dept: HEMATOLOGY/ONCOLOGY | Facility: HOSPITAL | Age: 85
End: 2025-01-27
Payer: MEDICARE

## 2025-01-27 VITALS
RESPIRATION RATE: 17 BRPM | DIASTOLIC BLOOD PRESSURE: 82 MMHG | TEMPERATURE: 100.4 F | BODY MASS INDEX: 30.41 KG/M2 | SYSTOLIC BLOOD PRESSURE: 147 MMHG | OXYGEN SATURATION: 99 % | HEART RATE: 98 BPM | WEIGHT: 160.94 LBS

## 2025-01-27 DIAGNOSIS — D80.1 HYPOGAMMAGLOBULINEMIA (MULTI): ICD-10-CM

## 2025-01-27 DIAGNOSIS — C90.00 MULTIPLE MYELOMA NOT HAVING ACHIEVED REMISSION (MULTI): ICD-10-CM

## 2025-01-27 LAB
ALBUMIN SERPL BCP-MCNC: 4 G/DL (ref 3.4–5)
ALP SERPL-CCNC: 46 U/L (ref 33–136)
ALT SERPL W P-5'-P-CCNC: 9 U/L (ref 7–45)
ANION GAP SERPL CALC-SCNC: 12 MMOL/L (ref 10–20)
AST SERPL W P-5'-P-CCNC: 14 U/L (ref 9–39)
BASOPHILS # BLD AUTO: 0.02 X10*3/UL (ref 0–0.1)
BASOPHILS NFR BLD AUTO: 0.5 %
BILIRUB SERPL-MCNC: 0.5 MG/DL (ref 0–1.2)
BUN SERPL-MCNC: 27 MG/DL (ref 6–23)
CALCIUM SERPL-MCNC: 10.1 MG/DL (ref 8.6–10.3)
CHLORIDE SERPL-SCNC: 107 MMOL/L (ref 98–107)
CO2 SERPL-SCNC: 29 MMOL/L (ref 21–32)
CREAT SERPL-MCNC: 1.01 MG/DL (ref 0.5–1.05)
EGFRCR SERPLBLD CKD-EPI 2021: 56 ML/MIN/1.73M*2
EOSINOPHIL # BLD AUTO: 0.02 X10*3/UL (ref 0–0.4)
EOSINOPHIL NFR BLD AUTO: 0.5 %
ERYTHROCYTE [DISTWIDTH] IN BLOOD BY AUTOMATED COUNT: 13.9 % (ref 11.5–14.5)
ERYTHROCYTE [DISTWIDTH] IN BLOOD BY AUTOMATED COUNT: 13.9 % (ref 11.5–14.5)
GLUCOSE SERPL-MCNC: 78 MG/DL (ref 74–99)
HCT VFR BLD AUTO: 30.6 % (ref 36–46)
HCT VFR BLD AUTO: 30.6 % (ref 36–46)
HGB BLD-MCNC: 10.1 G/DL (ref 12–16)
HGB BLD-MCNC: 10.1 G/DL (ref 12–16)
IMM GRANULOCYTES # BLD AUTO: 0.05 X10*3/UL (ref 0–0.5)
IMM GRANULOCYTES NFR BLD AUTO: 1.2 % (ref 0–0.9)
LYMPHOCYTES # BLD AUTO: 0.33 X10*3/UL (ref 0.8–3)
LYMPHOCYTES NFR BLD AUTO: 8.2 %
MCH RBC QN AUTO: 34.2 PG (ref 26–34)
MCH RBC QN AUTO: 34.2 PG (ref 26–34)
MCHC RBC AUTO-ENTMCNC: 33 G/DL (ref 32–36)
MCHC RBC AUTO-ENTMCNC: 33 G/DL (ref 32–36)
MCV RBC AUTO: 104 FL (ref 80–100)
MCV RBC AUTO: 104 FL (ref 80–100)
MONOCYTES # BLD AUTO: 0.56 X10*3/UL (ref 0.05–0.8)
MONOCYTES NFR BLD AUTO: 13.9 %
NEUTROPHILS # BLD AUTO: 3.04 X10*3/UL (ref 1.6–5.5)
NEUTROPHILS NFR BLD AUTO: 75.7 %
NRBC BLD-RTO: 0 /100 WBCS (ref 0–0)
NRBC BLD-RTO: 0 /100 WBCS (ref 0–0)
PLATELET # BLD AUTO: 173 X10*3/UL (ref 150–450)
PLATELET # BLD AUTO: 173 X10*3/UL (ref 150–450)
POTASSIUM SERPL-SCNC: 4.5 MMOL/L (ref 3.5–5.3)
PROT SERPL-MCNC: 6.2 G/DL (ref 6.4–8.2)
RBC # BLD AUTO: 2.95 X10*6/UL (ref 4–5.2)
RBC # BLD AUTO: 2.95 X10*6/UL (ref 4–5.2)
SODIUM SERPL-SCNC: 143 MMOL/L (ref 136–145)
WBC # BLD AUTO: 4 X10*3/UL (ref 4.4–11.3)
WBC # BLD AUTO: 4 X10*3/UL (ref 4.4–11.3)

## 2025-01-27 PROCEDURE — 96417 CHEMO IV INFUS EACH ADDL SEQ: CPT

## 2025-01-27 PROCEDURE — 96413 CHEMO IV INFUSION 1 HR: CPT

## 2025-01-27 PROCEDURE — 85025 COMPLETE CBC W/AUTO DIFF WBC: CPT

## 2025-01-27 PROCEDURE — 96367 TX/PROPH/DG ADDL SEQ IV INF: CPT

## 2025-01-27 PROCEDURE — 2500000004 HC RX 250 GENERAL PHARMACY W/ HCPCS (ALT 636 FOR OP/ED): Mod: JZ,TB | Performed by: INTERNAL MEDICINE

## 2025-01-27 PROCEDURE — 82784 ASSAY IGA/IGD/IGG/IGM EACH: CPT

## 2025-01-27 PROCEDURE — 80053 COMPREHEN METABOLIC PANEL: CPT

## 2025-01-27 PROCEDURE — 2500000001 HC RX 250 WO HCPCS SELF ADMINISTERED DRUGS (ALT 637 FOR MEDICARE OP): Performed by: INTERNAL MEDICINE

## 2025-01-27 PROCEDURE — 85027 COMPLETE CBC AUTOMATED: CPT

## 2025-01-27 PROCEDURE — 96366 THER/PROPH/DIAG IV INF ADDON: CPT | Mod: INF

## 2025-01-27 PROCEDURE — 2500000004 HC RX 250 GENERAL PHARMACY W/ HCPCS (ALT 636 FOR OP/ED): Mod: JZ,TB

## 2025-01-27 RX ORDER — ALBUTEROL SULFATE 0.83 MG/ML
3 SOLUTION RESPIRATORY (INHALATION) AS NEEDED
OUTPATIENT
Start: 2025-02-24

## 2025-01-27 RX ORDER — DEXAMETHASONE 4 MG/1
8 TABLET ORAL ONCE
Status: COMPLETED | OUTPATIENT
Start: 2025-01-27 | End: 2025-01-27

## 2025-01-27 RX ORDER — DIPHENHYDRAMINE HYDROCHLORIDE 50 MG/ML
50 INJECTION INTRAMUSCULAR; INTRAVENOUS AS NEEDED
OUTPATIENT
Start: 2025-02-24

## 2025-01-27 RX ORDER — FAMOTIDINE 10 MG/ML
20 INJECTION INTRAVENOUS ONCE AS NEEDED
Status: DISCONTINUED | OUTPATIENT
Start: 2025-01-27 | End: 2025-01-27 | Stop reason: HOSPADM

## 2025-01-27 RX ORDER — PROCHLORPERAZINE MALEATE 10 MG
10 TABLET ORAL EVERY 6 HOURS PRN
Status: DISCONTINUED | OUTPATIENT
Start: 2025-01-27 | End: 2025-01-27 | Stop reason: HOSPADM

## 2025-01-27 RX ORDER — ACETAMINOPHEN 325 MG/1
650 TABLET ORAL ONCE
Status: COMPLETED | OUTPATIENT
Start: 2025-01-27 | End: 2025-01-27

## 2025-01-27 RX ORDER — FAMOTIDINE 10 MG/ML
20 INJECTION INTRAVENOUS ONCE AS NEEDED
OUTPATIENT
Start: 2025-02-24

## 2025-01-27 RX ORDER — DIPHENHYDRAMINE HCL 25 MG
25 CAPSULE ORAL ONCE
OUTPATIENT
Start: 2025-02-24

## 2025-01-27 RX ORDER — EPINEPHRINE 0.3 MG/.3ML
0.3 INJECTION SUBCUTANEOUS EVERY 5 MIN PRN
Status: DISCONTINUED | OUTPATIENT
Start: 2025-01-27 | End: 2025-01-27 | Stop reason: HOSPADM

## 2025-01-27 RX ORDER — PROCHLORPERAZINE EDISYLATE 5 MG/ML
10 INJECTION INTRAMUSCULAR; INTRAVENOUS EVERY 6 HOURS PRN
Status: DISCONTINUED | OUTPATIENT
Start: 2025-01-27 | End: 2025-01-27 | Stop reason: HOSPADM

## 2025-01-27 RX ORDER — ACETAMINOPHEN 325 MG/1
650 TABLET ORAL ONCE
OUTPATIENT
Start: 2025-02-24

## 2025-01-27 RX ORDER — EPINEPHRINE 0.3 MG/.3ML
0.3 INJECTION SUBCUTANEOUS EVERY 5 MIN PRN
OUTPATIENT
Start: 2025-02-24

## 2025-01-27 RX ORDER — DIPHENHYDRAMINE HCL 25 MG
25 CAPSULE ORAL ONCE
Status: COMPLETED | OUTPATIENT
Start: 2025-01-27 | End: 2025-01-27

## 2025-01-27 RX ORDER — ALBUTEROL SULFATE 0.83 MG/ML
3 SOLUTION RESPIRATORY (INHALATION) AS NEEDED
Status: DISCONTINUED | OUTPATIENT
Start: 2025-01-27 | End: 2025-01-27 | Stop reason: HOSPADM

## 2025-01-27 RX ORDER — DIPHENHYDRAMINE HYDROCHLORIDE 50 MG/ML
50 INJECTION INTRAMUSCULAR; INTRAVENOUS AS NEEDED
Status: DISCONTINUED | OUTPATIENT
Start: 2025-01-27 | End: 2025-01-27 | Stop reason: HOSPADM

## 2025-01-27 RX ADMIN — DIPHENHYDRAMINE HYDROCHLORIDE 25 MG: 25 CAPSULE ORAL at 10:46

## 2025-01-27 RX ADMIN — CYCLOPHOSPHAMIDE 500 MG: 500 INJECTION, POWDER, FOR SOLUTION INTRAVENOUS; ORAL at 14:08

## 2025-01-27 RX ADMIN — ONDANSETRON HYDROCHLORIDE 12 MG: 8 TABLET, FILM COATED ORAL at 13:07

## 2025-01-27 RX ADMIN — ACETAMINOPHEN 650 MG: 325 TABLET ORAL at 10:46

## 2025-01-27 RX ADMIN — CARFILZOMIB 120 MG: 60 INJECTION, POWDER, LYOPHILIZED, FOR SOLUTION INTRAVENOUS at 13:35

## 2025-01-27 RX ADMIN — IMMUNE GLOBULIN (HUMAN) 25 G: 10 INJECTION INTRAVENOUS; SUBCUTANEOUS at 11:00

## 2025-01-27 RX ADMIN — DEXAMETHASONE 8 MG: 4 TABLET ORAL at 13:08

## 2025-01-27 ASSESSMENT — PAIN SCALES - GENERAL: PAINLEVEL_OUTOF10: 0-NO PAIN

## 2025-01-28 LAB
IGA SERPL-MCNC: <7 MG/DL (ref 70–400)
IGG SERPL-MCNC: 626 MG/DL (ref 700–1600)
IGM SERPL-MCNC: 6 MG/DL (ref 40–230)

## 2025-02-03 ENCOUNTER — APPOINTMENT (OUTPATIENT)
Dept: HEMATOLOGY/ONCOLOGY | Facility: HOSPITAL | Age: 85
End: 2025-02-03
Payer: MEDICARE

## 2025-02-03 ENCOUNTER — INFUSION (OUTPATIENT)
Dept: HEMATOLOGY/ONCOLOGY | Facility: CLINIC | Age: 85
End: 2025-02-03
Payer: MEDICARE

## 2025-02-03 VITALS
TEMPERATURE: 98.8 F | WEIGHT: 161.16 LBS | OXYGEN SATURATION: 99 % | SYSTOLIC BLOOD PRESSURE: 156 MMHG | BODY MASS INDEX: 30.45 KG/M2 | DIASTOLIC BLOOD PRESSURE: 78 MMHG | HEART RATE: 107 BPM | RESPIRATION RATE: 18 BRPM

## 2025-02-03 DIAGNOSIS — C90.00 MULTIPLE MYELOMA NOT HAVING ACHIEVED REMISSION (MULTI): ICD-10-CM

## 2025-02-03 LAB
ALBUMIN SERPL BCP-MCNC: 3.7 G/DL (ref 3.4–5)
ALP SERPL-CCNC: 47 U/L (ref 33–136)
ALT SERPL W P-5'-P-CCNC: 10 U/L (ref 7–45)
ANION GAP SERPL CALC-SCNC: 12 MMOL/L (ref 10–20)
AST SERPL W P-5'-P-CCNC: 15 U/L (ref 9–39)
BASOPHILS # BLD AUTO: 0.02 X10*3/UL (ref 0–0.1)
BASOPHILS NFR BLD AUTO: 0.3 %
BILIRUB SERPL-MCNC: 0.4 MG/DL (ref 0–1.2)
BUN SERPL-MCNC: 21 MG/DL (ref 6–23)
CALCIUM SERPL-MCNC: 9.5 MG/DL (ref 8.6–10.3)
CHLORIDE SERPL-SCNC: 108 MMOL/L (ref 98–107)
CO2 SERPL-SCNC: 26 MMOL/L (ref 21–32)
CREAT SERPL-MCNC: 0.95 MG/DL (ref 0.5–1.05)
EGFRCR SERPLBLD CKD-EPI 2021: 60 ML/MIN/1.73M*2
EOSINOPHIL # BLD AUTO: 0.02 X10*3/UL (ref 0–0.4)
EOSINOPHIL NFR BLD AUTO: 0.3 %
ERYTHROCYTE [DISTWIDTH] IN BLOOD BY AUTOMATED COUNT: 14.1 % (ref 11.5–14.5)
GLUCOSE SERPL-MCNC: 82 MG/DL (ref 74–99)
HCT VFR BLD AUTO: 29.8 % (ref 36–46)
HGB BLD-MCNC: 9.8 G/DL (ref 12–16)
IMM GRANULOCYTES # BLD AUTO: 0.04 X10*3/UL (ref 0–0.5)
IMM GRANULOCYTES NFR BLD AUTO: 0.7 % (ref 0–0.9)
LYMPHOCYTES # BLD AUTO: 0.29 X10*3/UL (ref 0.8–3)
LYMPHOCYTES NFR BLD AUTO: 4.8 %
MCH RBC QN AUTO: 34.3 PG (ref 26–34)
MCHC RBC AUTO-ENTMCNC: 32.9 G/DL (ref 32–36)
MCV RBC AUTO: 104 FL (ref 80–100)
MONOCYTES # BLD AUTO: 0.57 X10*3/UL (ref 0.05–0.8)
MONOCYTES NFR BLD AUTO: 9.5 %
NEUTROPHILS # BLD AUTO: 5.07 X10*3/UL (ref 1.6–5.5)
NEUTROPHILS NFR BLD AUTO: 84.4 %
NRBC BLD-RTO: 0 /100 WBCS (ref 0–0)
PLATELET # BLD AUTO: 200 X10*3/UL (ref 150–450)
POTASSIUM SERPL-SCNC: 4.1 MMOL/L (ref 3.5–5.3)
PROT SERPL-MCNC: 6.2 G/DL (ref 6.4–8.2)
RBC # BLD AUTO: 2.86 X10*6/UL (ref 4–5.2)
SODIUM SERPL-SCNC: 142 MMOL/L (ref 136–145)
WBC # BLD AUTO: 6 X10*3/UL (ref 4.4–11.3)

## 2025-02-03 PROCEDURE — 96417 CHEMO IV INFUS EACH ADDL SEQ: CPT

## 2025-02-03 PROCEDURE — 2500000004 HC RX 250 GENERAL PHARMACY W/ HCPCS (ALT 636 FOR OP/ED): Performed by: INTERNAL MEDICINE

## 2025-02-03 PROCEDURE — 80053 COMPREHEN METABOLIC PANEL: CPT

## 2025-02-03 PROCEDURE — 96413 CHEMO IV INFUSION 1 HR: CPT

## 2025-02-03 PROCEDURE — 2500000004 HC RX 250 GENERAL PHARMACY W/ HCPCS (ALT 636 FOR OP/ED)

## 2025-02-03 PROCEDURE — 85025 COMPLETE CBC W/AUTO DIFF WBC: CPT

## 2025-02-03 RX ORDER — HEPARIN SODIUM,PORCINE/PF 10 UNIT/ML
50 SYRINGE (ML) INTRAVENOUS AS NEEDED
OUTPATIENT
Start: 2025-02-03

## 2025-02-03 RX ORDER — ALBUTEROL SULFATE 0.83 MG/ML
3 SOLUTION RESPIRATORY (INHALATION) AS NEEDED
Status: DISCONTINUED | OUTPATIENT
Start: 2025-02-03 | End: 2025-02-03 | Stop reason: HOSPADM

## 2025-02-03 RX ORDER — FAMOTIDINE 10 MG/ML
20 INJECTION INTRAVENOUS ONCE AS NEEDED
Status: DISCONTINUED | OUTPATIENT
Start: 2025-02-03 | End: 2025-02-03 | Stop reason: HOSPADM

## 2025-02-03 RX ORDER — HEPARIN 100 UNIT/ML
500 SYRINGE INTRAVENOUS AS NEEDED
OUTPATIENT
Start: 2025-02-03

## 2025-02-03 RX ORDER — PROCHLORPERAZINE EDISYLATE 5 MG/ML
10 INJECTION INTRAMUSCULAR; INTRAVENOUS EVERY 6 HOURS PRN
Status: DISCONTINUED | OUTPATIENT
Start: 2025-02-03 | End: 2025-02-03 | Stop reason: HOSPADM

## 2025-02-03 RX ORDER — DIPHENHYDRAMINE HYDROCHLORIDE 50 MG/ML
50 INJECTION INTRAMUSCULAR; INTRAVENOUS AS NEEDED
Status: DISCONTINUED | OUTPATIENT
Start: 2025-02-03 | End: 2025-02-03 | Stop reason: HOSPADM

## 2025-02-03 RX ORDER — PROCHLORPERAZINE MALEATE 10 MG
10 TABLET ORAL EVERY 6 HOURS PRN
Status: DISCONTINUED | OUTPATIENT
Start: 2025-02-03 | End: 2025-02-03 | Stop reason: HOSPADM

## 2025-02-03 RX ORDER — DEXAMETHASONE 4 MG/1
8 TABLET ORAL ONCE
Status: COMPLETED | OUTPATIENT
Start: 2025-02-03 | End: 2025-02-03

## 2025-02-03 RX ORDER — EPINEPHRINE 0.3 MG/.3ML
0.3 INJECTION SUBCUTANEOUS EVERY 5 MIN PRN
Status: DISCONTINUED | OUTPATIENT
Start: 2025-02-03 | End: 2025-02-03 | Stop reason: HOSPADM

## 2025-02-03 RX ADMIN — CYCLOPHOSPHAMIDE 500 MG: 500 INJECTION, POWDER, FOR SOLUTION INTRAVENOUS; ORAL at 12:01

## 2025-02-03 RX ADMIN — CARFILZOMIB 120 MG: 60 INJECTION, POWDER, LYOPHILIZED, FOR SOLUTION INTRAVENOUS at 11:22

## 2025-02-03 RX ADMIN — ONDANSETRON HYDROCHLORIDE 12 MG: 8 TABLET, FILM COATED ORAL at 10:55

## 2025-02-03 RX ADMIN — DEXAMETHASONE 8 MG: 4 TABLET ORAL at 10:55

## 2025-02-03 ASSESSMENT — PAIN SCALES - GENERAL: PAINLEVEL_OUTOF10: 0-NO PAIN

## 2025-02-16 RX ORDER — EPINEPHRINE 0.3 MG/.3ML
0.3 INJECTION SUBCUTANEOUS EVERY 5 MIN PRN
OUTPATIENT
Start: 2025-03-31

## 2025-02-16 RX ORDER — DIPHENHYDRAMINE HYDROCHLORIDE 50 MG/ML
50 INJECTION INTRAMUSCULAR; INTRAVENOUS AS NEEDED
OUTPATIENT
Start: 2025-03-24

## 2025-02-16 RX ORDER — PROCHLORPERAZINE EDISYLATE 5 MG/ML
10 INJECTION INTRAMUSCULAR; INTRAVENOUS EVERY 6 HOURS PRN
OUTPATIENT
Start: 2025-03-31

## 2025-02-16 RX ORDER — EPINEPHRINE 0.3 MG/.3ML
0.3 INJECTION SUBCUTANEOUS EVERY 5 MIN PRN
OUTPATIENT
Start: 2025-03-17

## 2025-02-16 RX ORDER — EPINEPHRINE 0.3 MG/.3ML
0.3 INJECTION SUBCUTANEOUS EVERY 5 MIN PRN
OUTPATIENT
Start: 2025-03-24

## 2025-02-16 RX ORDER — PROCHLORPERAZINE MALEATE 10 MG
10 TABLET ORAL EVERY 6 HOURS PRN
OUTPATIENT
Start: 2025-03-31

## 2025-02-16 RX ORDER — FAMOTIDINE 10 MG/ML
20 INJECTION, SOLUTION INTRAVENOUS ONCE AS NEEDED
OUTPATIENT
Start: 2025-03-31

## 2025-02-16 RX ORDER — PROCHLORPERAZINE EDISYLATE 5 MG/ML
10 INJECTION INTRAMUSCULAR; INTRAVENOUS EVERY 6 HOURS PRN
OUTPATIENT
Start: 2025-03-17

## 2025-02-16 RX ORDER — DEXAMETHASONE 4 MG/1
8 TABLET ORAL ONCE
OUTPATIENT
Start: 2025-03-31

## 2025-02-16 RX ORDER — PROCHLORPERAZINE EDISYLATE 5 MG/ML
10 INJECTION INTRAMUSCULAR; INTRAVENOUS EVERY 6 HOURS PRN
OUTPATIENT
Start: 2025-03-24

## 2025-02-16 RX ORDER — ALBUTEROL SULFATE 0.83 MG/ML
3 SOLUTION RESPIRATORY (INHALATION) AS NEEDED
OUTPATIENT
Start: 2025-03-24

## 2025-02-16 RX ORDER — DEXAMETHASONE 4 MG/1
8 TABLET ORAL ONCE
OUTPATIENT
Start: 2025-03-17

## 2025-02-16 RX ORDER — ONDANSETRON HYDROCHLORIDE 8 MG/1
12 TABLET, FILM COATED ORAL ONCE
OUTPATIENT
Start: 2025-03-24

## 2025-02-16 RX ORDER — ALBUTEROL SULFATE 0.83 MG/ML
3 SOLUTION RESPIRATORY (INHALATION) AS NEEDED
OUTPATIENT
Start: 2025-03-17

## 2025-02-16 RX ORDER — DIPHENHYDRAMINE HYDROCHLORIDE 50 MG/ML
50 INJECTION INTRAMUSCULAR; INTRAVENOUS AS NEEDED
OUTPATIENT
Start: 2025-03-31

## 2025-02-16 RX ORDER — FAMOTIDINE 10 MG/ML
20 INJECTION, SOLUTION INTRAVENOUS ONCE AS NEEDED
OUTPATIENT
Start: 2025-03-24

## 2025-02-16 RX ORDER — DEXAMETHASONE 4 MG/1
8 TABLET ORAL ONCE
OUTPATIENT
Start: 2025-03-24

## 2025-02-16 RX ORDER — DIPHENHYDRAMINE HYDROCHLORIDE 50 MG/ML
50 INJECTION INTRAMUSCULAR; INTRAVENOUS AS NEEDED
OUTPATIENT
Start: 2025-03-17

## 2025-02-16 RX ORDER — ONDANSETRON HYDROCHLORIDE 8 MG/1
12 TABLET, FILM COATED ORAL ONCE
OUTPATIENT
Start: 2025-03-31

## 2025-02-16 RX ORDER — FAMOTIDINE 10 MG/ML
20 INJECTION, SOLUTION INTRAVENOUS ONCE AS NEEDED
OUTPATIENT
Start: 2025-03-17

## 2025-02-16 RX ORDER — PROCHLORPERAZINE MALEATE 10 MG
10 TABLET ORAL EVERY 6 HOURS PRN
OUTPATIENT
Start: 2025-03-24

## 2025-02-16 RX ORDER — ALBUTEROL SULFATE 0.83 MG/ML
3 SOLUTION RESPIRATORY (INHALATION) AS NEEDED
OUTPATIENT
Start: 2025-03-31

## 2025-02-16 RX ORDER — ONDANSETRON HYDROCHLORIDE 8 MG/1
12 TABLET, FILM COATED ORAL ONCE
OUTPATIENT
Start: 2025-03-17

## 2025-02-16 RX ORDER — PROCHLORPERAZINE MALEATE 10 MG
10 TABLET ORAL EVERY 6 HOURS PRN
OUTPATIENT
Start: 2025-03-17

## 2025-02-16 ASSESSMENT — ENCOUNTER SYMPTOMS
WHEEZING: 0
COUGH: 0
HEMATURIA: 0
APPETITE CHANGE: 0
CHILLS: 0
ADENOPATHY: 0
NEUROLOGICAL NEGATIVE: 1
CARDIOVASCULAR NEGATIVE: 1
BACK PAIN: 1
FATIGUE: 0
DIAPHORESIS: 0
FEVER: 0
DIARRHEA: 1
BRUISES/BLEEDS EASILY: 1
DYSURIA: 0
SHORTNESS OF BREATH: 0
UNEXPECTED WEIGHT CHANGE: 0

## 2025-02-16 NOTE — PROGRESS NOTES
"6Patient ID: Sil West \"Ines" is a 84 y.o. female.    Treatment:   Oncology History Overview Note   I. Diagnosis (1/2023):  IgG Lambda Multiple Myeloma  Presenting symptoms: Urinary incontinence and diffuse bony pain  II. Workup:  Bone Biopsy (1/6/23):  Plasma cells neoplasm  Repeat Bone Marrow Biopsy (1/26/23):  Hypercellular marrow, 80-90% plasma cells, lambda-restricted  FISH: 1q gain (high risk), trisomy 3  MRI Spine (12/18/22):  Osseous metastases, involvement in mid-cervical, mid-thoracic, and lumbar vertebral bodies, as well as the right iliac bone  PET Scan (1/23):  FDG avidity in right iliac bone, right sacral ala, left posterior 6th rib, and appendicular skeleton  Serum and Urine Studies (1/19-1/28/23):  FKLC 1.57, FLLC 2947.7, K/L ratio 0  IgG 537, IgA 221, IgM 19, b2M 23.4, Hgb 5.7  M protein IgA lambda 0.7 g/dL   U/L  Creatinine: 2.33 (peaked at 3.15)  UPEP: Monoclonal lambda light chain at 386.6 mg/24H  Hepatitis B and C negative  III. Treatment:  Radiation (2/2/23):  Right hip + T6-T8 x 5 fractions (total 2000 cGy)  Induction (12/24/22 - 2/16/23):  Bortezomib and dexamethasone + daratumumab  C1 (1/28/23): Bortezomib 1, 4, 8, 11 + daratumumab x 2 doses  C2 (2/16/23): Weekly dosing of daratumumab and bortezomib (1, 8, 15), with lenalidomide planned when renal function allows  Response Evaluation (5/25/23):  CR with M protein 0.1 (IgG kappa c/w roscoe) and free lyte chain 1.37  C8 Roscoe RVD (Completed 7/6/23):  Transitioned with a VGPR vs CR, ongoing multifocal bone pain  IV. Response Evaluation (7/13/23):  Marked improvement in bony lesions  Pathological fractures in right pelvis and ribs, possible infiltrate in the right lower lobe base, and hypodensity in the right thyroid gland  V. Treatment Adjustment (Cycle 9 and forward): 10/2023  Velcade omitted  Transitioned to a 4-week schedule with Roscoe (day 1) and Revlimid 15 mg days 1-21/28 days     Multiple myeloma not having achieved remission " (Multi)   9/13/2023 Initial Diagnosis    Multiple myeloma not having achieved remission (CMS/HCC)     10/2/2023 - 7/8/2024 Chemotherapy    Daratumumab, Pomalidomide and dexamethasone (oral meds managed outside treatment plan) 28 Day Cycles - Maintenance     8/5/2024 -  Chemotherapy    Carfilzomib and Cyclophosphamide (Weekly) / Dexamethasone, 28 Day Cycles (Custom)       Past Medical History:  HTN, DLP  pre skin cancers,   diveriticulitis   peripheral artery disease not amenable to surgery    Surgical History:  squamous cell carcinoma removed to left hand and right arm 6/2024  Past Surgical History:   Procedure Laterality Date    CT GUIDED PERCUTANEOUS BIOPSY BONE DEEP  01/06/2023    CT GUIDED PERCUTANEOUS BIOPSY BONE DEEP 1/6/2023 [facility]    OTHER SURGICAL HISTORY      THYROID BIOPSY      Family History:     Family History   Problem Relation Name Age of Onset    Alzheimer's disease Mother      Colon cancer Father      Rashes / Skin problems Sister      Rashes / Skin problems Brother       Social History:  ,  passed away on 10/24/23.  3 grown children (1 daughter, 2 sons). 2 grandchildren.  Has 6 cats.  Enjoyed skiing.  Occupation: retired .  Social History     Tobacco Use    Smoking status: Never     Passive exposure: Never    Smokeless tobacco: Never   Vaping Use    Vaping status: Never Used   Substance Use Topics    Alcohol use: Never    Drug use: Never      -------------------------------------------------------------------------------------------------------  Subjective       Sil West is a 84 year old female with IgG lambda multiple myeloma.  She recently had an increase to her lambda free light chains.  PET imaging done 7/17/24 showed an new soft tissue uptake in right 11th rib and interestingly patient has pain there as well.  Transitioned to carfilzomib, cyclophophamide, dex, cycle 1 on 8/5/24     Sil presents to the clinic today (2/14/25) unaccompanied for  follow up evaluation and is scheduled to receive C8D1 carfilzomib, cytoxan, and dexamethasone.  Receiving IVIG every month,  Receiving zometa every 3 months, but zometa  held due to needing to complete dental work.  She is getting diarrhea about 3 days after chemo treatments. Due to restlessness. and issues sleeping after receiving steroids with treatment.  Dose of dexamethasone was decreased from 20 mg to 8 mg.  On Asprin 81 mg daily for DVT prophylaxis.  Patient has been out shovelling snow. No significant bone pain.     Has had multiple Mohs surgery done by Dr. Vann to remove SCC.  Had 4 lesions removed recently, and was given a ointment to use called fluorouracil 5%.  Reports procedure to nose she had last week went well and is healing.  States that her crown fell out and needs a tooth post out in that will be done in the end of January., dental work completed about 10 days ago.      Review of Systems   Constitutional:  Negative for appetite change, chills, diaphoresis, fatigue, fever and unexpected weight change.   Respiratory:  Negative for cough, shortness of breath and wheezing.    Cardiovascular: Negative.    Gastrointestinal:  Positive for diarrhea.   Genitourinary:  Negative for dysuria and hematuria.    Musculoskeletal:  Positive for back pain.   Skin: Negative.         Had multiple spots of SCC removed by dermatology.   Neurological: Negative.    Hematological:  Negative for adenopathy. Bruises/bleeds easily (bruises easily).   All other systems reviewed and are negative.  -------------------------------------------------------------------------------------------------------  Objective   BSA: 1.77 meters squared  /66   Pulse 89   Temp 37.1 °C (98.8 °F)   Resp 16   Wt 73 kg (160 lb 15 oz)   SpO2 100%   BMI 30.41 kg/m²     Physical Exam  Vitals reviewed.   Constitutional:       Appearance: Normal appearance. She is well-developed and normal weight.   HENT:      Head: Normocephalic and  atraumatic.      Nose: Nose normal.   Eyes:      General: No scleral icterus.     Extraocular Movements: Extraocular movements intact.      Conjunctiva/sclera: Conjunctivae normal.      Pupils: Pupils are equal, round, and reactive to light.   Cardiovascular:      Rate and Rhythm: Normal rate and regular rhythm.      Pulses: Normal pulses.      Heart sounds: Normal heart sounds.   Pulmonary:      Effort: Pulmonary effort is normal.      Breath sounds: Normal breath sounds.   Abdominal:      General: Abdomen is flat. Bowel sounds are normal. There is no distension.      Palpations: Abdomen is soft. There is no mass.      Tenderness: There is no abdominal tenderness.   Musculoskeletal:         General: Normal range of motion.      Right lower leg: Edema (non-pitting) present.      Left lower leg: Edema (non-pitting) present.      Comments: No spine tenderness   Lymphadenopathy:      Comments: No lymphadenopathy   Skin:     General: Skin is warm and dry.      Comments: Multiple healing lesion from Mohls procedure and skin biopsies from dermatology to right side of face and left hand.   Neurological:      General: No focal deficit present.      Mental Status: She is alert and oriented to person, place, and time.      Comments: Normal strength and gait   Psychiatric:         Mood and Affect: Mood normal.         Behavior: Behavior normal.         Thought Content: Thought content normal.     Performance Status:  Symptomatic; fully ambulatory  -------------------------------------------------------------------------------------------------------  Assessment and Plan:    Multiple myeloma not having achieved remission (CMS/[facility])  IgG lambda MM    - Currently on daratumamab/Lenalidomide. Continued uptrending of free lambda light chain. M-protein still 0.1g IgG kappa, likely represents daratumumab. Will add weekly dex 10 mg to see if this could deepen her response.     5/13/24:  Continues to have increasing free lambda light  chain, M-protein remains at 0.1.  Receiving C20 daratumumab today.  Discussed that free lyte chain continues to increase significantly and suggested switching revlimid to pomalidomide  4 mg 21/28 days since no other CRAB criteria,  -Taking aspirin 81 mg daily for VTE prophylaxis during treatment with pomalidomide      PET scan (7/17/24)   IMPRESSION:  1. Redemonstration of an FDG avid right posterolateral 11th rib  fracture now with evidence of an underlying soft tissue lesion.  2. Previously described hypermetabolic osseous lesions in the right  iliac, right superior pubic ramus, and right aspect of the pubic  symphysis have continued to decrease in metabolic activity with  persistent FDG avidity likely representing continued treatment  response with residual viable disease, attention on follow-up imaging.  3. Healing right inferior pubic ramus fracture with decreased extent  of hypermetabolic activity.    7/18/24:  Laboratory results show major increase in free lyte chains to 62 mg/dL up from 18 in March 2024, only sx is right rib pain where PET from yesterday shows a new lesion, no other new lesions seen,      Bone marrow biopsy results from 7/22/24 showing limited bone marrow (20% cellularity) with maturing trilineage hematopoiesis, minimal involvement by lambda-restricted plasma cells by flow cytometry. ECHO 8/2/2024 LVEF 60-65%    Started cycle 1 carfilzomib, cyclophosphamide, dexamethasone 8//24 2/18/25:  CBC results stable from today.  Lambda lyte chains slowly increasing ( see below)  No concerning findings on physical examination.  Scheduled to receive C8D1 of carfilzomib, cyclophosphamide, and dexamethasone.   Dexamethasone  dose decreased from 20 mg  to 8 mg weekly with cycle 7 due to restlessness and issues sleeping.  With cycle 10, cyclophosphamide is omitted and carfilzomib maintenance is transitioned to every other week.      Ig [place] Free Light Chain   Date Value Ref Range Status   02/17/2025  0.41 0.33 - 1.94 mg/dL Final   01/20/2025 0.59 0.33 - 1.94 mg/dL Final   12/23/2024 0.43 0.33 - 1.94 mg/dL Final   11/25/2024 0.35 0.33 - 1.94 mg/dL Final   10/28/2024 0.43 0.33 - 1.94 mg/dL Final     Ig Lambda Free Light Chain   Date Value Ref Range Status   02/17/2025 4.62 (H) 0.57 - 2.63 mg/dL Final   01/20/2025 3.56 (H) 0.57 - 2.63 mg/dL Final   12/23/2024 3.38 (H) 0.57 - 2.63 mg/dL Final   11/25/2024 3.71 (H) 0.57 - 2.63 mg/dL Final   10/28/2024 4.56 (H) 0.57 - 2.63 mg/dL Final     Kappa/Lambda Ratio   Date Value Ref Range Status   02/17/2025 0.09 (L) 0.26 - 1.65 Final   01/20/2025 0.17 (L) 0.26 - 1.65 Final   12/23/2024 0.13 (L) 0.26 - 1.65 Final   11/25/2024 0.09 (L) 0.26 - 1.65 Final   10/28/2024 0.09 (L) 0.26 - 1.65 Final     M-PROTEIN 1   Date Value Ref Range Status   10/28/2024 0.1 (H)   g/dL Final   10/14/2024 0.1 (H)   g/dL Final   09/03/2024 0.1 (H)   g/dL Final   08/12/2024 0.1 (H)   g/dL Final   07/22/2024 0.1 (H)   g/dL Final      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  IgG level 538 (12/30/24). Last received IVIG on 12/30/24, continue monthly at [facility].    Dysuria:  urinalysis bland, suspect needs urologic evaluation for incontinence    Bone Health:  - Continue zoledronic acid 3.3 mg (renal dosing) every 3 months, received last on 9/3/24.    - Continue vitamin D supplement -- 2000 units PO daily.  Started zometa 3/30/23 will complete 3/2025.  12/23/24:  Sil reports she had a crown fall out and may need a root canal.  Placed zometa on hold until dental work has been completed.    2/17/25 dental work completed, will resume zometa march 2025.        Thyroid nodule  - repeat thyroid US on 2/19/24, 2 nodules noted with FNA recommended.  Thyroid biopsy 4/29/24 showing rare atypical cells from FNA of right mid lobe.  Repeat biopsy was nondiagnostic.  Dr. Magallanes recommended  thyroid surgery vs continued observation.  Sil requested to having US monitoring for now.  Last visit with Dr. Magallanes on 9/9/24.  Is scheduled to have thyroid US soon in feb-march       Right lower back pain  - seems musculoskeletal  - MRI lumbar spine (3/4/24): degenerative changes, no evidence of progressive disease or fracture.    - MRI thoracic spine (3/12/24): degenerative changes, redemonstration of multifocal marrow signal abnormalities compatible with the given history of multiple myeloma, osseous expansion and epidural extension of neoplasm previously seen have improved, decrease in size and enhancement of previously seen lesions.      Health Care Maintenance:  Vaccines:    Received influenza- vaccine 10/9/24.  Advised to obtain updated covid, RSV, shingrix, and prevnar 20 vaccines at local pharmacy.    RTC: monthly IVIG, followup visit and labs 3/17/25 and cycle 9 Carfilzomib and cyclophosphamide. Continue monthly IVIG at [place], [facility] uptick in lambda lyte chain.  With cycle 10 cyclophosphamide is omitted and carfilzomib switches to  qoweek.   Dental work completed, will restart zometa March 2025.    - -----------------------------------------------------------------------------------------  Carmencita Marshall MD

## 2025-02-17 ENCOUNTER — APPOINTMENT (OUTPATIENT)
Dept: RADIOLOGY | Facility: CLINIC | Age: 85
End: 2025-02-17
Payer: MEDICARE

## 2025-02-17 ENCOUNTER — INFUSION (OUTPATIENT)
Dept: HEMATOLOGY/ONCOLOGY | Facility: HOSPITAL | Age: 85
End: 2025-02-17
Payer: MEDICARE

## 2025-02-17 ENCOUNTER — LAB (OUTPATIENT)
Dept: LAB | Facility: HOSPITAL | Age: 85
End: 2025-02-17
Payer: MEDICARE

## 2025-02-17 ENCOUNTER — OFFICE VISIT (OUTPATIENT)
Dept: HEMATOLOGY/ONCOLOGY | Facility: HOSPITAL | Age: 85
End: 2025-02-17
Payer: MEDICARE

## 2025-02-17 VITALS
OXYGEN SATURATION: 100 % | DIASTOLIC BLOOD PRESSURE: 66 MMHG | HEART RATE: 89 BPM | BODY MASS INDEX: 30.41 KG/M2 | TEMPERATURE: 98.8 F | SYSTOLIC BLOOD PRESSURE: 144 MMHG | WEIGHT: 160.94 LBS | RESPIRATION RATE: 16 BRPM

## 2025-02-17 VITALS — OXYGEN SATURATION: 100 % | HEART RATE: 91 BPM | DIASTOLIC BLOOD PRESSURE: 75 MMHG | SYSTOLIC BLOOD PRESSURE: 155 MMHG

## 2025-02-17 DIAGNOSIS — C90.00 MULTIPLE MYELOMA NOT HAVING ACHIEVED REMISSION (MULTI): ICD-10-CM

## 2025-02-17 DIAGNOSIS — D80.1 HYPOGAMMAGLOBULINEMIA (MULTI): ICD-10-CM

## 2025-02-17 DIAGNOSIS — C90.00 MULTIPLE MYELOMA NOT HAVING ACHIEVED REMISSION (MULTI): Primary | ICD-10-CM

## 2025-02-17 LAB
ALBUMIN SERPL BCP-MCNC: 3.8 G/DL (ref 3.4–5)
ALP SERPL-CCNC: 47 U/L (ref 33–136)
ALT SERPL W P-5'-P-CCNC: 7 U/L (ref 7–45)
ANION GAP SERPL CALC-SCNC: 11 MMOL/L (ref 10–20)
AST SERPL W P-5'-P-CCNC: 13 U/L (ref 9–39)
BASOPHILS # BLD AUTO: 0.02 X10*3/UL (ref 0–0.1)
BASOPHILS NFR BLD AUTO: 0.7 %
BILIRUB SERPL-MCNC: 0.3 MG/DL (ref 0–1.2)
BUN SERPL-MCNC: 33 MG/DL (ref 6–23)
CALCIUM SERPL-MCNC: 9.6 MG/DL (ref 8.6–10.3)
CHLORIDE SERPL-SCNC: 108 MMOL/L (ref 98–107)
CO2 SERPL-SCNC: 29 MMOL/L (ref 21–32)
CREAT SERPL-MCNC: 1.09 MG/DL (ref 0.5–1.05)
EGFRCR SERPLBLD CKD-EPI 2021: 51 ML/MIN/1.73M*2
EOSINOPHIL # BLD AUTO: 0.03 X10*3/UL (ref 0–0.4)
EOSINOPHIL NFR BLD AUTO: 1 %
ERYTHROCYTE [DISTWIDTH] IN BLOOD BY AUTOMATED COUNT: 13.9 % (ref 11.5–14.5)
GLUCOSE SERPL-MCNC: 109 MG/DL (ref 74–99)
HCT VFR BLD AUTO: 30.8 % (ref 36–46)
HGB BLD-MCNC: 9.9 G/DL (ref 12–16)
IGA SERPL-MCNC: <7 MG/DL (ref 70–400)
IGG SERPL-MCNC: 726 MG/DL (ref 700–1600)
IGM SERPL-MCNC: 6 MG/DL (ref 40–230)
IMM GRANULOCYTES # BLD AUTO: 0.05 X10*3/UL (ref 0–0.5)
IMM GRANULOCYTES NFR BLD AUTO: 1.7 % (ref 0–0.9)
LYMPHOCYTES # BLD AUTO: 0.27 X10*3/UL (ref 0.8–3)
LYMPHOCYTES NFR BLD AUTO: 9 %
MCH RBC QN AUTO: 33.8 PG (ref 26–34)
MCHC RBC AUTO-ENTMCNC: 32.1 G/DL (ref 32–36)
MCV RBC AUTO: 105 FL (ref 80–100)
MONOCYTES # BLD AUTO: 0.33 X10*3/UL (ref 0.05–0.8)
MONOCYTES NFR BLD AUTO: 11 %
NEUTROPHILS # BLD AUTO: 2.29 X10*3/UL (ref 1.6–5.5)
NEUTROPHILS NFR BLD AUTO: 76.6 %
NRBC BLD-RTO: 0 /100 WBCS (ref 0–0)
PLATELET # BLD AUTO: 302 X10*3/UL (ref 150–450)
POTASSIUM SERPL-SCNC: 4.3 MMOL/L (ref 3.5–5.3)
PROT SERPL-MCNC: 6 G/DL (ref 6.4–8.2)
PROT SERPL-MCNC: 6.2 G/DL (ref 6.4–8.2)
RBC # BLD AUTO: 2.93 X10*6/UL (ref 4–5.2)
SODIUM SERPL-SCNC: 144 MMOL/L (ref 136–145)
WBC # BLD AUTO: 3 X10*3/UL (ref 4.4–11.3)

## 2025-02-17 PROCEDURE — 1126F AMNT PAIN NOTED NONE PRSNT: CPT | Performed by: INTERNAL MEDICINE

## 2025-02-17 PROCEDURE — 84165 PROTEIN E-PHORESIS SERUM: CPT

## 2025-02-17 PROCEDURE — 84075 ASSAY ALKALINE PHOSPHATASE: CPT

## 2025-02-17 PROCEDURE — 1159F MED LIST DOCD IN RCRD: CPT | Performed by: INTERNAL MEDICINE

## 2025-02-17 PROCEDURE — 99214 OFFICE O/P EST MOD 30 MIN: CPT | Performed by: INTERNAL MEDICINE

## 2025-02-17 PROCEDURE — 2500000004 HC RX 250 GENERAL PHARMACY W/ HCPCS (ALT 636 FOR OP/ED)

## 2025-02-17 PROCEDURE — 85025 COMPLETE CBC W/AUTO DIFF WBC: CPT

## 2025-02-17 PROCEDURE — 36415 COLL VENOUS BLD VENIPUNCTURE: CPT

## 2025-02-17 PROCEDURE — 96417 CHEMO IV INFUS EACH ADDL SEQ: CPT

## 2025-02-17 PROCEDURE — 83521 IG LIGHT CHAINS FREE EACH: CPT

## 2025-02-17 PROCEDURE — 82784 ASSAY IGA/IGD/IGG/IGM EACH: CPT

## 2025-02-17 PROCEDURE — 96413 CHEMO IV INFUSION 1 HR: CPT

## 2025-02-17 PROCEDURE — 82040 ASSAY OF SERUM ALBUMIN: CPT

## 2025-02-17 PROCEDURE — 84155 ASSAY OF PROTEIN SERUM: CPT | Mod: 59

## 2025-02-17 PROCEDURE — 2500000004 HC RX 250 GENERAL PHARMACY W/ HCPCS (ALT 636 FOR OP/ED): Performed by: INTERNAL MEDICINE

## 2025-02-17 PROCEDURE — 99214 OFFICE O/P EST MOD 30 MIN: CPT | Mod: 25 | Performed by: INTERNAL MEDICINE

## 2025-02-17 PROCEDURE — 2500000004 HC RX 250 GENERAL PHARMACY W/ HCPCS (ALT 636 FOR OP/ED): Mod: JZ,TB | Performed by: INTERNAL MEDICINE

## 2025-02-17 RX ORDER — DIPHENHYDRAMINE HYDROCHLORIDE 50 MG/ML
50 INJECTION INTRAMUSCULAR; INTRAVENOUS AS NEEDED
Status: DISCONTINUED | OUTPATIENT
Start: 2025-02-17 | End: 2025-02-17 | Stop reason: HOSPADM

## 2025-02-17 RX ORDER — FAMOTIDINE 10 MG/ML
20 INJECTION, SOLUTION INTRAVENOUS ONCE AS NEEDED
OUTPATIENT
Start: 2025-03-17

## 2025-02-17 RX ORDER — ALBUTEROL SULFATE 0.83 MG/ML
3 SOLUTION RESPIRATORY (INHALATION) AS NEEDED
OUTPATIENT
Start: 2025-03-17

## 2025-02-17 RX ORDER — ALBUTEROL SULFATE 0.83 MG/ML
3 SOLUTION RESPIRATORY (INHALATION) AS NEEDED
Status: DISCONTINUED | OUTPATIENT
Start: 2025-02-17 | End: 2025-02-17 | Stop reason: HOSPADM

## 2025-02-17 RX ORDER — PROCHLORPERAZINE EDISYLATE 5 MG/ML
10 INJECTION INTRAMUSCULAR; INTRAVENOUS EVERY 6 HOURS PRN
Status: DISCONTINUED | OUTPATIENT
Start: 2025-02-17 | End: 2025-02-17 | Stop reason: HOSPADM

## 2025-02-17 RX ORDER — PROCHLORPERAZINE MALEATE 10 MG
10 TABLET ORAL EVERY 6 HOURS PRN
Status: DISCONTINUED | OUTPATIENT
Start: 2025-02-17 | End: 2025-02-17 | Stop reason: HOSPADM

## 2025-02-17 RX ORDER — EPINEPHRINE 0.3 MG/.3ML
0.3 INJECTION SUBCUTANEOUS EVERY 5 MIN PRN
OUTPATIENT
Start: 2025-03-17

## 2025-02-17 RX ORDER — DIPHENHYDRAMINE HYDROCHLORIDE 50 MG/ML
50 INJECTION INTRAMUSCULAR; INTRAVENOUS AS NEEDED
OUTPATIENT
Start: 2025-03-17

## 2025-02-17 RX ORDER — EPINEPHRINE 0.3 MG/.3ML
0.3 INJECTION SUBCUTANEOUS EVERY 5 MIN PRN
Status: DISCONTINUED | OUTPATIENT
Start: 2025-02-17 | End: 2025-02-17 | Stop reason: HOSPADM

## 2025-02-17 RX ORDER — FAMOTIDINE 10 MG/ML
20 INJECTION, SOLUTION INTRAVENOUS ONCE AS NEEDED
Status: DISCONTINUED | OUTPATIENT
Start: 2025-02-17 | End: 2025-02-17 | Stop reason: HOSPADM

## 2025-02-17 RX ORDER — DEXAMETHASONE 4 MG/1
8 TABLET ORAL ONCE
Status: COMPLETED | OUTPATIENT
Start: 2025-02-17 | End: 2025-02-17

## 2025-02-17 RX ADMIN — DEXAMETHASONE 8 MG: 4 TABLET ORAL at 10:23

## 2025-02-17 RX ADMIN — ONDANSETRON HYDROCHLORIDE 12 MG: 8 TABLET, FILM COATED ORAL at 10:23

## 2025-02-17 RX ADMIN — CYCLOPHOSPHAMIDE 500 MG: 500 INJECTION, POWDER, FOR SOLUTION INTRAVENOUS; ORAL at 10:45

## 2025-02-17 RX ADMIN — CARFILZOMIB 120 MG: 60 INJECTION, POWDER, LYOPHILIZED, FOR SOLUTION INTRAVENOUS at 11:31

## 2025-02-17 ASSESSMENT — PAIN SCALES - GENERAL: PAINLEVEL_OUTOF10: 0-NO PAIN

## 2025-02-17 NOTE — PROGRESS NOTES
Patient arrived ambulatory to infusion for scheduled tx of carfilzomib and cyclophosphamide. Saw Dr.Cooper LOZA in clinic prior. Denies any new or worsening sx. Tolerated infusion without issue. Patient made aware of upcoming appointments.Discharged in stable condition.

## 2025-02-18 LAB
ALBUMIN: 3.7 G/DL (ref 3.4–5)
ALPHA 1 GLOBULIN: 0.3 G/DL (ref 0.2–0.6)
ALPHA 2 GLOBULIN: 0.7 G/DL (ref 0.4–1.1)
BETA GLOBULIN: 0.7 G/DL (ref 0.5–1.2)
GAMMA GLOBULIN: 0.6 G/DL (ref 0.5–1.4)
KAPPA LC SERPL-MCNC: 0.41 MG/DL (ref 0.33–1.94)
KAPPA LC/LAMBDA SER: 0.09 {RATIO} (ref 0.26–1.65)
LAMBDA LC SERPL-MCNC: 4.62 MG/DL (ref 0.57–2.63)
PATH REVIEW-SERUM PROTEIN ELECTROPHORESIS: NORMAL
PROTEIN ELECTROPHORESIS COMMENT: NORMAL

## 2025-02-21 ENCOUNTER — APPOINTMENT (OUTPATIENT)
Dept: DERMATOLOGY | Facility: CLINIC | Age: 85
End: 2025-02-21
Payer: MEDICARE

## 2025-02-21 DIAGNOSIS — L57.0 ACTINIC KERATOSIS: ICD-10-CM

## 2025-02-21 DIAGNOSIS — D48.5 NEOPLASM OF UNCERTAIN BEHAVIOR OF SKIN: ICD-10-CM

## 2025-02-21 PROCEDURE — 1036F TOBACCO NON-USER: CPT | Performed by: NURSE PRACTITIONER

## 2025-02-21 PROCEDURE — 99213 OFFICE O/P EST LOW 20 MIN: CPT | Performed by: NURSE PRACTITIONER

## 2025-02-21 PROCEDURE — 1159F MED LIST DOCD IN RCRD: CPT | Performed by: NURSE PRACTITIONER

## 2025-02-21 PROCEDURE — 1160F RVW MEDS BY RX/DR IN RCRD: CPT | Performed by: NURSE PRACTITIONER

## 2025-02-21 PROCEDURE — 11102 TANGNTL BX SKIN SINGLE LES: CPT | Performed by: NURSE PRACTITIONER

## 2025-02-21 NOTE — PATIENT INSTRUCTIONS

## 2025-02-21 NOTE — PROGRESS NOTES
Subjective     Sherie West is a 84 y.o. female who presents for the following: S/P Efudex and Suspicious Skin Lesion (Left forearm. Painful, growing. ).     Review of Systems:  No other skin or systemic complaints other than what is documented elsewhere in the note.    The following portions of the chart were reviewed this encounter and updated as appropriate:   Tobacco  Allergies  Meds  Problems  Med Hx  Surg Hx         Skin Cancer History  Biopsy Date Type Location Status   5/22/24 SCC Left Dorsal Hand Refer Mohs/Surgeon  1/7/25   10/15/24 SCC Left 3rd Finger Proximal Interphalangeal Joint Refer Mohs/Surgeon  1/7/25   10/15/24 SCC Left 3rd Finger Metacarpophalangeal Joint Refer Mohs/Surgeon  1/7/25   10/15/24 SCC Left Forearm - Posterior Refer Mohs/Surgeon  1/7/25   10/15/24 SCC Right Forearm - Posterior Refer Mohs/Surgeon  1/7/25 11/22/24 SCC in Situ Mid Tip of Nose Refer Mohs/Surgeon  1/7/25       Specialty Problems          Dermatology Problems    History of SCC (squamous cell carcinoma) of skin    Hx of basal cell carcinoma    Skin lesion of right arm        Objective   Well appearing patient in no apparent distress; mood and affect are within normal limits.    A focused skin examination was performed. All findings within normal limits unless otherwise noted below.    Assessment/Plan   1. Neoplasm of uncertain behavior of skin  Left Forearm - Posterior  6 mm pink scaly tender papule              Lesion biopsy  Type of biopsy: tangential    Informed consent: discussed and consent obtained    Timeout: patient name, date of birth, surgical site, and procedure verified    Procedure prep:  Patient was prepped and draped  Anesthesia: the lesion was anesthetized in a standard fashion    Anesthetic:  1% lidocaine plain local infiltration  Instrument used: DermaBlade    Hemostasis achieved with: electrodesiccation    Outcome: patient tolerated procedure well    Post-procedure details: wound care instructions  given    Additional details:  Cleaned area with isopropyl alcohol prior to anesthesia or biopsy. Applied thin layer of vaseline and covered with bandaid after procedure      Staff Communication: Dermatology Local Anesthesia: 1 % Plain Lidocaine - Amount: 0.5ml    Specimen 1 - Dermatopathology- DERM LAB  Differential Diagnosis: NMSC  Check Margins Yes/No?:    Comments:    Dermpath Lab: Routine Histopathology (formalin-fixed tissue)    NUB  - Given uncertainty in clinical diagnosis, shave biopsy is recommended in clinic today.  - The patient expressed understanding, is in agreement with this plan, and wishes to proceed with biopsy.  - Oral and written wound care instructions provided.  - Advised patient that the office will call within 2 weeks to discuss biopsy results.      Related Procedures  Follow Up In Dermatology - Established Patient    2. Actinic keratosis  Face, Dorsal hands, Forearms, Shoulders, Shins/Calves  Erythematous macules with gritty scale.    She has completed forehead and left cheek area (finished 1 week ago). Forehead has healed well and no AK noted. AK still noted to the nose and right cheek area as well has hands/forearms, shoulders and shins/calves. Patient to start next round of treatment soon with using efudex BID x2 weeks to the right cheek/right hands BID x3 weeks and then once finished will treat nose BID x2 weeks and left hand BID x3 weeks. Once completed, will hold on treating forearms/shins/calves until the fall. Will recheck response in June when patient returns to clinic for skin check.     Related Procedures  Follow Up In Dermatology - Established Patient    Related Medications  fluorouracil (Efudex) 5 % cream cream  Apply to affected areas as directed (following handout provided) twice daily for 2 weeks.        Return in 4 months for routine skin check or return to clinic sooner if needed

## 2025-02-24 ENCOUNTER — INFUSION (OUTPATIENT)
Dept: HEMATOLOGY/ONCOLOGY | Facility: CLINIC | Age: 85
End: 2025-02-24
Payer: MEDICARE

## 2025-02-24 ENCOUNTER — APPOINTMENT (OUTPATIENT)
Dept: HEMATOLOGY/ONCOLOGY | Facility: CLINIC | Age: 85
End: 2025-02-24
Payer: MEDICARE

## 2025-02-24 ENCOUNTER — HOSPITAL ENCOUNTER (OUTPATIENT)
Dept: RADIOLOGY | Facility: CLINIC | Age: 85
Discharge: HOME | End: 2025-02-24
Payer: MEDICARE

## 2025-02-24 ENCOUNTER — APPOINTMENT (OUTPATIENT)
Dept: HEMATOLOGY/ONCOLOGY | Facility: HOSPITAL | Age: 85
End: 2025-02-24
Payer: MEDICARE

## 2025-02-24 VITALS
HEART RATE: 76 BPM | HEIGHT: 62 IN | OXYGEN SATURATION: 100 % | BODY MASS INDEX: 29.86 KG/M2 | WEIGHT: 162.26 LBS | RESPIRATION RATE: 19 BRPM | DIASTOLIC BLOOD PRESSURE: 78 MMHG | SYSTOLIC BLOOD PRESSURE: 129 MMHG | TEMPERATURE: 97.5 F

## 2025-02-24 DIAGNOSIS — E04.1 THYROID NODULE: ICD-10-CM

## 2025-02-24 DIAGNOSIS — C90.00 MULTIPLE MYELOMA NOT HAVING ACHIEVED REMISSION (MULTI): ICD-10-CM

## 2025-02-24 DIAGNOSIS — D80.1 HYPOGAMMAGLOBULINEMIA (MULTI): ICD-10-CM

## 2025-02-24 LAB
ALBUMIN SERPL BCP-MCNC: 3.8 G/DL (ref 3.4–5)
ALP SERPL-CCNC: 46 U/L (ref 33–136)
ALT SERPL W P-5'-P-CCNC: 9 U/L (ref 7–45)
ANION GAP SERPL CALC-SCNC: 10 MMOL/L (ref 10–20)
AST SERPL W P-5'-P-CCNC: 14 U/L (ref 9–39)
BASOPHILS # BLD AUTO: 0.02 X10*3/UL (ref 0–0.1)
BASOPHILS NFR BLD AUTO: 0.6 %
BILIRUB SERPL-MCNC: 0.4 MG/DL (ref 0–1.2)
BUN SERPL-MCNC: 25 MG/DL (ref 6–23)
CALCIUM SERPL-MCNC: 9.6 MG/DL (ref 8.6–10.3)
CHLORIDE SERPL-SCNC: 107 MMOL/L (ref 98–107)
CO2 SERPL-SCNC: 29 MMOL/L (ref 21–32)
CREAT SERPL-MCNC: 1.04 MG/DL (ref 0.5–1.05)
EGFRCR SERPLBLD CKD-EPI 2021: 54 ML/MIN/1.73M*2
EOSINOPHIL # BLD AUTO: 0.04 X10*3/UL (ref 0–0.4)
EOSINOPHIL NFR BLD AUTO: 1.2 %
ERYTHROCYTE [DISTWIDTH] IN BLOOD BY AUTOMATED COUNT: 13.8 % (ref 11.5–14.5)
GLUCOSE SERPL-MCNC: 111 MG/DL (ref 74–99)
HCT VFR BLD AUTO: 30.3 % (ref 36–46)
HGB BLD-MCNC: 10 G/DL (ref 12–16)
IMM GRANULOCYTES # BLD AUTO: 0.05 X10*3/UL (ref 0–0.5)
IMM GRANULOCYTES NFR BLD AUTO: 1.5 % (ref 0–0.9)
LYMPHOCYTES # BLD AUTO: 0.31 X10*3/UL (ref 0.8–3)
LYMPHOCYTES NFR BLD AUTO: 9.5 %
MCH RBC QN AUTO: 33.9 PG (ref 26–34)
MCHC RBC AUTO-ENTMCNC: 33 G/DL (ref 32–36)
MCV RBC AUTO: 103 FL (ref 80–100)
MONOCYTES # BLD AUTO: 0.44 X10*3/UL (ref 0.05–0.8)
MONOCYTES NFR BLD AUTO: 13.5 %
NEUTROPHILS # BLD AUTO: 2.41 X10*3/UL (ref 1.6–5.5)
NEUTROPHILS NFR BLD AUTO: 73.7 %
NRBC BLD-RTO: 0 /100 WBCS (ref 0–0)
PLATELET # BLD AUTO: 152 X10*3/UL (ref 150–450)
POTASSIUM SERPL-SCNC: 4.4 MMOL/L (ref 3.5–5.3)
PROT SERPL-MCNC: 6.1 G/DL (ref 6.4–8.2)
RBC # BLD AUTO: 2.95 X10*6/UL (ref 4–5.2)
SODIUM SERPL-SCNC: 142 MMOL/L (ref 136–145)
WBC # BLD AUTO: 3.3 X10*3/UL (ref 4.4–11.3)

## 2025-02-24 PROCEDURE — 2500000004 HC RX 250 GENERAL PHARMACY W/ HCPCS (ALT 636 FOR OP/ED)

## 2025-02-24 PROCEDURE — 2500000004 HC RX 250 GENERAL PHARMACY W/ HCPCS (ALT 636 FOR OP/ED): Performed by: INTERNAL MEDICINE

## 2025-02-24 PROCEDURE — 96367 TX/PROPH/DG ADDL SEQ IV INF: CPT

## 2025-02-24 PROCEDURE — 2500000001 HC RX 250 WO HCPCS SELF ADMINISTERED DRUGS (ALT 637 FOR MEDICARE OP): Performed by: INTERNAL MEDICINE

## 2025-02-24 PROCEDURE — 96366 THER/PROPH/DIAG IV INF ADDON: CPT | Mod: INF

## 2025-02-24 PROCEDURE — 76536 US EXAM OF HEAD AND NECK: CPT | Performed by: RADIOLOGY

## 2025-02-24 PROCEDURE — 96417 CHEMO IV INFUS EACH ADDL SEQ: CPT

## 2025-02-24 PROCEDURE — 76536 US EXAM OF HEAD AND NECK: CPT

## 2025-02-24 PROCEDURE — 96413 CHEMO IV INFUSION 1 HR: CPT

## 2025-02-24 PROCEDURE — 85025 COMPLETE CBC W/AUTO DIFF WBC: CPT

## 2025-02-24 PROCEDURE — 80053 COMPREHEN METABOLIC PANEL: CPT

## 2025-02-24 RX ORDER — ACETAMINOPHEN 325 MG/1
650 TABLET ORAL ONCE
Status: COMPLETED | OUTPATIENT
Start: 2025-02-24 | End: 2025-02-24

## 2025-02-24 RX ORDER — EPINEPHRINE 0.3 MG/.3ML
0.3 INJECTION SUBCUTANEOUS EVERY 5 MIN PRN
Status: DISCONTINUED | OUTPATIENT
Start: 2025-02-24 | End: 2025-02-24 | Stop reason: HOSPADM

## 2025-02-24 RX ORDER — DEXAMETHASONE 4 MG/1
8 TABLET ORAL ONCE
Status: COMPLETED | OUTPATIENT
Start: 2025-02-24 | End: 2025-02-24

## 2025-02-24 RX ORDER — DIPHENHYDRAMINE HCL 25 MG
25 CAPSULE ORAL ONCE
Status: COMPLETED | OUTPATIENT
Start: 2025-02-24 | End: 2025-02-24

## 2025-02-24 RX ORDER — ALBUTEROL SULFATE 0.83 MG/ML
3 SOLUTION RESPIRATORY (INHALATION) AS NEEDED
OUTPATIENT
Start: 2025-03-24

## 2025-02-24 RX ORDER — DIPHENHYDRAMINE HYDROCHLORIDE 50 MG/ML
50 INJECTION INTRAMUSCULAR; INTRAVENOUS AS NEEDED
OUTPATIENT
Start: 2025-03-24

## 2025-02-24 RX ORDER — DIPHENHYDRAMINE HCL 25 MG
25 CAPSULE ORAL ONCE
OUTPATIENT
Start: 2025-03-24

## 2025-02-24 RX ORDER — DIPHENHYDRAMINE HYDROCHLORIDE 50 MG/ML
50 INJECTION INTRAMUSCULAR; INTRAVENOUS AS NEEDED
Status: DISCONTINUED | OUTPATIENT
Start: 2025-02-24 | End: 2025-02-24 | Stop reason: HOSPADM

## 2025-02-24 RX ORDER — PROCHLORPERAZINE EDISYLATE 5 MG/ML
10 INJECTION INTRAMUSCULAR; INTRAVENOUS EVERY 6 HOURS PRN
Status: DISCONTINUED | OUTPATIENT
Start: 2025-02-24 | End: 2025-02-24 | Stop reason: HOSPADM

## 2025-02-24 RX ORDER — EPINEPHRINE 0.3 MG/.3ML
0.3 INJECTION SUBCUTANEOUS EVERY 5 MIN PRN
OUTPATIENT
Start: 2025-03-24

## 2025-02-24 RX ORDER — ALBUTEROL SULFATE 0.83 MG/ML
3 SOLUTION RESPIRATORY (INHALATION) AS NEEDED
Status: DISCONTINUED | OUTPATIENT
Start: 2025-02-24 | End: 2025-02-24 | Stop reason: HOSPADM

## 2025-02-24 RX ORDER — ACETAMINOPHEN 325 MG/1
650 TABLET ORAL ONCE
OUTPATIENT
Start: 2025-03-24

## 2025-02-24 RX ORDER — FAMOTIDINE 10 MG/ML
20 INJECTION, SOLUTION INTRAVENOUS ONCE AS NEEDED
Status: DISCONTINUED | OUTPATIENT
Start: 2025-02-24 | End: 2025-02-24 | Stop reason: HOSPADM

## 2025-02-24 RX ORDER — PROCHLORPERAZINE MALEATE 10 MG
10 TABLET ORAL EVERY 6 HOURS PRN
Status: DISCONTINUED | OUTPATIENT
Start: 2025-02-24 | End: 2025-02-24 | Stop reason: HOSPADM

## 2025-02-24 RX ORDER — FAMOTIDINE 10 MG/ML
20 INJECTION, SOLUTION INTRAVENOUS ONCE AS NEEDED
OUTPATIENT
Start: 2025-03-24

## 2025-02-24 RX ADMIN — CYCLOPHOSPHAMIDE 500 MG: 500 INJECTION, POWDER, FOR SOLUTION INTRAVENOUS; ORAL at 12:34

## 2025-02-24 RX ADMIN — CARFILZOMIB 120 MG: 60 INJECTION, POWDER, LYOPHILIZED, FOR SOLUTION INTRAVENOUS at 11:51

## 2025-02-24 RX ADMIN — ONDANSETRON HYDROCHLORIDE 12 MG: 8 TABLET, FILM COATED ORAL at 11:20

## 2025-02-24 RX ADMIN — DIPHENHYDRAMINE HYDROCHLORIDE 25 MG: 25 CAPSULE ORAL at 08:53

## 2025-02-24 RX ADMIN — ACETAMINOPHEN 650 MG: 325 TABLET ORAL at 08:53

## 2025-02-24 RX ADMIN — IMMUNE GLOBULIN (HUMAN) 25 G: 10 INJECTION INTRAVENOUS; SUBCUTANEOUS at 09:18

## 2025-02-24 RX ADMIN — DEXAMETHASONE 8 MG: 4 TABLET ORAL at 11:21

## 2025-02-24 ASSESSMENT — PAIN SCALES - GENERAL: PAINLEVEL_OUTOF10: 1

## 2025-02-27 ENCOUNTER — TELEPHONE (OUTPATIENT)
Dept: DERMATOLOGY | Facility: CLINIC | Age: 85
End: 2025-02-27
Payer: MEDICARE

## 2025-02-27 DIAGNOSIS — C44.629 SCC (SQUAMOUS CELL CARCINOMA), ARM, LEFT: Primary | ICD-10-CM

## 2025-02-27 NOTE — TELEPHONE ENCOUNTER
Skin cancer. Please place a referral to derm surgery for excision. Patient needs to return to clinic in 6 months for next routine skin check.

## 2025-03-03 ENCOUNTER — INFUSION (OUTPATIENT)
Dept: HEMATOLOGY/ONCOLOGY | Facility: CLINIC | Age: 85
End: 2025-03-03
Payer: MEDICARE

## 2025-03-03 ENCOUNTER — APPOINTMENT (OUTPATIENT)
Dept: HEMATOLOGY/ONCOLOGY | Facility: HOSPITAL | Age: 85
End: 2025-03-03
Payer: MEDICARE

## 2025-03-03 VITALS
DIASTOLIC BLOOD PRESSURE: 83 MMHG | SYSTOLIC BLOOD PRESSURE: 144 MMHG | OXYGEN SATURATION: 98 % | TEMPERATURE: 97.9 F | WEIGHT: 161.6 LBS | HEART RATE: 107 BPM | RESPIRATION RATE: 16 BRPM | BODY MASS INDEX: 29.89 KG/M2

## 2025-03-03 DIAGNOSIS — C90.00 MULTIPLE MYELOMA NOT HAVING ACHIEVED REMISSION (MULTI): ICD-10-CM

## 2025-03-03 LAB
ALBUMIN SERPL BCP-MCNC: 3.8 G/DL (ref 3.4–5)
ALP SERPL-CCNC: 47 U/L (ref 33–136)
ALT SERPL W P-5'-P-CCNC: 10 U/L (ref 7–45)
ANION GAP SERPL CALC-SCNC: 11 MMOL/L (ref 10–20)
AST SERPL W P-5'-P-CCNC: 15 U/L (ref 9–39)
BASOPHILS # BLD AUTO: 0.01 X10*3/UL (ref 0–0.1)
BASOPHILS NFR BLD AUTO: 0.2 %
BILIRUB SERPL-MCNC: 0.4 MG/DL (ref 0–1.2)
BUN SERPL-MCNC: 29 MG/DL (ref 6–23)
CALCIUM SERPL-MCNC: 9.5 MG/DL (ref 8.6–10.3)
CHLORIDE SERPL-SCNC: 108 MMOL/L (ref 98–107)
CO2 SERPL-SCNC: 28 MMOL/L (ref 21–32)
CREAT SERPL-MCNC: 1.1 MG/DL (ref 0.5–1.05)
EGFRCR SERPLBLD CKD-EPI 2021: 50 ML/MIN/1.73M*2
EOSINOPHIL # BLD AUTO: 0.05 X10*3/UL (ref 0–0.4)
EOSINOPHIL NFR BLD AUTO: 1.2 %
ERYTHROCYTE [DISTWIDTH] IN BLOOD BY AUTOMATED COUNT: 13.7 % (ref 11.5–14.5)
GLUCOSE SERPL-MCNC: 75 MG/DL (ref 74–99)
HCT VFR BLD AUTO: 30.7 % (ref 36–46)
HGB BLD-MCNC: 10.1 G/DL (ref 12–16)
IMM GRANULOCYTES # BLD AUTO: 0.04 X10*3/UL (ref 0–0.5)
IMM GRANULOCYTES NFR BLD AUTO: 1 % (ref 0–0.9)
LYMPHOCYTES # BLD AUTO: 0.28 X10*3/UL (ref 0.8–3)
LYMPHOCYTES NFR BLD AUTO: 6.7 %
MCH RBC QN AUTO: 34.4 PG (ref 26–34)
MCHC RBC AUTO-ENTMCNC: 32.9 G/DL (ref 32–36)
MCV RBC AUTO: 104 FL (ref 80–100)
MONOCYTES # BLD AUTO: 0.36 X10*3/UL (ref 0.05–0.8)
MONOCYTES NFR BLD AUTO: 8.6 %
NEUTROPHILS # BLD AUTO: 3.45 X10*3/UL (ref 1.6–5.5)
NEUTROPHILS NFR BLD AUTO: 82.3 %
NRBC BLD-RTO: 0 /100 WBCS (ref 0–0)
PLATELET # BLD AUTO: 207 X10*3/UL (ref 150–450)
POTASSIUM SERPL-SCNC: 4.3 MMOL/L (ref 3.5–5.3)
PROT SERPL-MCNC: 6.4 G/DL (ref 6.4–8.2)
RBC # BLD AUTO: 2.94 X10*6/UL (ref 4–5.2)
SODIUM SERPL-SCNC: 143 MMOL/L (ref 136–145)
WBC # BLD AUTO: 4.2 X10*3/UL (ref 4.4–11.3)

## 2025-03-03 PROCEDURE — 96413 CHEMO IV INFUSION 1 HR: CPT

## 2025-03-03 PROCEDURE — 96417 CHEMO IV INFUS EACH ADDL SEQ: CPT

## 2025-03-03 PROCEDURE — 2500000004 HC RX 250 GENERAL PHARMACY W/ HCPCS (ALT 636 FOR OP/ED)

## 2025-03-03 PROCEDURE — 85025 COMPLETE CBC W/AUTO DIFF WBC: CPT

## 2025-03-03 PROCEDURE — 2500000004 HC RX 250 GENERAL PHARMACY W/ HCPCS (ALT 636 FOR OP/ED): Mod: JZ,TB | Performed by: INTERNAL MEDICINE

## 2025-03-03 PROCEDURE — 84075 ASSAY ALKALINE PHOSPHATASE: CPT

## 2025-03-03 RX ORDER — FAMOTIDINE 10 MG/ML
20 INJECTION, SOLUTION INTRAVENOUS ONCE AS NEEDED
Status: DISCONTINUED | OUTPATIENT
Start: 2025-03-03 | End: 2025-03-03 | Stop reason: HOSPADM

## 2025-03-03 RX ORDER — DIPHENHYDRAMINE HYDROCHLORIDE 50 MG/ML
50 INJECTION INTRAMUSCULAR; INTRAVENOUS AS NEEDED
Status: DISCONTINUED | OUTPATIENT
Start: 2025-03-03 | End: 2025-03-03 | Stop reason: HOSPADM

## 2025-03-03 RX ORDER — EPINEPHRINE 0.3 MG/.3ML
0.3 INJECTION SUBCUTANEOUS EVERY 5 MIN PRN
Status: DISCONTINUED | OUTPATIENT
Start: 2025-03-03 | End: 2025-03-03 | Stop reason: HOSPADM

## 2025-03-03 RX ORDER — PROCHLORPERAZINE MALEATE 10 MG
10 TABLET ORAL EVERY 6 HOURS PRN
Status: DISCONTINUED | OUTPATIENT
Start: 2025-03-03 | End: 2025-03-03 | Stop reason: HOSPADM

## 2025-03-03 RX ORDER — DEXAMETHASONE 4 MG/1
8 TABLET ORAL ONCE
Status: COMPLETED | OUTPATIENT
Start: 2025-03-03 | End: 2025-03-03

## 2025-03-03 RX ORDER — ALBUTEROL SULFATE 0.83 MG/ML
3 SOLUTION RESPIRATORY (INHALATION) AS NEEDED
Status: DISCONTINUED | OUTPATIENT
Start: 2025-03-03 | End: 2025-03-03 | Stop reason: HOSPADM

## 2025-03-03 RX ORDER — PROCHLORPERAZINE EDISYLATE 5 MG/ML
10 INJECTION INTRAMUSCULAR; INTRAVENOUS EVERY 6 HOURS PRN
Status: DISCONTINUED | OUTPATIENT
Start: 2025-03-03 | End: 2025-03-03 | Stop reason: HOSPADM

## 2025-03-03 RX ADMIN — CYCLOPHOSPHAMIDE 500 MG: 500 INJECTION, POWDER, FOR SOLUTION INTRAVENOUS; ORAL at 10:14

## 2025-03-03 RX ADMIN — CARFILZOMIB 120 MG: 60 INJECTION, POWDER, LYOPHILIZED, FOR SOLUTION INTRAVENOUS at 09:38

## 2025-03-03 RX ADMIN — DEXAMETHASONE 8 MG: 4 TABLET ORAL at 09:11

## 2025-03-03 RX ADMIN — ONDANSETRON HYDROCHLORIDE 12 MG: 8 TABLET, FILM COATED ORAL at 09:11

## 2025-03-03 ASSESSMENT — PAIN SCALES - GENERAL: PAINLEVEL_OUTOF10: 0-NO PAIN

## 2025-03-10 ENCOUNTER — TELEPHONE (OUTPATIENT)
Dept: SURGICAL ONCOLOGY | Facility: HOSPITAL | Age: 85
End: 2025-03-10
Payer: MEDICARE

## 2025-03-10 DIAGNOSIS — E04.1 THYROID NODULE: ICD-10-CM

## 2025-03-10 NOTE — TELEPHONE ENCOUNTER
Result Communication-I notified the patient by phone today that her thyroid nodules are all essentially stable.  In particular, the nodule in the right thyroid lobe which was 3.3 cm last time and had a slightly atypical biopsy is stable to perhaps slightly smaller at 3.0 cm in size.    She would like to continue with monitoring which is reasonable.  I will order a 1 year follow-up ultrasound as her 6-month ultrasound was stable.    She is comfortable with this plan.  I would like to see her back in the office next year after its been completed for repeat physical exam as well.    Resulted Orders   US thyroid    Narrative    Interpreted By:  Stephanie Leal,   STUDY:  US THYROID;  2/24/2025 2:00 pm      INDICATION:  Signs/Symptoms:thyroid nodule surveillance.      ,E04.1 Nontoxic single thyroid nodule      COMPARISON:  08/15/2024      ACCESSION NUMBER(S):  [id number]      ORDERING CLINICIAN:  KEYUR MOHR      TECHNIQUE:  Multiple ultrasonographic images of the thyroid gland and surrounding  tissues were obtained.      FINDINGS:  PARENCHYMA:  Mildly heterogenous      SIZE:  RIGHT LOBE:  4.5 x 2.2 x 2.8 cm  LEFT LOBE:  4.3 x 1.2 x 1.5 cm  ISTHMUS:  0.2 cm      NODULES: (Please note, assessment and description of nodules is per  TI-RADS criteria. Up to 4 total nodules described, which includes  largest and/or most clinically significant based on morphology.) It  is noted that some spongiform and/or cystic nodules may not be  specifically described and are TR category 1 (benign).      NODULE #: 1      Location: Central right lobe  Size: 30 x 19 x 25 mm, previously 33 x 15 x 27 mm  Composition:  mixed cystic and solid (1)  Echogenicity:  Hypoechoic (2)  Shape:  Wider-than-tall (0)  Margin:  Smooth (0)  Echogenic Foci:  None or Large comet-tail artifacts (0)      If present previously:  Significant change in size (>/= 20% diameter increase in at least  two dimensions and minimal increase of 2mm?): No significant  change.  Change in features or ACR TI-RADS category: No change.      The total score of this nodule is 3 points, corresponding to a  TI-RADS category  3; (3 points) Mildly suspicious.      NODULE #: 2.      Location: Right inferior pole  Size: 13 x 7 x 13 mm, previously 10 x 6 x 10 mm  Composition:  mixed cystic and solid (1)  Echogenicity:  Hypoechoic (2)  Shape:  Wider-than-tall (0)  Margin:  Smooth (0)  Echogenic Foci:  Punctate echogenic foci (3)      If present previously:  Significant change in size (>/= 20% diameter increase in at least  two dimensions and minimal increase of 2mm?): No significant change.  Change in features or ACR TI-RADS category: Punctate echogenic foci  not specifically seen previously      The total score of this nodule is 6 points, corresponding to a  TI-RADS category  4; (4-6 points) Moderately suspicious.      NODULE #: 3.      Location: Left inferior pole  Size: 15 x 11 x 12 mm, previously 14 x 7 x 11 mm  Composition:  mixed cystic and solid (1)  Echogenicity:  Hyperechoic or isoechoic (1)  Shape:  Wider-than-tall (0)  Margin:  Smooth (0)  Echogenic Foci:  Punctate echogenic foci (3)      If present previously:  Significant change in size (>/= 20% diameter increase in at least  two dimensions and minimal increase of 2mm?): No significant change.  Change in features or ACR TI-RADS category: Punctate echogenic foci  not specifically seen previously      The total score of this nodule is 5 points, corresponding to a  TI-RADS category  4; (4-6 points) Moderately suspicious.          CERVICAL LYMPH NODES:  No enlarged or morphologically abnormal cervical nodes are seen.        Impression    Complex bilateral thyroid nodules including dominant 30 mm mildly  suspicious (TIRADS 3) nodule in the right lobe, without significant  change from 08/15/2024. Follow-up recommendations below.      Please note that these statements are based on the recommendations of  the American College of  Radiology      TI-RADS grading system. ACR TI-RADS recommendations:      TR5 (?7 points) highly suspicious - FNA if ? 1cm, follow-up if 0.5  -0.9 cm every year for 5 years. Aggregate cancer risk 35%. TR4 (4-6  points) moderately suspicious - FNA if ? 1.5cm, follow-up if 1 -1.4  cm in 1, 2, 3 and 5 years. Aggregate cancer risk 9.1% TR3 (3 points)  mildly suspicious - FNA if ? 2.5cm, follow-up if 1.5 -2.4 cm in 1, 3  and 5 years. Aggregate cancer risk 4.8% TR2 (2 points) not  suspicious. Aggregate cancer risk 1.5% TR1 (0 points) benign - No FNA  or follow-up. Aggregate cancer risk 0.3%      MACRO:  None.      Signed by: Stephanie Leal 2/25/2025 11:31 AM  Dictation workstation:   [id number]       12:20 PM      Results were successfully communicated with the patient and they acknowledged their understanding.

## 2025-03-16 RX ORDER — DIPHENHYDRAMINE HYDROCHLORIDE 50 MG/ML
50 INJECTION, SOLUTION INTRAMUSCULAR; INTRAVENOUS AS NEEDED
OUTPATIENT
Start: 2025-04-14

## 2025-03-16 RX ORDER — EPINEPHRINE 0.3 MG/.3ML
0.3 INJECTION SUBCUTANEOUS EVERY 5 MIN PRN
OUTPATIENT
Start: 2025-04-28

## 2025-03-16 RX ORDER — ALBUTEROL SULFATE 0.83 MG/ML
3 SOLUTION RESPIRATORY (INHALATION) AS NEEDED
OUTPATIENT
Start: 2025-04-28

## 2025-03-16 RX ORDER — DIPHENHYDRAMINE HYDROCHLORIDE 50 MG/ML
50 INJECTION, SOLUTION INTRAMUSCULAR; INTRAVENOUS AS NEEDED
OUTPATIENT
Start: 2025-04-28

## 2025-03-16 RX ORDER — PROCHLORPERAZINE EDISYLATE 5 MG/ML
10 INJECTION INTRAMUSCULAR; INTRAVENOUS EVERY 6 HOURS PRN
OUTPATIENT
Start: 2025-04-28

## 2025-03-16 RX ORDER — FAMOTIDINE 10 MG/ML
20 INJECTION, SOLUTION INTRAVENOUS ONCE AS NEEDED
OUTPATIENT
Start: 2025-04-14

## 2025-03-16 RX ORDER — PROCHLORPERAZINE MALEATE 10 MG
10 TABLET ORAL EVERY 6 HOURS PRN
OUTPATIENT
Start: 2025-04-14

## 2025-03-16 RX ORDER — ALBUTEROL SULFATE 0.83 MG/ML
3 SOLUTION RESPIRATORY (INHALATION) AS NEEDED
OUTPATIENT
Start: 2025-04-14

## 2025-03-16 RX ORDER — FAMOTIDINE 10 MG/ML
20 INJECTION, SOLUTION INTRAVENOUS ONCE AS NEEDED
OUTPATIENT
Start: 2025-04-28

## 2025-03-16 RX ORDER — ONDANSETRON HYDROCHLORIDE 8 MG/1
12 TABLET, FILM COATED ORAL ONCE
OUTPATIENT
Start: 2025-04-14

## 2025-03-16 RX ORDER — PROCHLORPERAZINE EDISYLATE 5 MG/ML
10 INJECTION INTRAMUSCULAR; INTRAVENOUS EVERY 6 HOURS PRN
OUTPATIENT
Start: 2025-04-14

## 2025-03-16 RX ORDER — PROCHLORPERAZINE MALEATE 10 MG
10 TABLET ORAL EVERY 6 HOURS PRN
OUTPATIENT
Start: 2025-04-28

## 2025-03-16 RX ORDER — EPINEPHRINE 0.3 MG/.3ML
0.3 INJECTION SUBCUTANEOUS EVERY 5 MIN PRN
OUTPATIENT
Start: 2025-04-14

## 2025-03-16 RX ORDER — ONDANSETRON HYDROCHLORIDE 8 MG/1
12 TABLET, FILM COATED ORAL ONCE
OUTPATIENT
Start: 2025-04-28

## 2025-03-16 ASSESSMENT — ENCOUNTER SYMPTOMS
WHEEZING: 0
CARDIOVASCULAR NEGATIVE: 1
FATIGUE: 0
APPETITE CHANGE: 0
COUGH: 0
FEVER: 0
DIARRHEA: 1
BACK PAIN: 1
DYSURIA: 0
DIAPHORESIS: 0
HEMATURIA: 0
ADENOPATHY: 0
NEUROLOGICAL NEGATIVE: 1
UNEXPECTED WEIGHT CHANGE: 0
BRUISES/BLEEDS EASILY: 1
SHORTNESS OF BREATH: 0
CHILLS: 0

## 2025-03-16 NOTE — PROGRESS NOTES
"6Patient ID: Sil West \"Ines" is a 84 y.o. female.    Treatment:   Oncology History Overview Note   I. Diagnosis (1/2023):  IgG Lambda Multiple Myeloma  Presenting symptoms: Urinary incontinence and diffuse bony pain  II. Workup:  Bone Biopsy (1/6/23):  Plasma cells neoplasm  Repeat Bone Marrow Biopsy (1/26/23):  Hypercellular marrow, 80-90% plasma cells, lambda-restricted  FISH: 1q gain (high risk), trisomy 3  MRI Spine (12/18/22):  Osseous metastases, involvement in mid-cervical, mid-thoracic, and lumbar vertebral bodies, as well as the right iliac bone  PET Scan (1/23):  FDG avidity in right iliac bone, right sacral ala, left posterior 6th rib, and appendicular skeleton  Serum and Urine Studies (1/19-1/28/23):  FKLC 1.57, FLLC 2947.7, K/L ratio 0  IgG 537, IgA 221, IgM 19, b2M 23.4, Hgb 5.7  M protein IgA lambda 0.7 g/dL   U/L  Creatinine: 2.33 (peaked at 3.15)  UPEP: Monoclonal lambda light chain at 386.6 mg/24H  Hepatitis B and C negative  III. Treatment:  Radiation (2/2/23):  Right hip + T6-T8 x 5 fractions (total 2000 cGy)  Induction (12/24/22 - 2/16/23):  Bortezomib and dexamethasone + daratumumab  C1 (1/28/23): Bortezomib 1, 4, 8, 11 + daratumumab x 2 doses  C2 (2/16/23): Weekly dosing of daratumumab and bortezomib (1, 8, 15), with lenalidomide planned when renal function allows  Response Evaluation (5/25/23):  CR with M protein 0.1 (IgG kappa c/w roscoe) and free lyte chain 1.37  C8 Roscoe RVD (Completed 7/6/23):  Transitioned with a VGPR vs CR, ongoing multifocal bone pain  IV. Response Evaluation (7/13/23):  Marked improvement in bony lesions  Pathological fractures in right pelvis and ribs, possible infiltrate in the right lower lobe base, and hypodensity in the right thyroid gland  V. Treatment Adjustment (Cycle 9 and forward): 10/2023  Velcade omitted  Transitioned to a 4-week schedule with Roscoe (day 1) and Revlimid 15 mg days 1-21/28 days     Multiple myeloma not having achieved remission " (Multi)   9/13/2023 Initial Diagnosis    Multiple myeloma not having achieved remission (CMS/HCC)     10/2/2023 - 7/8/2024 Chemotherapy    Daratumumab, Pomalidomide and dexamethasone (oral meds managed outside treatment plan) 28 Day Cycles - Maintenance     8/5/2024 -  Chemotherapy    Carfilzomib and Cyclophosphamide (Weekly) / Dexamethasone, 28 Day Cycles (Custom)       Past Medical History:  HTN, DLP  pre skin cancers,   diveriticulitis   peripheral artery disease not amenable to surgery  Thyroid nodules under observation  .  Surgical History:  squamous cell carcinoma removed to left hand and right arm 6/2024 2/21/25 Mohs surgery done for 4 areas of SCC on 5 fluorouracil 5%  Past Surgical History:   Procedure Laterality Date    CT GUIDED PERCUTANEOUS BIOPSY BONE DEEP  01/06/2023    CT GUIDED PERCUTANEOUS BIOPSY BONE DEEP 1/6/2023 [facility]    OTHER SURGICAL HISTORY      THYROID BIOPSY      Family History:     Family History   Problem Relation Name Age of Onset    Alzheimer's disease Mother      Colon cancer Father      Rashes / Skin problems Sister      Rashes / Skin problems Brother       Social History:  ,  passed away on 10/24/23.  3 grown children (1 daughter, 2 sons). 2 grandchildren.  Has 6 cats.  Enjoyed skiing.  Occupation: retired .  Social History     Tobacco Use    Smoking status: Never     Passive exposure: Never    Smokeless tobacco: Never   Vaping Use    Vaping status: Never Used   Substance Use Topics    Alcohol use: Never    Drug use: Never      -------------------------------------------------------------------------------------------------------  Subjective       Sil West is a 84 year old female with IgG lambda multiple myeloma.  She recently had an increase to her lambda free light chains.  PET imaging done 7/17/24 showed an new soft tissue uptake in right 11th rib and interestingly patient has pain there as well.  Transitioned to carfilzomib,  cyclophophamide, dex, cycle 1 on 8/5/24     Sil presents to the clinic today (3/17/25) unaccompanied for follow up evaluation and is scheduled to receive C9D1 carfilzomib, cytoxan, and dexamethasone.  Receiving IVIG every month,  Receiving zometa every 3 months, but zometa  held due to needing to complete dental work.  She has a wonderful week last week, worked outside avoiding the sun exposed area. She is getting occasional, but not terrible bothersome. Due to restlessness. and issues sleeping after receiving steroids with treatment dose of dexamethasone was decreased from 20 mg to 8 mg.  On Asprin 81 mg daily for DVT prophylaxis.  Has a small twinge of pain right hip.  Dental work completed.      Review of Systems   Constitutional:  Negative for appetite change, chills, diaphoresis, fatigue, fever and unexpected weight change.   Respiratory:  Negative for cough, shortness of breath and wheezing.    Cardiovascular: Negative.    Gastrointestinal:  Positive for diarrhea.   Genitourinary:  Negative for dysuria and hematuria.    Musculoskeletal:  Positive for back pain.   Skin: Negative.         Had multiple spots of SCC removed by dermatology.   Neurological: Negative.    Hematological:  Negative for adenopathy. Bruises/bleeds easily (bruises easily).   All other systems reviewed and are negative.  -------------------------------------------------------------------------------------------------------  Objective   BSA: 1.78 meters squared  /66   Pulse 98   Temp 36.1 °C (97 °F)   Resp 17   Wt 73.2 kg (161 lb 6 oz)   SpO2 100%   BMI 29.85 kg/m²     Physical Exam  Vitals reviewed.   Constitutional:       Appearance: Normal appearance. She is well-developed and normal weight.   HENT:      Head: Normocephalic and atraumatic.      Nose: Nose normal.   Eyes:      General: No scleral icterus.     Extraocular Movements: Extraocular movements intact.      Conjunctiva/sclera: Conjunctivae normal.      Pupils:  Pupils are equal, round, and reactive to light.   Cardiovascular:      Rate and Rhythm: Normal rate and regular rhythm.      Pulses: Normal pulses.      Heart sounds: Normal heart sounds.   Pulmonary:      Effort: Pulmonary effort is normal.      Breath sounds: Normal breath sounds.   Abdominal:      General: Abdomen is flat. Bowel sounds are normal. There is no distension.      Palpations: Abdomen is soft. There is no mass.      Tenderness: There is no abdominal tenderness.   Musculoskeletal:         General: Normal range of motion.      Right lower leg: Edema (non-pitting) present.      Left lower leg: Edema (non-pitting) present.      Comments: No spine tenderness   Lymphadenopathy:      Comments: No lymphadenopathy   Skin:     General: Skin is warm and dry.      Comments: Multiple healing lesion from Mohls procedure and skin biopsies from dermatology to right side of face and left hand.   Neurological:      General: No focal deficit present.      Mental Status: She is alert and oriented to person, place, and time.      Comments: Normal strength and gait   Psychiatric:         Mood and Affect: Mood normal.         Behavior: Behavior normal.         Thought Content: Thought content normal.     Performance Status:  Symptomatic; fully ambulatory  -------------------------------------------------------------------------------------------------------  Assessment and Plan:    Multiple myeloma not having achieved remission (CMS/HCC)  IgG lambda MM    - Currently on daratumamab/Lenalidomide. Continued uptrending of free lambda light chain. M-protein still 0.1g IgG kappa, likely represents daratumumab. Will add weekly dex 10 mg to see if this could deepen her response.     5/13/24:  Continues to have increasing free lambda light chain, M-protein remains at 0.1.  Receiving C20 daratumumab today.  Discussed that free lyte chain continues to increase significantly and suggested switching revlimid to pomalidomide  4 mg 21/28  days since no other CRAB criteria,  -Taking aspirin 81 mg daily for VTE prophylaxis during treatment with pomalidomide      PET scan (7/17/24)   IMPRESSION:  1. Redemonstration of an FDG avid right posterolateral 11th rib  fracture now with evidence of an underlying soft tissue lesion.  2. Previously described hypermetabolic osseous lesions in the right  iliac, right superior pubic ramus, and right aspect of the pubic  symphysis have continued to decrease in metabolic activity with  persistent FDG avidity likely representing continued treatment  response with residual viable disease, attention on follow-up imaging.  3. Healing right inferior pubic ramus fracture with decreased extent  of hypermetabolic activity.    7/18/24:  Laboratory results show major increase in free lyte chains to 62 mg/dL up from 18 in March 2024, only sx is right rib pain where PET from yesterday shows a new lesion, no other new lesions seen,      Bone marrow biopsy results from 7/22/24 showing limited bone marrow (20% cellularity) with maturing trilineage hematopoiesis, minimal involvement by lambda-restricted plasma cells by flow cytometry. ECHO 8/2/2024 LVEF 60-65%    Started cycle 1 carfilzomib, cyclophosphamide, dexamethasone 8//24    3/17/25:  CBC results stable from today.  Lambda lyte chains slowly increasing ( see below)  No concerning findings on physical examination.  Scheduled to receive C9D1 of carfilzomib, cyclophosphamide, and dexamethasone.   Dexamethasone  dose decreased from 20 mg  to 8 mg weekly with cycle 7 due to restlessness and issues sleeping.  With cycle 10, cyclophosphamide is omitted and carfilzomib maintenance is transitioned to every other week.      Ig [place] Free Light Chain   Date Value Ref Range Status   02/17/2025 0.41 0.33 - 1.94 mg/dL Final   01/20/2025 0.59 0.33 - 1.94 mg/dL Final   12/23/2024 0.43 0.33 - 1.94 mg/dL Final   11/25/2024 0.35 0.33 - 1.94 mg/dL Final   10/28/2024 0.43 0.33 - 1.94 mg/dL Final      Ig Lambda Free Light Chain   Date Value Ref Range Status   02/17/2025 4.62 (H) 0.57 - 2.63 mg/dL Final   01/20/2025 3.56 (H) 0.57 - 2.63 mg/dL Final   12/23/2024 3.38 (H) 0.57 - 2.63 mg/dL Final   11/25/2024 3.71 (H) 0.57 - 2.63 mg/dL Final   10/28/2024 4.56 (H) 0.57 - 2.63 mg/dL Final     Kappa/Lambda Ratio   Date Value Ref Range Status   02/17/2025 0.09 (L) 0.26 - 1.65 Final   01/20/2025 0.17 (L) 0.26 - 1.65 Final   12/23/2024 0.13 (L) 0.26 - 1.65 Final   11/25/2024 0.09 (L) 0.26 - 1.65 Final   10/28/2024 0.09 (L) 0.26 - 1.65 Final     M-PROTEIN 1   Date Value Ref Range Status   10/28/2024 0.1 (H)   g/dL Final   10/14/2024 0.1 (H)   g/dL Final   09/03/2024 0.1 (H)   g/dL Final   08/12/2024 0.1 (H)   g/dL Final   07/22/2024 0.1 (H)   g/dL Final      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  IgG level 538 (12/30/24). Last received IVIG on 2/24/25, continue monthly at [facility].    Dysuria:  urinalysis bland, suspect needs urologic evaluation for incontinence    Bone Health:  - Continue zoledronic acid 3.3 mg (renal dosing) every 3 months, received last on 9/3/24.    - Continue vitamin D supplement -- 2000 units PO daily.  Started zometa 3/30/23 will complete 3/2025.  12/23/24:  Sil reports she had a crown fall out and may need a root canal.  Placed zometa on hold until dental work has been completed.    3/7/25 dental work completed, will resume zometa march 17 2025, then every 3months.        Thyroid nodule  - repeat thyroid US on 2/19/24, 2 nodules noted with FNA recommended.  Thyroid biopsy 4/29/24 showing rare atypical cells from FNA of right mid lobe.  Repeat biopsy was nondiagnostic.  Dr. Magallanes recommended thyroid surgery vs continued observation.  Sil requested to having US monitoring for now.  Last visit with Dr. Magallanes on 9/9/24.  Is scheduled to have thyroid US soon in  feb-march       Right lower back pain  - seems musculoskeletal  - MRI lumbar spine (3/4/24): degenerative changes, no evidence of progressive disease or fracture.    - MRI thoracic spine (3/12/24): degenerative changes, redemonstration of multifocal marrow signal abnormalities compatible with the given history of multiple myeloma, osseous expansion and epidural extension of neoplasm previously seen have improved, decrease in size and enhancement of previously seen lesions.      Health Care Maintenance:  Vaccines:    Received influenza- vaccine 10/9/24.  Advised to obtain updated covid, RSV, shingrix, and prevnar 20 vaccines at local pharmacy.    RTC: monthly IVIG, followup visit and labs 4/14/25 and cycle 10 Carfilzomib and cyclophosphamide. Continue monthly IVIG at [place], small [facility] in lambda lyte chain.  With cycle 10 cyclophosphamide is omitted and carfilzomib switches to  qoweek.   Dental work completed, started zometa March 2025.    - -----------------------------------------------------------------------------------------  Carmencita Marshall MD

## 2025-03-17 ENCOUNTER — INFUSION (OUTPATIENT)
Dept: HEMATOLOGY/ONCOLOGY | Facility: HOSPITAL | Age: 85
End: 2025-03-17
Payer: MEDICARE

## 2025-03-17 ENCOUNTER — OFFICE VISIT (OUTPATIENT)
Dept: HEMATOLOGY/ONCOLOGY | Facility: HOSPITAL | Age: 85
End: 2025-03-17
Payer: MEDICARE

## 2025-03-17 ENCOUNTER — LAB (OUTPATIENT)
Dept: LAB | Facility: HOSPITAL | Age: 85
End: 2025-03-17
Payer: MEDICARE

## 2025-03-17 VITALS
HEART RATE: 98 BPM | SYSTOLIC BLOOD PRESSURE: 140 MMHG | OXYGEN SATURATION: 100 % | RESPIRATION RATE: 17 BRPM | DIASTOLIC BLOOD PRESSURE: 66 MMHG | TEMPERATURE: 97 F | WEIGHT: 161.38 LBS | BODY MASS INDEX: 29.85 KG/M2

## 2025-03-17 DIAGNOSIS — C90.00 MULTIPLE MYELOMA NOT HAVING ACHIEVED REMISSION (MULTI): Primary | ICD-10-CM

## 2025-03-17 DIAGNOSIS — C90.00 MULTIPLE MYELOMA NOT HAVING ACHIEVED REMISSION (MULTI): ICD-10-CM

## 2025-03-17 LAB
ALBUMIN SERPL BCP-MCNC: 4 G/DL (ref 3.4–5)
ALP SERPL-CCNC: 46 U/L (ref 33–136)
ALT SERPL W P-5'-P-CCNC: 7 U/L (ref 7–45)
ANION GAP SERPL CALC-SCNC: 13 MMOL/L (ref 10–20)
AST SERPL W P-5'-P-CCNC: 12 U/L (ref 9–39)
BASOPHILS # BLD AUTO: 0.02 X10*3/UL (ref 0–0.1)
BASOPHILS NFR BLD AUTO: 0.4 %
BILIRUB SERPL-MCNC: 0.3 MG/DL (ref 0–1.2)
BUN SERPL-MCNC: 30 MG/DL (ref 6–23)
CALCIUM SERPL-MCNC: 9.6 MG/DL (ref 8.6–10.3)
CHLORIDE SERPL-SCNC: 108 MMOL/L (ref 98–107)
CO2 SERPL-SCNC: 28 MMOL/L (ref 21–32)
CREAT SERPL-MCNC: 1.08 MG/DL (ref 0.5–1.05)
EGFRCR SERPLBLD CKD-EPI 2021: 51 ML/MIN/1.73M*2
EOSINOPHIL # BLD AUTO: 0.05 X10*3/UL (ref 0–0.4)
EOSINOPHIL NFR BLD AUTO: 1.1 %
ERYTHROCYTE [DISTWIDTH] IN BLOOD BY AUTOMATED COUNT: 13.6 % (ref 11.5–14.5)
GLUCOSE SERPL-MCNC: 84 MG/DL (ref 74–99)
HCT VFR BLD AUTO: 30.9 % (ref 36–46)
HGB BLD-MCNC: 10.2 G/DL (ref 12–16)
HOLD SPECIMEN: NORMAL
HOLD SPECIMEN: NORMAL
IGA SERPL-MCNC: <7 MG/DL (ref 70–400)
IGG SERPL-MCNC: 754 MG/DL (ref 700–1600)
IGM SERPL-MCNC: 6 MG/DL (ref 40–230)
IMM GRANULOCYTES # BLD AUTO: 0.03 X10*3/UL (ref 0–0.5)
IMM GRANULOCYTES NFR BLD AUTO: 0.6 % (ref 0–0.9)
KAPPA LC SERPL-MCNC: 0.2 MG/DL (ref 0.33–1.94)
KAPPA LC/LAMBDA SER: 0.04 {RATIO} (ref 0.26–1.65)
LAMBDA LC SERPL-MCNC: 5.52 MG/DL (ref 0.57–2.63)
LYMPHOCYTES # BLD AUTO: 0.36 X10*3/UL (ref 0.8–3)
LYMPHOCYTES NFR BLD AUTO: 7.7 %
MAGNESIUM SERPL-MCNC: 1.98 MG/DL (ref 1.6–2.4)
MCH RBC QN AUTO: 34.6 PG (ref 26–34)
MCHC RBC AUTO-ENTMCNC: 33 G/DL (ref 32–36)
MCV RBC AUTO: 105 FL (ref 80–100)
MONOCYTES # BLD AUTO: 0.54 X10*3/UL (ref 0.05–0.8)
MONOCYTES NFR BLD AUTO: 11.5 %
NEUTROPHILS # BLD AUTO: 3.68 X10*3/UL (ref 1.6–5.5)
NEUTROPHILS NFR BLD AUTO: 78.7 %
NRBC BLD-RTO: 0 /100 WBCS (ref 0–0)
PHOSPHATE SERPL-MCNC: 3.1 MG/DL (ref 2.5–4.9)
PLATELET # BLD AUTO: 288 X10*3/UL (ref 150–450)
POTASSIUM SERPL-SCNC: 4.5 MMOL/L (ref 3.5–5.3)
PROT SERPL-MCNC: 6.1 G/DL (ref 6.4–8.2)
PROT SERPL-MCNC: 6.3 G/DL (ref 6.4–8.2)
RBC # BLD AUTO: 2.95 X10*6/UL (ref 4–5.2)
SODIUM SERPL-SCNC: 144 MMOL/L (ref 136–145)
WBC # BLD AUTO: 4.7 X10*3/UL (ref 4.4–11.3)

## 2025-03-17 PROCEDURE — 36415 COLL VENOUS BLD VENIPUNCTURE: CPT

## 2025-03-17 PROCEDURE — 99214 OFFICE O/P EST MOD 30 MIN: CPT | Performed by: INTERNAL MEDICINE

## 2025-03-17 PROCEDURE — 85025 COMPLETE CBC W/AUTO DIFF WBC: CPT

## 2025-03-17 PROCEDURE — 83735 ASSAY OF MAGNESIUM: CPT

## 2025-03-17 PROCEDURE — 83521 IG LIGHT CHAINS FREE EACH: CPT

## 2025-03-17 PROCEDURE — 84100 ASSAY OF PHOSPHORUS: CPT

## 2025-03-17 PROCEDURE — 1159F MED LIST DOCD IN RCRD: CPT | Performed by: INTERNAL MEDICINE

## 2025-03-17 PROCEDURE — 1125F AMNT PAIN NOTED PAIN PRSNT: CPT | Performed by: INTERNAL MEDICINE

## 2025-03-17 PROCEDURE — 2500000004 HC RX 250 GENERAL PHARMACY W/ HCPCS (ALT 636 FOR OP/ED): Performed by: INTERNAL MEDICINE

## 2025-03-17 PROCEDURE — 82784 ASSAY IGA/IGD/IGG/IGM EACH: CPT

## 2025-03-17 PROCEDURE — 84075 ASSAY ALKALINE PHOSPHATASE: CPT

## 2025-03-17 PROCEDURE — 99214 OFFICE O/P EST MOD 30 MIN: CPT | Mod: 25 | Performed by: INTERNAL MEDICINE

## 2025-03-17 PROCEDURE — 84155 ASSAY OF PROTEIN SERUM: CPT | Mod: 59

## 2025-03-17 PROCEDURE — 96413 CHEMO IV INFUSION 1 HR: CPT

## 2025-03-17 PROCEDURE — 84165 PROTEIN E-PHORESIS SERUM: CPT

## 2025-03-17 PROCEDURE — 96367 TX/PROPH/DG ADDL SEQ IV INF: CPT

## 2025-03-17 PROCEDURE — 96417 CHEMO IV INFUS EACH ADDL SEQ: CPT

## 2025-03-17 PROCEDURE — 1036F TOBACCO NON-USER: CPT | Performed by: INTERNAL MEDICINE

## 2025-03-17 RX ORDER — DIPHENHYDRAMINE HYDROCHLORIDE 50 MG/ML
50 INJECTION, SOLUTION INTRAMUSCULAR; INTRAVENOUS AS NEEDED
OUTPATIENT
Start: 2025-06-09

## 2025-03-17 RX ORDER — FAMOTIDINE 10 MG/ML
20 INJECTION, SOLUTION INTRAVENOUS ONCE AS NEEDED
OUTPATIENT
Start: 2025-06-09

## 2025-03-17 RX ORDER — DIPHENHYDRAMINE HYDROCHLORIDE 50 MG/ML
50 INJECTION, SOLUTION INTRAMUSCULAR; INTRAVENOUS AS NEEDED
Status: DISCONTINUED | OUTPATIENT
Start: 2025-03-17 | End: 2025-03-17 | Stop reason: HOSPADM

## 2025-03-17 RX ORDER — ALBUTEROL SULFATE 0.83 MG/ML
3 SOLUTION RESPIRATORY (INHALATION) AS NEEDED
Status: DISCONTINUED | OUTPATIENT
Start: 2025-03-17 | End: 2025-03-17 | Stop reason: HOSPADM

## 2025-03-17 RX ORDER — FAMOTIDINE 10 MG/ML
20 INJECTION, SOLUTION INTRAVENOUS ONCE AS NEEDED
Status: DISCONTINUED | OUTPATIENT
Start: 2025-03-17 | End: 2025-03-17 | Stop reason: HOSPADM

## 2025-03-17 RX ORDER — ALBUTEROL SULFATE 0.83 MG/ML
3 SOLUTION RESPIRATORY (INHALATION) AS NEEDED
OUTPATIENT
Start: 2025-06-09

## 2025-03-17 RX ORDER — PROCHLORPERAZINE MALEATE 10 MG
10 TABLET ORAL EVERY 6 HOURS PRN
Status: DISCONTINUED | OUTPATIENT
Start: 2025-03-17 | End: 2025-03-17 | Stop reason: HOSPADM

## 2025-03-17 RX ORDER — DEXAMETHASONE 4 MG/1
8 TABLET ORAL ONCE
Status: COMPLETED | OUTPATIENT
Start: 2025-03-17 | End: 2025-03-17

## 2025-03-17 RX ORDER — PROCHLORPERAZINE EDISYLATE 5 MG/ML
10 INJECTION INTRAMUSCULAR; INTRAVENOUS EVERY 6 HOURS PRN
Status: DISCONTINUED | OUTPATIENT
Start: 2025-03-17 | End: 2025-03-17 | Stop reason: HOSPADM

## 2025-03-17 RX ORDER — EPINEPHRINE 0.3 MG/.3ML
0.3 INJECTION SUBCUTANEOUS EVERY 5 MIN PRN
Status: DISCONTINUED | OUTPATIENT
Start: 2025-03-17 | End: 2025-03-17 | Stop reason: HOSPADM

## 2025-03-17 RX ORDER — EPINEPHRINE 0.3 MG/.3ML
0.3 INJECTION SUBCUTANEOUS EVERY 5 MIN PRN
OUTPATIENT
Start: 2025-06-09

## 2025-03-17 RX ADMIN — DEXAMETHASONE 8 MG: 4 TABLET ORAL at 10:47

## 2025-03-17 RX ADMIN — SODIUM CHLORIDE 500 ML: 9 INJECTION, SOLUTION INTRAVENOUS at 12:30

## 2025-03-17 RX ADMIN — ONDANSETRON HYDROCHLORIDE 12 MG: 8 TABLET, FILM COATED ORAL at 10:47

## 2025-03-17 RX ADMIN — CARFILZOMIB 120 MG: 60 INJECTION, POWDER, LYOPHILIZED, FOR SOLUTION INTRAVENOUS at 11:14

## 2025-03-17 RX ADMIN — ZOLEDRONIC ACID 3 MG: 4 INJECTION, SOLUTION, CONCENTRATE INTRAVENOUS at 12:29

## 2025-03-17 RX ADMIN — CYCLOPHOSPHAMIDE 500 MG: 500 INJECTION, POWDER, FOR SOLUTION INTRAVENOUS; ORAL at 11:51

## 2025-03-17 ASSESSMENT — PAIN SCALES - GENERAL: PAINLEVEL_OUTOF10: 2

## 2025-03-17 NOTE — PROGRESS NOTES
Patient arrived ambulatory to infusion for scheduled tx of carfilzomib and cyclophosphamide. Pt to also receive Zometa infusion today as well. Pt saw  in clinic prior. Denies any new or worsening sx. Tolerated infusion without issue. Patient made aware of upcoming appointments. Discharged in stable condition.

## 2025-03-18 ENCOUNTER — APPOINTMENT (OUTPATIENT)
Dept: DERMATOLOGY | Facility: CLINIC | Age: 85
End: 2025-03-18
Payer: MEDICARE

## 2025-03-18 VITALS — DIASTOLIC BLOOD PRESSURE: 74 MMHG | SYSTOLIC BLOOD PRESSURE: 138 MMHG | HEART RATE: 118 BPM

## 2025-03-18 DIAGNOSIS — C44.629 SQUAMOUS CELL CARCINOMA OF SKIN OF LEFT FOREARM: ICD-10-CM

## 2025-03-18 PROCEDURE — 17313 MOHS 1 STAGE T/A/L: CPT | Performed by: DERMATOLOGY

## 2025-03-18 PROCEDURE — 13121 CMPLX RPR S/A/L 2.6-7.5 CM: CPT | Performed by: DERMATOLOGY

## 2025-03-18 NOTE — PROGRESS NOTES
Mohs Surgery Operative Note    Date of Surgery:  3/18/2025  Surgeon:  Sophia Vann MD  Office Location:  [address] [facility] [place] [facility] [place]  [address]  [address]  [facility] [address]  Dept: [phone]  Dept Fax: [phone]  Referring Provider: Esequiel Stiles, APRN-CNP  [address] [facility] [address]  [place] [address]      Assessment/Plan   Pre-procedure:   Obtained informed consent: written from patient  The surgical site was identified and confirmed with the patient.     Intra-operative:   Audible time out called at : 8:35AM 03/18/25  by: Tico Bruno RN   Verified patient name, birthdate, site, specimen bottle label & requisition.    The planned procedure(s) was again reviewed with the patient. The risks of bleeding, infection, nerve damage and scarring were reviewed. Written authorization was obtained. The patient identity, surgical site, and planned procedure(s) were verified. The provider acted as both surgeon and pathologist.     Squamous cell carcinoma of skin of left forearm  Left Forearm - Posterior  Mohs surgery  Consent obtained: written    Universal Protocol:  Procedure explained and questions answered to patient or proxy's satisfaction: Yes    Test results available and properly labeled: Yes    Pathology report reviewed: Yes    External notes reviewed: Yes    Photo or diagram used for site identification: Yes    Site/side marked: Yes    Slide independently reviewed by Mohs surgeon: Yes    Immediately prior to procedure a time out was called: Yes    Patient identity confirmed: verbally with patient  Preparation: Patient was prepped and draped in usual sterile fashion      Anticoagulation:  Is the patient taking prescription anticoagulant and/or aspirin prescribed/recommended by a physician? Yes    Was the anticoagulation regimen changed prior to Mohs? No      Anesthesia:  Anesthesia method: local infiltration  Local anesthetic: Lidocaine 1.5% WITH Epi.    Procedure  Details:  Case ID Number: [id number]  Biopsy accession number: [id number]  Date of biopsy: 2/21/2025  Pre-Op diagnosis: squamous cell carcinoma  Surgical site (from skin exam): Left Forearm - Posterior  Pre-operative length (cm): 1.2  Pre-operative width (cm): 1.6  Indications for Mohs surgery: ill-defined borders  Previously treated? No      Micrographic Surgery Details:  Post-operative length (cm): 1.4  Post-operative width (cm): 2  Number of Mohs stages: 1    Stage 1  Comments: The patient was brought into the operating room and placed in the procedure chair in the appropriate position.  The area positive by previous biopsy was identified and confirmed with the patient. The area of clinically obvious tumor was debulked using a curette and/or scalpel as needed. An incision was made following the Mohs approach through the skin. The specimen was taken to the lab, divided into 2 piece(s) and appropriately chromacoded and processed.  Tumor features identified on Mohs section: no tumor identified  Depth of defect: subcutaneous fat  Patient tolerance of procedure: tolerated well, no immediate complications    Reconstruction:  Was the defect reconstructed? Yes    Was reconstruction performed by the same Mohs surgeon? Yes    Setting of reconstruction: outpatient office  When was reconstruction performed? same day  Type of reconstruction: linear  Linear reconstruction: complex  Length of linear repair (cm): 4.5  Subcutaneous Layers (Deep Stitches)   Suture size:  3-0  Suture type:  Vicryl  Stitches:  Buried vertical mattress  Fine/surface layer approximation (top stitches)   Epidermal/Superficial suture size:  5-0  Epidermal/Superficial suture type:  Fast-absorbing gut  Stitches: simple running    Hemostasis achieved with: electrodesiccation  Outcome: patient tolerated procedure well with no complications    Post-procedure details: sterile dressing applied and wound care instructions given    Dressing type: Hypafix, pressure  dressing, Telfa pad and petrolatum      Complex Linear Repair - Wide Undermining:  Given the location and size of the defect, it was determined that a complex layered linear closure was required to restore normal anatomy and function. The repair was considered complex because extensive undermining was required and performed. The amount of undermining performed was greater than the maximum width of the defect as measured perpendicular to the closure line along at least one entire edge of the defect. Standing cutaneous cones were removed using Burow's triangles. The wound edges were brought into close approximation with buried vertical mattress sutures. The remainder of the wound was then closed with epidermal top sutures.    The final repair measured 4.5 cm              Wound care was discussed, and the patient was given written post-operative wound care instructions.      The patient will follow up with Sophia Vann MD as needed for any post operative problems or concerns, and will follow up with their primary dermatologist as scheduled.

## 2025-03-18 NOTE — PROGRESS NOTES
"Office Visit Note  Date: 3/18/2025  Surgeon:  Sophia Vann MD  Office Location:  [facility]  [address]  [address]  [facility] [address]  Dept: [phone]  Dept Fax: [phone]  Referring Provider: Esequiel Stiles, APRN-CNP  [address]  [address]  [place] [address]    Subjective   Sil ALBA West \"Sherie\" is a 84 y.o. female who presents for the following: MOHS Surgery (Left Forearm- Posterior SCC)    According to the patient, the lesion has been present for approximately 6 months at the time of diagnosis.  The lesion is not causing symptoms.  The lesion has not been treated previously.    The patient does not have a pacemaker / defibrillator.  The patient does not have a heart valve / joint replacement.    The patient is on blood thinners.  The patient does not have a history of hepatitis B or C.  The patient does not have a history of HIV.  The patient does have a history of immunosuppression (e.g. organ transplantation, malignancy, medications)    Review of Systems:  No other skin or systemic complaints other than what is documented elsewhere in the note.    MEDICAL HISTORY: clinically relevant history including significant past medical history, medications and allergies was reviewed and documented in Epic.    Objective   Well appearing patient in no apparent distress; mood and affect are within normal limits.  Vital signs: See record.  Noted on the Left Forearm - Posterior  Is a 1.2 x 1.6 cm scar    The patient confirmed the identified site.    Discussion:  The nature of the diagnosis was explained. The lesion is a skin cancer.  It has a risk of local growth and distant spread. The condition is associated with sun exposure.  Warning signs of non-melanoma skin cancer discussed. Patient was instructed to perform monthly self skin examination.  We recommended that the patient have regular full skin exams given an increased risk of subsequent skin cancers. The " patient was instructed to use sun protective behaviors including use of broad spectrum sunscreens and sun protective clothing to reduce risk of skin cancers.      Risks, benefits, side effects of Mohs surgery were discussed with patient and the patient voiced understanding.  It was explained that even though the cure rate of Mohs is very high it is not 100%. Risks of surgery including but not limited to bleeding, infection, numbness, nerve damage, and scar were reviewed.  Discussion included wound care requirements, activity restrictions, likely scar outcome and time to heal.     After Mohs surgery, the defect may need to be repaired surgically and the scar may be longer than the original lesion.  Reconstruction options, risks, and benefits were reviewed including second intention healing, linear repair (4-1 ratio was explained), local flaps, skin grafts, cartilage grafts and interpolation flaps (the need for multiple surgeries was explained). Possible outcomes were reviewed including likely scar appearance, failure of flap survival, infection, bleeding and the need for revision surgery.     The pathology was reviewed, the photograph was reviewed, and the referring physician's note was reviewed.    Patient elected for Mohs surgery.

## 2025-03-19 LAB
ALBUMIN: 3.8 G/DL (ref 3.4–5)
ALPHA 1 GLOBULIN: 0.3 G/DL (ref 0.2–0.6)
ALPHA 2 GLOBULIN: 0.7 G/DL (ref 0.4–1.1)
BETA GLOBULIN: 0.7 G/DL (ref 0.5–1.2)
GAMMA GLOBULIN: 0.6 G/DL (ref 0.5–1.4)
PATH REVIEW-SERUM PROTEIN ELECTROPHORESIS: NORMAL
PROTEIN ELECTROPHORESIS COMMENT: NORMAL

## 2025-03-24 ENCOUNTER — INFUSION (OUTPATIENT)
Dept: HEMATOLOGY/ONCOLOGY | Facility: CLINIC | Age: 85
End: 2025-03-24
Payer: MEDICARE

## 2025-03-24 VITALS
BODY MASS INDEX: 29.85 KG/M2 | HEART RATE: 108 BPM | WEIGHT: 161.38 LBS | DIASTOLIC BLOOD PRESSURE: 79 MMHG | RESPIRATION RATE: 18 BRPM | TEMPERATURE: 97.7 F | OXYGEN SATURATION: 98 % | SYSTOLIC BLOOD PRESSURE: 156 MMHG

## 2025-03-24 DIAGNOSIS — D80.1 HYPOGAMMAGLOBULINEMIA (MULTI): ICD-10-CM

## 2025-03-24 DIAGNOSIS — C90.00 MULTIPLE MYELOMA NOT HAVING ACHIEVED REMISSION (MULTI): ICD-10-CM

## 2025-03-24 LAB
ALBUMIN SERPL BCP-MCNC: 3.8 G/DL (ref 3.4–5)
ALP SERPL-CCNC: 51 U/L (ref 33–136)
ALT SERPL W P-5'-P-CCNC: 9 U/L (ref 7–45)
ANION GAP SERPL CALC-SCNC: 11 MMOL/L (ref 10–20)
AST SERPL W P-5'-P-CCNC: 14 U/L (ref 9–39)
BASOPHILS # BLD AUTO: 0.03 X10*3/UL (ref 0–0.1)
BASOPHILS NFR BLD AUTO: 0.7 %
BILIRUB SERPL-MCNC: 0.4 MG/DL (ref 0–1.2)
BUN SERPL-MCNC: 28 MG/DL (ref 6–23)
CALCIUM SERPL-MCNC: 9.5 MG/DL (ref 8.6–10.3)
CHLORIDE SERPL-SCNC: 106 MMOL/L (ref 98–107)
CO2 SERPL-SCNC: 29 MMOL/L (ref 21–32)
CREAT SERPL-MCNC: 1.02 MG/DL (ref 0.5–1.05)
EGFRCR SERPLBLD CKD-EPI 2021: 55 ML/MIN/1.73M*2
EOSINOPHIL # BLD AUTO: 0.04 X10*3/UL (ref 0–0.4)
EOSINOPHIL NFR BLD AUTO: 1 %
ERYTHROCYTE [DISTWIDTH] IN BLOOD BY AUTOMATED COUNT: 13.6 % (ref 11.5–14.5)
GLUCOSE SERPL-MCNC: 102 MG/DL (ref 74–99)
HCT VFR BLD AUTO: 28.8 % (ref 36–46)
HGB BLD-MCNC: 9.6 G/DL (ref 12–16)
IGA SERPL-MCNC: <7 MG/DL (ref 70–400)
IGG SERPL-MCNC: 643 MG/DL (ref 700–1600)
IGM SERPL-MCNC: 6 MG/DL (ref 40–230)
IMM GRANULOCYTES # BLD AUTO: 0.04 X10*3/UL (ref 0–0.5)
IMM GRANULOCYTES NFR BLD AUTO: 1 % (ref 0–0.9)
LYMPHOCYTES # BLD AUTO: 0.25 X10*3/UL (ref 0.8–3)
LYMPHOCYTES NFR BLD AUTO: 6 %
MCH RBC QN AUTO: 34.8 PG (ref 26–34)
MCHC RBC AUTO-ENTMCNC: 33.3 G/DL (ref 32–36)
MCV RBC AUTO: 104 FL (ref 80–100)
MONOCYTES # BLD AUTO: 0.43 X10*3/UL (ref 0.05–0.8)
MONOCYTES NFR BLD AUTO: 10.3 %
NEUTROPHILS # BLD AUTO: 3.4 X10*3/UL (ref 1.6–5.5)
NEUTROPHILS NFR BLD AUTO: 81 %
NRBC BLD-RTO: 0 /100 WBCS (ref 0–0)
PLATELET # BLD AUTO: 173 X10*3/UL (ref 150–450)
POTASSIUM SERPL-SCNC: 4 MMOL/L (ref 3.5–5.3)
PROT SERPL-MCNC: 5.9 G/DL (ref 6.4–8.2)
RBC # BLD AUTO: 2.76 X10*6/UL (ref 4–5.2)
SODIUM SERPL-SCNC: 142 MMOL/L (ref 136–145)
WBC # BLD AUTO: 4.2 X10*3/UL (ref 4.4–11.3)

## 2025-03-24 PROCEDURE — 2500000004 HC RX 250 GENERAL PHARMACY W/ HCPCS (ALT 636 FOR OP/ED): Mod: JZ,TB

## 2025-03-24 PROCEDURE — 85025 COMPLETE CBC W/AUTO DIFF WBC: CPT

## 2025-03-24 PROCEDURE — 96413 CHEMO IV INFUSION 1 HR: CPT

## 2025-03-24 PROCEDURE — 80053 COMPREHEN METABOLIC PANEL: CPT

## 2025-03-24 PROCEDURE — 96417 CHEMO IV INFUS EACH ADDL SEQ: CPT

## 2025-03-24 PROCEDURE — 82784 ASSAY IGA/IGD/IGG/IGM EACH: CPT

## 2025-03-24 PROCEDURE — 96367 TX/PROPH/DG ADDL SEQ IV INF: CPT

## 2025-03-24 PROCEDURE — 2500000001 HC RX 250 WO HCPCS SELF ADMINISTERED DRUGS (ALT 637 FOR MEDICARE OP): Performed by: INTERNAL MEDICINE

## 2025-03-24 PROCEDURE — 2500000004 HC RX 250 GENERAL PHARMACY W/ HCPCS (ALT 636 FOR OP/ED): Performed by: INTERNAL MEDICINE

## 2025-03-24 PROCEDURE — 96366 THER/PROPH/DIAG IV INF ADDON: CPT | Mod: INF

## 2025-03-24 RX ORDER — HEPARIN SODIUM,PORCINE/PF 10 UNIT/ML
50 SYRINGE (ML) INTRAVENOUS AS NEEDED
OUTPATIENT
Start: 2025-03-24

## 2025-03-24 RX ORDER — FAMOTIDINE 10 MG/ML
20 INJECTION, SOLUTION INTRAVENOUS ONCE AS NEEDED
Status: DISCONTINUED | OUTPATIENT
Start: 2025-03-24 | End: 2025-03-24 | Stop reason: HOSPADM

## 2025-03-24 RX ORDER — ACETAMINOPHEN 325 MG/1
650 TABLET ORAL ONCE
Status: COMPLETED | OUTPATIENT
Start: 2025-03-24 | End: 2025-03-24

## 2025-03-24 RX ORDER — DIPHENHYDRAMINE HCL 25 MG
25 CAPSULE ORAL ONCE
Status: COMPLETED | OUTPATIENT
Start: 2025-03-24 | End: 2025-03-24

## 2025-03-24 RX ORDER — PROCHLORPERAZINE EDISYLATE 5 MG/ML
10 INJECTION INTRAMUSCULAR; INTRAVENOUS EVERY 6 HOURS PRN
Status: DISCONTINUED | OUTPATIENT
Start: 2025-03-24 | End: 2025-03-24 | Stop reason: HOSPADM

## 2025-03-24 RX ORDER — ALBUTEROL SULFATE 0.83 MG/ML
3 SOLUTION RESPIRATORY (INHALATION) AS NEEDED
Status: DISCONTINUED | OUTPATIENT
Start: 2025-03-24 | End: 2025-03-24 | Stop reason: HOSPADM

## 2025-03-24 RX ORDER — DIPHENHYDRAMINE HYDROCHLORIDE 50 MG/ML
50 INJECTION, SOLUTION INTRAMUSCULAR; INTRAVENOUS AS NEEDED
Status: DISCONTINUED | OUTPATIENT
Start: 2025-03-24 | End: 2025-03-24 | Stop reason: HOSPADM

## 2025-03-24 RX ORDER — EPINEPHRINE 0.3 MG/.3ML
0.3 INJECTION SUBCUTANEOUS EVERY 5 MIN PRN
Status: DISCONTINUED | OUTPATIENT
Start: 2025-03-24 | End: 2025-03-24 | Stop reason: HOSPADM

## 2025-03-24 RX ORDER — DEXAMETHASONE 4 MG/1
8 TABLET ORAL ONCE
Status: COMPLETED | OUTPATIENT
Start: 2025-03-24 | End: 2025-03-24

## 2025-03-24 RX ORDER — DIPHENHYDRAMINE HYDROCHLORIDE 50 MG/ML
50 INJECTION, SOLUTION INTRAMUSCULAR; INTRAVENOUS AS NEEDED
OUTPATIENT
Start: 2025-04-21

## 2025-03-24 RX ORDER — DIPHENHYDRAMINE HCL 25 MG
25 CAPSULE ORAL ONCE
OUTPATIENT
Start: 2025-04-21

## 2025-03-24 RX ORDER — ALBUTEROL SULFATE 0.83 MG/ML
3 SOLUTION RESPIRATORY (INHALATION) AS NEEDED
OUTPATIENT
Start: 2025-04-21

## 2025-03-24 RX ORDER — EPINEPHRINE 0.3 MG/.3ML
0.3 INJECTION SUBCUTANEOUS EVERY 5 MIN PRN
OUTPATIENT
Start: 2025-04-21

## 2025-03-24 RX ORDER — HEPARIN 100 UNIT/ML
500 SYRINGE INTRAVENOUS AS NEEDED
OUTPATIENT
Start: 2025-03-24

## 2025-03-24 RX ORDER — FAMOTIDINE 10 MG/ML
20 INJECTION, SOLUTION INTRAVENOUS ONCE AS NEEDED
OUTPATIENT
Start: 2025-04-21

## 2025-03-24 RX ORDER — ACETAMINOPHEN 325 MG/1
650 TABLET ORAL ONCE
OUTPATIENT
Start: 2025-04-21

## 2025-03-24 RX ORDER — PROCHLORPERAZINE MALEATE 10 MG
10 TABLET ORAL EVERY 6 HOURS PRN
Status: DISCONTINUED | OUTPATIENT
Start: 2025-03-24 | End: 2025-03-24 | Stop reason: HOSPADM

## 2025-03-24 RX ADMIN — DEXAMETHASONE 8 MG: 4 TABLET ORAL at 11:19

## 2025-03-24 RX ADMIN — ONDANSETRON HYDROCHLORIDE 12 MG: 8 TABLET, FILM COATED ORAL at 11:19

## 2025-03-24 RX ADMIN — CYCLOPHOSPHAMIDE 500 MG: 500 INJECTION, POWDER, FOR SOLUTION INTRAVENOUS; ORAL at 12:22

## 2025-03-24 RX ADMIN — IMMUNE GLOBULIN (HUMAN) 25 G: 10 INJECTION INTRAVENOUS; SUBCUTANEOUS at 09:21

## 2025-03-24 RX ADMIN — ACETAMINOPHEN 650 MG: 325 TABLET ORAL at 08:52

## 2025-03-24 RX ADMIN — DIPHENHYDRAMINE HYDROCHLORIDE 25 MG: 25 CAPSULE ORAL at 08:53

## 2025-03-24 RX ADMIN — CARFILZOMIB 120 MG: 60 INJECTION, POWDER, LYOPHILIZED, FOR SOLUTION INTRAVENOUS at 11:42

## 2025-03-24 ASSESSMENT — PAIN SCALES - GENERAL: PAINLEVEL_OUTOF10: 2
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
## 2025-03-31 ENCOUNTER — INFUSION (OUTPATIENT)
Dept: HEMATOLOGY/ONCOLOGY | Facility: CLINIC | Age: 85
End: 2025-03-31
Payer: MEDICARE

## 2025-03-31 VITALS
OXYGEN SATURATION: 100 % | RESPIRATION RATE: 18 BRPM | WEIGHT: 161.16 LBS | TEMPERATURE: 97.9 F | BODY MASS INDEX: 29.81 KG/M2 | HEART RATE: 98 BPM | SYSTOLIC BLOOD PRESSURE: 135 MMHG | DIASTOLIC BLOOD PRESSURE: 79 MMHG

## 2025-03-31 DIAGNOSIS — C90.00 MULTIPLE MYELOMA NOT HAVING ACHIEVED REMISSION (MULTI): ICD-10-CM

## 2025-03-31 LAB
ALBUMIN SERPL BCP-MCNC: 3.9 G/DL (ref 3.4–5)
ALP SERPL-CCNC: 48 U/L (ref 33–136)
ALT SERPL W P-5'-P-CCNC: 8 U/L (ref 7–45)
ANION GAP SERPL CALC-SCNC: 12 MMOL/L (ref 10–20)
AST SERPL W P-5'-P-CCNC: 15 U/L (ref 9–39)
BASOPHILS # BLD AUTO: 0.02 X10*3/UL (ref 0–0.1)
BASOPHILS NFR BLD AUTO: 0.6 %
BILIRUB SERPL-MCNC: 0.4 MG/DL (ref 0–1.2)
BUN SERPL-MCNC: 32 MG/DL (ref 6–23)
CALCIUM SERPL-MCNC: 9.7 MG/DL (ref 8.6–10.3)
CHLORIDE SERPL-SCNC: 109 MMOL/L (ref 98–107)
CO2 SERPL-SCNC: 27 MMOL/L (ref 21–32)
CREAT SERPL-MCNC: 0.99 MG/DL (ref 0.5–1.05)
EGFRCR SERPLBLD CKD-EPI 2021: 57 ML/MIN/1.73M*2
EOSINOPHIL # BLD AUTO: 0.05 X10*3/UL (ref 0–0.4)
EOSINOPHIL NFR BLD AUTO: 1.6 %
ERYTHROCYTE [DISTWIDTH] IN BLOOD BY AUTOMATED COUNT: 13.3 % (ref 11.5–14.5)
GLUCOSE SERPL-MCNC: 122 MG/DL (ref 74–99)
HCT VFR BLD AUTO: 27.9 % (ref 36–46)
HGB BLD-MCNC: 9.3 G/DL (ref 12–16)
IMM GRANULOCYTES # BLD AUTO: 0.03 X10*3/UL (ref 0–0.5)
IMM GRANULOCYTES NFR BLD AUTO: 0.9 % (ref 0–0.9)
LYMPHOCYTES # BLD AUTO: 0.23 X10*3/UL (ref 0.8–3)
LYMPHOCYTES NFR BLD AUTO: 7.1 %
MCH RBC QN AUTO: 34.7 PG (ref 26–34)
MCHC RBC AUTO-ENTMCNC: 33.3 G/DL (ref 32–36)
MCV RBC AUTO: 104 FL (ref 80–100)
MONOCYTES # BLD AUTO: 0.31 X10*3/UL (ref 0.05–0.8)
MONOCYTES NFR BLD AUTO: 9.6 %
NEUTROPHILS # BLD AUTO: 2.58 X10*3/UL (ref 1.6–5.5)
NEUTROPHILS NFR BLD AUTO: 80.2 %
NRBC BLD-RTO: 0 /100 WBCS (ref 0–0)
PLATELET # BLD AUTO: 195 X10*3/UL (ref 150–450)
POTASSIUM SERPL-SCNC: 3.9 MMOL/L (ref 3.5–5.3)
PROT SERPL-MCNC: 6.3 G/DL (ref 6.4–8.2)
RBC # BLD AUTO: 2.68 X10*6/UL (ref 4–5.2)
SODIUM SERPL-SCNC: 144 MMOL/L (ref 136–145)
WBC # BLD AUTO: 3.2 X10*3/UL (ref 4.4–11.3)

## 2025-03-31 PROCEDURE — 85025 COMPLETE CBC W/AUTO DIFF WBC: CPT

## 2025-03-31 PROCEDURE — 96417 CHEMO IV INFUS EACH ADDL SEQ: CPT

## 2025-03-31 PROCEDURE — 2500000004 HC RX 250 GENERAL PHARMACY W/ HCPCS (ALT 636 FOR OP/ED): Mod: JZ,TB | Performed by: INTERNAL MEDICINE

## 2025-03-31 PROCEDURE — 96413 CHEMO IV INFUSION 1 HR: CPT

## 2025-03-31 PROCEDURE — 84075 ASSAY ALKALINE PHOSPHATASE: CPT

## 2025-03-31 RX ORDER — PROCHLORPERAZINE EDISYLATE 5 MG/ML
10 INJECTION INTRAMUSCULAR; INTRAVENOUS EVERY 6 HOURS PRN
Status: DISCONTINUED | OUTPATIENT
Start: 2025-03-31 | End: 2025-03-31 | Stop reason: HOSPADM

## 2025-03-31 RX ORDER — EPINEPHRINE 0.3 MG/.3ML
0.3 INJECTION SUBCUTANEOUS EVERY 5 MIN PRN
Status: DISCONTINUED | OUTPATIENT
Start: 2025-03-31 | End: 2025-03-31 | Stop reason: HOSPADM

## 2025-03-31 RX ORDER — PROCHLORPERAZINE MALEATE 10 MG
10 TABLET ORAL EVERY 6 HOURS PRN
Status: DISCONTINUED | OUTPATIENT
Start: 2025-03-31 | End: 2025-03-31 | Stop reason: HOSPADM

## 2025-03-31 RX ORDER — DEXAMETHASONE 4 MG/1
8 TABLET ORAL ONCE
Status: COMPLETED | OUTPATIENT
Start: 2025-03-31 | End: 2025-03-31

## 2025-03-31 RX ORDER — ALBUTEROL SULFATE 0.83 MG/ML
3 SOLUTION RESPIRATORY (INHALATION) AS NEEDED
Status: DISCONTINUED | OUTPATIENT
Start: 2025-03-31 | End: 2025-03-31 | Stop reason: HOSPADM

## 2025-03-31 RX ORDER — FAMOTIDINE 10 MG/ML
20 INJECTION, SOLUTION INTRAVENOUS ONCE AS NEEDED
Status: DISCONTINUED | OUTPATIENT
Start: 2025-03-31 | End: 2025-03-31 | Stop reason: HOSPADM

## 2025-03-31 RX ORDER — HEPARIN SODIUM,PORCINE/PF 10 UNIT/ML
50 SYRINGE (ML) INTRAVENOUS AS NEEDED
OUTPATIENT
Start: 2025-03-31

## 2025-03-31 RX ORDER — DIPHENHYDRAMINE HYDROCHLORIDE 50 MG/ML
50 INJECTION, SOLUTION INTRAMUSCULAR; INTRAVENOUS AS NEEDED
Status: DISCONTINUED | OUTPATIENT
Start: 2025-03-31 | End: 2025-03-31 | Stop reason: HOSPADM

## 2025-03-31 RX ORDER — HEPARIN 100 UNIT/ML
500 SYRINGE INTRAVENOUS AS NEEDED
OUTPATIENT
Start: 2025-03-31

## 2025-03-31 RX ADMIN — CYCLOPHOSPHAMIDE 500 MG: 500 INJECTION, POWDER, FOR SOLUTION INTRAVENOUS; ORAL at 10:19

## 2025-03-31 RX ADMIN — ONDANSETRON HYDROCHLORIDE 12 MG: 8 TABLET, FILM COATED ORAL at 09:25

## 2025-03-31 RX ADMIN — DEXAMETHASONE 8 MG: 4 TABLET ORAL at 09:24

## 2025-03-31 RX ADMIN — CARFILZOMIB 120 MG: 60 INJECTION, POWDER, LYOPHILIZED, FOR SOLUTION INTRAVENOUS at 09:39

## 2025-03-31 ASSESSMENT — PAIN SCALES - GENERAL: PAINLEVEL_OUTOF10: 0-NO PAIN

## 2025-04-07 DIAGNOSIS — I10 PRIMARY HYPERTENSION: ICD-10-CM

## 2025-04-07 RX ORDER — AMLODIPINE BESYLATE 5 MG/1
5 TABLET ORAL DAILY
Qty: 30 TABLET | Refills: 1 | Status: SHIPPED | OUTPATIENT
Start: 2025-04-07 | End: 2025-06-06

## 2025-04-13 ASSESSMENT — ENCOUNTER SYMPTOMS
BRUISES/BLEEDS EASILY: 1
UNEXPECTED WEIGHT CHANGE: 0
APPETITE CHANGE: 0
HEMATURIA: 0
DYSURIA: 0
BACK PAIN: 1
FATIGUE: 0
DIARRHEA: 1
DIAPHORESIS: 0
CHILLS: 0
FEVER: 0
ADENOPATHY: 0
CARDIOVASCULAR NEGATIVE: 1
NEUROLOGICAL NEGATIVE: 1

## 2025-04-13 NOTE — PROGRESS NOTES
"Patient ID: Sil West \"Ines" is a 84 y.o. female.    Treatment:   Oncology History Overview Note   I. Diagnosis (1/2023):  IgG Lambda Multiple Myeloma  Presenting symptoms: Urinary incontinence and diffuse bony pain  II. Workup:  Bone Biopsy (1/6/23):  Plasma cells neoplasm  Repeat Bone Marrow Biopsy (1/26/23):  Hypercellular marrow, 80-90% plasma cells, lambda-restricted  FISH: 1q gain (high risk), trisomy 3  MRI Spine (12/18/22):  Osseous metastases, involvement in mid-cervical, mid-thoracic, and lumbar vertebral bodies, as well as the right iliac bone  PET Scan (1/23):  FDG avidity in right iliac bone, right sacral ala, left posterior 6th rib, and appendicular skeleton  Serum and Urine Studies (1/19-1/28/23):  FKLC 1.57, FLLC 2947.7, K/L ratio 0  IgG 537, IgA 221, IgM 19, b2M 23.4, Hgb 5.7  M protein IgA lambda 0.7 g/dL   U/L  Creatinine: 2.33 (peaked at 3.15)  UPEP: Monoclonal lambda light chain at 386.6 mg/24H  Hepatitis B and C negative  III. Treatment:  Radiation (2/2/23):  Right hip + T6-T8 x 5 fractions (total 2000 cGy)  Induction (12/24/22 - 2/16/23):  Bortezomib and dexamethasone + daratumumab  C1 (1/28/23): Bortezomib 1, 4, 8, 11 + daratumumab x 2 doses  C2 (2/16/23): Weekly dosing of daratumumab and bortezomib (1, 8, 15), with lenalidomide planned when renal function allows  Response Evaluation (5/25/23):  CR with M protein 0.1 (IgG kappa c/w roscoe) and free lyte chain 1.37  C8 Roscoe RVD (Completed 7/6/23):  Transitioned with a VGPR vs CR, ongoing multifocal bone pain  IV. Response Evaluation (7/13/23):  Marked improvement in bony lesions  Pathological fractures in right pelvis and ribs, possible infiltrate in the right lower lobe base, and hypodensity in the right thyroid gland  V. Treatment Adjustment (Cycle 9 and forward): 10/2023  Velcade omitted  Transitioned to a 4-week schedule with Roscoe (day 1) and Revlimid 15 mg days 1-21/28 days     Multiple myeloma not having achieved remission " (Multi)   9/13/2023 Initial Diagnosis    Multiple myeloma not having achieved remission (CMS/HCC)     10/2/2023 - 7/8/2024 Chemotherapy    Daratumumab, Pomalidomide and dexamethasone (oral meds managed outside treatment plan) 28 Day Cycles - Maintenance     8/5/2024 -  Chemotherapy    Carfilzomib and Cyclophosphamide (Weekly) / Dexamethasone, 28 Day Cycles (Custom)       Past Medical History:  HTN, DLP  pre skin cancers,   diveriticulitis   peripheral artery disease not amenable to surgery  Thyroid nodules under observation  .  Surgical History:  squamous cell carcinoma removed to left hand and right arm 6/2024 2/21/25 Mohs surgery done for 4 areas of SCC on 5 fluorouracil 5%  Past Surgical History:   Procedure Laterality Date    CT GUIDED PERCUTANEOUS BIOPSY BONE DEEP  01/06/2023    CT GUIDED PERCUTANEOUS BIOPSY BONE DEEP 1/6/2023 [facility]    OTHER SURGICAL HISTORY      THYROID BIOPSY      Family History:     Family History   Problem Relation Name Age of Onset    Alzheimer's disease Mother      Colon cancer Father      Rashes / Skin problems Sister      Rashes / Skin problems Brother       Social History:  ,  passed away on 10/24/23.  3 grown children (1 daughter, 2 sons). 2 grandchildren.  Has 6 cats.  Enjoyed skiing.  Occupation: retired .  Social History     Tobacco Use    Smoking status: Never     Passive exposure: Never    Smokeless tobacco: Never   Vaping Use    Vaping status: Never Used   Substance Use Topics    Alcohol use: Never    Drug use: Never      -------------------------------------------------------------------------------------------------------  Subjective       Sil West is a 84 year old female with IgG lambda multiple myeloma.  She recently had an increase to her lambda free light chains.  PET imaging done 7/17/24 showed an new soft tissue uptake in right 11th rib and interestingly patient has pain there as well.  Transitioned to carfilzomib,  cyclophophamide, dex, cycle 1 on 8/5/24     Sil presents to the clinic today (4/14/25) unaccompanied for follow up evaluation and is scheduled to receive C10D1 carfilzomib and dexamethasone today.  Starting at C10 she is only receiving carfilzomib every 2 weeks.  Due to restlessness. and issues sleeping after receiving steroids with treatment dose of dexamethasone was decreased from 20 mg to 8 mg.  Receiving IVIG every month.  Receiving zometa restarted 3/17/25 after completion of dental work, scheduled for every 3 months.      No changes to health.  Energy level is doing good.  Has been able to get outside to do yard work and took a trip to [place] to visit 's nephew.  On Asprin 81 mg daily for DVT prophylaxis.  Bruises easily.  Right hip is no longer bothering her.  Dental work completed.  Following closely with dermatology.  Continues to have intermittent diarrhea.  Taking imodium as needed.      Review of Systems   Constitutional:  Negative for appetite change, chills, diaphoresis, fatigue, fever and unexpected weight change.   Respiratory: Negative.     Cardiovascular: Negative.    Gastrointestinal:  Positive for diarrhea.   Genitourinary:  Negative for dysuria and hematuria.    Musculoskeletal:  Positive for back pain.   Skin: Negative.         Had multiple spots of SCC removed by dermatology.   Neurological: Negative.    Hematological:  Negative for adenopathy. Bruises/bleeds easily (bruises easily).   All other systems reviewed and are negative.  -------------------------------------------------------------------------------------------------------  Objective   BSA: 1.79 meters squared  /60   Pulse 87   Temp 36.8 °C (98.2 °F)   Resp 16   Wt 74 kg (163 lb 2.3 oz)   SpO2 100%   BMI 30.17 kg/m²     Physical Exam  Vitals reviewed.   Constitutional:       Appearance: Normal appearance. She is well-developed and normal weight.   HENT:      Head: Normocephalic and atraumatic.      Nose:  Nose normal.   Eyes:      General: No scleral icterus.     Extraocular Movements: Extraocular movements intact.      Conjunctiva/sclera: Conjunctivae normal.      Pupils: Pupils are equal, round, and reactive to light.   Cardiovascular:      Rate and Rhythm: Normal rate and regular rhythm.      Pulses: Normal pulses.      Heart sounds: Normal heart sounds.   Pulmonary:      Effort: Pulmonary effort is normal.      Breath sounds: Normal breath sounds.   Abdominal:      General: Abdomen is flat. Bowel sounds are normal. There is no distension.      Palpations: Abdomen is soft. There is no mass.      Tenderness: There is no abdominal tenderness.   Musculoskeletal:         General: Normal range of motion.      Right lower leg: Edema (non-pitting) present.      Left lower leg: Edema (non-pitting) present.      Comments: No spine tenderness   Lymphadenopathy:      Comments: No lymphadenopathy   Skin:     General: Skin is warm and dry.   Neurological:      General: No focal deficit present.      Mental Status: She is alert and oriented to person, place, and time.      Comments: Normal strength and gait   Psychiatric:         Mood and Affect: Mood normal.         Behavior: Behavior normal.         Thought Content: Thought content normal.     Performance Status:  Symptomatic; fully ambulatory  -------------------------------------------------------------------------------------------------------  Assessment and Plan:    Multiple myeloma not having achieved remission (CMS/HCC)  IgG lambda MM    - Currently on daratumamab/Lenalidomide. Continued uptrending of free lambda light chain. M-protein still 0.1g IgG kappa, likely represents daratumumab. Will add weekly dex 10 mg to see if this could deepen her response.     5/13/24:  Continues to have increasing free lambda light chain, M-protein remains at 0.1.  Receiving C20 daratumumab today.  Discussed that free lyte chain continues to increase significantly and suggested  switching revlimid to pomalidomide  4 mg 21/28 days since no other CRAB criteria,  -Taking aspirin 81 mg daily for VTE prophylaxis during treatment with pomalidomide      PET scan (7/17/24)   IMPRESSION:  1. Redemonstration of an FDG avid right posterolateral 11th rib  fracture now with evidence of an underlying soft tissue lesion.  2. Previously described hypermetabolic osseous lesions in the right  iliac, right superior pubic ramus, and right aspect of the pubic  symphysis have continued to decrease in metabolic activity with  persistent FDG avidity likely representing continued treatment  response with residual viable disease, attention on follow-up imaging.  3. Healing right inferior pubic ramus fracture with decreased extent  of hypermetabolic activity.    7/18/24:  Laboratory results show major increase in free lyte chains to 62 mg/dL up from 18 in March 2024, only sx is right rib pain where PET from yesterday shows a new lesion, no other new lesions seen,      Bone marrow biopsy results from 7/22/24 showing limited bone marrow (20% cellularity) with maturing trilineage hematopoiesis, minimal involvement by lambda-restricted plasma cells by flow cytometry. ECHO 8/2/2024 LVEF 60-65%    Started cycle 1 carfilzomib, cyclophosphamide, dexamethasone 8//24 4/14/25:  CBC results stable from today.  Lambda lyte chains slowly increasing (see below).  Free light chains and SPEP in process from today.  No concerning findings on physical examination.  Scheduled to receive C10D1 of carfilzomib and dexamethasone.  Dexamethasone 8 mg on day 1 and 15 with treatment.  With cycle 10, cyclophosphamide is omitted and carfilzomib maintenance is transitioned to every other week.        Ig Lupton Free Light Chain   Date Value Ref Range Status   03/17/2025 0.20 (L) 0.33 - 1.94 mg/dL Final   02/17/2025 0.41 0.33 - 1.94 mg/dL Final   01/20/2025 0.59 0.33 - 1.94 mg/dL Final   12/23/2024 0.43 0.33 - 1.94 mg/dL Final   11/25/2024 0.35  0.33 - 1.94 mg/dL Final     Ig Lambda Free Light Chain   Date Value Ref Range Status   03/17/2025 5.52 (H) 0.57 - 2.63 mg/dL Final   02/17/2025 4.62 (H) 0.57 - 2.63 mg/dL Final   01/20/2025 3.56 (H) 0.57 - 2.63 mg/dL Final   12/23/2024 3.38 (H) 0.57 - 2.63 mg/dL Final   11/25/2024 3.71 (H) 0.57 - 2.63 mg/dL Final     Kappa/Lambda Ratio   Date Value Ref Range Status   03/17/2025 0.04 (L) 0.26 - 1.65 Final   02/17/2025 0.09 (L) 0.26 - 1.65 Final   01/20/2025 0.17 (L) 0.26 - 1.65 Final   12/23/2024 0.13 (L) 0.26 - 1.65 Final   11/25/2024 0.09 (L) 0.26 - 1.65 Final     M-PROTEIN 1   Date Value Ref Range Status   10/28/2024 0.1 (H)   g/dL Final   10/14/2024 0.1 (H)   g/dL Final   09/03/2024 0.1 (H)   g/dL Final   08/12/2024 0.1 (H)   g/dL Final   07/22/2024 0.1 (H)   g/dL Final      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  IgG level 643 (3/24/25). Last received IVIG on 3/24/25 continue [facility] at [facility].  Next due 4/28/25, postponed by 1 week to match with dates receiving carfilzomib.      Dysuria:  urinalysis bland, suspect needs urologic evaluation for incontinence    Bone Health:  - Continue zoledronic acid 3.3 mg (renal dosing) every 3 months, received last on 9/3/24.    - Continue vitamin D supplement -- 2000 units PO daily.  Started zometa 3/30/23 will complete 3/2025.  12/23/24:  Sil reports she had a crown fall out and may need a root canal.  Placed zometa on hold until dental work has been completed.    4/14/25:  Dental work completed.  Resumed zometa 3/17/25, continue every 3 months.        Thyroid nodule  - repeat thyroid US on 2/19/24, 2 nodules noted with FNA recommended.  Thyroid biopsy 4/29/24 showing rare atypical cells from FNA of right mid lobe.  Repeat biopsy was nondiagnostic.  Dr. Magallanes recommended thyroid surgery vs continued observation.  Sil requested to having US  monitoring for now.  Last visit with Dr. Magallanes on 9/9/24. US 2/24/25 with 30 mm mildly suspicious nodule to right lobe without significant change from prior imaging.  Plan to have US in 1 year with follow up.       Right lower back pain  - seems musculoskeletal  - MRI lumbar spine (3/4/24): degenerative changes, no evidence of progressive disease or fracture.    - MRI thoracic spine (3/12/24): degenerative changes, redemonstration of multifocal marrow signal abnormalities compatible with the given history of multiple myeloma, osseous expansion and epidural extension of neoplasm previously seen have improved, decrease in size and enhancement of previously seen lesions.      Health Care Maintenance:  Vaccines:    Received influenza- vaccine 10/9/24.  Advised to obtain updated covid, RSV, shingrix, and prevnar 20 vaccines at local pharmacy.    RTC:   Continue monthly IVIG at [place] and zometa every 3 months.  5/12/25:  Follow up visit with provider and C11 carfilzomib.  Cyclophosphamide is omitted at C10 and carfilzomib switches to qo week.   -----------------------------------------------------------------------------------------  CHANTEL Medrano-PAUL

## 2025-04-14 ENCOUNTER — OFFICE VISIT (OUTPATIENT)
Dept: HEMATOLOGY/ONCOLOGY | Facility: HOSPITAL | Age: 85
End: 2025-04-14
Payer: MEDICARE

## 2025-04-14 ENCOUNTER — LAB (OUTPATIENT)
Dept: LAB | Facility: HOSPITAL | Age: 85
End: 2025-04-14
Payer: MEDICARE

## 2025-04-14 ENCOUNTER — INFUSION (OUTPATIENT)
Dept: HEMATOLOGY/ONCOLOGY | Facility: HOSPITAL | Age: 85
End: 2025-04-14
Payer: MEDICARE

## 2025-04-14 ENCOUNTER — APPOINTMENT (OUTPATIENT)
Dept: HEMATOLOGY/ONCOLOGY | Facility: HOSPITAL | Age: 85
End: 2025-04-14
Payer: MEDICARE

## 2025-04-14 VITALS
RESPIRATION RATE: 16 BRPM | DIASTOLIC BLOOD PRESSURE: 60 MMHG | OXYGEN SATURATION: 100 % | TEMPERATURE: 98.2 F | HEART RATE: 87 BPM | SYSTOLIC BLOOD PRESSURE: 138 MMHG | WEIGHT: 163.14 LBS | BODY MASS INDEX: 30.17 KG/M2

## 2025-04-14 DIAGNOSIS — Z79.69 IMMUNOSUPPRESSED DUE TO CHEMOTHERAPY: ICD-10-CM

## 2025-04-14 DIAGNOSIS — C90.00 MULTIPLE MYELOMA NOT HAVING ACHIEVED REMISSION (MULTI): ICD-10-CM

## 2025-04-14 DIAGNOSIS — T45.1X5A IMMUNOSUPPRESSED DUE TO CHEMOTHERAPY: ICD-10-CM

## 2025-04-14 DIAGNOSIS — C90.00 MULTIPLE MYELOMA NOT HAVING ACHIEVED REMISSION (MULTI): Primary | ICD-10-CM

## 2025-04-14 DIAGNOSIS — Z85.828 HX OF BASAL CELL CARCINOMA: ICD-10-CM

## 2025-04-14 DIAGNOSIS — D80.1 HYPOGAMMAGLOBULINEMIA (MULTI): ICD-10-CM

## 2025-04-14 DIAGNOSIS — Z85.828 HISTORY OF SCC (SQUAMOUS CELL CARCINOMA) OF SKIN: ICD-10-CM

## 2025-04-14 DIAGNOSIS — D84.821 IMMUNOSUPPRESSED DUE TO CHEMOTHERAPY: ICD-10-CM

## 2025-04-14 LAB
ALBUMIN SERPL BCP-MCNC: 4 G/DL (ref 3.4–5)
ALP SERPL-CCNC: 49 U/L (ref 33–136)
ALT SERPL W P-5'-P-CCNC: 8 U/L (ref 7–45)
ANION GAP SERPL CALC-SCNC: 12 MMOL/L (ref 10–20)
AST SERPL W P-5'-P-CCNC: 14 U/L (ref 9–39)
BASOPHILS # BLD AUTO: 0.02 X10*3/UL (ref 0–0.1)
BASOPHILS NFR BLD AUTO: 0.6 %
BILIRUB SERPL-MCNC: 0.3 MG/DL (ref 0–1.2)
BUN SERPL-MCNC: 23 MG/DL (ref 6–23)
CALCIUM SERPL-MCNC: 9.6 MG/DL (ref 8.6–10.3)
CHLORIDE SERPL-SCNC: 108 MMOL/L (ref 98–107)
CO2 SERPL-SCNC: 28 MMOL/L (ref 21–32)
CREAT SERPL-MCNC: 0.97 MG/DL (ref 0.5–1.05)
EGFRCR SERPLBLD CKD-EPI 2021: 58 ML/MIN/1.73M*2
EOSINOPHIL # BLD AUTO: 0.03 X10*3/UL (ref 0–0.4)
EOSINOPHIL NFR BLD AUTO: 0.9 %
ERYTHROCYTE [DISTWIDTH] IN BLOOD BY AUTOMATED COUNT: 13.5 % (ref 11.5–14.5)
GLUCOSE SERPL-MCNC: 111 MG/DL (ref 74–99)
HCT VFR BLD AUTO: 31.3 % (ref 36–46)
HGB BLD-MCNC: 9.9 G/DL (ref 12–16)
IGA SERPL-MCNC: <7 MG/DL (ref 70–400)
IGG SERPL-MCNC: 695 MG/DL (ref 700–1600)
IGM SERPL-MCNC: 5 MG/DL (ref 40–230)
IMM GRANULOCYTES # BLD AUTO: 0.01 X10*3/UL (ref 0–0.5)
IMM GRANULOCYTES NFR BLD AUTO: 0.3 % (ref 0–0.9)
LYMPHOCYTES # BLD AUTO: 0.31 X10*3/UL (ref 0.8–3)
LYMPHOCYTES NFR BLD AUTO: 9 %
MCH RBC QN AUTO: 34.1 PG (ref 26–34)
MCHC RBC AUTO-ENTMCNC: 31.6 G/DL (ref 32–36)
MCV RBC AUTO: 108 FL (ref 80–100)
MONOCYTES # BLD AUTO: 0.46 X10*3/UL (ref 0.05–0.8)
MONOCYTES NFR BLD AUTO: 13.4 %
NEUTROPHILS # BLD AUTO: 2.61 X10*3/UL (ref 1.6–5.5)
NEUTROPHILS NFR BLD AUTO: 75.8 %
NRBC BLD-RTO: 0 /100 WBCS (ref 0–0)
PLATELET # BLD AUTO: 237 X10*3/UL (ref 150–450)
POTASSIUM SERPL-SCNC: 4.5 MMOL/L (ref 3.5–5.3)
PROT SERPL-MCNC: 5.9 G/DL (ref 6.4–8.2)
PROT SERPL-MCNC: 6.3 G/DL (ref 6.4–8.2)
RBC # BLD AUTO: 2.9 X10*6/UL (ref 4–5.2)
SODIUM SERPL-SCNC: 143 MMOL/L (ref 136–145)
WBC # BLD AUTO: 3.4 X10*3/UL (ref 4.4–11.3)

## 2025-04-14 PROCEDURE — 1036F TOBACCO NON-USER: CPT

## 2025-04-14 PROCEDURE — 2500000004 HC RX 250 GENERAL PHARMACY W/ HCPCS (ALT 636 FOR OP/ED): Performed by: INTERNAL MEDICINE

## 2025-04-14 PROCEDURE — 99215 OFFICE O/P EST HI 40 MIN: CPT | Mod: 25

## 2025-04-14 PROCEDURE — 96413 CHEMO IV INFUSION 1 HR: CPT

## 2025-04-14 PROCEDURE — 99215 OFFICE O/P EST HI 40 MIN: CPT

## 2025-04-14 PROCEDURE — 1126F AMNT PAIN NOTED NONE PRSNT: CPT

## 2025-04-14 PROCEDURE — 36415 COLL VENOUS BLD VENIPUNCTURE: CPT

## 2025-04-14 PROCEDURE — 85025 COMPLETE CBC W/AUTO DIFF WBC: CPT

## 2025-04-14 PROCEDURE — 1159F MED LIST DOCD IN RCRD: CPT

## 2025-04-14 PROCEDURE — 1160F RVW MEDS BY RX/DR IN RCRD: CPT

## 2025-04-14 PROCEDURE — 82784 ASSAY IGA/IGD/IGG/IGM EACH: CPT

## 2025-04-14 PROCEDURE — 2500000004 HC RX 250 GENERAL PHARMACY W/ HCPCS (ALT 636 FOR OP/ED)

## 2025-04-14 PROCEDURE — 84165 PROTEIN E-PHORESIS SERUM: CPT

## 2025-04-14 PROCEDURE — 83521 IG LIGHT CHAINS FREE EACH: CPT

## 2025-04-14 PROCEDURE — 84155 ASSAY OF PROTEIN SERUM: CPT

## 2025-04-14 PROCEDURE — 84075 ASSAY ALKALINE PHOSPHATASE: CPT

## 2025-04-14 RX ORDER — EPINEPHRINE 0.3 MG/.3ML
0.3 INJECTION SUBCUTANEOUS EVERY 5 MIN PRN
OUTPATIENT
Start: 2025-05-12

## 2025-04-14 RX ORDER — EPINEPHRINE 0.3 MG/.3ML
0.3 INJECTION SUBCUTANEOUS EVERY 5 MIN PRN
OUTPATIENT
Start: 2025-05-27

## 2025-04-14 RX ORDER — DEXAMETHASONE 4 MG/1
8 TABLET ORAL ONCE
Status: CANCELLED | OUTPATIENT
Start: 2025-04-14 | End: 2025-04-14

## 2025-04-14 RX ORDER — DEXAMETHASONE 4 MG/1
8 TABLET ORAL ONCE
OUTPATIENT
Start: 2025-05-12 | End: 2025-05-12

## 2025-04-14 RX ORDER — ALBUTEROL SULFATE 0.83 MG/ML
3 SOLUTION RESPIRATORY (INHALATION) AS NEEDED
OUTPATIENT
Start: 2025-04-28

## 2025-04-14 RX ORDER — PROCHLORPERAZINE EDISYLATE 5 MG/ML
10 INJECTION INTRAMUSCULAR; INTRAVENOUS EVERY 6 HOURS PRN
OUTPATIENT
Start: 2025-05-27

## 2025-04-14 RX ORDER — PROCHLORPERAZINE MALEATE 10 MG
10 TABLET ORAL EVERY 6 HOURS PRN
OUTPATIENT
Start: 2025-05-12

## 2025-04-14 RX ORDER — ALBUTEROL SULFATE 0.83 MG/ML
3 SOLUTION RESPIRATORY (INHALATION) AS NEEDED
Status: DISCONTINUED | OUTPATIENT
Start: 2025-04-14 | End: 2025-04-14 | Stop reason: HOSPADM

## 2025-04-14 RX ORDER — PROCHLORPERAZINE MALEATE 10 MG
10 TABLET ORAL EVERY 6 HOURS PRN
Status: DISCONTINUED | OUTPATIENT
Start: 2025-04-14 | End: 2025-04-14 | Stop reason: HOSPADM

## 2025-04-14 RX ORDER — DEXAMETHASONE 4 MG/1
8 TABLET ORAL ONCE
OUTPATIENT
Start: 2025-05-27 | End: 2025-05-27

## 2025-04-14 RX ORDER — DIPHENHYDRAMINE HYDROCHLORIDE 50 MG/ML
50 INJECTION, SOLUTION INTRAMUSCULAR; INTRAVENOUS AS NEEDED
OUTPATIENT
Start: 2025-05-12

## 2025-04-14 RX ORDER — DEXAMETHASONE 4 MG/1
8 TABLET ORAL ONCE
OUTPATIENT
Start: 2025-04-28 | End: 2025-04-28

## 2025-04-14 RX ORDER — PROCHLORPERAZINE MALEATE 10 MG
10 TABLET ORAL EVERY 6 HOURS PRN
OUTPATIENT
Start: 2025-05-27

## 2025-04-14 RX ORDER — DIPHENHYDRAMINE HYDROCHLORIDE 50 MG/ML
50 INJECTION, SOLUTION INTRAMUSCULAR; INTRAVENOUS AS NEEDED
OUTPATIENT
Start: 2025-04-28

## 2025-04-14 RX ORDER — ALBUTEROL SULFATE 0.83 MG/ML
3 SOLUTION RESPIRATORY (INHALATION) AS NEEDED
OUTPATIENT
Start: 2025-05-27

## 2025-04-14 RX ORDER — FAMOTIDINE 10 MG/ML
20 INJECTION, SOLUTION INTRAVENOUS ONCE AS NEEDED
OUTPATIENT
Start: 2025-05-27

## 2025-04-14 RX ORDER — DIPHENHYDRAMINE HCL 25 MG
25 CAPSULE ORAL ONCE
OUTPATIENT
Start: 2025-04-28

## 2025-04-14 RX ORDER — ACETAMINOPHEN 325 MG/1
650 TABLET ORAL ONCE
OUTPATIENT
Start: 2025-04-28

## 2025-04-14 RX ORDER — EPINEPHRINE 0.3 MG/.3ML
0.3 INJECTION SUBCUTANEOUS EVERY 5 MIN PRN
Status: DISCONTINUED | OUTPATIENT
Start: 2025-04-14 | End: 2025-04-14 | Stop reason: HOSPADM

## 2025-04-14 RX ORDER — ALBUTEROL SULFATE 0.83 MG/ML
3 SOLUTION RESPIRATORY (INHALATION) AS NEEDED
OUTPATIENT
Start: 2025-05-12

## 2025-04-14 RX ORDER — PROCHLORPERAZINE EDISYLATE 5 MG/ML
10 INJECTION INTRAMUSCULAR; INTRAVENOUS EVERY 6 HOURS PRN
Status: DISCONTINUED | OUTPATIENT
Start: 2025-04-14 | End: 2025-04-14 | Stop reason: HOSPADM

## 2025-04-14 RX ORDER — DIPHENHYDRAMINE HYDROCHLORIDE 50 MG/ML
50 INJECTION, SOLUTION INTRAMUSCULAR; INTRAVENOUS AS NEEDED
Status: DISCONTINUED | OUTPATIENT
Start: 2025-04-14 | End: 2025-04-14 | Stop reason: HOSPADM

## 2025-04-14 RX ORDER — PROCHLORPERAZINE EDISYLATE 5 MG/ML
10 INJECTION INTRAMUSCULAR; INTRAVENOUS EVERY 6 HOURS PRN
OUTPATIENT
Start: 2025-05-12

## 2025-04-14 RX ORDER — DEXAMETHASONE 4 MG/1
8 TABLET ORAL ONCE
Status: COMPLETED | OUTPATIENT
Start: 2025-04-14 | End: 2025-04-14

## 2025-04-14 RX ORDER — ONDANSETRON HYDROCHLORIDE 8 MG/1
12 TABLET, FILM COATED ORAL ONCE
OUTPATIENT
Start: 2025-05-27

## 2025-04-14 RX ORDER — ONDANSETRON HYDROCHLORIDE 8 MG/1
12 TABLET, FILM COATED ORAL ONCE
OUTPATIENT
Start: 2025-05-12

## 2025-04-14 RX ORDER — FAMOTIDINE 10 MG/ML
20 INJECTION, SOLUTION INTRAVENOUS ONCE AS NEEDED
OUTPATIENT
Start: 2025-04-28

## 2025-04-14 RX ORDER — EPINEPHRINE 0.3 MG/.3ML
0.3 INJECTION SUBCUTANEOUS EVERY 5 MIN PRN
OUTPATIENT
Start: 2025-04-28

## 2025-04-14 RX ORDER — FAMOTIDINE 10 MG/ML
20 INJECTION, SOLUTION INTRAVENOUS ONCE AS NEEDED
OUTPATIENT
Start: 2025-05-12

## 2025-04-14 RX ORDER — FAMOTIDINE 10 MG/ML
20 INJECTION, SOLUTION INTRAVENOUS ONCE AS NEEDED
Status: DISCONTINUED | OUTPATIENT
Start: 2025-04-14 | End: 2025-04-14 | Stop reason: HOSPADM

## 2025-04-14 RX ORDER — DIPHENHYDRAMINE HYDROCHLORIDE 50 MG/ML
50 INJECTION, SOLUTION INTRAMUSCULAR; INTRAVENOUS AS NEEDED
OUTPATIENT
Start: 2025-05-27

## 2025-04-14 RX ADMIN — ONDANSETRON HYDROCHLORIDE 12 MG: 8 TABLET, FILM COATED ORAL at 10:47

## 2025-04-14 RX ADMIN — DEXAMETHASONE 8 MG: 4 TABLET ORAL at 11:31

## 2025-04-14 RX ADMIN — CARFILZOMIB 120 MG: 60 INJECTION, POWDER, LYOPHILIZED, FOR SOLUTION INTRAVENOUS at 11:31

## 2025-04-14 ASSESSMENT — PAIN SCALES - GENERAL: PAINLEVEL_OUTOF10: 0-NO PAIN

## 2025-04-14 ASSESSMENT — ENCOUNTER SYMPTOMS: RESPIRATORY NEGATIVE: 1

## 2025-04-14 NOTE — PROGRESS NOTES
Sherie West is a 84 y.o. female who arrived to infusion from provider visit for   Treatment Plans       Name Type Plan Dates Plan Provider         Active    Carfilzomib and Cyclophosphamide (Weekly) / Dexamethasone, 28 Day Cycles (Custom) Oncology Treatment 8/5/2024 - Present Carmencita Marshall MD                  See provider note for assessment. Tolerated infusion without incident. Pt aware of future appointments and agreeable. Pt ambulated off unit independently in safe condition.

## 2025-04-15 LAB
KAPPA LC SERPL-MCNC: 0.32 MG/DL (ref 0.33–1.94)
KAPPA LC/LAMBDA SER: 0.05 {RATIO} (ref 0.26–1.65)
LAMBDA LC SERPL-MCNC: 6.76 MG/DL (ref 0.57–2.63)

## 2025-04-16 LAB
ALBUMIN: 3.7 G/DL (ref 3.4–5)
ALPHA 1 GLOBULIN: 0.3 G/DL (ref 0.2–0.6)
ALPHA 2 GLOBULIN: 0.6 G/DL (ref 0.4–1.1)
BETA GLOBULIN: 0.7 G/DL (ref 0.5–1.2)
GAMMA GLOBULIN: 0.6 G/DL (ref 0.5–1.4)
PATH REVIEW-SERUM PROTEIN ELECTROPHORESIS: NORMAL
PROTEIN ELECTROPHORESIS COMMENT: NORMAL

## 2025-04-21 ENCOUNTER — APPOINTMENT (OUTPATIENT)
Dept: HEMATOLOGY/ONCOLOGY | Facility: CLINIC | Age: 85
End: 2025-04-21
Payer: MEDICARE

## 2025-04-28 ENCOUNTER — APPOINTMENT (OUTPATIENT)
Dept: HEMATOLOGY/ONCOLOGY | Facility: CLINIC | Age: 85
End: 2025-04-28
Payer: MEDICARE

## 2025-04-28 ENCOUNTER — INFUSION (OUTPATIENT)
Dept: HEMATOLOGY/ONCOLOGY | Facility: CLINIC | Age: 85
End: 2025-04-28
Payer: MEDICARE

## 2025-04-28 VITALS
WEIGHT: 164.02 LBS | BODY MASS INDEX: 30.34 KG/M2 | OXYGEN SATURATION: 98 % | TEMPERATURE: 98.2 F | SYSTOLIC BLOOD PRESSURE: 145 MMHG | HEART RATE: 76 BPM | RESPIRATION RATE: 18 BRPM | DIASTOLIC BLOOD PRESSURE: 83 MMHG

## 2025-04-28 DIAGNOSIS — D80.1 HYPOGAMMAGLOBULINEMIA (MULTI): ICD-10-CM

## 2025-04-28 DIAGNOSIS — C90.00 MULTIPLE MYELOMA NOT HAVING ACHIEVED REMISSION (MULTI): ICD-10-CM

## 2025-04-28 LAB
ALBUMIN SERPL BCP-MCNC: 3.9 G/DL (ref 3.4–5)
ALP SERPL-CCNC: 46 U/L (ref 33–136)
ALT SERPL W P-5'-P-CCNC: 7 U/L (ref 7–45)
ANION GAP SERPL CALC-SCNC: 11 MMOL/L (ref 10–20)
AST SERPL W P-5'-P-CCNC: 13 U/L (ref 9–39)
BASOPHILS # BLD AUTO: 0.02 X10*3/UL (ref 0–0.1)
BASOPHILS NFR BLD AUTO: 0.5 %
BILIRUB SERPL-MCNC: 0.3 MG/DL (ref 0–1.2)
BUN SERPL-MCNC: 27 MG/DL (ref 6–23)
CALCIUM SERPL-MCNC: 9.5 MG/DL (ref 8.6–10.3)
CHLORIDE SERPL-SCNC: 110 MMOL/L (ref 98–107)
CO2 SERPL-SCNC: 26 MMOL/L (ref 21–32)
CREAT SERPL-MCNC: 1.09 MG/DL (ref 0.5–1.05)
EGFRCR SERPLBLD CKD-EPI 2021: 51 ML/MIN/1.73M*2
EOSINOPHIL # BLD AUTO: 0.03 X10*3/UL (ref 0–0.4)
EOSINOPHIL NFR BLD AUTO: 0.7 %
ERYTHROCYTE [DISTWIDTH] IN BLOOD BY AUTOMATED COUNT: 13.3 % (ref 11.5–14.5)
GLUCOSE SERPL-MCNC: 126 MG/DL (ref 74–99)
HCT VFR BLD AUTO: 30.1 % (ref 36–46)
HGB BLD-MCNC: 9.9 G/DL (ref 12–16)
IGA SERPL-MCNC: <7 MG/DL (ref 70–400)
IGG SERPL-MCNC: 565 MG/DL (ref 700–1600)
IGM SERPL-MCNC: 5 MG/DL (ref 40–230)
IMM GRANULOCYTES # BLD AUTO: 0.02 X10*3/UL (ref 0–0.5)
IMM GRANULOCYTES NFR BLD AUTO: 0.5 % (ref 0–0.9)
LYMPHOCYTES # BLD AUTO: 0.35 X10*3/UL (ref 0.8–3)
LYMPHOCYTES NFR BLD AUTO: 8.1 %
MCH RBC QN AUTO: 35.2 PG (ref 26–34)
MCHC RBC AUTO-ENTMCNC: 32.9 G/DL (ref 32–36)
MCV RBC AUTO: 107 FL (ref 80–100)
MONOCYTES # BLD AUTO: 0.35 X10*3/UL (ref 0.05–0.8)
MONOCYTES NFR BLD AUTO: 8.1 %
NEUTROPHILS # BLD AUTO: 3.57 X10*3/UL (ref 1.6–5.5)
NEUTROPHILS NFR BLD AUTO: 82.1 %
NRBC BLD-RTO: 0 /100 WBCS (ref 0–0)
PLATELET # BLD AUTO: 237 X10*3/UL (ref 150–450)
POTASSIUM SERPL-SCNC: 4.1 MMOL/L (ref 3.5–5.3)
PROT SERPL-MCNC: 5.9 G/DL (ref 6.4–8.2)
RBC # BLD AUTO: 2.81 X10*6/UL (ref 4–5.2)
SODIUM SERPL-SCNC: 143 MMOL/L (ref 136–145)
WBC # BLD AUTO: 4.3 X10*3/UL (ref 4.4–11.3)

## 2025-04-28 PROCEDURE — 2500000001 HC RX 250 WO HCPCS SELF ADMINISTERED DRUGS (ALT 637 FOR MEDICARE OP)

## 2025-04-28 PROCEDURE — 85025 COMPLETE CBC W/AUTO DIFF WBC: CPT

## 2025-04-28 PROCEDURE — 96413 CHEMO IV INFUSION 1 HR: CPT

## 2025-04-28 PROCEDURE — 80053 COMPREHEN METABOLIC PANEL: CPT

## 2025-04-28 PROCEDURE — 96367 TX/PROPH/DG ADDL SEQ IV INF: CPT

## 2025-04-28 PROCEDURE — 82784 ASSAY IGA/IGD/IGG/IGM EACH: CPT

## 2025-04-28 PROCEDURE — 2500000004 HC RX 250 GENERAL PHARMACY W/ HCPCS (ALT 636 FOR OP/ED): Mod: JZ | Performed by: INTERNAL MEDICINE

## 2025-04-28 PROCEDURE — 2500000004 HC RX 250 GENERAL PHARMACY W/ HCPCS (ALT 636 FOR OP/ED): Mod: JZ,TB

## 2025-04-28 PROCEDURE — 96366 THER/PROPH/DIAG IV INF ADDON: CPT | Mod: INF

## 2025-04-28 RX ORDER — PROCHLORPERAZINE EDISYLATE 5 MG/ML
10 INJECTION INTRAMUSCULAR; INTRAVENOUS EVERY 6 HOURS PRN
Status: DISCONTINUED | OUTPATIENT
Start: 2025-04-28 | End: 2025-04-28 | Stop reason: HOSPADM

## 2025-04-28 RX ORDER — DIPHENHYDRAMINE HYDROCHLORIDE 50 MG/ML
50 INJECTION, SOLUTION INTRAMUSCULAR; INTRAVENOUS AS NEEDED
Status: DISCONTINUED | OUTPATIENT
Start: 2025-04-28 | End: 2025-04-28 | Stop reason: HOSPADM

## 2025-04-28 RX ORDER — DIPHENHYDRAMINE HCL 25 MG
25 CAPSULE ORAL ONCE
OUTPATIENT
Start: 2025-05-26

## 2025-04-28 RX ORDER — ALBUTEROL SULFATE 0.83 MG/ML
3 SOLUTION RESPIRATORY (INHALATION) AS NEEDED
Status: DISCONTINUED | OUTPATIENT
Start: 2025-04-28 | End: 2025-04-28 | Stop reason: HOSPADM

## 2025-04-28 RX ORDER — DIPHENHYDRAMINE HCL 25 MG
25 CAPSULE ORAL ONCE
Status: COMPLETED | OUTPATIENT
Start: 2025-04-28 | End: 2025-04-28

## 2025-04-28 RX ORDER — ALBUTEROL SULFATE 0.83 MG/ML
3 SOLUTION RESPIRATORY (INHALATION) AS NEEDED
OUTPATIENT
Start: 2025-05-26

## 2025-04-28 RX ORDER — EPINEPHRINE 0.3 MG/.3ML
0.3 INJECTION SUBCUTANEOUS EVERY 5 MIN PRN
Status: DISCONTINUED | OUTPATIENT
Start: 2025-04-28 | End: 2025-04-28 | Stop reason: HOSPADM

## 2025-04-28 RX ORDER — EPINEPHRINE 0.3 MG/.3ML
0.3 INJECTION SUBCUTANEOUS EVERY 5 MIN PRN
OUTPATIENT
Start: 2025-05-26

## 2025-04-28 RX ORDER — DIPHENHYDRAMINE HYDROCHLORIDE 50 MG/ML
50 INJECTION, SOLUTION INTRAMUSCULAR; INTRAVENOUS AS NEEDED
OUTPATIENT
Start: 2025-05-26

## 2025-04-28 RX ORDER — DEXAMETHASONE 4 MG/1
8 TABLET ORAL ONCE
Status: COMPLETED | OUTPATIENT
Start: 2025-04-28 | End: 2025-04-28

## 2025-04-28 RX ORDER — PROCHLORPERAZINE MALEATE 10 MG
10 TABLET ORAL EVERY 6 HOURS PRN
Status: DISCONTINUED | OUTPATIENT
Start: 2025-04-28 | End: 2025-04-28 | Stop reason: HOSPADM

## 2025-04-28 RX ORDER — ACETAMINOPHEN 325 MG/1
650 TABLET ORAL ONCE
OUTPATIENT
Start: 2025-05-26

## 2025-04-28 RX ORDER — FAMOTIDINE 10 MG/ML
20 INJECTION, SOLUTION INTRAVENOUS ONCE AS NEEDED
Status: DISCONTINUED | OUTPATIENT
Start: 2025-04-28 | End: 2025-04-28 | Stop reason: HOSPADM

## 2025-04-28 RX ORDER — FAMOTIDINE 10 MG/ML
20 INJECTION, SOLUTION INTRAVENOUS ONCE AS NEEDED
OUTPATIENT
Start: 2025-05-26

## 2025-04-28 RX ORDER — ACETAMINOPHEN 325 MG/1
650 TABLET ORAL ONCE
Status: COMPLETED | OUTPATIENT
Start: 2025-04-28 | End: 2025-04-28

## 2025-04-28 RX ADMIN — ONDANSETRON HYDROCHLORIDE 12 MG: 8 TABLET, FILM COATED ORAL at 10:19

## 2025-04-28 RX ADMIN — IMMUNE GLOBULIN (HUMAN) 25 G: 10 INJECTION INTRAVENOUS; SUBCUTANEOUS at 08:40

## 2025-04-28 RX ADMIN — ACETAMINOPHEN 650 MG: 325 TABLET ORAL at 08:17

## 2025-04-28 RX ADMIN — CARFILZOMIB 120 MG: 60 INJECTION, POWDER, LYOPHILIZED, FOR SOLUTION INTRAVENOUS at 10:42

## 2025-04-28 RX ADMIN — DEXAMETHASONE 8 MG: 4 TABLET ORAL at 10:19

## 2025-04-28 RX ADMIN — DIPHENHYDRAMINE HYDROCHLORIDE 25 MG: 25 CAPSULE ORAL at 08:17

## 2025-04-28 ASSESSMENT — PAIN SCALES - GENERAL: PAINLEVEL_OUTOF10: 0-NO PAIN

## 2025-04-29 ENCOUNTER — TELEPHONE (OUTPATIENT)
Dept: PRIMARY CARE | Facility: CLINIC | Age: 85
End: 2025-04-29
Payer: MEDICARE

## 2025-04-30 RX ORDER — PROCHLORPERAZINE EDISYLATE 5 MG/ML
10 INJECTION INTRAMUSCULAR; INTRAVENOUS EVERY 6 HOURS PRN
OUTPATIENT
Start: 2025-06-23

## 2025-04-30 RX ORDER — PROCHLORPERAZINE MALEATE 10 MG
10 TABLET ORAL EVERY 6 HOURS PRN
OUTPATIENT
Start: 2025-06-09

## 2025-04-30 RX ORDER — ALBUTEROL SULFATE 0.83 MG/ML
3 SOLUTION RESPIRATORY (INHALATION) AS NEEDED
OUTPATIENT
Start: 2025-06-09

## 2025-04-30 RX ORDER — DEXAMETHASONE 4 MG/1
8 TABLET ORAL ONCE
OUTPATIENT
Start: 2025-06-09 | End: 2025-06-09

## 2025-04-30 RX ORDER — ALBUTEROL SULFATE 0.83 MG/ML
3 SOLUTION RESPIRATORY (INHALATION) AS NEEDED
OUTPATIENT
Start: 2025-06-23

## 2025-04-30 RX ORDER — DIPHENHYDRAMINE HYDROCHLORIDE 50 MG/ML
50 INJECTION, SOLUTION INTRAMUSCULAR; INTRAVENOUS AS NEEDED
OUTPATIENT
Start: 2025-06-09

## 2025-04-30 RX ORDER — ONDANSETRON HYDROCHLORIDE 8 MG/1
12 TABLET, FILM COATED ORAL ONCE
OUTPATIENT
Start: 2025-06-23

## 2025-04-30 RX ORDER — PROCHLORPERAZINE EDISYLATE 5 MG/ML
10 INJECTION INTRAMUSCULAR; INTRAVENOUS EVERY 6 HOURS PRN
OUTPATIENT
Start: 2025-06-09

## 2025-04-30 RX ORDER — ONDANSETRON HYDROCHLORIDE 8 MG/1
12 TABLET, FILM COATED ORAL ONCE
OUTPATIENT
Start: 2025-06-09

## 2025-04-30 RX ORDER — PROCHLORPERAZINE MALEATE 10 MG
10 TABLET ORAL EVERY 6 HOURS PRN
OUTPATIENT
Start: 2025-06-23

## 2025-04-30 RX ORDER — FAMOTIDINE 10 MG/ML
20 INJECTION, SOLUTION INTRAVENOUS ONCE AS NEEDED
OUTPATIENT
Start: 2025-06-23

## 2025-04-30 RX ORDER — DIPHENHYDRAMINE HYDROCHLORIDE 50 MG/ML
50 INJECTION, SOLUTION INTRAMUSCULAR; INTRAVENOUS AS NEEDED
OUTPATIENT
Start: 2025-06-23

## 2025-04-30 RX ORDER — FAMOTIDINE 10 MG/ML
20 INJECTION, SOLUTION INTRAVENOUS ONCE AS NEEDED
OUTPATIENT
Start: 2025-06-09

## 2025-04-30 RX ORDER — DEXAMETHASONE 4 MG/1
8 TABLET ORAL ONCE
OUTPATIENT
Start: 2025-06-23 | End: 2025-06-23

## 2025-04-30 RX ORDER — EPINEPHRINE 0.3 MG/.3ML
0.3 INJECTION SUBCUTANEOUS EVERY 5 MIN PRN
OUTPATIENT
Start: 2025-06-23

## 2025-04-30 RX ORDER — EPINEPHRINE 0.3 MG/.3ML
0.3 INJECTION SUBCUTANEOUS EVERY 5 MIN PRN
OUTPATIENT
Start: 2025-06-09

## 2025-05-05 DIAGNOSIS — C90.00 MULTIPLE MYELOMA NOT HAVING ACHIEVED REMISSION (MULTI): ICD-10-CM

## 2025-05-05 RX ORDER — ACYCLOVIR 400 MG/1
400 TABLET ORAL 2 TIMES DAILY
Qty: 180 TABLET | Refills: 0 | Status: SHIPPED | OUTPATIENT
Start: 2025-05-05 | End: 2025-05-05

## 2025-05-05 RX ORDER — ACYCLOVIR 400 MG/1
400 TABLET ORAL 2 TIMES DAILY
Qty: 180 TABLET | Refills: 3 | Status: SHIPPED | OUTPATIENT
Start: 2025-05-05 | End: 2025-05-08 | Stop reason: SDUPTHER

## 2025-05-05 NOTE — TELEPHONE ENCOUNTER
PT REQUESTING REFILL ACYCLOVIR 400 MG SENT TO [facility] UNSURE IF THIS IS SOMETHING JEY HAS PRESCRIBED SHE IS CALLING HER ONCOLOGIST TO SEE IF HE PRESCRIBED IT PT DECLINED TO MAKE APPT TO BE SEEN FOR THIS

## 2025-05-08 ENCOUNTER — OFFICE VISIT (OUTPATIENT)
Dept: PRIMARY CARE | Facility: CLINIC | Age: 85
End: 2025-05-08
Payer: MEDICARE

## 2025-05-08 VITALS
HEIGHT: 62 IN | HEART RATE: 93 BPM | RESPIRATION RATE: 18 BRPM | DIASTOLIC BLOOD PRESSURE: 68 MMHG | TEMPERATURE: 98.1 F | BODY MASS INDEX: 29.81 KG/M2 | WEIGHT: 162 LBS | OXYGEN SATURATION: 97 % | SYSTOLIC BLOOD PRESSURE: 142 MMHG

## 2025-05-08 DIAGNOSIS — C79.51 SECONDARY MALIGNANT NEOPLASM OF BONE (MULTI): ICD-10-CM

## 2025-05-08 DIAGNOSIS — Z00.00 ENCOUNTER FOR MEDICARE ANNUAL WELLNESS EXAM: Primary | ICD-10-CM

## 2025-05-08 DIAGNOSIS — C90.00 MULTIPLE MYELOMA NOT HAVING ACHIEVED REMISSION (MULTI): ICD-10-CM

## 2025-05-08 DIAGNOSIS — N18.31 CKD STAGE 3A, GFR 45-59 ML/MIN (MULTI): ICD-10-CM

## 2025-05-08 DIAGNOSIS — I10 PRIMARY HYPERTENSION: ICD-10-CM

## 2025-05-08 PROBLEM — H61.21 IMPACTED CERUMEN, RIGHT EAR: Status: RESOLVED | Noted: 2024-05-21 | Resolved: 2025-05-08

## 2025-05-08 PROCEDURE — 3078F DIAST BP <80 MM HG: CPT | Performed by: NURSE PRACTITIONER

## 2025-05-08 PROCEDURE — 1036F TOBACCO NON-USER: CPT | Performed by: NURSE PRACTITIONER

## 2025-05-08 PROCEDURE — 99397 PER PM REEVAL EST PAT 65+ YR: CPT | Performed by: NURSE PRACTITIONER

## 2025-05-08 PROCEDURE — 1160F RVW MEDS BY RX/DR IN RCRD: CPT | Performed by: NURSE PRACTITIONER

## 2025-05-08 PROCEDURE — 3077F SYST BP >= 140 MM HG: CPT | Performed by: NURSE PRACTITIONER

## 2025-05-08 PROCEDURE — 1126F AMNT PAIN NOTED NONE PRSNT: CPT | Performed by: NURSE PRACTITIONER

## 2025-05-08 PROCEDURE — 99215 OFFICE O/P EST HI 40 MIN: CPT | Mod: 25 | Performed by: NURSE PRACTITIONER

## 2025-05-08 PROCEDURE — 1159F MED LIST DOCD IN RCRD: CPT | Performed by: NURSE PRACTITIONER

## 2025-05-08 PROCEDURE — G0439 PPPS, SUBSEQ VISIT: HCPCS | Performed by: NURSE PRACTITIONER

## 2025-05-08 RX ORDER — ATORVASTATIN CALCIUM 20 MG/1
20 TABLET, FILM COATED ORAL DAILY
Qty: 90 TABLET | Refills: 3 | Status: SHIPPED | OUTPATIENT
Start: 2025-05-08 | End: 2026-05-08

## 2025-05-08 RX ORDER — AMLODIPINE BESYLATE 5 MG/1
5 TABLET ORAL DAILY
Qty: 30 TABLET | Refills: 1 | Status: SHIPPED | OUTPATIENT
Start: 2025-05-08 | End: 2025-07-07

## 2025-05-08 RX ORDER — ACYCLOVIR 400 MG/1
400 TABLET ORAL 2 TIMES DAILY
Qty: 180 TABLET | Refills: 1 | Status: SHIPPED | OUTPATIENT
Start: 2025-05-08

## 2025-05-08 ASSESSMENT — ENCOUNTER SYMPTOMS
DEPRESSION: 0
OCCASIONAL FEELINGS OF UNSTEADINESS: 0
LOSS OF SENSATION IN FEET: 0

## 2025-05-08 ASSESSMENT — PAIN SCALES - GENERAL: PAINLEVEL_OUTOF10: 0-NO PAIN

## 2025-05-08 NOTE — PATIENT INSTRUCTIONS

## 2025-05-08 NOTE — PROGRESS NOTES
"  History Of Present Illness  Sil R Pool \"Sherie\" is a 84 y.o. female presenting for a wellness exam.    Preventative screenings:  None due    Other medical issues/concerns:   In treatment for MM     Patient Care Team:  GLENN Kaufman as PCP - General (Family Medicine)  GLENN Medrano as PCP - [employer] Advantage PCP  Carmencita Marshall MD as Consulting Physician (Hematology and Oncology)  Shadia Washburn MD as Consulting Physician (Hematology and Oncology)  Sophia Vann MD as Referring Physician (Dermatology)  Paul Magallanes MD as Surgeon (Surgical Oncology)     General health: Good   Exercise: Balanced  Caffeine: one cup in the morning    Diet: Balanced    Functional ability:   No cognitive impairment observed.    No cognitive impairment reported by patient or family.    ADL screening:  Hearing without difficulties.  Independent in bathing, dressing, walking in home    IADL screening:  Independent in managing finances, grocery shopping, taking medications, doing housework    Home Safety:   Falls Home safety risk factors: No loose rugs, poor lighting, stairs without rails, bathroom with no grab bars    Depression screening:  Patient Health Questionnaire-2 Score: 0     CARDIAC SCREENING   The ASCVD Risk score (Edwin DK, et al., 2019) failed to calculate for the following reasons:    The 2019 ASCVD risk score is only valid for ages 40 to 79       Past Medical History  Patient Active Problem List    Diagnosis Date Noted    Secondary malignant neoplasm of bone (Multi) 12/10/2024    Encounter for antineoplastic chemotherapy 11/25/2024    Hypogammaglobulinemia (Multi) 09/20/2024    CKD stage 3a, GFR 45-59 ml/min (Multi) 05/21/2024    Hyperglycemia 05/21/2024    Skin lesion of right arm 03/22/2024    Other chronic pain 03/22/2024    Back pain 03/22/2024    Other constipation 11/28/2023    At risk for osteopenia 10/05/2023    Immunosuppressed due to chemotherapy 10/05/2023    " Thyroid nodule 10/05/2023    Bilateral carpal tunnel syndrome 09/26/2023    Chronic right SI joint pain 09/26/2023    Hearing difficulty of both ears 09/26/2023    Hyperlipidemia, unspecified 09/26/2023    Lesion of pelvic bone 09/26/2023    Lumbar spondylosis 09/26/2023    Neuropathy 09/26/2023    Osteoarthritis of left knee 09/26/2023    Paresthesia of skin 09/26/2023    Polymyalgia rheumatica (Multi) 09/26/2023    Vitamin D deficiency 09/26/2023    Multiple myeloma not having achieved remission (Multi) 09/13/2023    History of SCC (squamous cell carcinoma) of skin 03/21/2014    Hx of basal cell carcinoma 03/21/2014        Medications  Medications and current supplements were reviewed and recorded.   Does the patient use opiate medications:  NO . If yes, do they take medication appropriately: NA.  Medications ordered prior to the current encounter[1]     Surgical History  She has a past surgical history that includes CT guided percutaneous biopsy bone deep (01/06/2023) and Other surgical history.     Social History  She reports that she has never smoked. She has never been exposed to tobacco smoke. She has never used smokeless tobacco. She reports that she does not drink alcohol and does not use drugs.    Family History  Family History[2]     Allergies  Lobster    Immunizations  Immunization History   Administered Date(s) Administered    Influenza, trivalent, adjuvanted 10/09/2024    Moderna SARS-CoV-2 Vaccination 03/10/2021, 12/27/2021    Pneumococcal polysaccharide vaccine, 23-valent, age 2 years and older (PNEUMOVAX 23) 05/14/2009    Tdap vaccine, age 7 year and older (BOOSTRIX, ADACEL) 03/24/2011, 06/11/2024    Tetanus Toxoid, Unspecified 01/01/1988    Tetanus toxoid, adsorbed 01/01/1988    Zoster, live 12/28/2012        ROS  Negative, except as discussed in HPI     Vitals  /68 (BP Location: Left arm, Patient Position: Sitting, BP Cuff Size: Small adult)   Pulse 93   Temp 36.7 °C (98.1 °F)   Resp 18   " Ht 1.562 m (5' 1.5\")   Wt 73.5 kg (162 lb)   SpO2 97%   BMI 30.11 kg/m²      Physical Exam    Relevant Results  Lab Results   Component Value Date    WBC 4.3 (L) 04/28/2025    WBC 3.4 (L) 04/14/2025    HGB 9.9 (L) 04/28/2025    HGB 9.9 (L) 04/14/2025    HCT 30.1 (L) 04/28/2025    HCT 31.3 (L) 04/14/2025     (H) 04/28/2025     (H) 04/14/2025     04/28/2025     04/14/2025     Lab Results   Component Value Date     04/28/2025     04/14/2025    K 4.1 04/28/2025    K 4.5 04/14/2025     (H) 04/28/2025     (H) 04/14/2025    CO2 26 04/28/2025    CO2 28 04/14/2025    BUN 27 (H) 04/28/2025    BUN 23 04/14/2025    CREATININE 1.09 (H) 04/28/2025    CREATININE 0.97 04/14/2025    CALCIUM 9.5 04/28/2025    CALCIUM 9.6 04/14/2025    PROT 5.9 (L) 04/28/2025    PROT 6.3 (L) 04/14/2025    PROT 5.9 (L) 04/14/2025    BILITOT 0.3 04/28/2025    BILITOT 0.3 04/14/2025    ALKPHOS 46 04/28/2025    ALKPHOS 49 04/14/2025    ALT 7 04/28/2025    ALT 8 04/14/2025    AST 13 04/28/2025    AST 14 04/14/2025    GLUCOSE 126 (H) 04/28/2025    GLUCOSE 111 (H) 04/14/2025     Lab Results   Component Value Date    HGBA1C 5.7 (H) 06/10/2024     Lab Results   Component Value Date    TSH 1.76 03/25/2024    FREET4 1.10 03/25/2024      Lab Results   Component Value Date    CHOL 120 (L) 10/06/2022    TRIG 71 10/06/2022    HDL 37 (L) 10/06/2022           Assessment/Plan   Sil \"Sherie\" was seen today for annual exam.  Diagnoses and all orders for this visit:  Encounter for Medicare annual wellness exam (Primary)  Secondary malignant neoplasm of bone (Multi)  CKD stage 3a, GFR 45-59 ml/min (Multi)  Multiple myeloma not having achieved remission (Multi)  -     acyclovir (Zovirax) 400 mg tablet; Take 1 tablet (400 mg) by mouth 2 times a day.  -     atorvastatin (Lipitor) 20 mg tablet; Take 1 tablet (20 mg) by mouth once daily.  Primary hypertension  -     amLODIPine (Norvasc) 5 mg tablet; Take 1 tablet (5 " mg) by mouth once daily.  -     Lipid Panel; Future  -     Lipid Panel       Medications Discontinued During This Encounter   Medication Reason    atorvastatin (Lipitor) 20 mg tablet Reorder    amLODIPine (Norvasc) 5 mg tablet Reorder    acyclovir (Zovirax) 400 mg tablet Reorder        Counseling:   Medication education:   -Education:  The patient is counseled regarding potential side-effects of any and all new medications  -Understanding:  Patient expressed understanding of information discussed today  -Adherence:  No barriers to adherence identified    Advanced care planning: Discussed including healthcare power of  as well as specific end-of-life choices and/or directives was discussed with the patient , voluntarily, and advance care decision was documented in the patient's record.     Final treatment plan is a result of shared decision making with patient.         Terra Baires, CHANTEL-CNP         Tobacco/Alcohol/Opioid use, as well as Illicit Drug Use was screened for/reviewed and documented in Social Documentation section of the chart and medication list as appropriate    Depression Screening  Depression screening completed using the PHQ-2 questions with results documented in the chart/encounter (15m).  (See Rooming Screening section for documentation, or note for additional information)    Cardiac Risk Assessment  Cardiovascular risk was discussed and, if needed, lifestyle modifications recommended, including nutritional choices, exercise, and elimination of habits contributing to risk.   We agreed on a plan to reduce the current cardiovascular risk based on above discussion as needed.     Aspirin use/disuse was discussed and documented in the Problem List of the medical record (under Cardiac Risk Counseling) after reviewing the updated guidelines below:  Consider low dose Aspirin ( mg) use if the benefit for cardiovascular disease prevention outweighs risk for bleeding complications.   In  general, low dose ASA should be considered:  In patients WITHOUT prior MI/stroke/PAD (primary prevention):   a. Age <60: Use if 10-year cardiovascular disease risk >20%, with discussion of risks and benefits with patient  b. Age 60-<70: Use if 10-year cardiovascular disease risk >20% and low bleeding (e.g., gastrointestinal) risk, with discussion of risks and benefits with patient  c. Age >=70: Do not use    In patients WITH prior MI/stroke/PAD (secondary prevention):   Generally use unless extremely high bleeding (e.g., gastrointenstinal) risk, with discussion of risks and benefits with patient    Advance Directives Discussion  Advanced Care Planning (including a Living Will, Healthcare POA, as well as specific end of life choices and/or directives), was discussed with the patient and/or surrogate, voluntarily, and details of that discussion documented in the Problem List (under Advanced Directives Discussion) of the medical record.    (~16 min spent discussing above)     During the course of the visit the patient was educated and counseled about age appropriate screening and preventive services. Completed preventive screenings were documented in the chart and orders were placed for outstanding screenings/procedures as documented in the Assessment and Plan.    Patient Instructions (the written plan) was given to the patient at check out.Patient was identified as a fall risk. Risk prevention instructions provided.       [1]   Current Outpatient Medications   Medication Sig Dispense Refill    acetaminophen (TylenoL) 325 mg tablet Take 2 tablets (650 mg) by mouth every 4 hours if needed.      aspirin 81 mg EC tablet Take 1 tablet (81 mg) by mouth once daily.      cholecalciferol, vitamin D3, 25 mcg (1,000 unit) tablet,chewable Chew 2 tablets (50 mcg) once daily.      ondansetron (Zofran) 8 mg tablet Take 1 tablet (8 mg) by mouth every 8 hours if needed for nausea or vomiting. 30 tablet 5    prochlorperazine  (Compazine) 10 mg tablet Take 1 tablet (10 mg) by mouth every 6 hours if needed for nausea or vomiting. 30 tablet 5    acyclovir (Zovirax) 400 mg tablet Take 1 tablet (400 mg) by mouth 2 times a day. 180 tablet 1    amLODIPine (Norvasc) 5 mg tablet Take 1 tablet (5 mg) by mouth once daily. 30 tablet 1    atorvastatin (Lipitor) 20 mg tablet Take 1 tablet (20 mg) by mouth once daily. 90 tablet 3    fluorouracil (Efudex) 5 % cream cream Apply to affected areas as directed (following handout provided) twice daily for 2 weeks. (Patient not taking: Reported on 5/8/2025) 40 g 0     No current facility-administered medications for this visit.   [2]   Family History  Problem Relation Name Age of Onset    Alzheimer's disease Mother      Colon cancer Father      Rashes / Skin problems Sister      Rashes / Skin problems Brother

## 2025-05-10 ASSESSMENT — ENCOUNTER SYMPTOMS
ADENOPATHY: 0
FEVER: 0
DYSURIA: 0
FATIGUE: 0
BRUISES/BLEEDS EASILY: 1
RESPIRATORY NEGATIVE: 1
DIAPHORESIS: 0
CHILLS: 0
DIARRHEA: 1
NEUROLOGICAL NEGATIVE: 1
CARDIOVASCULAR NEGATIVE: 1
APPETITE CHANGE: 0
UNEXPECTED WEIGHT CHANGE: 0
HEMATURIA: 0

## 2025-05-10 NOTE — PROGRESS NOTES
"Patient ID: Sil West \"Ines" is a 84 y.o. female.    Treatment:   Oncology History Overview Note   I. Diagnosis (1/2023):  IgG Lambda Multiple Myeloma  Presenting symptoms: Urinary incontinence and diffuse bony pain  II. Workup:  Bone Biopsy (1/6/23):  Plasma cells neoplasm  Repeat Bone Marrow Biopsy (1/26/23):  Hypercellular marrow, 80-90% plasma cells, lambda-restricted  FISH: 1q gain (high risk), trisomy 3  MRI Spine (12/18/22):  Osseous metastases, involvement in mid-cervical, mid-thoracic, and lumbar vertebral bodies, as well as the right iliac bone  PET Scan (1/23):  FDG avidity in right iliac bone, right sacral ala, left posterior 6th rib, and appendicular skeleton  Serum and Urine Studies (1/19-1/28/23):  FKLC 1.57, FLLC 2947.7, K/L ratio 0  IgG 537, IgA 221, IgM 19, b2M 23.4, Hgb 5.7  M protein IgA lambda 0.7 g/dL   U/L  Creatinine: 2.33 (peaked at 3.15)  UPEP: Monoclonal lambda light chain at 386.6 mg/24H  Hepatitis B and C negative  III. Treatment:  Radiation (2/2/23):  Right hip + T6-T8 x 5 fractions (total 2000 cGy)  Induction (12/24/22 - 2/16/23):  Bortezomib and dexamethasone + daratumumab  C1 (1/28/23): Bortezomib 1, 4, 8, 11 + daratumumab x 2 doses  C2 (2/16/23): Weekly dosing of daratumumab and bortezomib (1, 8, 15), with lenalidomide planned when renal function allows  Response Evaluation (5/25/23):  CR with M protein 0.1 (IgG kappa c/w roscoe) and free lyte chain 1.37  C8 Roscoe RVD (Completed 7/6/23):  Transitioned with a VGPR vs CR, ongoing multifocal bone pain  IV. Response Evaluation (7/13/23):  Marked improvement in bony lesions  Pathological fractures in right pelvis and ribs, possible infiltrate in the right lower lobe base, and hypodensity in the right thyroid gland  V. Treatment Adjustment (Cycle 9 and forward): 10/2023  Velcade omitted  Transitioned to a 4-week schedule with Roscoe (day 1) and Revlimid 15 mg days 1-21/28 days     Multiple myeloma not having achieved remission " (Multi)   9/13/2023 Initial Diagnosis    Multiple myeloma not having achieved remission (CMS/HCC)     10/2/2023 - 7/8/2024 Chemotherapy    Daratumumab, Pomalidomide and dexamethasone (oral meds managed outside treatment plan) 28 Day Cycles - Maintenance     8/5/2024 -  Chemotherapy    Carfilzomib and Cyclophosphamide (Weekly) / Dexamethasone, 28 Day Cycles (Custom)       Past Medical History:  HTN, DLP  pre skin cancers,   diveriticulitis   peripheral artery disease not amenable to surgery  Thyroid nodules under observation  .  Surgical History:  squamous cell carcinoma removed to left hand and right arm 6/2024 2/21/25 Mohs surgery done for 4 areas of SCC on 5 fluorouracil 5%  Past Surgical History:   Procedure Laterality Date    CT GUIDED PERCUTANEOUS BIOPSY BONE DEEP  01/06/2023    CT GUIDED PERCUTANEOUS BIOPSY BONE DEEP 1/6/2023 [facility]    OTHER SURGICAL HISTORY      THYROID BIOPSY      Family History:     Family History   Problem Relation Name Age of Onset    Alzheimer's disease Mother      Colon cancer Father      Rashes / Skin problems Sister      Rashes / Skin problems Brother       Social History:  ,  passed away on 10/24/23.  3 grown children (1 daughter, 2 sons). 2 grandchildren.  Has 6 cats.  Enjoyed skiing.  Occupation: retired .  Social History     Tobacco Use    Smoking status: Never     Passive exposure: Never    Smokeless tobacco: Never   Vaping Use    Vaping status: Never Used   Substance Use Topics    Alcohol use: Never    Drug use: Never      -------------------------------------------------------------------------------------------------------  Subjective       Sil West is a 84 year old female with IgG lambda multiple myeloma.  She recently had an increase to her lambda free light chains.  PET imaging done 7/17/24 showed an new soft tissue uptake in right 11th rib and interestingly patient has pain there as well.  Transitioned to carfilzomib,  cyclophophamide, dex, cycle 1 on 8/5/24     Sil presents to the clinic today (5/12/25) unaccompanied for follow up evaluation and is scheduled to receive C11D1 carfilzomib and dexamethasone today.  Starting at C10 she is only receiving carfilzomib every 2 weeks.  Due to restlessness. and issues sleeping after receiving steroids with treatment dose of dexamethasone was decreased from 20 mg to 8 mg.  Receiving IVIG every month.  Receiving zometa restarted 3/17/25 after completion of dental work, scheduled for every 3 months.      Reports no changes to health.  Energy level is doing good.  Has been staying active with yard work and gardening.  Going to [employer].  Appetite is good, is gaining weight slowly.  On Asprin 81 mg daily for DVT prophylaxis.  Bruises easily.  Right hip and back is no longer bothering her.  Dental work completed.  Following closely with dermatology.  Continues to have intermittent loose stools, but feels is food related.  Taking imodium as needed.      Review of Systems   Constitutional:  Negative for appetite change, chills, diaphoresis, fatigue, fever and unexpected weight change.   Respiratory: Negative.     Cardiovascular: Negative.    Gastrointestinal:  Positive for diarrhea.   Genitourinary:  Negative for dysuria and hematuria.    Musculoskeletal: Negative.    Skin: Negative.         Had multiple spots of SCC removed by dermatology.   Neurological: Negative.    Hematological:  Negative for adenopathy. Bruises/bleeds easily (bruises easily).   All other systems reviewed and are negative.  -------------------------------------------------------------------------------------------------------  Objective   BSA: 1.8 meters squared  /67   Pulse 83   Temp 37.1 °C (98.8 °F)   Resp 17   Wt 74.4 kg (164 lb 0.4 oz)   SpO2 100%   BMI 30.49 kg/m²     Physical Exam  Vitals reviewed.   Constitutional:       Appearance: Normal appearance. She is well-developed and normal weight.   HENT:       Head: Normocephalic and atraumatic.      Nose: Nose normal.   Eyes:      General: No scleral icterus.     Extraocular Movements: Extraocular movements intact.      Conjunctiva/sclera: Conjunctivae normal.      Pupils: Pupils are equal, round, and reactive to light.   Cardiovascular:      Rate and Rhythm: Normal rate and regular rhythm.      Pulses: Normal pulses.      Heart sounds: Normal heart sounds.   Pulmonary:      Effort: Pulmonary effort is normal.      Breath sounds: Normal breath sounds.   Abdominal:      General: Abdomen is flat. Bowel sounds are normal. There is no distension.      Palpations: Abdomen is soft. There is no mass.      Tenderness: There is no abdominal tenderness.   Musculoskeletal:         General: Normal range of motion.      Right lower leg: Edema (non-pitting) present.      Left lower leg: Edema (non-pitting) present.      Comments: No spine tenderness   Lymphadenopathy:      Comments: No lymphadenopathy   Skin:     General: Skin is warm and dry.   Neurological:      General: No focal deficit present.      Mental Status: She is alert and oriented to person, place, and time.      Comments: Normal strength and gait   Psychiatric:         Mood and Affect: Mood normal.         Behavior: Behavior normal.         Thought Content: Thought content normal.     Performance Status:  Symptomatic; fully ambulatory  -------------------------------------------------------------------------------------------------------  Assessment and Plan:    Multiple myeloma not having achieved remission (CMS/HCC)  IgG lambda MM    - Currently on daratumamab/Lenalidomide. Continued uptrending of free lambda light chain. M-protein still 0.1g IgG kappa, likely represents daratumumab. Will add weekly dex 10 mg to see if this could deepen her response.     5/13/24:  Continues to have increasing free lambda light chain, M-protein remains at 0.1.  Receiving C20 daratumumab today.  Discussed that free lyte chain  continues to increase significantly and suggested switching revlimid to pomalidomide  4 mg 21/28 days since no other CRAB criteria,  -Taking aspirin 81 mg daily for VTE prophylaxis during treatment with pomalidomide      PET scan (7/17/24)   IMPRESSION:  1. Redemonstration of an FDG avid right posterolateral 11th rib  fracture now with evidence of an underlying soft tissue lesion.  2. Previously described hypermetabolic osseous lesions in the right  iliac, right superior pubic ramus, and right aspect of the pubic  symphysis have continued to decrease in metabolic activity with  persistent FDG avidity likely representing continued treatment  response with residual viable disease, attention on follow-up imaging.  3. Healing right inferior pubic ramus fracture with decreased extent  of hypermetabolic activity.    7/18/24:  Laboratory results show major increase in free lyte chains to 62 mg/dL up from 18 in March 2024, only sx is right rib pain where PET from yesterday shows a new lesion, no other new lesions seen,      Bone marrow biopsy results from 7/22/24 showing limited bone marrow (20% cellularity) with maturing trilineage hematopoiesis, minimal involvement by lambda-restricted plasma cells by flow cytometry. ECHO 8/2/2024 LVEF 60-65%    Started cycle 1 carfilzomib, cyclophosphamide, dexamethasone 8//24 5/12/25:  CBC results stable from today.  Increase to lambda lyte chains to 23.95 (5/12/25) up from 6.76 (4/14/25)-see below.  SPEP normal (4/14/25), SPEP in process from today.  No concerning findings on physical examination.  Scheduled to receive C11D1 of carfilzomib and dexamethasone.  Dexamethasone 8 mg on day 1 and 15 with treatment.  With cycle 10, cyclophosphamide was omitted and carfilzomib maintenance is transitioned to every other week.  Will discuss increased lambda free light chain results with Dr. Marshall.  Plan for follow up on 6/9/25.     Ig [place] Free Light Chain   Date Value Ref Range Status    05/12/2025 0.38 0.33 - 1.94 mg/dL Final   04/14/2025 0.32 (L) 0.33 - 1.94 mg/dL Final   03/17/2025 0.20 (L) 0.33 - 1.94 mg/dL Final   02/17/2025 0.41 0.33 - 1.94 mg/dL Final   01/20/2025 0.59 0.33 - 1.94 mg/dL Final     Ig Lambda Free Light Chain   Date Value Ref Range Status   05/12/2025 23.95 (H) 0.57 - 2.63 mg/dL Final   04/14/2025 6.76 (H) 0.57 - 2.63 mg/dL Final   03/17/2025 5.52 (H) 0.57 - 2.63 mg/dL Final   02/17/2025 4.62 (H) 0.57 - 2.63 mg/dL Final   01/20/2025 3.56 (H) 0.57 - 2.63 mg/dL Final     Kappa/Lambda Ratio   Date Value Ref Range Status   05/12/2025 0.02 (L) 0.26 - 1.65 Final   04/14/2025 0.05 (L) 0.26 - 1.65 Final   03/17/2025 0.04 (L) 0.26 - 1.65 Final   02/17/2025 0.09 (L) 0.26 - 1.65 Final   01/20/2025 0.17 (L) 0.26 - 1.65 Final     M-PROTEIN 1   Date Value Ref Range Status   10/28/2024 0.1 (H)   g/dL Final   10/14/2024 0.1 (H)   g/dL Final   09/03/2024 0.1 (H)   g/dL Final   08/12/2024 0.1 (H)   g/dL Final   07/22/2024 0.1 (H)   g/dL Final      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  IgG level 565 (4/28/25).  Last received IVIG on 4/28/25.  Continue monthly at [facility], next due 5/27/25.      Dysuria: urinalysis bland, suspect needs urologic evaluation for incontinence    Bone Health:  - Continue zoledronic acid 3.3 mg (renal dosing) every 3 months, received last on 9/3/24.    - Continue vitamin D supplement -- 2000 units PO daily.  Started zometa 3/30/23 will complete 3/2025.  12/23/24:  Sil reports she had a crown fall out and may need a root canal.  Placed zometa on hold until dental work has been completed.    4/14/25:  Dental work completed.  Resumed zometa 3/17/25, continue every 3 months.        Thyroid nodule  - repeat thyroid US on 2/19/24, 2 nodules noted with FNA recommended.  Thyroid biopsy 4/29/24 showing rare atypical cells from FNA of right mid lobe.   Repeat biopsy was nondiagnostic.  Dr. Magallanes recommended thyroid surgery vs continued observation.  Sil requested to having US monitoring for now.  Last visit with Dr. Magallanes on 9/9/24. US 2/24/25 with 30 mm mildly suspicious nodule to right lobe without significant change from prior imaging.  Plan to have US in 1 year with follow up.       Right lower back pain  - seems musculoskeletal  - MRI lumbar spine (3/4/24): degenerative changes, no evidence of progressive disease or fracture.    - MRI thoracic spine (3/12/24): degenerative changes, redemonstration of multifocal marrow signal abnormalities compatible with the given history of multiple myeloma, osseous expansion and epidural extension of neoplasm previously seen have improved, decrease in size and enhancement of previously seen lesions.    5/12/25:  Reports no further back pain.      Health Care Maintenance:  Vaccines:    Received influenza- vaccine 10/9/24.  Advised to obtain updated covid, RSV, shingrix, and prevnar 20 vaccines at local pharmacy.    RTC:   Continue monthly IVIG at [place] and zometa every 3 months.  6/9/25:  Follow up visit with provider and C12 carfilzomib.    Cyclophosphamide is omitted at C10 and carfilzomib switches to qo week.   -----------------------------------------------------------------------------------------  CHANTEL Medrano-PAUL

## 2025-05-12 ENCOUNTER — APPOINTMENT (OUTPATIENT)
Dept: HEMATOLOGY/ONCOLOGY | Facility: HOSPITAL | Age: 85
End: 2025-05-12
Payer: MEDICARE

## 2025-05-12 ENCOUNTER — OFFICE VISIT (OUTPATIENT)
Dept: HEMATOLOGY/ONCOLOGY | Facility: HOSPITAL | Age: 85
End: 2025-05-12
Payer: MEDICARE

## 2025-05-12 ENCOUNTER — LAB (OUTPATIENT)
Dept: LAB | Facility: HOSPITAL | Age: 85
End: 2025-05-12
Payer: MEDICARE

## 2025-05-12 ENCOUNTER — INFUSION (OUTPATIENT)
Dept: HEMATOLOGY/ONCOLOGY | Facility: HOSPITAL | Age: 85
End: 2025-05-12
Payer: MEDICARE

## 2025-05-12 VITALS
HEART RATE: 83 BPM | WEIGHT: 164.02 LBS | RESPIRATION RATE: 17 BRPM | BODY MASS INDEX: 30.49 KG/M2 | SYSTOLIC BLOOD PRESSURE: 141 MMHG | TEMPERATURE: 98.8 F | DIASTOLIC BLOOD PRESSURE: 67 MMHG | OXYGEN SATURATION: 100 %

## 2025-05-12 DIAGNOSIS — Z79.69 IMMUNOSUPPRESSED DUE TO CHEMOTHERAPY: ICD-10-CM

## 2025-05-12 DIAGNOSIS — C90.00 MULTIPLE MYELOMA NOT HAVING ACHIEVED REMISSION (MULTI): ICD-10-CM

## 2025-05-12 DIAGNOSIS — Z51.11 ENCOUNTER FOR ANTINEOPLASTIC CHEMOTHERAPY: ICD-10-CM

## 2025-05-12 DIAGNOSIS — Z85.828 HX OF BASAL CELL CARCINOMA: ICD-10-CM

## 2025-05-12 DIAGNOSIS — T45.1X5A IMMUNOSUPPRESSED DUE TO CHEMOTHERAPY: ICD-10-CM

## 2025-05-12 DIAGNOSIS — C90.00 MULTIPLE MYELOMA NOT HAVING ACHIEVED REMISSION (MULTI): Primary | ICD-10-CM

## 2025-05-12 DIAGNOSIS — Z85.828 HISTORY OF SCC (SQUAMOUS CELL CARCINOMA) OF SKIN: ICD-10-CM

## 2025-05-12 DIAGNOSIS — D80.1 HYPOGAMMAGLOBULINEMIA (MULTI): ICD-10-CM

## 2025-05-12 DIAGNOSIS — D84.821 IMMUNOSUPPRESSED DUE TO CHEMOTHERAPY: ICD-10-CM

## 2025-05-12 LAB
ALBUMIN SERPL BCP-MCNC: 4 G/DL (ref 3.4–5)
ALP SERPL-CCNC: 48 U/L (ref 33–136)
ALT SERPL W P-5'-P-CCNC: 7 U/L (ref 7–45)
ANION GAP SERPL CALC-SCNC: 10 MMOL/L (ref 10–20)
AST SERPL W P-5'-P-CCNC: 13 U/L (ref 9–39)
BASOPHILS # BLD AUTO: 0.03 X10*3/UL (ref 0–0.1)
BASOPHILS NFR BLD AUTO: 0.8 %
BILIRUB SERPL-MCNC: 0.3 MG/DL (ref 0–1.2)
BUN SERPL-MCNC: 26 MG/DL (ref 6–23)
CALCIUM SERPL-MCNC: 9.6 MG/DL (ref 8.6–10.3)
CHLORIDE SERPL-SCNC: 107 MMOL/L (ref 98–107)
CO2 SERPL-SCNC: 29 MMOL/L (ref 21–32)
CREAT SERPL-MCNC: 1.02 MG/DL (ref 0.5–1.05)
EGFRCR SERPLBLD CKD-EPI 2021: 55 ML/MIN/1.73M*2
EOSINOPHIL # BLD AUTO: 0.04 X10*3/UL (ref 0–0.4)
EOSINOPHIL NFR BLD AUTO: 1 %
ERYTHROCYTE [DISTWIDTH] IN BLOOD BY AUTOMATED COUNT: 12.5 % (ref 11.5–14.5)
GLUCOSE SERPL-MCNC: 78 MG/DL (ref 74–99)
HCT VFR BLD AUTO: 32.5 % (ref 36–46)
HGB BLD-MCNC: 10.6 G/DL (ref 12–16)
IGA SERPL-MCNC: <7 MG/DL (ref 70–400)
IGG SERPL-MCNC: 761 MG/DL (ref 700–1600)
IGM SERPL-MCNC: 5 MG/DL (ref 40–230)
IMM GRANULOCYTES # BLD AUTO: 0.02 X10*3/UL (ref 0–0.5)
IMM GRANULOCYTES NFR BLD AUTO: 0.5 % (ref 0–0.9)
LYMPHOCYTES # BLD AUTO: 0.36 X10*3/UL (ref 0.8–3)
LYMPHOCYTES NFR BLD AUTO: 9 %
MCH RBC QN AUTO: 34.8 PG (ref 26–34)
MCHC RBC AUTO-ENTMCNC: 32.6 G/DL (ref 32–36)
MCV RBC AUTO: 107 FL (ref 80–100)
MONOCYTES # BLD AUTO: 0.43 X10*3/UL (ref 0.05–0.8)
MONOCYTES NFR BLD AUTO: 10.8 %
NEUTROPHILS # BLD AUTO: 3.1 X10*3/UL (ref 1.6–5.5)
NEUTROPHILS NFR BLD AUTO: 77.9 %
NRBC BLD-RTO: 0 /100 WBCS (ref 0–0)
PLATELET # BLD AUTO: 252 X10*3/UL (ref 150–450)
POTASSIUM SERPL-SCNC: 4.4 MMOL/L (ref 3.5–5.3)
PROT SERPL-MCNC: 6.2 G/DL (ref 6.4–8.2)
PROT SERPL-MCNC: 6.5 G/DL (ref 6.4–8.2)
RBC # BLD AUTO: 3.05 X10*6/UL (ref 4–5.2)
SODIUM SERPL-SCNC: 142 MMOL/L (ref 136–145)
WBC # BLD AUTO: 4 X10*3/UL (ref 4.4–11.3)

## 2025-05-12 PROCEDURE — 2500000004 HC RX 250 GENERAL PHARMACY W/ HCPCS (ALT 636 FOR OP/ED): Mod: JZ | Performed by: INTERNAL MEDICINE

## 2025-05-12 PROCEDURE — 83521 IG LIGHT CHAINS FREE EACH: CPT

## 2025-05-12 PROCEDURE — 1159F MED LIST DOCD IN RCRD: CPT

## 2025-05-12 PROCEDURE — 99215 OFFICE O/P EST HI 40 MIN: CPT

## 2025-05-12 PROCEDURE — 1126F AMNT PAIN NOTED NONE PRSNT: CPT

## 2025-05-12 PROCEDURE — 84155 ASSAY OF PROTEIN SERUM: CPT | Mod: 59

## 2025-05-12 PROCEDURE — 36415 COLL VENOUS BLD VENIPUNCTURE: CPT

## 2025-05-12 PROCEDURE — 85025 COMPLETE CBC W/AUTO DIFF WBC: CPT

## 2025-05-12 PROCEDURE — 1160F RVW MEDS BY RX/DR IN RCRD: CPT

## 2025-05-12 PROCEDURE — 96413 CHEMO IV INFUSION 1 HR: CPT

## 2025-05-12 PROCEDURE — 84075 ASSAY ALKALINE PHOSPHATASE: CPT

## 2025-05-12 PROCEDURE — 82784 ASSAY IGA/IGD/IGG/IGM EACH: CPT

## 2025-05-12 PROCEDURE — 84165 PROTEIN E-PHORESIS SERUM: CPT

## 2025-05-12 PROCEDURE — 99215 OFFICE O/P EST HI 40 MIN: CPT | Mod: 25

## 2025-05-12 PROCEDURE — 2500000004 HC RX 250 GENERAL PHARMACY W/ HCPCS (ALT 636 FOR OP/ED): Performed by: INTERNAL MEDICINE

## 2025-05-12 RX ORDER — FAMOTIDINE 10 MG/ML
20 INJECTION, SOLUTION INTRAVENOUS ONCE AS NEEDED
Status: DISCONTINUED | OUTPATIENT
Start: 2025-05-12 | End: 2025-05-12 | Stop reason: HOSPADM

## 2025-05-12 RX ORDER — EPINEPHRINE 0.3 MG/.3ML
0.3 INJECTION SUBCUTANEOUS EVERY 5 MIN PRN
Status: DISCONTINUED | OUTPATIENT
Start: 2025-05-12 | End: 2025-05-12 | Stop reason: HOSPADM

## 2025-05-12 RX ORDER — PROCHLORPERAZINE EDISYLATE 5 MG/ML
10 INJECTION INTRAMUSCULAR; INTRAVENOUS EVERY 6 HOURS PRN
Status: DISCONTINUED | OUTPATIENT
Start: 2025-05-12 | End: 2025-05-12 | Stop reason: HOSPADM

## 2025-05-12 RX ORDER — DEXAMETHASONE 4 MG/1
8 TABLET ORAL ONCE
Status: COMPLETED | OUTPATIENT
Start: 2025-05-12 | End: 2025-05-12

## 2025-05-12 RX ORDER — DIPHENHYDRAMINE HYDROCHLORIDE 50 MG/ML
50 INJECTION, SOLUTION INTRAMUSCULAR; INTRAVENOUS AS NEEDED
Status: DISCONTINUED | OUTPATIENT
Start: 2025-05-12 | End: 2025-05-12 | Stop reason: HOSPADM

## 2025-05-12 RX ORDER — HEPARIN 100 UNIT/ML
500 SYRINGE INTRAVENOUS AS NEEDED
OUTPATIENT
Start: 2025-05-12

## 2025-05-12 RX ORDER — PROCHLORPERAZINE MALEATE 10 MG
10 TABLET ORAL EVERY 6 HOURS PRN
Status: DISCONTINUED | OUTPATIENT
Start: 2025-05-12 | End: 2025-05-12 | Stop reason: HOSPADM

## 2025-05-12 RX ORDER — HEPARIN SODIUM,PORCINE/PF 10 UNIT/ML
50 SYRINGE (ML) INTRAVENOUS AS NEEDED
OUTPATIENT
Start: 2025-05-12

## 2025-05-12 RX ORDER — ALBUTEROL SULFATE 0.83 MG/ML
3 SOLUTION RESPIRATORY (INHALATION) AS NEEDED
Status: DISCONTINUED | OUTPATIENT
Start: 2025-05-12 | End: 2025-05-12 | Stop reason: HOSPADM

## 2025-05-12 RX ADMIN — DEXAMETHASONE 8 MG: 4 TABLET ORAL at 11:28

## 2025-05-12 RX ADMIN — ONDANSETRON HYDROCHLORIDE 12 MG: 8 TABLET, FILM COATED ORAL at 11:28

## 2025-05-12 RX ADMIN — CARFILZOMIB 120 MG: 60 INJECTION, POWDER, LYOPHILIZED, FOR SOLUTION INTRAVENOUS at 12:16

## 2025-05-12 ASSESSMENT — PAIN SCALES - GENERAL: PAINLEVEL_OUTOF10: 0-NO PAIN

## 2025-05-12 ASSESSMENT — ENCOUNTER SYMPTOMS: MUSCULOSKELETAL NEGATIVE: 1

## 2025-05-12 NOTE — PROGRESS NOTES
Pt present today for C11D1.  Pt saw provider prior to treatment.  See provider's note for full assessment.  Carfilzomib regimen infused with no issues.  Pt discharged to home in stable condition.

## 2025-05-12 NOTE — SIGNIFICANT EVENT

## 2025-05-13 ENCOUNTER — TELEPHONE (OUTPATIENT)
Dept: DERMATOLOGY | Facility: CLINIC | Age: 85
End: 2025-05-13
Payer: MEDICARE

## 2025-05-13 LAB
KAPPA LC SERPL-MCNC: 0.38 MG/DL (ref 0.33–1.94)
KAPPA LC/LAMBDA SER: 0.02 {RATIO} (ref 0.26–1.65)
LAMBDA LC SERPL-MCNC: 23.95 MG/DL (ref 0.57–2.63)

## 2025-05-13 NOTE — TELEPHONE ENCOUNTER
She wants to speak with you regarding starting 5FU.  She has some concerns and apprehensions for starting this and would like to know your thoughts.

## 2025-05-14 LAB
ALBUMIN: 3.7 G/DL (ref 3.4–5)
ALPHA 1 GLOBULIN: 0.3 G/DL (ref 0.2–0.6)
ALPHA 2 GLOBULIN: 0.8 G/DL (ref 0.4–1.1)
BETA GLOBULIN: 0.7 G/DL (ref 0.5–1.2)
GAMMA GLOBULIN: 0.7 G/DL (ref 0.5–1.4)
PATH REVIEW-SERUM PROTEIN ELECTROPHORESIS: NORMAL
PROTEIN ELECTROPHORESIS COMMENT: NORMAL

## 2025-05-19 ENCOUNTER — APPOINTMENT (OUTPATIENT)
Dept: HEMATOLOGY/ONCOLOGY | Facility: CLINIC | Age: 85
End: 2025-05-19
Payer: MEDICARE

## 2025-05-27 ENCOUNTER — LAB (OUTPATIENT)
Dept: LAB | Facility: CLINIC | Age: 85
End: 2025-05-27
Payer: MEDICARE

## 2025-05-27 ENCOUNTER — INFUSION (OUTPATIENT)
Dept: HEMATOLOGY/ONCOLOGY | Facility: CLINIC | Age: 85
End: 2025-05-27
Payer: MEDICARE

## 2025-05-27 VITALS
OXYGEN SATURATION: 99 % | DIASTOLIC BLOOD PRESSURE: 74 MMHG | SYSTOLIC BLOOD PRESSURE: 149 MMHG | RESPIRATION RATE: 20 BRPM | WEIGHT: 164.68 LBS | HEART RATE: 89 BPM | TEMPERATURE: 96.8 F | BODY MASS INDEX: 30.61 KG/M2

## 2025-05-27 DIAGNOSIS — C90.00 MULTIPLE MYELOMA NOT HAVING ACHIEVED REMISSION (MULTI): ICD-10-CM

## 2025-05-27 DIAGNOSIS — D80.1 HYPOGAMMAGLOBULINEMIA (MULTI): ICD-10-CM

## 2025-05-27 LAB
ALBUMIN SERPL BCP-MCNC: 4 G/DL (ref 3.4–5)
ALP SERPL-CCNC: 52 U/L (ref 33–136)
ALT SERPL W P-5'-P-CCNC: 7 U/L (ref 7–45)
ANION GAP SERPL CALC-SCNC: 13 MMOL/L (ref 10–20)
AST SERPL W P-5'-P-CCNC: 14 U/L (ref 9–39)
BASOPHILS # BLD AUTO: 0.03 X10*3/UL (ref 0–0.1)
BASOPHILS NFR BLD AUTO: 0.7 %
BILIRUB SERPL-MCNC: 0.4 MG/DL (ref 0–1.2)
BUN SERPL-MCNC: 29 MG/DL (ref 6–23)
CALCIUM SERPL-MCNC: 9.3 MG/DL (ref 8.6–10.3)
CHLORIDE SERPL-SCNC: 106 MMOL/L (ref 98–107)
CO2 SERPL-SCNC: 27 MMOL/L (ref 21–32)
CREAT SERPL-MCNC: 1.03 MG/DL (ref 0.5–1.05)
EGFRCR SERPLBLD CKD-EPI 2021: 54 ML/MIN/1.73M*2
EOSINOPHIL # BLD AUTO: 0.04 X10*3/UL (ref 0–0.4)
EOSINOPHIL NFR BLD AUTO: 1 %
ERYTHROCYTE [DISTWIDTH] IN BLOOD BY AUTOMATED COUNT: 12.2 % (ref 11.5–14.5)
GLUCOSE SERPL-MCNC: 87 MG/DL (ref 74–99)
HCT VFR BLD AUTO: 33.3 % (ref 36–46)
HGB BLD-MCNC: 11 G/DL (ref 12–16)
IGA SERPL-MCNC: 7 MG/DL (ref 70–400)
IGG SERPL-MCNC: 662 MG/DL (ref 700–1600)
IGM SERPL-MCNC: 5 MG/DL (ref 40–230)
IMM GRANULOCYTES # BLD AUTO: 0.01 X10*3/UL (ref 0–0.5)
IMM GRANULOCYTES NFR BLD AUTO: 0.2 % (ref 0–0.9)
LYMPHOCYTES # BLD AUTO: 0.38 X10*3/UL (ref 0.8–3)
LYMPHOCYTES NFR BLD AUTO: 9.2 %
MCH RBC QN AUTO: 34.3 PG (ref 26–34)
MCHC RBC AUTO-ENTMCNC: 33 G/DL (ref 32–36)
MCV RBC AUTO: 104 FL (ref 80–100)
MONOCYTES # BLD AUTO: 0.41 X10*3/UL (ref 0.05–0.8)
MONOCYTES NFR BLD AUTO: 9.9 %
NEUTROPHILS # BLD AUTO: 3.27 X10*3/UL (ref 1.6–5.5)
NEUTROPHILS NFR BLD AUTO: 79 %
NRBC BLD-RTO: 0 /100 WBCS (ref 0–0)
PLATELET # BLD AUTO: 242 X10*3/UL (ref 150–450)
POTASSIUM SERPL-SCNC: 4 MMOL/L (ref 3.5–5.3)
PROT SERPL-MCNC: 6.1 G/DL (ref 6.4–8.2)
RBC # BLD AUTO: 3.21 X10*6/UL (ref 4–5.2)
SODIUM SERPL-SCNC: 142 MMOL/L (ref 136–145)
WBC # BLD AUTO: 4.1 X10*3/UL (ref 4.4–11.3)

## 2025-05-27 PROCEDURE — 96367 TX/PROPH/DG ADDL SEQ IV INF: CPT

## 2025-05-27 PROCEDURE — 96413 CHEMO IV INFUSION 1 HR: CPT

## 2025-05-27 PROCEDURE — 2500000001 HC RX 250 WO HCPCS SELF ADMINISTERED DRUGS (ALT 637 FOR MEDICARE OP)

## 2025-05-27 PROCEDURE — 2500000004 HC RX 250 GENERAL PHARMACY W/ HCPCS (ALT 636 FOR OP/ED): Mod: JZ,TB

## 2025-05-27 PROCEDURE — 80053 COMPREHEN METABOLIC PANEL: CPT

## 2025-05-27 PROCEDURE — 82784 ASSAY IGA/IGD/IGG/IGM EACH: CPT

## 2025-05-27 PROCEDURE — 96366 THER/PROPH/DIAG IV INF ADDON: CPT | Mod: INF

## 2025-05-27 PROCEDURE — 2500000004 HC RX 250 GENERAL PHARMACY W/ HCPCS (ALT 636 FOR OP/ED): Performed by: INTERNAL MEDICINE

## 2025-05-27 PROCEDURE — 85025 COMPLETE CBC W/AUTO DIFF WBC: CPT

## 2025-05-27 PROCEDURE — 36415 COLL VENOUS BLD VENIPUNCTURE: CPT

## 2025-05-27 RX ORDER — DIPHENHYDRAMINE HCL 25 MG
25 CAPSULE ORAL ONCE
OUTPATIENT
Start: 2025-06-24

## 2025-05-27 RX ORDER — DEXAMETHASONE 4 MG/1
8 TABLET ORAL ONCE
Status: COMPLETED | OUTPATIENT
Start: 2025-05-27 | End: 2025-05-27

## 2025-05-27 RX ORDER — ACETAMINOPHEN 325 MG/1
650 TABLET ORAL ONCE
OUTPATIENT
Start: 2025-06-24

## 2025-05-27 RX ORDER — EPINEPHRINE 0.3 MG/.3ML
0.3 INJECTION SUBCUTANEOUS EVERY 5 MIN PRN
Status: DISCONTINUED | OUTPATIENT
Start: 2025-05-27 | End: 2025-05-27 | Stop reason: HOSPADM

## 2025-05-27 RX ORDER — PROCHLORPERAZINE EDISYLATE 5 MG/ML
10 INJECTION INTRAMUSCULAR; INTRAVENOUS EVERY 6 HOURS PRN
Status: DISCONTINUED | OUTPATIENT
Start: 2025-05-27 | End: 2025-05-27 | Stop reason: HOSPADM

## 2025-05-27 RX ORDER — FAMOTIDINE 10 MG/ML
20 INJECTION, SOLUTION INTRAVENOUS ONCE AS NEEDED
OUTPATIENT
Start: 2025-06-24

## 2025-05-27 RX ORDER — DIPHENHYDRAMINE HYDROCHLORIDE 50 MG/ML
50 INJECTION, SOLUTION INTRAMUSCULAR; INTRAVENOUS AS NEEDED
OUTPATIENT
Start: 2025-06-24

## 2025-05-27 RX ORDER — ALBUTEROL SULFATE 0.83 MG/ML
3 SOLUTION RESPIRATORY (INHALATION) AS NEEDED
Status: DISCONTINUED | OUTPATIENT
Start: 2025-05-27 | End: 2025-05-27 | Stop reason: HOSPADM

## 2025-05-27 RX ORDER — DIPHENHYDRAMINE HCL 25 MG
25 CAPSULE ORAL ONCE
Status: COMPLETED | OUTPATIENT
Start: 2025-05-27 | End: 2025-05-27

## 2025-05-27 RX ORDER — FAMOTIDINE 10 MG/ML
20 INJECTION, SOLUTION INTRAVENOUS ONCE AS NEEDED
Status: DISCONTINUED | OUTPATIENT
Start: 2025-05-27 | End: 2025-05-27 | Stop reason: HOSPADM

## 2025-05-27 RX ORDER — ACETAMINOPHEN 325 MG/1
650 TABLET ORAL ONCE
Status: COMPLETED | OUTPATIENT
Start: 2025-05-27 | End: 2025-05-27

## 2025-05-27 RX ORDER — PROCHLORPERAZINE MALEATE 10 MG
10 TABLET ORAL EVERY 6 HOURS PRN
Status: DISCONTINUED | OUTPATIENT
Start: 2025-05-27 | End: 2025-05-27 | Stop reason: HOSPADM

## 2025-05-27 RX ORDER — ALBUTEROL SULFATE 0.83 MG/ML
3 SOLUTION RESPIRATORY (INHALATION) AS NEEDED
OUTPATIENT
Start: 2025-06-24

## 2025-05-27 RX ORDER — EPINEPHRINE 0.3 MG/.3ML
0.3 INJECTION SUBCUTANEOUS EVERY 5 MIN PRN
OUTPATIENT
Start: 2025-06-24

## 2025-05-27 RX ORDER — DIPHENHYDRAMINE HYDROCHLORIDE 50 MG/ML
50 INJECTION, SOLUTION INTRAMUSCULAR; INTRAVENOUS AS NEEDED
Status: DISCONTINUED | OUTPATIENT
Start: 2025-05-27 | End: 2025-05-27 | Stop reason: HOSPADM

## 2025-05-27 RX ADMIN — ONDANSETRON HYDROCHLORIDE 12 MG: 8 TABLET, FILM COATED ORAL at 11:41

## 2025-05-27 RX ADMIN — DIPHENHYDRAMINE HYDROCHLORIDE 25 MG: 25 CAPSULE ORAL at 09:51

## 2025-05-27 RX ADMIN — CARFILZOMIB 120 MG: 60 INJECTION, POWDER, LYOPHILIZED, FOR SOLUTION INTRAVENOUS at 12:12

## 2025-05-27 RX ADMIN — IMMUNE GLOBULIN (HUMAN) 25 G: 10 INJECTION INTRAVENOUS; SUBCUTANEOUS at 10:11

## 2025-05-27 RX ADMIN — ACETAMINOPHEN 650 MG: 325 TABLET ORAL at 09:51

## 2025-05-27 RX ADMIN — DEXAMETHASONE 8 MG: 4 TABLET ORAL at 11:41

## 2025-05-27 ASSESSMENT — PAIN SCALES - GENERAL
PAINLEVEL_OUTOF10: 0-NO PAIN

## 2025-05-27 NOTE — PROGRESS NOTES
IVIG administered per titration order as follows:  35ml/hr for 30minutes then,  71ml/hr for 30mins then,   142ml/hr for 30mins then,  284ml/hr until completed.

## 2025-06-08 RX ORDER — PROCHLORPERAZINE EDISYLATE 5 MG/ML
10 INJECTION INTRAMUSCULAR; INTRAVENOUS EVERY 6 HOURS PRN
OUTPATIENT
Start: 2025-07-21

## 2025-06-08 RX ORDER — PROCHLORPERAZINE MALEATE 10 MG
10 TABLET ORAL EVERY 6 HOURS PRN
OUTPATIENT
Start: 2025-07-21

## 2025-06-08 RX ORDER — DEXAMETHASONE 4 MG/1
8 TABLET ORAL ONCE
OUTPATIENT
Start: 2025-07-21 | End: 2025-07-21

## 2025-06-08 RX ORDER — DEXAMETHASONE 4 MG/1
8 TABLET ORAL ONCE
OUTPATIENT
Start: 2025-07-07 | End: 2025-07-07

## 2025-06-08 RX ORDER — DIPHENHYDRAMINE HYDROCHLORIDE 50 MG/ML
50 INJECTION, SOLUTION INTRAMUSCULAR; INTRAVENOUS AS NEEDED
OUTPATIENT
Start: 2025-07-21

## 2025-06-08 RX ORDER — PROCHLORPERAZINE MALEATE 10 MG
10 TABLET ORAL EVERY 6 HOURS PRN
OUTPATIENT
Start: 2025-07-07

## 2025-06-08 RX ORDER — EPINEPHRINE 0.3 MG/.3ML
0.3 INJECTION SUBCUTANEOUS EVERY 5 MIN PRN
OUTPATIENT
Start: 2025-07-07

## 2025-06-08 RX ORDER — ONDANSETRON 8 MG/1
12 TABLET, FILM COATED ORAL ONCE
OUTPATIENT
Start: 2025-07-07

## 2025-06-08 RX ORDER — ONDANSETRON 8 MG/1
12 TABLET, FILM COATED ORAL ONCE
OUTPATIENT
Start: 2025-07-21

## 2025-06-08 RX ORDER — ALBUTEROL SULFATE 0.83 MG/ML
3 SOLUTION RESPIRATORY (INHALATION) AS NEEDED
OUTPATIENT
Start: 2025-07-21

## 2025-06-08 RX ORDER — EPINEPHRINE 0.3 MG/.3ML
0.3 INJECTION SUBCUTANEOUS EVERY 5 MIN PRN
OUTPATIENT
Start: 2025-07-21

## 2025-06-08 RX ORDER — ALBUTEROL SULFATE 0.83 MG/ML
3 SOLUTION RESPIRATORY (INHALATION) AS NEEDED
OUTPATIENT
Start: 2025-07-07

## 2025-06-08 RX ORDER — FAMOTIDINE 10 MG/ML
20 INJECTION, SOLUTION INTRAVENOUS ONCE AS NEEDED
OUTPATIENT
Start: 2025-07-07

## 2025-06-08 RX ORDER — FAMOTIDINE 10 MG/ML
20 INJECTION, SOLUTION INTRAVENOUS ONCE AS NEEDED
OUTPATIENT
Start: 2025-07-21

## 2025-06-08 RX ORDER — DIPHENHYDRAMINE HYDROCHLORIDE 50 MG/ML
50 INJECTION, SOLUTION INTRAMUSCULAR; INTRAVENOUS AS NEEDED
OUTPATIENT
Start: 2025-07-07

## 2025-06-08 RX ORDER — PROCHLORPERAZINE EDISYLATE 5 MG/ML
10 INJECTION INTRAMUSCULAR; INTRAVENOUS EVERY 6 HOURS PRN
OUTPATIENT
Start: 2025-07-07

## 2025-06-08 ASSESSMENT — ENCOUNTER SYMPTOMS
BRUISES/BLEEDS EASILY: 1
CARDIOVASCULAR NEGATIVE: 1
RESPIRATORY NEGATIVE: 1
ADENOPATHY: 0
DIARRHEA: 1
APPETITE CHANGE: 0
UNEXPECTED WEIGHT CHANGE: 0
DYSURIA: 0
FATIGUE: 0
DIAPHORESIS: 0
HEMATURIA: 0
FEVER: 0
CHILLS: 0

## 2025-06-09 ENCOUNTER — INFUSION (OUTPATIENT)
Dept: HEMATOLOGY/ONCOLOGY | Facility: HOSPITAL | Age: 85
End: 2025-06-09
Payer: MEDICARE

## 2025-06-09 ENCOUNTER — OFFICE VISIT (OUTPATIENT)
Dept: HEMATOLOGY/ONCOLOGY | Facility: HOSPITAL | Age: 85
End: 2025-06-09
Payer: MEDICARE

## 2025-06-09 ENCOUNTER — APPOINTMENT (OUTPATIENT)
Dept: HEMATOLOGY/ONCOLOGY | Facility: CLINIC | Age: 85
End: 2025-06-09
Payer: MEDICARE

## 2025-06-09 ENCOUNTER — LAB (OUTPATIENT)
Dept: LAB | Facility: HOSPITAL | Age: 85
End: 2025-06-09
Payer: MEDICARE

## 2025-06-09 VITALS
RESPIRATION RATE: 16 BRPM | OXYGEN SATURATION: 100 % | SYSTOLIC BLOOD PRESSURE: 139 MMHG | TEMPERATURE: 98.8 F | HEART RATE: 84 BPM | DIASTOLIC BLOOD PRESSURE: 66 MMHG | WEIGHT: 165.57 LBS | BODY MASS INDEX: 30.78 KG/M2

## 2025-06-09 DIAGNOSIS — C90.00 MULTIPLE MYELOMA NOT HAVING ACHIEVED REMISSION (MULTI): ICD-10-CM

## 2025-06-09 DIAGNOSIS — C90.00 MULTIPLE MYELOMA NOT HAVING ACHIEVED REMISSION (MULTI): Primary | ICD-10-CM

## 2025-06-09 LAB
ALBUMIN SERPL BCP-MCNC: 4.1 G/DL (ref 3.4–5)
ALP SERPL-CCNC: 52 U/L (ref 33–136)
ALT SERPL W P-5'-P-CCNC: 10 U/L (ref 7–45)
ANION GAP SERPL CALC-SCNC: 14 MMOL/L (ref 10–20)
AST SERPL W P-5'-P-CCNC: 18 U/L (ref 9–39)
BASOPHILS # BLD AUTO: 0.03 X10*3/UL (ref 0–0.1)
BASOPHILS NFR BLD AUTO: 0.5 %
BILIRUB SERPL-MCNC: 0.4 MG/DL (ref 0–1.2)
BUN SERPL-MCNC: 21 MG/DL (ref 6–23)
CALCIUM SERPL-MCNC: 9.6 MG/DL (ref 8.6–10.3)
CHLORIDE SERPL-SCNC: 107 MMOL/L (ref 98–107)
CO2 SERPL-SCNC: 26 MMOL/L (ref 21–32)
CREAT SERPL-MCNC: 0.96 MG/DL (ref 0.5–1.05)
EGFRCR SERPLBLD CKD-EPI 2021: 59 ML/MIN/1.73M*2
EOSINOPHIL # BLD AUTO: 0.03 X10*3/UL (ref 0–0.4)
EOSINOPHIL NFR BLD AUTO: 0.5 %
ERYTHROCYTE [DISTWIDTH] IN BLOOD BY AUTOMATED COUNT: 12.3 % (ref 11.5–14.5)
GLUCOSE SERPL-MCNC: 76 MG/DL (ref 74–99)
HCT VFR BLD AUTO: 36.4 % (ref 36–46)
HGB BLD-MCNC: 11.8 G/DL (ref 12–16)
IGA SERPL-MCNC: 10 MG/DL (ref 70–400)
IGG SERPL-MCNC: 839 MG/DL (ref 700–1600)
IGM SERPL-MCNC: 5 MG/DL (ref 40–230)
IMM GRANULOCYTES # BLD AUTO: 0.02 X10*3/UL (ref 0–0.5)
IMM GRANULOCYTES NFR BLD AUTO: 0.4 % (ref 0–0.9)
LYMPHOCYTES # BLD AUTO: 0.46 X10*3/UL (ref 0.8–3)
LYMPHOCYTES NFR BLD AUTO: 8.2 %
MAGNESIUM SERPL-MCNC: 2.11 MG/DL (ref 1.6–2.4)
MCH RBC QN AUTO: 34.3 PG (ref 26–34)
MCHC RBC AUTO-ENTMCNC: 32.4 G/DL (ref 32–36)
MCV RBC AUTO: 106 FL (ref 80–100)
MONOCYTES # BLD AUTO: 0.45 X10*3/UL (ref 0.05–0.8)
MONOCYTES NFR BLD AUTO: 8 %
NEUTROPHILS # BLD AUTO: 4.61 X10*3/UL (ref 1.6–5.5)
NEUTROPHILS NFR BLD AUTO: 82.4 %
NRBC BLD-RTO: 0 /100 WBCS (ref 0–0)
PHOSPHATE SERPL-MCNC: 2.8 MG/DL (ref 2.5–4.9)
PLATELET # BLD AUTO: 284 X10*3/UL (ref 150–450)
POTASSIUM SERPL-SCNC: 4.7 MMOL/L (ref 3.5–5.3)
PROT SERPL-MCNC: 6.7 G/DL (ref 6.4–8.2)
PROT SERPL-MCNC: 6.8 G/DL (ref 6.4–8.2)
RBC # BLD AUTO: 3.44 X10*6/UL (ref 4–5.2)
SODIUM SERPL-SCNC: 142 MMOL/L (ref 136–145)
WBC # BLD AUTO: 5.6 X10*3/UL (ref 4.4–11.3)

## 2025-06-09 PROCEDURE — 80053 COMPREHEN METABOLIC PANEL: CPT

## 2025-06-09 PROCEDURE — 96413 CHEMO IV INFUSION 1 HR: CPT

## 2025-06-09 PROCEDURE — 1159F MED LIST DOCD IN RCRD: CPT | Performed by: INTERNAL MEDICINE

## 2025-06-09 PROCEDURE — 84155 ASSAY OF PROTEIN SERUM: CPT

## 2025-06-09 PROCEDURE — 99215 OFFICE O/P EST HI 40 MIN: CPT | Performed by: INTERNAL MEDICINE

## 2025-06-09 PROCEDURE — 85025 COMPLETE CBC W/AUTO DIFF WBC: CPT

## 2025-06-09 PROCEDURE — 83735 ASSAY OF MAGNESIUM: CPT

## 2025-06-09 PROCEDURE — 82784 ASSAY IGA/IGD/IGG/IGM EACH: CPT

## 2025-06-09 PROCEDURE — 84165 PROTEIN E-PHORESIS SERUM: CPT

## 2025-06-09 PROCEDURE — 83521 IG LIGHT CHAINS FREE EACH: CPT

## 2025-06-09 PROCEDURE — 2500000004 HC RX 250 GENERAL PHARMACY W/ HCPCS (ALT 636 FOR OP/ED): Performed by: INTERNAL MEDICINE

## 2025-06-09 PROCEDURE — 1126F AMNT PAIN NOTED NONE PRSNT: CPT | Performed by: INTERNAL MEDICINE

## 2025-06-09 PROCEDURE — 96367 TX/PROPH/DG ADDL SEQ IV INF: CPT

## 2025-06-09 PROCEDURE — 36415 COLL VENOUS BLD VENIPUNCTURE: CPT

## 2025-06-09 PROCEDURE — 84100 ASSAY OF PHOSPHORUS: CPT

## 2025-06-09 RX ORDER — PROCHLORPERAZINE MALEATE 10 MG
10 TABLET ORAL EVERY 6 HOURS PRN
Status: DISCONTINUED | OUTPATIENT
Start: 2025-06-09 | End: 2025-06-09 | Stop reason: HOSPADM

## 2025-06-09 RX ORDER — DIPHENHYDRAMINE HYDROCHLORIDE 50 MG/ML
50 INJECTION, SOLUTION INTRAMUSCULAR; INTRAVENOUS AS NEEDED
OUTPATIENT
Start: 2025-07-07

## 2025-06-09 RX ORDER — ALBUTEROL SULFATE 0.83 MG/ML
3 SOLUTION RESPIRATORY (INHALATION) AS NEEDED
Status: DISCONTINUED | OUTPATIENT
Start: 2025-06-09 | End: 2025-06-09 | Stop reason: HOSPADM

## 2025-06-09 RX ORDER — DIPHENHYDRAMINE HYDROCHLORIDE 50 MG/ML
50 INJECTION, SOLUTION INTRAMUSCULAR; INTRAVENOUS AS NEEDED
Status: DISCONTINUED | OUTPATIENT
Start: 2025-06-09 | End: 2025-06-09 | Stop reason: HOSPADM

## 2025-06-09 RX ORDER — PROCHLORPERAZINE EDISYLATE 5 MG/ML
10 INJECTION INTRAMUSCULAR; INTRAVENOUS EVERY 6 HOURS PRN
Status: DISCONTINUED | OUTPATIENT
Start: 2025-06-09 | End: 2025-06-09 | Stop reason: HOSPADM

## 2025-06-09 RX ORDER — DEXAMETHASONE 4 MG/1
8 TABLET ORAL ONCE
Status: COMPLETED | OUTPATIENT
Start: 2025-06-09 | End: 2025-06-09

## 2025-06-09 RX ORDER — EPINEPHRINE 0.3 MG/.3ML
0.3 INJECTION SUBCUTANEOUS EVERY 5 MIN PRN
Status: DISCONTINUED | OUTPATIENT
Start: 2025-06-09 | End: 2025-06-09 | Stop reason: HOSPADM

## 2025-06-09 RX ORDER — ALBUTEROL SULFATE 0.83 MG/ML
3 SOLUTION RESPIRATORY (INHALATION) AS NEEDED
OUTPATIENT
Start: 2025-07-07

## 2025-06-09 RX ORDER — FAMOTIDINE 10 MG/ML
20 INJECTION, SOLUTION INTRAVENOUS ONCE AS NEEDED
OUTPATIENT
Start: 2025-07-07

## 2025-06-09 RX ORDER — EPINEPHRINE 0.3 MG/.3ML
0.3 INJECTION SUBCUTANEOUS EVERY 5 MIN PRN
OUTPATIENT
Start: 2025-07-07

## 2025-06-09 RX ORDER — FAMOTIDINE 10 MG/ML
20 INJECTION, SOLUTION INTRAVENOUS ONCE AS NEEDED
Status: DISCONTINUED | OUTPATIENT
Start: 2025-06-09 | End: 2025-06-09 | Stop reason: HOSPADM

## 2025-06-09 RX ADMIN — ONDANSETRON HYDROCHLORIDE 12 MG: 8 TABLET, FILM COATED ORAL at 12:40

## 2025-06-09 RX ADMIN — SODIUM CHLORIDE 500 ML: 0.9 INJECTION, SOLUTION INTRAVENOUS at 12:40

## 2025-06-09 RX ADMIN — CARFILZOMIB 120 MG: 60 INJECTION, POWDER, LYOPHILIZED, FOR SOLUTION INTRAVENOUS at 13:11

## 2025-06-09 RX ADMIN — ZOLEDRONIC ACID 3.3 MG: 4 INJECTION, SOLUTION, CONCENTRATE INTRAVENOUS at 12:37

## 2025-06-09 RX ADMIN — DEXAMETHASONE 8 MG: 4 TABLET ORAL at 12:39

## 2025-06-09 ASSESSMENT — PAIN SCALES - GENERAL: PAINLEVEL_OUTOF10: 0-NO PAIN

## 2025-06-09 ASSESSMENT — ENCOUNTER SYMPTOMS: EXTREMITY WEAKNESS: 1

## 2025-06-09 NOTE — PROGRESS NOTES
"Patient ID: Sil West \"Ines" is a 84 y.o. female.    Treatment:   Oncology History Overview Note   I. Diagnosis (1/2023):  IgG Lambda Multiple Myeloma  Presenting symptoms: Urinary incontinence and diffuse bony pain  II. Workup:  Bone Biopsy (1/6/23):  Plasma cells neoplasm  Repeat Bone Marrow Biopsy (1/26/23):  Hypercellular marrow, 80-90% plasma cells, lambda-restricted  FISH: 1q gain (high risk), trisomy 3  MRI Spine (12/18/22):  Osseous metastases, involvement in mid-cervical, mid-thoracic, and lumbar vertebral bodies, as well as the right iliac bone  PET Scan (1/23):  FDG avidity in right iliac bone, right sacral ala, left posterior 6th rib, and appendicular skeleton  Serum and Urine Studies (1/19-1/28/23):  FKLC 1.57, FLLC 2947.7, K/L ratio 0  IgG 537, IgA 221, IgM 19, b2M 23.4, Hgb 5.7  M protein IgA lambda 0.7 g/dL   U/L  Creatinine: 2.33 (peaked at 3.15)  UPEP: Monoclonal lambda light chain at 386.6 mg/24H  Hepatitis B and C negative  III. Treatment:  Radiation (2/2/23):  Right hip + T6-T8 x 5 fractions (total 2000 cGy)  Induction (12/24/22 - 2/16/23):  Bortezomib and dexamethasone + daratumumab  C1 (1/28/23): Bortezomib 1, 4, 8, 11 + daratumumab x 2 doses  C2 (2/16/23): Weekly dosing of daratumumab and bortezomib (1, 8, 15), with lenalidomide planned when renal function allows  Response Evaluation (5/25/23):  CR with M protein 0.1 (IgG kappa c/w roscoe) and free lyte chain 1.37  C8 Roscoe RVD (Completed 7/6/23):  Transitioned with a VGPR vs CR, ongoing multifocal bone pain  IV. Response Evaluation (7/13/23):  Marked improvement in bony lesions  Pathological fractures in right pelvis and ribs, possible infiltrate in the right lower lobe base, and hypodensity in the right thyroid gland  V. Treatment Adjustment (Cycle 9 and forward): 10/2023  Velcade omitted  Transitioned to a 4-week schedule with Roscoe (day 1) and Revlimid 15 mg days 1-21/28 days     Multiple myeloma not having achieved remission " (Multi)   9/13/2023 Initial Diagnosis    Multiple myeloma not having achieved remission (CMS/HCC)     10/2/2023 - 7/8/2024 Chemotherapy    Daratumumab, Pomalidomide and dexamethasone (oral meds managed outside treatment plan) 28 Day Cycles - Maintenance     8/5/2024 -  Chemotherapy    Carfilzomib and Cyclophosphamide (Weekly) / Dexamethasone, 28 Day Cycles (Custom)       Past Medical History:  HTN, DLP  pre skin cancers,   diveriticulitis   peripheral artery disease not amenable to surgery  Thyroid nodules under observation  .  Surgical History:  squamous cell carcinoma removed to left hand and right arm 6/2024 2/21/25 Mohs surgery done for 4 areas of SCC on 5 fluorouracil 5%  Past Surgical History:   Procedure Laterality Date    CT GUIDED PERCUTANEOUS BIOPSY BONE DEEP  01/06/2023    CT GUIDED PERCUTANEOUS BIOPSY BONE DEEP 1/6/2023 [facility]    OTHER SURGICAL HISTORY      THYROID BIOPSY      Family History:     Family History   Problem Relation Name Age of Onset    Alzheimer's disease Mother      Colon cancer Father      Rashes / Skin problems Sister      Rashes / Skin problems Brother       Social History:  ,  passed away on 10/24/23.  3 grown children (1 daughter, 2 sons). 2 grandchildren.  Has 6 cats.  Enjoyed skiing.  Occupation: retired .  Social History     Tobacco Use    Smoking status: Never     Passive exposure: Never    Smokeless tobacco: Never   Vaping Use    Vaping status: Never Used   Substance Use Topics    Alcohol use: Never    Drug use: Never      -------------------------------------------------------------------------------------------------------  Subjective       Sil West is a 84 year old female with IgG lambda multiple myeloma.  She recently had an increase to her lambda free light chains.  PET imaging done 7/17/24 showed an new soft tissue uptake in right 11th rib and interestingly patient has pain there as well.  Transitioned to carfilzomib,  cyclophophamide, dex, cycle 1 on 8/5/24     Sil presents to the clinic today (6/9/25) unaccompanied for follow up evaluation and is scheduled to receive C12D1 carfilzomib and dexamethasone today.  Starting at C10 , cyclophosphamide drops out and she is only receiving carfilzomib every 2 weeks.  Due to restlessness. and issues sleeping after receiving steroids with treatment dose of dexamethasone was decreased from 20 mg to 8 mg.  Receiving IVIG every month.  Unfortunately looks like free lyte chains starting to increase again.  Receiving zometa restarted 3/17/25 after completion of dental work, scheduled for every 3 months.  Patient feels wonderful, mowing her grass. Her daughter is not accompanying her today. Endorses some right leg weakness    Has been staying active with yard work and gardening.  Going to [employer].  Appetite is good, is gaining weight slowly.  On Asprin 81 mg daily for DVT prophylaxis.  Following closely with dermatology.  Continues to have intermittent loose stools, but feels is food related.  Taking imodium as needed.      Review of Systems   Constitutional:  Negative for appetite change, chills, diaphoresis, fatigue, fever and unexpected weight change.   Respiratory: Negative.     Cardiovascular: Negative.    Gastrointestinal:  Positive for diarrhea.   Genitourinary:  Negative for dysuria and hematuria.    Skin: Negative.         Had multiple spots of SCC removed by dermatology.   Neurological:  Positive for extremity weakness (mild right leg weakness).   Hematological:  Negative for adenopathy. Bruises/bleeds easily (bruises easily).   All other systems reviewed and are negative.  -------------------------------------------------------------------------------------------------------  Objective   BSA: 1.81 meters squared  /66   Pulse 84   Temp 37.1 °C (98.8 °F)   Resp 16   Wt 75.1 kg (165 lb 9.1 oz)   SpO2 100%   BMI 30.78 kg/m²     Physical Exam  Vitals reviewed.    Constitutional:       Appearance: Normal appearance. She is well-developed and normal weight.   HENT:      Head: Normocephalic and atraumatic.      Nose: Nose normal.   Eyes:      General: No scleral icterus.     Extraocular Movements: Extraocular movements intact.      Conjunctiva/sclera: Conjunctivae normal.      Pupils: Pupils are equal, round, and reactive to light.   Cardiovascular:      Rate and Rhythm: Normal rate and regular rhythm.      Pulses: Normal pulses.      Heart sounds: Normal heart sounds.   Pulmonary:      Effort: Pulmonary effort is normal.      Breath sounds: Normal breath sounds.   Abdominal:      General: Abdomen is flat. Bowel sounds are normal. There is no distension.      Palpations: Abdomen is soft. There is no mass.      Tenderness: There is no abdominal tenderness.   Musculoskeletal:         General: Normal range of motion.      Right lower leg: Edema (non-pitting) present.      Left lower leg: Edema (non-pitting) present.      Comments: No spine tenderness   Lymphadenopathy:      Comments: No lymphadenopathy   Skin:     General: Skin is warm and dry.   Neurological:      General: No focal deficit present.      Mental Status: She is alert and oriented to person, place, and time.      Comments: Normal strength and gait good strength right hip flexor   Psychiatric:         Mood and Affect: Mood normal.         Behavior: Behavior normal.         Thought Content: Thought content normal.     Performance Status:  Symptomatic; fully ambulatory  -------------------------------------------------------------------------------------------------------  Assessment and Plan:    Multiple myeloma not having achieved remission (CMS/HCC)  IgG lambda MM    - Currently on daratumamab/Lenalidomide. Continued uptrending of free lambda light chain. M-protein still 0.1g IgG kappa, likely represents daratumumab. Will add weekly dex 10 mg to see if this could deepen her response.     5/13/24:  Continues to have  increasing free lambda light chain, M-protein remains at 0.1.  Receiving C20 daratumumab today.  Discussed that free lyte chain continues to increase significantly and suggested switching revlimid to pomalidomide  4 mg 21/28 days since no other CRAB criteria,  -Taking aspirin 81 mg daily for VTE prophylaxis during treatment with pomalidomide      PET scan (7/17/24)   IMPRESSION:  1. Redemonstration of an FDG avid right posterolateral 11th rib  fracture now with evidence of an underlying soft tissue lesion.  2. Previously described hypermetabolic osseous lesions in the right  iliac, right superior pubic ramus, and right aspect of the pubic  symphysis have continued to decrease in metabolic activity with  persistent FDG avidity likely representing continued treatment  response with residual viable disease, attention on follow-up imaging.  3. Healing right inferior pubic ramus fracture with decreased extent  of hypermetabolic activity.    7/18/24:  Laboratory results show major increase in free lyte chains to 62 mg/dL up from 18 in March 2024, only sx is right rib pain where PET from yesterday shows a new lesion, no other new lesions seen,      Bone marrow biopsy results from 7/22/24 showing limited bone marrow (20% cellularity) with maturing trilineage hematopoiesis, minimal involvement by lambda-restricted plasma cells by flow cytometry. ECHO 8/2/2024 LVEF 60-65%    Started cycle 1 carfilzomib, cyclophosphamide, dexamethasone 8//24 6/9/25 :  CBC results stable from today.  Increase to lambda lyte chains to 23.95 (5/12/25) up from 6.76 (4/14/25)-see below.  SPEP normal (4/14/25), SPEP in process from today.  No concerning findings on physical examination.  Scheduled to receive C11D1 of carfilzomib and dexamethasone.  Dexamethasone 8 mg on day 1 and 15 with treatment.  With cycle 10, cyclophosphamide was omitted and carfilzomib maintenance is transitioned to every other week.  Today we discussed treatment for rising  free lyte chains ( after visit note free lyte chains from today almost tripled since 1 month ago)  We discussed CART cell therapy which is a one time treatment and requires about 3 weeks hospitalization.  CRS and ICANs toxicities reviewed.  Understandably, patient is and always has been reluctant to have prolonged hospitalization, in part due to her cats.  We also discussed the two bite therapies, talquetamab and taclistamab, each of which have ramp up period to monitor for cytokine release syndrome and immune effector cell neurotoxicity.  In addition talquetamab has side effect of taste changes and skin toxicity, but would allow subsequent CART cell therapy. Will discuss options further when patients daughter can come with her.       Ig Kappa Free Light Chain   Date Value Ref Range Status   06/09/2025 0.35 0.33 - 1.94 mg/dL Final   05/12/2025 0.38 0.33 - 1.94 mg/dL Final   04/14/2025 0.32 (L) 0.33 - 1.94 mg/dL Final   03/17/2025 0.20 (L) 0.33 - 1.94 mg/dL Final   02/17/2025 0.41 0.33 - 1.94 mg/dL Final     Ig Lambda Free Light Chain   Date Value Ref Range Status   06/09/2025 85.45 (H) 0.57 - 2.63 mg/dL Final   05/12/2025 23.95 (H) 0.57 - 2.63 mg/dL Final   04/14/2025 6.76 (H) 0.57 - 2.63 mg/dL Final   03/17/2025 5.52 (H) 0.57 - 2.63 mg/dL Final   02/17/2025 4.62 (H) 0.57 - 2.63 mg/dL Final     Kappa/Lambda Ratio   Date Value Ref Range Status   06/09/2025 0.00 (L) 0.26 - 1.65 Final   05/12/2025 0.02 (L) 0.26 - 1.65 Final   04/14/2025 0.05 (L) 0.26 - 1.65 Final   03/17/2025 0.04 (L) 0.26 - 1.65 Final   02/17/2025 0.09 (L) 0.26 - 1.65 Final     M-PROTEIN 1   Date Value Ref Range Status   10/28/2024 0.1 (H)   g/dL Final   10/14/2024 0.1 (H)   g/dL Final   09/03/2024 0.1 (H)   g/dL Final   08/12/2024 0.1 (H)   g/dL Final   07/22/2024 0.1 (H)   g/dL Final      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  IgG level 565  (4/28/25).  Last received IVIG on 4/28/25.  Continue monthly at [facility], next due 5/27/25.      Dysuria: urinalysis bland, suspect needs urologic evaluation for incontinence    Bone Health:  - Continue zoledronic acid 3.3 mg (renal dosing) every 3 months, received last on 9/3/24.    - Continue vitamin D supplement -- 2000 units PO daily.  Started zometa 3/30/23 will complete 3/2025.  12/23/24:  Sil reports she had a crown fall out and may need a root canal.  Placed zometa on hold until dental work has been completed.    4/14/25:  Dental work completed.  Resumed zometa 3/17/25, continue every 3 months.        Thyroid nodule  - repeat thyroid US on 2/19/24, 2 nodules noted with FNA recommended.  Thyroid biopsy 4/29/24 showing rare atypical cells from FNA of right mid lobe.  Repeat biopsy was nondiagnostic.  Dr. Magallanes recommended thyroid surgery vs continued observation.  iSl requested to having US monitoring for now.  Last visit with Dr. Magallanes on 9/9/24. US 2/24/25 with 30 mm mildly suspicious nodule to right lobe without significant change from prior imaging.  Plan to have US in 1 year with follow up.       Right lower back pain  - seems musculoskeletal  - MRI lumbar spine (3/4/24): degenerative changes, no evidence of progressive disease or fracture.    - MRI thoracic spine (3/12/24): degenerative changes, redemonstration of multifocal marrow signal abnormalities compatible with the given history of multiple myeloma, osseous expansion and epidural extension of neoplasm previously seen have improved, decrease in size and enhancement of previously seen lesions.    5/12/25:  Reports no further back pain.      Health Care Maintenance:  Vaccines:    Received influenza- vaccine 10/9/24.  Advised to obtain updated covid, RSV, shingrix, and prevnar 20 vaccines at local pharmacy.    RTC: Continue monthly IVIG at [facility] and zometa every 3 months. Carfilzomib in one month, discuss bite therapy  and CART treatment.     Addendum:  noted free lyte chain up to 85, spoke to patients daughter Frances regarding situation and treatment options. She will come with the patient on 6/16/25 at 3:00 pm to discuss options and finalize plans.   -----------------------------------------------------------------------------------------  Carmencita Marshall MD

## 2025-06-09 NOTE — PROGRESS NOTES
Pt presents today for C12D1 Carfilzomib. Pt to get Zometa today as well. Pt tolerated both infuses with no issues. Pt discharged to home in stable condition.

## 2025-06-09 NOTE — PATIENT INSTRUCTIONS
Looks like your free lyte chains are coming up again,  we talking about starting a type of treatment called a bite molecule which is given by subcutaneous injection.  Generally Need to receive first 3 doses as inpatient due to risk of side effects which can include  fevers, confusion, low blood pressure, lab abnormalities. After the start treatment is weekly and can be given in [place].

## 2025-06-10 LAB
ALBUMIN: 4 G/DL (ref 3.4–5)
ALPHA 1 GLOBULIN: 0.3 G/DL (ref 0.2–0.6)
ALPHA 2 GLOBULIN: 0.8 G/DL (ref 0.4–1.1)
BETA GLOBULIN: 0.7 G/DL (ref 0.5–1.2)
GAMMA GLOBULIN: 0.8 G/DL (ref 0.5–1.4)
KAPPA LC SERPL-MCNC: 0.35 MG/DL (ref 0.33–1.94)
KAPPA LC/LAMBDA SER: 0 {RATIO} (ref 0.26–1.65)
LAMBDA LC SERPL-MCNC: 85.45 MG/DL (ref 0.57–2.63)
PATH REVIEW-SERUM PROTEIN ELECTROPHORESIS: NORMAL
PROTEIN ELECTROPHORESIS COMMENT: NORMAL

## 2025-06-15 ASSESSMENT — ENCOUNTER SYMPTOMS
CARDIOVASCULAR NEGATIVE: 1
UNEXPECTED WEIGHT CHANGE: 0
BRUISES/BLEEDS EASILY: 1
EXTREMITY WEAKNESS: 1
APPETITE CHANGE: 0
DIAPHORESIS: 0
DIARRHEA: 1
ADENOPATHY: 0
RESPIRATORY NEGATIVE: 1
FEVER: 0
CHILLS: 0
HEMATURIA: 0
DYSURIA: 0
FATIGUE: 0

## 2025-06-16 ENCOUNTER — OFFICE VISIT (OUTPATIENT)
Dept: HEMATOLOGY/ONCOLOGY | Facility: HOSPITAL | Age: 85
End: 2025-06-16
Payer: MEDICARE

## 2025-06-16 VITALS
TEMPERATURE: 97 F | SYSTOLIC BLOOD PRESSURE: 128 MMHG | HEART RATE: 102 BPM | RESPIRATION RATE: 17 BRPM | WEIGHT: 162.7 LBS | BODY MASS INDEX: 30.24 KG/M2 | DIASTOLIC BLOOD PRESSURE: 67 MMHG | OXYGEN SATURATION: 100 %

## 2025-06-16 DIAGNOSIS — C90.00 MULTIPLE MYELOMA NOT HAVING ACHIEVED REMISSION (MULTI): Primary | ICD-10-CM

## 2025-06-16 PROCEDURE — 99215 OFFICE O/P EST HI 40 MIN: CPT | Performed by: INTERNAL MEDICINE

## 2025-06-16 PROCEDURE — 1159F MED LIST DOCD IN RCRD: CPT | Performed by: INTERNAL MEDICINE

## 2025-06-16 PROCEDURE — 1126F AMNT PAIN NOTED NONE PRSNT: CPT | Performed by: INTERNAL MEDICINE

## 2025-06-16 ASSESSMENT — PAIN SCALES - GENERAL: PAINLEVEL_OUTOF10: 0-NO PAIN

## 2025-06-16 NOTE — PROGRESS NOTES
"Patient ID: Sil West \"Ines" is a 84 y.o. female.    Treatment:   Oncology History Overview Note   I. Diagnosis (1/2023):  IgG Lambda Multiple Myeloma  Presenting symptoms: Urinary incontinence and diffuse bony pain  II. Workup:  Bone Biopsy (1/6/23):  Plasma cells neoplasm  Repeat Bone Marrow Biopsy (1/26/23):  Hypercellular marrow, 80-90% plasma cells, lambda-restricted  FISH: 1q gain (high risk), trisomy 3  MRI Spine (12/18/22):  Osseous metastases, involvement in mid-cervical, mid-thoracic, and lumbar vertebral bodies, as well as the right iliac bone  PET Scan (1/23):  FDG avidity in right iliac bone, right sacral ala, left posterior 6th rib, and appendicular skeleton  Serum and Urine Studies (1/19-1/28/23):  FKLC 1.57, FLLC 2947.7, K/L ratio 0  IgG 537, IgA 221, IgM 19, b2M 23.4, Hgb 5.7  M protein IgA lambda 0.7 g/dL   U/L  Creatinine: 2.33 (peaked at 3.15)  UPEP: Monoclonal lambda light chain at 386.6 mg/24H  Hepatitis B and C negative  III. Treatment:  Radiation (2/2/23):  Right hip + T6-T8 x 5 fractions (total 2000 cGy)  Induction (12/24/22 - 2/16/23):  Bortezomib and dexamethasone + daratumumab  C1 (1/28/23): Bortezomib 1, 4, 8, 11 + daratumumab x 2 doses  C2 (2/16/23): Weekly dosing of daratumumab and bortezomib (1, 8, 15), with lenalidomide planned when renal function allows  Response Evaluation (5/25/23):  CR with M protein 0.1 (IgG kappa c/w roscoe) and free lyte chain 1.37  C8 Roscoe RVD (Completed 7/6/23):  Transitioned with a VGPR vs CR, ongoing multifocal bone pain  IV. Response Evaluation (7/13/23):  Marked improvement in bony lesions  Pathological fractures in right pelvis and ribs, possible infiltrate in the right lower lobe base, and hypodensity in the right thyroid gland  V. Treatment Adjustment (Cycle 9 and forward): 10/2023  Velcade omitted  Transitioned to a 4-week schedule with Roscoe (day 1) and Revlimid 15 mg days 1-21/28 days     Multiple myeloma not having achieved remission " (Multi)   9/13/2023 Initial Diagnosis    Multiple myeloma not having achieved remission (CMS/HCC)     10/2/2023 - 7/8/2024 Chemotherapy    Daratumumab, Pomalidomide and dexamethasone (oral meds managed outside treatment plan) 28 Day Cycles - Maintenance     8/5/2024 -  Chemotherapy    Carfilzomib and Cyclophosphamide (Weekly) / Dexamethasone, 28 Day Cycles (Custom)       Past Medical History:  HTN, DLP  pre skin cancers,   diveriticulitis   peripheral artery disease not amenable to surgery  Thyroid nodules under observation  .  Surgical History:  squamous cell carcinoma removed to left hand and right arm 6/2024 2/21/25 Mohs surgery done for 4 areas of SCC on 5 fluorouracil 5%  Past Surgical History:   Procedure Laterality Date    CT GUIDED PERCUTANEOUS BIOPSY BONE DEEP  01/06/2023    CT GUIDED PERCUTANEOUS BIOPSY BONE DEEP 1/6/2023 [facility]    OTHER SURGICAL HISTORY      THYROID BIOPSY      Family History:     Family History   Problem Relation Name Age of Onset    Alzheimer's disease Mother      Colon cancer Father      Rashes / Skin problems Sister      Rashes / Skin problems Brother       Social History:  ,  passed away on 10/24/23.  3 grown children (1 daughter, 2 sons). 2 grandchildren.  Has 6 cats.  Enjoyed skiing.  Occupation: retired .  Social History     Tobacco Use    Smoking status: Never     Passive exposure: Never    Smokeless tobacco: Never   Vaping Use    Vaping status: Never Used   Substance Use Topics    Alcohol use: Never    Drug use: Never      -------------------------------------------------------------------------------------------------------  Subjective       Sil West is a 84 year old female with IgG lambda multiple myeloma.  She recently had an increase to her lambda free light chains.  PET imaging done 7/17/24 showed an new soft tissue uptake in right 11th rib and interestingly patient has pain there as well.  Transitioned to carfilzomib,  cyclophophamide, dex, cycle 1 on 8/5/24     Sil presents to the clinic today (6/16/25) accompanied by her daughter for discussion of treatment options for disease progression.  She is day 8 of cycle 12 of of carfilzomib. Starting at C10 , cyclophosphamide drops out and she is only receiving carfilzomib every 2 weeks.  Due to restlessness. and issues sleeping after receiving steroids with treatment dose of dexamethasone was decreased from 20 mg to 8 mg.  Receiving IVIG every month.  Unfortunately looks like free lyte chains starting to increase again.  Receiving zometa restarted 3/17/25 after completion of dental work, scheduled for every 3 months.  Patient feels wonderful, mowing her grass. No intercurrent problems. Patient remains active with her garden and her cats.     Following closely with dermatology.  Continues to have intermittent loose stools, but feels is food related.  Taking imodium as needed.      Review of Systems   Constitutional:  Negative for appetite change, chills, diaphoresis, fatigue, fever and unexpected weight change.   Respiratory: Negative.     Cardiovascular: Negative.    Gastrointestinal:  Positive for diarrhea.   Genitourinary:  Negative for dysuria and hematuria.    Skin: Negative.         Had multiple spots of SCC removed by dermatology.   Neurological:  Positive for extremity weakness (mild right leg weakness).   Hematological:  Negative for adenopathy. Bruises/bleeds easily (bruises easily).   All other systems reviewed and are negative.  -------------------------------------------------------------------------------------------------------  Objective   BSA: 1.79 meters squared  /67   Pulse 102   Temp 36.1 °C (97 °F)   Resp 17   Wt 73.8 kg (162 lb 11.2 oz)   SpO2 100%   BMI 30.24 kg/m²     Physical Exam  Vitals reviewed.   Constitutional:       Appearance: Normal appearance. She is well-developed and normal weight.   HENT:      Head: Normocephalic and atraumatic.       Nose: Nose normal.   Eyes:      General: No scleral icterus.     Extraocular Movements: Extraocular movements intact.      Conjunctiva/sclera: Conjunctivae normal.      Pupils: Pupils are equal, round, and reactive to light.   Cardiovascular:      Rate and Rhythm: Normal rate and regular rhythm.      Pulses: Normal pulses.      Heart sounds: Normal heart sounds.   Pulmonary:      Effort: Pulmonary effort is normal.      Breath sounds: Normal breath sounds.   Abdominal:      General: Abdomen is flat. Bowel sounds are normal. There is no distension.      Palpations: Abdomen is soft. There is no mass.      Tenderness: There is no abdominal tenderness.   Musculoskeletal:         General: Normal range of motion.      Right lower leg: Edema (non-pitting) present.      Left lower leg: Edema (non-pitting) present.      Comments: No spine tenderness   Lymphadenopathy:      Comments: No lymphadenopathy   Skin:     General: Skin is warm and dry.   Neurological:      General: No focal deficit present.      Mental Status: She is alert and oriented to person, place, and time.      Comments: Normal strength and gait good strength right hip flexor   Psychiatric:         Mood and Affect: Mood normal.         Behavior: Behavior normal.         Thought Content: Thought content normal.     Performance Status:  Symptomatic; fully ambulatory  -------------------------------------------------------------------------------------------------------  Assessment and Plan:    Multiple myeloma not having achieved remission (CMS/HCC)  IgG lambda MM    - Currently on daratumamab/Lenalidomide. Continued uptrending of free lambda light chain. M-protein still 0.1g IgG kappa, likely represents daratumumab. Will add weekly dex 10 mg to see if this could deepen her response.     5/13/24:  Continues to have increasing free lambda light chain, M-protein remains at 0.1.  Receiving C20 daratumumab today.  Discussed that free lyte chain continues to  increase significantly and suggested switching revlimid to pomalidomide  4 mg 21/28 days since no other CRAB criteria,  -Taking aspirin 81 mg daily for VTE prophylaxis during treatment with pomalidomide      PET scan (7/17/24)   IMPRESSION:  1. Redemonstration of an FDG avid right posterolateral 11th rib  fracture now with evidence of an underlying soft tissue lesion.  2. Previously described hypermetabolic osseous lesions in the right  iliac, right superior pubic ramus, and right aspect of the pubic  symphysis have continued to decrease in metabolic activity with  persistent FDG avidity likely representing continued treatment  response with residual viable disease, attention on follow-up imaging.  3. Healing right inferior pubic ramus fracture with decreased extent  of hypermetabolic activity.    7/18/24:  Laboratory results show major increase in free lyte chains to 62 mg/dL up from 18 in March 2024, only sx is right rib pain where PET from yesterday shows a new lesion, no other new lesions seen,      Bone marrow biopsy results from 7/22/24 showing limited bone marrow (20% cellularity) with maturing trilineage hematopoiesis, minimal involvement by lambda-restricted plasma cells by flow cytometry. ECHO 8/2/2024 LVEF 60-65%    Started cycle 1 carfilzomib, cyclophosphamide, dexamethasone 8//24, cyclophosphamide omitted from regimen with cycle 10.     6/17/25:   Day 8 of cycle  C11 of carfilzomib and dexamethasone. CBC results stable from today.  Marked increase in lambda lyte chains to 85.45 from 23.95 (5/12/25) up from 6.76 (4/14/25)-see below.  SPEP normal (4/14/25), SPEP in process from today.  No concerning findings on physical examination.   Dexamethasone 8 mg on day 1 and 15 with treatment.  With cycle 10, cyclophosphamide was omitted and carfilzomib maintenance is transitioned to every other week.  Patients daughter Beulah here with the patient today to discussed treatment for rising free lyte chains ( after  visit note free lyte chains from today almost tripled since 1 month ago)  We discussed CART cell therapy and requires about 3 weeks hospitalization.  CRS and ICANs toxicities reviewed.  We also discussed bite therapy, would recommend talquetamab as would still leave door open for subsequent CART therapy with BCMA.  Patient would be eligible to participate in [facility]ANCO[facility] study evaluating ways to treat dysgeusia on talvey therapy. .  We also discussed the two bite therapies, talquetamab and taclistamab, each of which have ramp up period to monitor for cytokine release syndrome and immune effector cell neurotoxicity.  In addition talquetamab has side effect of taste changes and skin toxicity, but would allow subsequent CART cell therapy. Will discuss options further when patients daughter can come with her. Patient was quite overwhelmed by this information and will be going home to make her decision.  It appears that patients family are able to provide plenty of support to help patient undergo any of the above therapies.      Ig Fate Free Light Chain   Date Value Ref Range Status   06/09/2025 0.35 0.33 - 1.94 mg/dL Final   05/12/2025 0.38 0.33 - 1.94 mg/dL Final   04/14/2025 0.32 (L) 0.33 - 1.94 mg/dL Final   03/17/2025 0.20 (L) 0.33 - 1.94 mg/dL Final   02/17/2025 0.41 0.33 - 1.94 mg/dL Final     Ig Lambda Free Light Chain   Date Value Ref Range Status   06/09/2025 85.45 (H) 0.57 - 2.63 mg/dL Final   05/12/2025 23.95 (H) 0.57 - 2.63 mg/dL Final   04/14/2025 6.76 (H) 0.57 - 2.63 mg/dL Final   03/17/2025 5.52 (H) 0.57 - 2.63 mg/dL Final   02/17/2025 4.62 (H) 0.57 - 2.63 mg/dL Final     Kappa/Lambda Ratio   Date Value Ref Range Status   06/09/2025 0.00 (L) 0.26 - 1.65 Final   05/12/2025 0.02 (L) 0.26 - 1.65 Final   04/14/2025 0.05 (L) 0.26 - 1.65 Final   03/17/2025 0.04 (L) 0.26 - 1.65 Final   02/17/2025 0.09 (L) 0.26 - 1.65 Final     M-PROTEIN 1   Date Value Ref Range Status   10/28/2024 0.1 (H)   g/dL Final   10/14/2024 0.1  (H)   g/dL Final   09/03/2024 0.1 (H)   g/dL Final   08/12/2024 0.1 (H)   g/dL Final   07/22/2024 0.1 (H)   g/dL Final      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  IgG level 565 (4/28/25).  Last received IVIG on 4/28/25.  Continue monthly at [facility], next due 5/27/25.      Dysuria: urinalysis bland, suspect needs urologic evaluation for incontinence    Bone Health:  - Continue zoledronic acid 3.3 mg (renal dosing) every 3 months, received last on 9/3/24.    - Continue vitamin D supplement -- 2000 units PO daily.  Started zometa 3/30/23 will complete 3/2025.  12/23/24:  Sil reports she had a crown fall out and may need a root canal.  Placed zometa on hold until dental work has been completed.    4/14/25:  Dental work completed.  Resumed zometa 3/17/25, continue every 3 months.        Thyroid nodule  - repeat thyroid US on 2/19/24, 2 nodules noted with FNA recommended.  Thyroid biopsy 4/29/24 showing rare atypical cells from FNA of right mid lobe.  Repeat biopsy was nondiagnostic.  Dr. Magallanes recommended thyroid surgery vs continued observation.  Sil requested to having US monitoring for now.  Last visit with Dr. Magallanes on 9/9/24. US 2/24/25 with 30 mm mildly suspicious nodule to right lobe without significant change from prior imaging.  Plan to have US in 1 year with follow up.       Right lower back pain  - seems musculoskeletal  - MRI lumbar spine (3/4/24): degenerative changes, no evidence of progressive disease or fracture.    - MRI thoracic spine (3/12/24): degenerative changes, redemonstration of multifocal marrow signal abnormalities compatible with the given history of multiple myeloma, osseous expansion and epidural extension of neoplasm previously seen have improved, decrease in size and enhancement of previously seen lesions.    5/12/25:  Reports no further back pain.       Health Care Maintenance:  Vaccines:    Received influenza- vaccine 10/9/24.  Advised to obtain updated covid, RSV, shingrix, and prevnar 20 vaccines at local pharmacy.    RTC: Continue monthly IVIG at [facility] and zometa every 3 months. Carfilzomib in one month, discuss bite therapy and CART treatment.     Addendum:  noted free lyte chain up to 85, spoke to patients daughter Frances regarding situation and treatment options. She will come with the patient on 6/16/25 at 3:00 pm to discuss options and finalize plans.  Followup in person in 3 weeks for next cycle of carfilzomib may read cyclophophamide.  -----------------------------------------------------------------------------------------  Carmencita Marshall MD

## 2025-06-20 ENCOUNTER — APPOINTMENT (OUTPATIENT)
Dept: DERMATOLOGY | Facility: CLINIC | Age: 85
End: 2025-06-20
Payer: MEDICARE

## 2025-06-20 DIAGNOSIS — L57.9 SKIN CHANGES DUE TO CHRONIC EXPOSURE TO NONIONIZING RADIATION: ICD-10-CM

## 2025-06-20 DIAGNOSIS — L81.4 LENTIGO: ICD-10-CM

## 2025-06-20 DIAGNOSIS — Z85.828 HISTORY OF NONMELANOMA SKIN CANCER: ICD-10-CM

## 2025-06-20 DIAGNOSIS — B07.8 COMMON WART: ICD-10-CM

## 2025-06-20 DIAGNOSIS — D48.5 NEOPLASM OF UNCERTAIN BEHAVIOR OF SKIN: ICD-10-CM

## 2025-06-20 DIAGNOSIS — Z12.83 ENCOUNTER FOR SCREENING FOR MALIGNANT NEOPLASM OF SKIN: Primary | ICD-10-CM

## 2025-06-20 DIAGNOSIS — L90.5 SCAR CONDITIONS AND FIBROSIS OF SKIN: ICD-10-CM

## 2025-06-20 DIAGNOSIS — D22.9 BENIGN NEVUS: ICD-10-CM

## 2025-06-20 DIAGNOSIS — D18.01 ANGIOMA OF SKIN: ICD-10-CM

## 2025-06-20 DIAGNOSIS — L57.0 ACTINIC KERATOSIS: ICD-10-CM

## 2025-06-20 DIAGNOSIS — L82.1 SEBORRHEIC KERATOSIS: ICD-10-CM

## 2025-06-20 PROCEDURE — 1036F TOBACCO NON-USER: CPT | Performed by: NURSE PRACTITIONER

## 2025-06-20 PROCEDURE — 99213 OFFICE O/P EST LOW 20 MIN: CPT | Performed by: NURSE PRACTITIONER

## 2025-06-20 PROCEDURE — 1159F MED LIST DOCD IN RCRD: CPT | Performed by: NURSE PRACTITIONER

## 2025-06-20 PROCEDURE — 17004 DESTROY PREMAL LESIONS 15/>: CPT | Performed by: NURSE PRACTITIONER

## 2025-06-20 PROCEDURE — 1160F RVW MEDS BY RX/DR IN RCRD: CPT | Performed by: NURSE PRACTITIONER

## 2025-06-20 NOTE — Clinical Note
-I reviewed the etiology of warts in detail with the patient. Discussed that this is a viral infection of the skin. Warts are difficult to eradicate as they occur in areas of relative immune incompetent skin. Treatments are aimed at creating local irritation to the skin, in order to activate the body's immune system to resolve the viral infection.   -Treatment options discussed with the family including topical therapies.  -Today elect to do the following:   -Start OTC salicylic acid 40% to involved areas once daily.  Instructions provided for use.   
A cherry hemangioma is a small macule (small, flat, smooth area) or papule (small, solid bump) formed from an overgrowth of tiny blood vessels in the skin. Cherry hemangiomas are characteristically red or purplish in color. They often first appear in middle adulthood and usually increase in number with age. Cherry hemangiomas are noncancerous (benign) and are common in adults.    The present appearance of the lesion is not worrisome but it should continue to be observed and testing/treatment may be warranted if change occurs.  
A solar lentigo (plural, solar lentigines), also known as a sun-induced freckle or senile lentigo, is a dark (hyperpigmented) lesion caused by natural or artificial ultraviolet (UV) light. Solar lentigines may be single or multiple. This type of lentigo is different from a simple lentigo (lentigo simplex) because it is caused by exposure to UV light. Solar lentigines are benign, but they do indicate excessive sun exposure, a risk factor for the development of skin cancer.    To prevent solar lentigines, avoid exposure to sunlight in midday (10 AM to 3 PM), wear sun-protective clothing (tightly woven clothes and hats), and apply sunscreen (SPF 30 UVA and UVB block).    The present appearance of the lesion is not worrisome but it should continue to be observed and testing/treatment may be warranted if change occurs.  
ABCDEs of melanoma and atypical moles were discussed with the patient.    Patient was instructed to perform monthly self skin examination.  We recommended that the patient have regular full skin exams given an increased risk of subsequent skin cancers.    The patient was instructed to use sun protective behaviors including use of broad spectrum sunscreens and sun protective clothing to reduce risk of skin cancers.    Warning signs of non-melanoma skin cancer discussed.  
ABCDEs of melanoma and atypical moles were discussed with the patient.    Patient was instructed to perform monthly self skin examination.  We recommended that the patient have regular full skin exams given an increased risk of subsequent skin cancers.    The patient was instructed to use sun protective behaviors including use of broad spectrum sunscreens and sun protective clothing to reduce risk of skin cancers.    Warning signs of non-melanoma skin cancer discussed.  
Actinic changes in the form of freckles, lentigines and hyper/hypopigmentation   
Never did efudex treatment to the forearms/hands. Given more HAK in nature, would benefit more from LN2. Patient in agreement.     WHAT IS ACTINIC KERATOSIS?   - Actinic keratosis (AK) is a skin condition caused by sun damage. It causes scaly, rough, or bumpy spots on the skin.  - If left alone, AKs may turn into a skin cancer. People who burn easily or have trouble tanning are at more risk for developing AKs.   - There is no one test for AKs and diagnosis is made by clinical appearance. Treatment options include cryotherapy, therapy with lights, and various creams (e.g., topical 5-fluorocuracil, imiquimod).       To lower the chance of getting AK, you can:       ?  Stay out of the sun in the middle of the day (from 10 a.m. to 4 p.m.)       ?  Wear sunscreen - An SPF of at least 30 is best. The SPF number is on the sunscreen bottle or tube.       ?  Wear a wide-brimmed hat, long-sleeved shirt, long pants, or long skirt outside. A baseball hat does not give much protection.        ?  Do not use tanning beds.        ?  Keep a low-fat diet, less than 21% of calories should come from fat       ?  Take Vitamin B3 (nicotinomide) 500mg twice daily.      YOUR TREATMENT PLAN  - At this time I recommend treatment with cryotherapy.  - Possible side effects of liquid nitrogen treatment reviewed including formation of blisters, crusting, tenderness, scar, and discoloration which may be permanent.  - Patient advised to return the office for re-evaluation if the treated lesion(s) do not resolve within 4-6 weeks. Patient verbalizes understanding.  
Scattered cherry-red papule(s).  
Scattered tan macules in sun-exposed areas.  
Scattered, uniform and benign-appearing, regular brown melanocytic papules and macules.  
Seborrheic keratoses are common noncancerous (benign) growths of unknown cause seen in adults due to a thickening of an area of the top skin layer. Seborrheic keratoses may appear as if they are stuck on to the skin. They have distinct borders, and they may appear as papules (small, solid bumps) or plaques (solid, raised patches that are bigger than a thumbnail). They may be the same color as your skin, or they may be pink, light brown, darker brown, or very dark brown, or sometimes may appear black.    There is no way to prevent new seborrheic keratoses from forming. Seborrheic keratoses can be removed, but removal is considered a cosmetic issue and is usually not covered by insurance.    PLAN  No treatment is needed unless there is irritation from clothing, such as itching or bleeding.  2.   Some lotions containing alpha hydroxy acids, salicylic acid, or urea may make the areas feel smoother with regular use but will not eliminate them.  
Stuck on verrucous, tan-brown papules and plaques.    
The present appearance of the lesion is not worrisome but it should continue to be observed and testing/treatment may be warranted if change occurs.  
The scar is clear, there is no evidence of recurrence.  The present appearance of the scar is not worrisome but it should continue to be observed and testing/treatment may be warranted if change occurs.    
Thin erythematous papules with gritty scale  
Verrucous papules  
Well healed scar at the site(s) of prior treatment with no evidence of recurrence.        
Attending Attestation (For Attendings USE Only)...

## 2025-06-23 ENCOUNTER — TELEPHONE (OUTPATIENT)
Dept: ADMISSION | Facility: HOSPITAL | Age: 85
End: 2025-06-23
Payer: MEDICARE

## 2025-06-23 ENCOUNTER — INFUSION (OUTPATIENT)
Dept: HEMATOLOGY/ONCOLOGY | Facility: CLINIC | Age: 85
End: 2025-06-23
Payer: MEDICARE

## 2025-06-23 ENCOUNTER — DOCUMENTATION (OUTPATIENT)
Dept: HEMATOLOGY/ONCOLOGY | Facility: HOSPITAL | Age: 85
End: 2025-06-23

## 2025-06-23 VITALS
HEART RATE: 81 BPM | BODY MASS INDEX: 30.16 KG/M2 | DIASTOLIC BLOOD PRESSURE: 76 MMHG | RESPIRATION RATE: 16 BRPM | OXYGEN SATURATION: 99 % | WEIGHT: 162.26 LBS | SYSTOLIC BLOOD PRESSURE: 136 MMHG | TEMPERATURE: 97.9 F

## 2025-06-23 DIAGNOSIS — D80.1 HYPOGAMMAGLOBULINEMIA (MULTI): ICD-10-CM

## 2025-06-23 DIAGNOSIS — C90.00 MULTIPLE MYELOMA NOT HAVING ACHIEVED REMISSION (MULTI): ICD-10-CM

## 2025-06-23 DIAGNOSIS — C90.00 MULTIPLE MYELOMA NOT HAVING ACHIEVED REMISSION (MULTI): Primary | ICD-10-CM

## 2025-06-23 LAB
ALBUMIN SERPL BCP-MCNC: 3.9 G/DL (ref 3.4–5)
ALP SERPL-CCNC: 58 U/L (ref 33–136)
ALT SERPL W P-5'-P-CCNC: 8 U/L (ref 7–45)
ANION GAP SERPL CALC-SCNC: 13 MMOL/L (ref 10–20)
AST SERPL W P-5'-P-CCNC: 15 U/L (ref 9–39)
BASOPHILS # BLD AUTO: 0.02 X10*3/UL (ref 0–0.1)
BASOPHILS NFR BLD AUTO: 0.4 %
BILIRUB SERPL-MCNC: 0.4 MG/DL (ref 0–1.2)
BUN SERPL-MCNC: 24 MG/DL (ref 6–23)
CALCIUM SERPL-MCNC: 9.6 MG/DL (ref 8.6–10.3)
CHLORIDE SERPL-SCNC: 107 MMOL/L (ref 98–107)
CO2 SERPL-SCNC: 26 MMOL/L (ref 21–32)
CREAT SERPL-MCNC: 1.07 MG/DL (ref 0.5–1.05)
EGFRCR SERPLBLD CKD-EPI 2021: 52 ML/MIN/1.73M*2
EOSINOPHIL # BLD AUTO: 0.03 X10*3/UL (ref 0–0.4)
EOSINOPHIL NFR BLD AUTO: 0.7 %
ERYTHROCYTE [DISTWIDTH] IN BLOOD BY AUTOMATED COUNT: 11.9 % (ref 11.5–14.5)
GLUCOSE SERPL-MCNC: 111 MG/DL (ref 74–99)
HCT VFR BLD AUTO: 32.7 % (ref 36–46)
HGB BLD-MCNC: 11.1 G/DL (ref 12–16)
IGA SERPL-MCNC: 12 MG/DL (ref 70–400)
IGG SERPL-MCNC: 694 MG/DL (ref 700–1600)
IGM SERPL-MCNC: 5 MG/DL (ref 40–230)
IMM GRANULOCYTES # BLD AUTO: 0.01 X10*3/UL (ref 0–0.5)
IMM GRANULOCYTES NFR BLD AUTO: 0.2 % (ref 0–0.9)
LYMPHOCYTES # BLD AUTO: 0.37 X10*3/UL (ref 0.8–3)
LYMPHOCYTES NFR BLD AUTO: 8 %
MCH RBC QN AUTO: 34.3 PG (ref 26–34)
MCHC RBC AUTO-ENTMCNC: 33.9 G/DL (ref 32–36)
MCV RBC AUTO: 101 FL (ref 80–100)
MONOCYTES # BLD AUTO: 0.39 X10*3/UL (ref 0.05–0.8)
MONOCYTES NFR BLD AUTO: 8.5 %
NEUTROPHILS # BLD AUTO: 3.78 X10*3/UL (ref 1.6–5.5)
NEUTROPHILS NFR BLD AUTO: 82.2 %
NRBC BLD-RTO: 0 /100 WBCS (ref 0–0)
PLATELET # BLD AUTO: 268 X10*3/UL (ref 150–450)
POTASSIUM SERPL-SCNC: 4.1 MMOL/L (ref 3.5–5.3)
PROT SERPL-MCNC: 6.4 G/DL (ref 6.4–8.2)
RBC # BLD AUTO: 3.24 X10*6/UL (ref 4–5.2)
SODIUM SERPL-SCNC: 142 MMOL/L (ref 136–145)
WBC # BLD AUTO: 4.6 X10*3/UL (ref 4.4–11.3)

## 2025-06-23 PROCEDURE — 2500000004 HC RX 250 GENERAL PHARMACY W/ HCPCS (ALT 636 FOR OP/ED): Mod: JZ,TB

## 2025-06-23 PROCEDURE — 82784 ASSAY IGA/IGD/IGG/IGM EACH: CPT

## 2025-06-23 PROCEDURE — 85025 COMPLETE CBC W/AUTO DIFF WBC: CPT

## 2025-06-23 PROCEDURE — 96413 CHEMO IV INFUSION 1 HR: CPT

## 2025-06-23 PROCEDURE — 80053 COMPREHEN METABOLIC PANEL: CPT

## 2025-06-23 PROCEDURE — 2500000004 HC RX 250 GENERAL PHARMACY W/ HCPCS (ALT 636 FOR OP/ED): Performed by: INTERNAL MEDICINE

## 2025-06-23 PROCEDURE — 96367 TX/PROPH/DG ADDL SEQ IV INF: CPT

## 2025-06-23 PROCEDURE — 2500000001 HC RX 250 WO HCPCS SELF ADMINISTERED DRUGS (ALT 637 FOR MEDICARE OP)

## 2025-06-23 PROCEDURE — 96366 THER/PROPH/DIAG IV INF ADDON: CPT | Mod: INF

## 2025-06-23 RX ORDER — DEXAMETHASONE 4 MG/1
16 TABLET ORAL ONCE
OUTPATIENT
Start: 2025-07-07

## 2025-06-23 RX ORDER — EPINEPHRINE 0.3 MG/.3ML
0.3 INJECTION SUBCUTANEOUS EVERY 5 MIN PRN
OUTPATIENT
Start: 2025-07-07

## 2025-06-23 RX ORDER — FAMOTIDINE 10 MG/ML
20 INJECTION, SOLUTION INTRAVENOUS ONCE AS NEEDED
Status: DISCONTINUED | OUTPATIENT
Start: 2025-06-23 | End: 2025-06-23 | Stop reason: HOSPADM

## 2025-06-23 RX ORDER — ALBUTEROL SULFATE 0.83 MG/ML
3 SOLUTION RESPIRATORY (INHALATION) AS NEEDED
OUTPATIENT
Start: 2025-07-10

## 2025-06-23 RX ORDER — FAMOTIDINE 10 MG/ML
20 INJECTION, SOLUTION INTRAVENOUS ONCE AS NEEDED
OUTPATIENT
Start: 2025-07-10

## 2025-06-23 RX ORDER — DIPHENHYDRAMINE HYDROCHLORIDE 50 MG/ML
50 INJECTION, SOLUTION INTRAMUSCULAR; INTRAVENOUS AS NEEDED
Status: DISCONTINUED | OUTPATIENT
Start: 2025-06-23 | End: 2025-06-23 | Stop reason: HOSPADM

## 2025-06-23 RX ORDER — PROCHLORPERAZINE MALEATE 10 MG
10 TABLET ORAL EVERY 6 HOURS PRN
Status: DISCONTINUED | OUTPATIENT
Start: 2025-06-23 | End: 2025-06-23 | Stop reason: ALTCHOICE

## 2025-06-23 RX ORDER — FAMOTIDINE 10 MG/ML
20 INJECTION, SOLUTION INTRAVENOUS ONCE AS NEEDED
OUTPATIENT
Start: 2025-07-07

## 2025-06-23 RX ORDER — DIPHENHYDRAMINE HYDROCHLORIDE 50 MG/ML
50 INJECTION, SOLUTION INTRAMUSCULAR; INTRAVENOUS AS NEEDED
OUTPATIENT
Start: 2025-07-13

## 2025-06-23 RX ORDER — PROCHLORPERAZINE EDISYLATE 5 MG/ML
10 INJECTION INTRAMUSCULAR; INTRAVENOUS EVERY 6 HOURS PRN
OUTPATIENT
Start: 2025-07-07

## 2025-06-23 RX ORDER — DIPHENHYDRAMINE HYDROCHLORIDE 50 MG/ML
50 INJECTION, SOLUTION INTRAMUSCULAR; INTRAVENOUS AS NEEDED
OUTPATIENT
Start: 2025-07-07

## 2025-06-23 RX ORDER — EPINEPHRINE 0.3 MG/.3ML
0.3 INJECTION SUBCUTANEOUS EVERY 5 MIN PRN
OUTPATIENT
Start: 2025-07-10

## 2025-06-23 RX ORDER — DIPHENHYDRAMINE HCL 50 MG
50 CAPSULE ORAL ONCE
OUTPATIENT
Start: 2025-07-13

## 2025-06-23 RX ORDER — ALBUTEROL SULFATE 0.83 MG/ML
3 SOLUTION RESPIRATORY (INHALATION) AS NEEDED
OUTPATIENT
Start: 2025-07-21

## 2025-06-23 RX ORDER — PROCHLORPERAZINE EDISYLATE 5 MG/ML
10 INJECTION INTRAMUSCULAR; INTRAVENOUS EVERY 6 HOURS PRN
OUTPATIENT
Start: 2025-07-10

## 2025-06-23 RX ORDER — DIPHENHYDRAMINE HYDROCHLORIDE 50 MG/ML
50 INJECTION, SOLUTION INTRAMUSCULAR; INTRAVENOUS AS NEEDED
OUTPATIENT
Start: 2025-07-21

## 2025-06-23 RX ORDER — ACETAMINOPHEN 325 MG/1
650 TABLET ORAL ONCE
OUTPATIENT
Start: 2025-07-10

## 2025-06-23 RX ORDER — EPINEPHRINE 0.3 MG/.3ML
0.3 INJECTION SUBCUTANEOUS EVERY 5 MIN PRN
OUTPATIENT
Start: 2025-07-13

## 2025-06-23 RX ORDER — PROCHLORPERAZINE EDISYLATE 5 MG/ML
10 INJECTION INTRAMUSCULAR; INTRAVENOUS EVERY 6 HOURS PRN
Status: DISCONTINUED | OUTPATIENT
Start: 2025-06-23 | End: 2025-06-23 | Stop reason: ALTCHOICE

## 2025-06-23 RX ORDER — DIPHENHYDRAMINE HCL 25 MG
25 CAPSULE ORAL ONCE
OUTPATIENT
Start: 2025-07-21

## 2025-06-23 RX ORDER — FAMOTIDINE 10 MG/ML
20 INJECTION, SOLUTION INTRAVENOUS ONCE AS NEEDED
OUTPATIENT
Start: 2025-07-21

## 2025-06-23 RX ORDER — ACETAMINOPHEN 325 MG/1
650 TABLET ORAL ONCE
OUTPATIENT
Start: 2025-07-07

## 2025-06-23 RX ORDER — ACETAMINOPHEN 325 MG/1
650 TABLET ORAL ONCE
OUTPATIENT
Start: 2025-07-13

## 2025-06-23 RX ORDER — PROCHLORPERAZINE MALEATE 10 MG
10 TABLET ORAL EVERY 6 HOURS PRN
OUTPATIENT
Start: 2025-07-13

## 2025-06-23 RX ORDER — FAMOTIDINE 10 MG/ML
20 INJECTION, SOLUTION INTRAVENOUS ONCE AS NEEDED
OUTPATIENT
Start: 2025-07-13

## 2025-06-23 RX ORDER — DEXAMETHASONE 4 MG/1
8 TABLET ORAL ONCE
Status: COMPLETED | OUTPATIENT
Start: 2025-06-23 | End: 2025-06-23

## 2025-06-23 RX ORDER — ALBUTEROL SULFATE 0.83 MG/ML
3 SOLUTION RESPIRATORY (INHALATION) AS NEEDED
OUTPATIENT
Start: 2025-07-13

## 2025-06-23 RX ORDER — DIPHENHYDRAMINE HCL 50 MG
50 CAPSULE ORAL ONCE
OUTPATIENT
Start: 2025-07-07

## 2025-06-23 RX ORDER — ACETAMINOPHEN 325 MG/1
650 TABLET ORAL ONCE
OUTPATIENT
Start: 2025-07-21

## 2025-06-23 RX ORDER — DIPHENHYDRAMINE HCL 50 MG
50 CAPSULE ORAL ONCE
OUTPATIENT
Start: 2025-07-10

## 2025-06-23 RX ORDER — PROCHLORPERAZINE MALEATE 10 MG
10 TABLET ORAL EVERY 6 HOURS PRN
OUTPATIENT
Start: 2025-07-10

## 2025-06-23 RX ORDER — ALBUTEROL SULFATE 0.83 MG/ML
3 SOLUTION RESPIRATORY (INHALATION) AS NEEDED
Status: DISCONTINUED | OUTPATIENT
Start: 2025-06-23 | End: 2025-06-23 | Stop reason: HOSPADM

## 2025-06-23 RX ORDER — EPINEPHRINE 0.3 MG/.3ML
0.3 INJECTION SUBCUTANEOUS EVERY 5 MIN PRN
Status: DISCONTINUED | OUTPATIENT
Start: 2025-06-23 | End: 2025-06-23 | Stop reason: HOSPADM

## 2025-06-23 RX ORDER — DEXAMETHASONE 4 MG/1
16 TABLET ORAL ONCE
OUTPATIENT
Start: 2025-07-13

## 2025-06-23 RX ORDER — ACETAMINOPHEN 325 MG/1
650 TABLET ORAL ONCE
Status: COMPLETED | OUTPATIENT
Start: 2025-06-23 | End: 2025-06-23

## 2025-06-23 RX ORDER — DIPHENHYDRAMINE HYDROCHLORIDE 50 MG/ML
50 INJECTION, SOLUTION INTRAMUSCULAR; INTRAVENOUS AS NEEDED
OUTPATIENT
Start: 2025-07-10

## 2025-06-23 RX ORDER — DIPHENHYDRAMINE HCL 25 MG
25 CAPSULE ORAL ONCE
Status: COMPLETED | OUTPATIENT
Start: 2025-06-23 | End: 2025-06-23

## 2025-06-23 RX ORDER — EPINEPHRINE 0.3 MG/.3ML
0.3 INJECTION SUBCUTANEOUS EVERY 5 MIN PRN
OUTPATIENT
Start: 2025-07-21

## 2025-06-23 RX ORDER — PROCHLORPERAZINE MALEATE 10 MG
10 TABLET ORAL EVERY 6 HOURS PRN
OUTPATIENT
Start: 2025-07-07

## 2025-06-23 RX ORDER — PROCHLORPERAZINE EDISYLATE 5 MG/ML
10 INJECTION INTRAMUSCULAR; INTRAVENOUS EVERY 6 HOURS PRN
OUTPATIENT
Start: 2025-07-13

## 2025-06-23 RX ORDER — ALBUTEROL SULFATE 0.83 MG/ML
3 SOLUTION RESPIRATORY (INHALATION) AS NEEDED
OUTPATIENT
Start: 2025-07-07

## 2025-06-23 RX ORDER — DEXAMETHASONE 4 MG/1
16 TABLET ORAL ONCE
OUTPATIENT
Start: 2025-07-10

## 2025-06-23 RX ADMIN — IMMUNE GLOBULIN (HUMAN) 25 G: 10 INJECTION INTRAVENOUS; SUBCUTANEOUS at 10:14

## 2025-06-23 RX ADMIN — CARFILZOMIB 120 MG: 60 INJECTION, POWDER, LYOPHILIZED, FOR SOLUTION INTRAVENOUS at 12:20

## 2025-06-23 RX ADMIN — ACETAMINOPHEN 650 MG: 325 TABLET ORAL at 09:41

## 2025-06-23 RX ADMIN — DIPHENHYDRAMINE HYDROCHLORIDE 25 MG: 25 CAPSULE ORAL at 09:41

## 2025-06-23 RX ADMIN — DEXAMETHASONE 8 MG: 4 TABLET ORAL at 11:51

## 2025-06-23 RX ADMIN — ONDANSETRON HYDROCHLORIDE 12 MG: 8 TABLET, FILM COATED ORAL at 11:51

## 2025-06-23 ASSESSMENT — PAIN SCALES - GENERAL: PAINLEVEL_OUTOF10: 0-NO PAIN

## 2025-06-23 NOTE — TELEPHONE ENCOUNTER
Frances, daughter called back and said they talked with Dr. Marshall about BITE therapy last week at their visit. She said Sil wants to go ahead with this treatment and what would be the next steps? She is asking for a call back at [phone]. Messaged team.

## 2025-06-23 NOTE — PROGRESS NOTES
Spoke to patients daughter juan m regarding patients treatment decision. Patient and her daughter have decided to start talquetamab or Talvey bite therapy which will leave door open for CART cell therapy in the future in event of disease progression.  Patient would like to receive all therapy as an outpatient and daughter states that she will be staying with her mother for the first week of treatment. We reviewed side effects of talvey again, including crs, icans, dysgeusia, and hand foot syndrome.  Patient declined clinical trial as she prefers not to be hospitalized.  I explained that if the patient has severe side effects she may require admission.  Orders for outpatient talquetamab placed in [facility]. Patient will return on July 3, 2025 to review chemo consent and treatment details.   More than 20 minutes spent on phone with daughter  
Conrad

## 2025-07-02 ASSESSMENT — ENCOUNTER SYMPTOMS
ADENOPATHY: 0
UNEXPECTED WEIGHT CHANGE: 0
CARDIOVASCULAR NEGATIVE: 1
DYSURIA: 0
RESPIRATORY NEGATIVE: 1
HEMATURIA: 0
CHILLS: 0
APPETITE CHANGE: 0
BRUISES/BLEEDS EASILY: 1
FEVER: 0
FATIGUE: 0
DIAPHORESIS: 0
DIARRHEA: 1

## 2025-07-02 NOTE — PROGRESS NOTES
"Patient ID: Sil West \"Ines" is a 84 y.o. female.    Treatment:   Oncology History Overview Note   I. Diagnosis (1/2023):  IgG Lambda Multiple Myeloma  Presenting symptoms: Urinary incontinence and diffuse bony pain  II. Workup:  Bone Biopsy (1/6/23):  Plasma cells neoplasm  Repeat Bone Marrow Biopsy (1/26/23):  Hypercellular marrow, 80-90% plasma cells, lambda-restricted  FISH: 1q gain (high risk), trisomy 3  MRI Spine (12/18/22):  Osseous metastases, involvement in mid-cervical, mid-thoracic, and lumbar vertebral bodies, as well as the right iliac bone  PET Scan (1/23):  FDG avidity in right iliac bone, right sacral ala, left posterior 6th rib, and appendicular skeleton  Serum and Urine Studies (1/19-1/28/23):  FKLC 1.57, FLLC 2947.7, K/L ratio 0  IgG 537, IgA 221, IgM 19, b2M 23.4, Hgb 5.7  M protein IgA lambda 0.7 g/dL   U/L  Creatinine: 2.33 (peaked at 3.15)  UPEP: Monoclonal lambda light chain at 386.6 mg/24H  Hepatitis B and C negative  III. Treatment:  Radiation (2/2/23):  Right hip + T6-T8 x 5 fractions (total 2000 cGy)  Induction (12/24/22 - 2/16/23):  Bortezomib and dexamethasone + daratumumab  C1 (1/28/23): Bortezomib 1, 4, 8, 11 + daratumumab x 2 doses  C2 (2/16/23): Weekly dosing of daratumumab and bortezomib (1, 8, 15), with lenalidomide planned when renal function allows  Response Evaluation (5/25/23):  CR with M protein 0.1 (IgG kappa c/w roscoe) and free lyte chain 1.37  C8 Roscoe RVD (Completed 7/6/23):  Transitioned with a VGPR vs CR, ongoing multifocal bone pain  IV. Response Evaluation (7/13/23):  Marked improvement in bony lesions  Pathological fractures in right pelvis and ribs, possible infiltrate in the right lower lobe base, and hypodensity in the right thyroid gland  V. Treatment Adjustment (Cycle 9 and forward): 10/2023  Velcade omitted  Transitioned to a 4-week schedule with Roscoe (day 1) and Revlimid 15 mg days 1-21/28 days     Multiple myeloma not having achieved remission " (Multi)   9/13/2023 Initial Diagnosis    Multiple myeloma not having achieved remission (CMS/HCC)     10/2/2023 - 7/8/2024 Chemotherapy    Daratumumab, Pomalidomide and dexamethasone (oral meds managed outside treatment plan) 28 Day Cycles - Maintenance     8/5/2024 - 6/23/2025 Chemotherapy    Carfilzomib and Cyclophosphamide (Weekly) / Dexamethasone, 28 Day Cycles (Custom)     7/7/2025 -  Chemotherapy    Talquetamab (Weekly), 28 Day Cycles       Past Medical History:  HTN, DLP  pre skin cancers,   diveriticulitis   peripheral artery disease not amenable to surgery  Thyroid nodules under observation  .  Surgical History:  squamous cell carcinoma removed to left hand and right arm 6/2024 2/21/25 Mohs surgery done for 4 areas of SCC on 5 fluorouracil 5%  Past Surgical History:   Procedure Laterality Date    CT GUIDED PERCUTANEOUS BIOPSY BONE DEEP  01/06/2023    CT GUIDED PERCUTANEOUS BIOPSY BONE DEEP 1/6/2023 [facility]    OTHER SURGICAL HISTORY      THYROID BIOPSY      Family History:     Family History   Problem Relation Name Age of Onset    Alzheimer's disease Mother      Colon cancer Father      Rashes / Skin problems Sister      Rashes / Skin problems Brother       Social History:  ,  passed away on 10/24/23.  3 grown children (1 daughter, 2 sons). 2 grandchildren.  Has 6 cats.  Enjoyed skiing.  Occupation: retired .  Social History     Tobacco Use    Smoking status: Never     Passive exposure: Never    Smokeless tobacco: Never   Vaping Use    Vaping status: Never Used   Substance Use Topics    Alcohol use: Never    Drug use: Never      -------------------------------------------------------------------------------------------------------  Subjective       Sil West is a 84 year old female with IgG lambda multiple myeloma.  She recently had an increase to her lambda free light chains.  PET imaging done 7/17/24 showed an new soft tissue uptake in right 11th rib and  "interestingly patient has pain there as well.  Transitioned to carfilzomib, cyclophophamide, dex, cycle 1 on 8/5/24, Starting at C10 , cyclophosphamide drops out and she is only receiving carfilzomib every 2 weeks.completed carfilzomib June 23,2025 due to disease progression.     Sil presents to the clinic today (7/3/25) accompanied by her daughter and son after deciding to proceed with talquetimab for progressive myeloma, Multiple discussions over the telephone with patients daughter since last visit discussing pros and cons of various  bites and CART cell therapy.   Connies progressive disease has been mainly manifected by rapidly increasing lambda lyte chains as in chart below.  She does not seem to have any other CRAB criteria. Receiving IVIG every month and zometa restarted 3/17/25 after completion of dental work, scheduled for every 3 months.  Patient feels wonderful, mowing her grass. No intercurrent problems. Patient remains active with her garden and her cats.     Following closely with dermatology.  Continues to have intermittent loose stools, but feels is food related.  Taking imodium as needed.      Review of Systems   Constitutional:  Negative for appetite change, chills, diaphoresis, fatigue, fever and unexpected weight change.   Respiratory: Negative.     Cardiovascular: Negative.    Gastrointestinal:  Positive for diarrhea.   Genitourinary:  Negative for dysuria and hematuria.    Skin: Negative.         Had multiple spots of SCC removed by dermatology.   Neurological:  Negative for extremity weakness (mild right leg weakness).   Hematological:  Negative for adenopathy. Bruises/bleeds easily (bruises easily).   All other systems reviewed and are negative.  -------------------------------------------------------------------------------------------------------  Objective   BSA: 1.77 meters squared  /66   Pulse 93   Temp 36.6 °C (97.9 °F)   Resp 17   Ht (S) 1.563 m (5' 1.54\")   Wt 71.9 " kg (158 lb 8.2 oz)   SpO2 100%   BMI 29.43 kg/m²     Not repeated today.    Physical Exam  Vitals reviewed.   Constitutional:       Appearance: Normal appearance. She is well-developed and normal weight.   HENT:      Head: Normocephalic and atraumatic.      Nose: Nose normal.   Eyes:      General: No scleral icterus.     Extraocular Movements: Extraocular movements intact.      Conjunctiva/sclera: Conjunctivae normal.      Pupils: Pupils are equal, round, and reactive to light.   Cardiovascular:      Rate and Rhythm: Normal rate and regular rhythm.      Pulses: Normal pulses.      Heart sounds: Normal heart sounds.   Pulmonary:      Effort: Pulmonary effort is normal.      Breath sounds: Normal breath sounds.   Abdominal:      General: Abdomen is flat. Bowel sounds are normal. There is no distension.      Palpations: Abdomen is soft. There is no mass.      Tenderness: There is no abdominal tenderness.   Musculoskeletal:         General: Normal range of motion.      Right lower leg: Edema (non-pitting) present.      Left lower leg: Edema (non-pitting) present.      Comments: No spine tenderness   Lymphadenopathy:      Comments: No lymphadenopathy   Skin:     General: Skin is warm and dry.   Neurological:      General: No focal deficit present.      Mental Status: She is alert and oriented to person, place, and time.      Comments: Normal strength and gait good strength right hip flexor   Psychiatric:         Mood and Affect: Mood normal.         Behavior: Behavior normal.         Thought Content: Thought content normal.     Performance Status:  Symptomatic; fully ambulatory  -------------------------------------------------------------------------------------------------------  Assessment and Plan:    Multiple myeloma not having achieved remission (CMS/HCC)  IgG lambda MM    - Currently on daratumamab/Lenalidomide. Continued uptrending of free lambda light chain. M-protein still 0.1g IgG kappa, likely represents  daratumumab. Will add weekly dex 10 mg to see if this could deepen her response.     5/13/24:  Continues to have increasing free lambda light chain, M-protein remains at 0.1.  Receiving C20 daratumumab today.  Discussed that free lyte chain continues to increase significantly and suggested switching revlimid to pomalidomide  4 mg 21/28 days since no other CRAB criteria,  -Taking aspirin 81 mg daily for VTE prophylaxis during treatment with pomalidomide      PET scan (7/17/24)   IMPRESSION:  1. Redemonstration of an FDG avid right posterolateral 11th rib  fracture now with evidence of an underlying soft tissue lesion.  2. Previously described hypermetabolic osseous lesions in the right  iliac, right superior pubic ramus, and right aspect of the pubic  symphysis have continued to decrease in metabolic activity with  persistent FDG avidity likely representing continued treatment  response with residual viable disease, attention on follow-up imaging.  3. Healing right inferior pubic ramus fracture with decreased extent  of hypermetabolic activity.    7/18/24:  Laboratory results show major increase in free lyte chains to 62 mg/dL up from 18 in March 2024, only sx is right rib pain where PET from yesterday shows a new lesion, no other new lesions seen,      Bone marrow biopsy results from 7/22/24 showing limited bone marrow (20% cellularity) with maturing trilineage hematopoiesis, minimal involvement by lambda-restricted plasma cells by flow cytometry. ECHO 8/2/2024 LVEF 60-65%    Started cycle 1 carfilzomib, cyclophosphamide, dexamethasone 8//24, cyclophosphamide omitted from regimen with cycle 10.  Last dose of carfilzomib 6/23/25 due to disease progression.    6/17/25:   Marked increase in lambda lyte chains to 85.45 from 23.95 (5/12/25) up from 6.76 (4/14/25)    7/3/25:  Patient and her family have decided to proceed with talquetamab treatment and will provide support needed for outpatient ramp up. Today, I spent 50  minutes in the office reviewing the schedule, potential toxicites, outpatient medications including dexamethasone to be used in event of suspected CRS or ICANS with immediate contact to physician team should these sx develop.       Ig Sandoval Free Light Chain   Date Value Ref Range Status   06/09/2025 0.35 0.33 - 1.94 mg/dL Final   05/12/2025 0.38 0.33 - 1.94 mg/dL Final   04/14/2025 0.32 (L) 0.33 - 1.94 mg/dL Final   03/17/2025 0.20 (L) 0.33 - 1.94 mg/dL Final   02/17/2025 0.41 0.33 - 1.94 mg/dL Final     Ig Lambda Free Light Chain   Date Value Ref Range Status   06/09/2025 85.45 (H) 0.57 - 2.63 mg/dL Final   05/12/2025 23.95 (H) 0.57 - 2.63 mg/dL Final   04/14/2025 6.76 (H) 0.57 - 2.63 mg/dL Final   03/17/2025 5.52 (H) 0.57 - 2.63 mg/dL Final   02/17/2025 4.62 (H) 0.57 - 2.63 mg/dL Final     Kappa/Lambda Ratio   Date Value Ref Range Status   06/09/2025 0.00 (L) 0.26 - 1.65 Final   05/12/2025 0.02 (L) 0.26 - 1.65 Final   04/14/2025 0.05 (L) 0.26 - 1.65 Final   03/17/2025 0.04 (L) 0.26 - 1.65 Final   02/17/2025 0.09 (L) 0.26 - 1.65 Final     M-PROTEIN 1   Date Value Ref Range Status   10/28/2024 0.1 (H)   g/dL Final   10/14/2024 0.1 (H)   g/dL Final   09/03/2024 0.1 (H)   g/dL Final   08/12/2024 0.1 (H)   g/dL Final   07/22/2024 0.1 (H)   g/dL Final      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  IgG level 565 (4/28/25).  Last received IVIG on 4/28/25.  Continue monthly at [facility], next due 5/27/25.      Dysuria: urinalysis bland, suspect needs urologic evaluation for incontinence    Bone Health:  - Continue zoledronic acid 3.3 mg (renal dosing) every 3 months, received last on 9/3/24.    - Continue vitamin D supplement -- 2000 units PO daily.  Started zometa 3/30/23 will complete 3/2025.  12/23/24:  Sil reports she had a crown fall out and may need a root canal.  Placed zometa on hold until  dental work has been completed.    4/14/25:  Dental work completed.  Resumed zometa 3/17/25, continue every 3 months.        Thyroid nodule  - repeat thyroid US on 2/19/24, 2 nodules noted with FNA recommended.  Thyroid biopsy 4/29/24 showing rare atypical cells from FNA of right mid lobe.  Repeat biopsy was nondiagnostic.  Dr. Magallanes recommended thyroid surgery vs continued observation.  Sil requested to having US monitoring for now.  Last visit with Dr. Magallanes on 9/9/24. US 2/24/25 with 30 mm mildly suspicious nodule to right lobe without significant change from prior imaging.  Plan to have US in 1 year with follow up.       Right lower back pain  - seems musculoskeletal  - MRI lumbar spine (3/4/24): degenerative changes, no evidence of progressive disease or fracture.    - MRI thoracic spine (3/12/24): degenerative changes, redemonstration of multifocal marrow signal abnormalities compatible with the given history of multiple myeloma, osseous expansion and epidural extension of neoplasm previously seen have improved, decrease in size and enhancement of previously seen lesions.    5/12/25:  Reports no further back pain.      Health Care Maintenance:  Vaccines:    Received influenza- vaccine 10/9/24.  Advised to obtain updated covid, RSV, shingrix, and prevnar 20 vaccines at local pharmacy.    RTC: Continue monthly IVIG at [place] and zometa every 3 months. 7/7/25 to start talquetamab, will transition to  [facility] day 8 of cycle 2.   -----------------------------------------------------------------------------------------  Carmencita Marshall MD

## 2025-07-03 ENCOUNTER — OFFICE VISIT (OUTPATIENT)
Dept: HEMATOLOGY/ONCOLOGY | Facility: HOSPITAL | Age: 85
End: 2025-07-03
Payer: MEDICARE

## 2025-07-03 VITALS
WEIGHT: 158.51 LBS | DIASTOLIC BLOOD PRESSURE: 66 MMHG | TEMPERATURE: 97.9 F | OXYGEN SATURATION: 100 % | BODY MASS INDEX: 29.17 KG/M2 | HEIGHT: 62 IN | HEART RATE: 93 BPM | SYSTOLIC BLOOD PRESSURE: 145 MMHG | RESPIRATION RATE: 17 BRPM

## 2025-07-03 DIAGNOSIS — C90.00 MULTIPLE MYELOMA NOT HAVING ACHIEVED REMISSION (MULTI): Primary | ICD-10-CM

## 2025-07-03 PROCEDURE — 99215 OFFICE O/P EST HI 40 MIN: CPT | Performed by: INTERNAL MEDICINE

## 2025-07-03 PROCEDURE — 1126F AMNT PAIN NOTED NONE PRSNT: CPT | Performed by: INTERNAL MEDICINE

## 2025-07-03 PROCEDURE — 1159F MED LIST DOCD IN RCRD: CPT | Performed by: INTERNAL MEDICINE

## 2025-07-03 RX ORDER — SULFAMETHOXAZOLE AND TRIMETHOPRIM 800; 160 MG/1; MG/1
1 TABLET ORAL 3 TIMES WEEKLY
Qty: 12 TABLET | Refills: 11 | Status: SHIPPED | OUTPATIENT
Start: 2025-07-04

## 2025-07-03 RX ORDER — DEXAMETHASONE 4 MG/1
10 TABLET ORAL AS NEEDED
Qty: 13 TABLET | Refills: 2 | Status: SHIPPED | OUTPATIENT
Start: 2025-07-03

## 2025-07-03 RX ORDER — PROCHLORPERAZINE MALEATE 10 MG
10 TABLET ORAL EVERY 6 HOURS PRN
Qty: 30 TABLET | Refills: 5 | Status: SHIPPED | OUTPATIENT
Start: 2025-07-03

## 2025-07-03 RX ORDER — ONDANSETRON 8 MG/1
8 TABLET, FILM COATED ORAL EVERY 8 HOURS PRN
Qty: 30 TABLET | Refills: 5 | Status: SHIPPED | OUTPATIENT
Start: 2025-07-03

## 2025-07-03 RX ORDER — ACYCLOVIR 400 MG/1
400 TABLET ORAL 2 TIMES DAILY
Qty: 60 TABLET | Refills: 11 | Status: SHIPPED | OUTPATIENT
Start: 2025-07-03

## 2025-07-03 ASSESSMENT — PAIN SCALES - GENERAL: PAINLEVEL_OUTOF10: 0-NO PAIN

## 2025-07-04 DIAGNOSIS — C90.00 MULTIPLE MYELOMA NOT HAVING ACHIEVED REMISSION (MULTI): Primary | ICD-10-CM

## 2025-07-04 RX ORDER — DIPHENHYDRAMINE HYDROCHLORIDE 50 MG/ML
50 INJECTION, SOLUTION INTRAMUSCULAR; INTRAVENOUS AS NEEDED
OUTPATIENT
Start: 2025-08-11

## 2025-07-04 RX ORDER — PROCHLORPERAZINE MALEATE 10 MG
10 TABLET ORAL EVERY 6 HOURS PRN
OUTPATIENT
Start: 2025-07-28

## 2025-07-04 RX ORDER — EPINEPHRINE 0.3 MG/.3ML
0.3 INJECTION SUBCUTANEOUS EVERY 5 MIN PRN
OUTPATIENT
Start: 2025-08-04

## 2025-07-04 RX ORDER — ONDANSETRON 8 MG/1
8 TABLET, FILM COATED ORAL ONCE
OUTPATIENT
Start: 2025-08-11

## 2025-07-04 RX ORDER — PROCHLORPERAZINE EDISYLATE 5 MG/ML
10 INJECTION INTRAMUSCULAR; INTRAVENOUS EVERY 6 HOURS PRN
OUTPATIENT
Start: 2025-08-11

## 2025-07-04 RX ORDER — ONDANSETRON 8 MG/1
8 TABLET, FILM COATED ORAL ONCE
OUTPATIENT
Start: 2025-07-28

## 2025-07-04 RX ORDER — DIPHENHYDRAMINE HYDROCHLORIDE 50 MG/ML
50 INJECTION, SOLUTION INTRAMUSCULAR; INTRAVENOUS AS NEEDED
OUTPATIENT
Start: 2025-08-04

## 2025-07-04 RX ORDER — ONDANSETRON 8 MG/1
8 TABLET, FILM COATED ORAL ONCE
OUTPATIENT
Start: 2025-08-04

## 2025-07-04 RX ORDER — DIPHENHYDRAMINE HYDROCHLORIDE 50 MG/ML
50 INJECTION, SOLUTION INTRAMUSCULAR; INTRAVENOUS AS NEEDED
OUTPATIENT
Start: 2025-07-21

## 2025-07-04 RX ORDER — FAMOTIDINE 10 MG/ML
20 INJECTION, SOLUTION INTRAVENOUS ONCE AS NEEDED
OUTPATIENT
Start: 2025-07-21

## 2025-07-04 RX ORDER — EPINEPHRINE 0.3 MG/.3ML
0.3 INJECTION SUBCUTANEOUS EVERY 5 MIN PRN
OUTPATIENT
Start: 2025-07-21

## 2025-07-04 RX ORDER — ONDANSETRON 8 MG/1
8 TABLET, FILM COATED ORAL ONCE
OUTPATIENT
Start: 2025-07-21

## 2025-07-04 RX ORDER — DIPHENHYDRAMINE HYDROCHLORIDE 50 MG/ML
50 INJECTION, SOLUTION INTRAMUSCULAR; INTRAVENOUS AS NEEDED
OUTPATIENT
Start: 2025-07-28

## 2025-07-04 RX ORDER — FAMOTIDINE 10 MG/ML
20 INJECTION, SOLUTION INTRAVENOUS ONCE AS NEEDED
OUTPATIENT
Start: 2025-08-11

## 2025-07-04 RX ORDER — FAMOTIDINE 10 MG/ML
20 INJECTION, SOLUTION INTRAVENOUS ONCE AS NEEDED
OUTPATIENT
Start: 2025-07-28

## 2025-07-04 RX ORDER — ALBUTEROL SULFATE 0.83 MG/ML
3 SOLUTION RESPIRATORY (INHALATION) AS NEEDED
OUTPATIENT
Start: 2025-07-21

## 2025-07-04 RX ORDER — PROCHLORPERAZINE EDISYLATE 5 MG/ML
10 INJECTION INTRAMUSCULAR; INTRAVENOUS EVERY 6 HOURS PRN
OUTPATIENT
Start: 2025-07-21

## 2025-07-04 RX ORDER — PROCHLORPERAZINE MALEATE 10 MG
10 TABLET ORAL EVERY 6 HOURS PRN
OUTPATIENT
Start: 2025-08-11

## 2025-07-04 RX ORDER — ALBUTEROL SULFATE 0.83 MG/ML
3 SOLUTION RESPIRATORY (INHALATION) AS NEEDED
OUTPATIENT
Start: 2025-08-11

## 2025-07-04 RX ORDER — EPINEPHRINE 0.3 MG/.3ML
0.3 INJECTION SUBCUTANEOUS EVERY 5 MIN PRN
OUTPATIENT
Start: 2025-07-28

## 2025-07-04 RX ORDER — EPINEPHRINE 0.3 MG/.3ML
0.3 INJECTION SUBCUTANEOUS EVERY 5 MIN PRN
OUTPATIENT
Start: 2025-08-11

## 2025-07-04 RX ORDER — ALBUTEROL SULFATE 0.83 MG/ML
3 SOLUTION RESPIRATORY (INHALATION) AS NEEDED
OUTPATIENT
Start: 2025-08-04

## 2025-07-04 RX ORDER — ALBUTEROL SULFATE 0.83 MG/ML
3 SOLUTION RESPIRATORY (INHALATION) AS NEEDED
OUTPATIENT
Start: 2025-07-28

## 2025-07-04 RX ORDER — PROCHLORPERAZINE MALEATE 10 MG
10 TABLET ORAL EVERY 6 HOURS PRN
OUTPATIENT
Start: 2025-07-21

## 2025-07-04 RX ORDER — FAMOTIDINE 10 MG/ML
20 INJECTION, SOLUTION INTRAVENOUS ONCE AS NEEDED
OUTPATIENT
Start: 2025-08-04

## 2025-07-04 RX ORDER — PROCHLORPERAZINE EDISYLATE 5 MG/ML
10 INJECTION INTRAMUSCULAR; INTRAVENOUS EVERY 6 HOURS PRN
OUTPATIENT
Start: 2025-08-04

## 2025-07-04 RX ORDER — PROCHLORPERAZINE EDISYLATE 5 MG/ML
10 INJECTION INTRAMUSCULAR; INTRAVENOUS EVERY 6 HOURS PRN
OUTPATIENT
Start: 2025-07-28

## 2025-07-04 RX ORDER — PROCHLORPERAZINE MALEATE 10 MG
10 TABLET ORAL EVERY 6 HOURS PRN
OUTPATIENT
Start: 2025-08-04

## 2025-07-04 ASSESSMENT — ENCOUNTER SYMPTOMS: EXTREMITY WEAKNESS: 0

## 2025-07-06 ASSESSMENT — ENCOUNTER SYMPTOMS
UNEXPECTED WEIGHT CHANGE: 0
BRUISES/BLEEDS EASILY: 1
DIARRHEA: 1
RESPIRATORY NEGATIVE: 1
FEVER: 0
CARDIOVASCULAR NEGATIVE: 1
DIAPHORESIS: 0
CHILLS: 0
ADENOPATHY: 0
APPETITE CHANGE: 0
FATIGUE: 0

## 2025-07-06 NOTE — PROGRESS NOTES
"Patient ID: Sil West \"Ines" is a 84 y.o. female.    Treatment:   Oncology History Overview Note   I. Diagnosis (1/2023):  IgG Lambda Multiple Myeloma  Presenting symptoms: Urinary incontinence and diffuse bony pain  II. Workup:  Bone Biopsy (1/6/23):  Plasma cells neoplasm  Repeat Bone Marrow Biopsy (1/26/23):  Hypercellular marrow, 80-90% plasma cells, lambda-restricted  FISH: 1q gain (high risk), trisomy 3  MRI Spine (12/18/22):  Osseous metastases, involvement in mid-cervical, mid-thoracic, and lumbar vertebral bodies, as well as the right iliac bone  PET Scan (1/23):  FDG avidity in right iliac bone, right sacral ala, left posterior 6th rib, and appendicular skeleton  Serum and Urine Studies (1/19-1/28/23):  FKLC 1.57, FLLC 2947.7, K/L ratio 0  IgG 537, IgA 221, IgM 19, b2M 23.4, Hgb 5.7  M protein IgA lambda 0.7 g/dL   U/L  Creatinine: 2.33 (peaked at 3.15)  UPEP: Monoclonal lambda light chain at 386.6 mg/24H  Hepatitis B and C negative  III. Treatment:  Radiation (2/2/23):  Right hip + T6-T8 x 5 fractions (total 2000 cGy)  Induction (12/24/22 - 2/16/23):  Bortezomib and dexamethasone + daratumumab  C1 (1/28/23): Bortezomib 1, 4, 8, 11 + daratumumab x 2 doses  C2 (2/16/23): Weekly dosing of daratumumab and bortezomib (1, 8, 15), with lenalidomide planned when renal function allows  Response Evaluation (5/25/23):  CR with M protein 0.1 (IgG kappa c/w roscoe) and free lyte chain 1.37  C8 Roscoe RVD (Completed 7/6/23):  Transitioned with a VGPR vs CR, ongoing multifocal bone pain  IV. Response Evaluation (7/13/23):  Marked improvement in bony lesions  Pathological fractures in right pelvis and ribs, possible infiltrate in the right lower lobe base, and hypodensity in the right thyroid gland  V. Treatment Adjustment (Cycle 9 and forward): 10/2023  Velcade omitted  Transitioned to a 4-week schedule with Roscoe (day 1) and Revlimid 15 mg days 1-21/28 days     Multiple myeloma not having achieved remission " (Multi)   9/13/2023 Initial Diagnosis    Multiple myeloma not having achieved remission (CMS/HCC)     10/2/2023 - 7/8/2024 Chemotherapy    Daratumumab, Pomalidomide and dexamethasone (oral meds managed outside treatment plan) 28 Day Cycles - Maintenance     8/5/2024 - 6/23/2025 Chemotherapy    Carfilzomib and Cyclophosphamide (Weekly) / Dexamethasone, 28 Day Cycles (Custom)     7/7/2025 -  Chemotherapy    Talquetamab (Weekly), 28 Day Cycles       Past Medical History:  HTN, DLP  pre skin cancers,   diveriticulitis   peripheral artery disease not amenable to surgery  Thyroid nodules under observation    Surgical History:  squamous cell carcinoma removed to left hand and right arm 6/2024 2/21/25 Mohs surgery done for 4 areas of SCC on 5 fluorouracil 5%  Past Surgical History:   Procedure Laterality Date    CT GUIDED PERCUTANEOUS BIOPSY BONE DEEP  01/06/2023    CT GUIDED PERCUTANEOUS BIOPSY BONE DEEP 1/6/2023 [facility]    OTHER SURGICAL HISTORY      THYROID BIOPSY      Family History:     Family History   Problem Relation Name Age of Onset    Alzheimer's disease Mother      Colon cancer Father      Rashes / Skin problems Sister      Rashes / Skin problems Brother       Social History:  ,  passed away on 10/24/23.  3 grown children (1 daughter, 2 sons). 2 grandchildren.  Has 6 cats.  Enjoyed skiing.  Occupation: retired .  Social History     Tobacco Use    Smoking status: Never     Passive exposure: Never    Smokeless tobacco: Never   Vaping Use    Vaping status: Never Used   Substance Use Topics    Alcohol use: Never    Drug use: Never      -------------------------------------------------------------------------------------------------------  Subjective       Sil West is a 84 year old female with IgG lambda multiple myeloma.  She recently had an increase to her lambda free light chains.  PET imaging done 7/17/24 showed an new soft tissue uptake in right 11th rib and  interestingly patient has pain there as well.  Transitioned to carfilzomib, cyclophophamide, dex, cycle 1 on 8/5/24, Starting at C10 , cyclophosphamide drops out and she is only receiving carfilzomib every 2 weeks.  Completed carfilzomib June 23, 2025 due to disease progression.     Sil presents to the clinic today (7/7/25) accompanied by her daughter Frances for follow up evaluation, count check and is scheduled to start C1D1 talquetamab today for progressive myeloma.  Sherie's progressive disease has been mainly manifected by rapidly increasing lambda lyte chains as in chart below.  She does not seem to have any other CRAB criteria. Receiving IVIG every month and zometa restarted 3/17/25 after completion of dental work, scheduled for every 3 months.      Energy level is okay.  Staying active working in yard, trimming bushes.  Appetite is lower but thinks is due to the heat.  Weight is steady.  Following closely with dermatology.  Continues to have intermittent loose stools, but feels is food related.  Taking imodium as needed.  Bowels rotate between constipation and diarrhea.  Bruises easily.  Daughter Frances is staying with Sherie for the next 10 day.      Review of Systems   Constitutional:  Negative for appetite change, chills, diaphoresis, fatigue, fever and unexpected weight change.   Respiratory: Negative.     Cardiovascular: Negative.    Gastrointestinal:  Positive for diarrhea. Negative for nausea and vomiting.   Genitourinary: Negative.     Musculoskeletal: Negative.  Negative for gait problem.   Skin: Negative.         Had multiple spots of SCC removed by dermatology.   Neurological:  Negative for dizziness, gait problem, headaches and numbness.   Hematological:  Negative for adenopathy. Bruises/bleeds easily (bruises easily).   All other systems reviewed and are negative.  -------------------------------------------------------------------------------------------------------  Objective   BSA:  1.78 meters squared  /67   Pulse 100   Temp 36.6 °C (97.9 °F)   Resp 17   Wt 73.1 kg (161 lb 2.5 oz)   SpO2 99%   BMI 29.92 kg/m²     Physical Exam  Vitals reviewed.   Constitutional:       Appearance: Normal appearance. She is well-developed and normal weight.   HENT:      Head: Normocephalic and atraumatic.      Nose: Nose normal.   Eyes:      General: No scleral icterus.     Extraocular Movements: Extraocular movements intact.      Conjunctiva/sclera: Conjunctivae normal.      Pupils: Pupils are equal, round, and reactive to light.   Cardiovascular:      Rate and Rhythm: Normal rate and regular rhythm.      Pulses: Normal pulses.      Heart sounds: Normal heart sounds.   Pulmonary:      Effort: Pulmonary effort is normal.      Breath sounds: Normal breath sounds.   Abdominal:      General: Abdomen is flat. Bowel sounds are normal. There is no distension.      Palpations: Abdomen is soft. There is no mass.      Tenderness: There is no abdominal tenderness.   Musculoskeletal:         General: Normal range of motion.      Right lower leg: Edema (non-pitting) present.      Left lower leg: Edema (non-pitting) present.      Comments: No spine tenderness   Lymphadenopathy:      Comments: No lymphadenopathy   Skin:     General: Skin is warm and dry.   Neurological:      General: No focal deficit present.      Mental Status: She is alert and oriented to person, place, and time.   Psychiatric:         Mood and Affect: Mood normal.         Behavior: Behavior normal.         Thought Content: Thought content normal.     Performance Status:  Symptomatic; fully ambulatory  -------------------------------------------------------------------------------------------------------  Assessment and Plan:    Multiple myeloma not having achieved remission (CMS/HCC)  IgG lambda MM    - Currently on daratumamab/Lenalidomide. Continued uptrending of free lambda light chain. M-protein still 0.1g IgG kappa, likely represents  daratumumab. Will add weekly dex 10 mg to see if this could deepen her response.     5/13/24:  Continues to have increasing free lambda light chain, M-protein remains at 0.1.  Receiving C20 daratumumab today.  Discussed that free lyte chain continues to increase significantly and suggested switching revlimid to pomalidomide  4 mg 21/28 days since no other CRAB criteria,  -Taking aspirin 81 mg daily for VTE prophylaxis during treatment with pomalidomide      PET scan (7/17/24)   IMPRESSION:  1. Redemonstration of an FDG avid right posterolateral 11th rib  fracture now with evidence of an underlying soft tissue lesion.  2. Previously described hypermetabolic osseous lesions in the right  iliac, right superior pubic ramus, and right aspect of the pubic  symphysis have continued to decrease in metabolic activity with  persistent FDG avidity likely representing continued treatment  response with residual viable disease, attention on follow-up imaging.  3. Healing right inferior pubic ramus fracture with decreased extent  of hypermetabolic activity.    7/18/24:  Laboratory results show major increase in free lyte chains to 62 mg/dL up from 18 in March 2024, only sx is right rib pain where PET from yesterday shows a new lesion, no other new lesions seen,      Bone marrow biopsy results from 7/22/24 showing limited bone marrow (20% cellularity) with maturing trilineage hematopoiesis, minimal involvement by lambda-restricted plasma cells by flow cytometry. ECHO 8/2/2024 LVEF 60-65%    Started cycle 1 carfilzomib, cyclophosphamide, dexamethasone 8//24, cyclophosphamide omitted from regimen with cycle 10.  Last dose of carfilzomib 6/23/25 due to disease progression.    6/17/25:   Marked increase in lambda lyte chains to 85.45 from 23.95 (5/12/25) up from 6.76 (4/14/25)    7/3/25:  Patient and her family have decided to proceed with talquetamab treatment and will provide support needed for outpatient ramp up. Today, I spent 50  minutes in the office reviewing the schedule, potential toxicites, outpatient medications including dexamethasone to be used in event of suspected CRS or ICANS with immediate contact to physician team should these sx develop.     7/7/25:  CBC results stable from today.  No concerning findings on physical examination.  Lambda free light chains increased to 85.25 (6/9/25)-see below.  SPEP normal (6/9/25).  Myeloma labs in process from today.  Scheduled to receive C1D1 step up talquetamab today.  Reviewed supportive medications and confirmed that Sherie has picked up prescriptions.  Advised to contact oncology team with symptom concerns, provided contact number.  Confirmed has dexamethasone prescription at home, advised to wait from oncology team prior to taking.  Daughter Frances will be staying with Sherie for the next 10 days.  Plan for PET scan, advised to schedule.  Follow up visit with mal heme clinic on 7/8/25.     Ig [place] [facility] Free Light Chain   Date Value Ref Range Status   06/09/2025 0.35 0.33 - 1.94 mg/dL Final   05/12/2025 0.38 0.33 - 1.94 mg/dL Final   04/14/2025 0.32 (L) 0.33 - 1.94 mg/dL Final   03/17/2025 0.20 (L) 0.33 - 1.94 mg/dL Final   02/17/2025 0.41 0.33 - 1.94 mg/dL Final     Ig Lambda Free Light Chain   Date Value Ref Range Status   06/09/2025 85.45 (H) 0.57 - 2.63 mg/dL Final   05/12/2025 23.95 (H) 0.57 - 2.63 mg/dL Final   04/14/2025 6.76 (H) 0.57 - 2.63 mg/dL Final   03/17/2025 5.52 (H) 0.57 - 2.63 mg/dL Final   02/17/2025 4.62 (H) 0.57 - 2.63 mg/dL Final     Kappa/Lambda Ratio   Date Value Ref Range Status   06/09/2025 0.00 (L) 0.26 - 1.65 Final   05/12/2025 0.02 (L) 0.26 - 1.65 Final   04/14/2025 0.05 (L) 0.26 - 1.65 Final   03/17/2025 0.04 (L) 0.26 - 1.65 Final   02/17/2025 0.09 (L) 0.26 - 1.65 Final     M-PROTEIN 1   Date Value Ref Range Status   10/28/2024 0.1 (H)   g/dL Final   10/14/2024 0.1 (H)   g/dL Final   09/03/2024 0.1 (H)   g/dL Final   08/12/2024 0.1 (H)   g/dL Final   07/22/2024  0.1 (H)   g/dL Final      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  IgG level 694 (6/23/25).  Continue monthly at [facility], last received 6/23/25.  Next dose due 7/21/25.    Dysuria: urinalysis bland, suspect needs urologic evaluation for incontinence    Bone Health:  - Continue zoledronic acid 3.3 mg (renal dosing) every 3 months, received last on 9/3/24.    - Continue vitamin D supplement -- 2000 units PO daily.  Started zometa 3/30/23 will complete 3/2025.  12/23/24:  Sil reports she had a crown fall out and may need a root canal.  Placed zometa on hold until dental work has been completed.    Dental work completed.  Resumed zometa 3/17/25, continue every 3 months.   Last received Zometa 6/9/25.       Thyroid nodule  - repeat thyroid US on 2/19/24, 2 nodules noted with FNA recommended.  Thyroid biopsy 4/29/24 showing rare atypical cells from FNA of right mid lobe.  Repeat biopsy was nondiagnostic.  Dr. Magallanes recommended thyroid surgery vs continued observation.  Sil requested to having US monitoring for now.  Last visit with Dr. Magallanes on 9/9/24. US 2/24/25 with 30 mm mildly suspicious nodule to right lobe without significant change from prior imaging.  Plan to have US in 1 year with follow up.       Right lower back pain  - seems musculoskeletal  - MRI lumbar spine (3/4/24): degenerative changes, no evidence of progressive disease or fracture.    - MRI thoracic spine (3/12/24): degenerative changes, redemonstration of multifocal marrow signal abnormalities compatible with the given history of multiple myeloma, osseous expansion and epidural extension of neoplasm previously seen have improved, decrease in size and enhancement of previously seen lesions.    7/7/25:  Reports no further back pain.      Health Care Maintenance:  Vaccines:    Received influenza-  vaccine 10/9/24.  Advised to obtain updated covid, RSV, shingrix, and prevnar 20 vaccines at local pharmacy.    RTC:   Plan for PET scan.  Continue monthly IVIG and zometa every 3 months.   7/8/25: Mal heme provider visit  7/10/25:  C1D3 2nd step up talquetamab dose.  7/11/25:  Mal heme provider visit.  7/14/25:  C1D6 first treatment talquestamab dose.    7/15/25:  Mal heme provider visit.  7/21/25:  Follow up visit with oncology team for C2D1 talquetamab and IVIG.    Will transition to [facility] day 8 of cycle 2.   -----------------------------------------------------------------------------------------  CHANTEL Medrano-PAUL

## 2025-07-07 ENCOUNTER — OFFICE VISIT (OUTPATIENT)
Dept: HEMATOLOGY/ONCOLOGY | Facility: HOSPITAL | Age: 85
End: 2025-07-07
Payer: MEDICARE

## 2025-07-07 ENCOUNTER — INFUSION (OUTPATIENT)
Dept: HEMATOLOGY/ONCOLOGY | Facility: HOSPITAL | Age: 85
End: 2025-07-07
Payer: MEDICARE

## 2025-07-07 ENCOUNTER — LAB (OUTPATIENT)
Dept: LAB | Facility: HOSPITAL | Age: 85
End: 2025-07-07
Payer: MEDICARE

## 2025-07-07 ENCOUNTER — APPOINTMENT (OUTPATIENT)
Dept: HEMATOLOGY/ONCOLOGY | Facility: HOSPITAL | Age: 85
End: 2025-07-07
Payer: MEDICARE

## 2025-07-07 VITALS
TEMPERATURE: 97.9 F | WEIGHT: 161.16 LBS | RESPIRATION RATE: 17 BRPM | OXYGEN SATURATION: 99 % | DIASTOLIC BLOOD PRESSURE: 67 MMHG | SYSTOLIC BLOOD PRESSURE: 131 MMHG | HEART RATE: 100 BPM | BODY MASS INDEX: 29.92 KG/M2

## 2025-07-07 VITALS
HEART RATE: 100 BPM | DIASTOLIC BLOOD PRESSURE: 70 MMHG | RESPIRATION RATE: 17 BRPM | SYSTOLIC BLOOD PRESSURE: 132 MMHG | TEMPERATURE: 98.1 F | OXYGEN SATURATION: 99 %

## 2025-07-07 DIAGNOSIS — Z79.69 IMMUNOSUPPRESSED DUE TO CHEMOTHERAPY: ICD-10-CM

## 2025-07-07 DIAGNOSIS — D84.821 IMMUNOSUPPRESSED DUE TO CHEMOTHERAPY: ICD-10-CM

## 2025-07-07 DIAGNOSIS — Z85.828 HX OF BASAL CELL CARCINOMA: ICD-10-CM

## 2025-07-07 DIAGNOSIS — C90.00 MULTIPLE MYELOMA NOT HAVING ACHIEVED REMISSION (MULTI): ICD-10-CM

## 2025-07-07 DIAGNOSIS — Z51.11 ENCOUNTER FOR ANTINEOPLASTIC CHEMOTHERAPY: ICD-10-CM

## 2025-07-07 DIAGNOSIS — D80.1 HYPOGAMMAGLOBULINEMIA (MULTI): ICD-10-CM

## 2025-07-07 DIAGNOSIS — C90.00 MULTIPLE MYELOMA NOT HAVING ACHIEVED REMISSION (MULTI): Primary | ICD-10-CM

## 2025-07-07 DIAGNOSIS — T45.1X5A IMMUNOSUPPRESSED DUE TO CHEMOTHERAPY: ICD-10-CM

## 2025-07-07 DIAGNOSIS — Z85.828 HISTORY OF SCC (SQUAMOUS CELL CARCINOMA) OF SKIN: ICD-10-CM

## 2025-07-07 LAB
ALBUMIN SERPL BCP-MCNC: 4 G/DL (ref 3.4–5)
ALP SERPL-CCNC: 55 U/L (ref 33–136)
ALT SERPL W P-5'-P-CCNC: 7 U/L (ref 7–45)
ANION GAP SERPL CALC-SCNC: 13 MMOL/L (ref 10–20)
AST SERPL W P-5'-P-CCNC: 15 U/L (ref 9–39)
BASOPHILS # BLD AUTO: 0.02 X10*3/UL (ref 0–0.1)
BASOPHILS NFR BLD AUTO: 0.3 %
BILIRUB SERPL-MCNC: 0.4 MG/DL (ref 0–1.2)
BUN SERPL-MCNC: 21 MG/DL (ref 6–23)
CALCIUM SERPL-MCNC: 10.1 MG/DL (ref 8.6–10.3)
CHLORIDE SERPL-SCNC: 104 MMOL/L (ref 98–107)
CO2 SERPL-SCNC: 27 MMOL/L (ref 21–32)
CREAT SERPL-MCNC: 1.14 MG/DL (ref 0.5–1.05)
EGFRCR SERPLBLD CKD-EPI 2021: 48 ML/MIN/1.73M*2
EOSINOPHIL # BLD AUTO: 0.01 X10*3/UL (ref 0–0.4)
EOSINOPHIL NFR BLD AUTO: 0.2 %
ERYTHROCYTE [DISTWIDTH] IN BLOOD BY AUTOMATED COUNT: 12.2 % (ref 11.5–14.5)
GLUCOSE SERPL-MCNC: 87 MG/DL (ref 74–99)
HCT VFR BLD AUTO: 33.9 % (ref 36–46)
HGB BLD-MCNC: 11 G/DL (ref 12–16)
IMM GRANULOCYTES # BLD AUTO: 0.02 X10*3/UL (ref 0–0.5)
IMM GRANULOCYTES NFR BLD AUTO: 0.3 % (ref 0–0.9)
LDH SERPL L TO P-CCNC: 140 U/L (ref 84–246)
LYMPHOCYTES # BLD AUTO: 0.46 X10*3/UL (ref 0.8–3)
LYMPHOCYTES NFR BLD AUTO: 8 %
MCH RBC QN AUTO: 32.8 PG (ref 26–34)
MCHC RBC AUTO-ENTMCNC: 32.4 G/DL (ref 32–36)
MCV RBC AUTO: 101 FL (ref 80–100)
MONOCYTES # BLD AUTO: 0.52 X10*3/UL (ref 0.05–0.8)
MONOCYTES NFR BLD AUTO: 9 %
NEUTROPHILS # BLD AUTO: 4.72 X10*3/UL (ref 1.6–5.5)
NEUTROPHILS NFR BLD AUTO: 82.2 %
NRBC BLD-RTO: 0 /100 WBCS (ref 0–0)
PLATELET # BLD AUTO: 295 X10*3/UL (ref 150–450)
POTASSIUM SERPL-SCNC: 4.3 MMOL/L (ref 3.5–5.3)
PROT SERPL-MCNC: 6.5 G/DL (ref 6.4–8.2)
PROT SERPL-MCNC: 7.2 G/DL (ref 6.4–8.2)
RBC # BLD AUTO: 3.35 X10*6/UL (ref 4–5.2)
SODIUM SERPL-SCNC: 140 MMOL/L (ref 136–145)
WBC # BLD AUTO: 5.8 X10*3/UL (ref 4.4–11.3)

## 2025-07-07 PROCEDURE — 1126F AMNT PAIN NOTED NONE PRSNT: CPT

## 2025-07-07 PROCEDURE — 86704 HEP B CORE ANTIBODY TOTAL: CPT

## 2025-07-07 PROCEDURE — 1036F TOBACCO NON-USER: CPT

## 2025-07-07 PROCEDURE — 1160F RVW MEDS BY RX/DR IN RCRD: CPT

## 2025-07-07 PROCEDURE — 82784 ASSAY IGA/IGD/IGG/IGM EACH: CPT

## 2025-07-07 PROCEDURE — 87340 HEPATITIS B SURFACE AG IA: CPT

## 2025-07-07 PROCEDURE — 99215 OFFICE O/P EST HI 40 MIN: CPT | Mod: 25

## 2025-07-07 PROCEDURE — 84155 ASSAY OF PROTEIN SERUM: CPT | Mod: 59

## 2025-07-07 PROCEDURE — 80053 COMPREHEN METABOLIC PANEL: CPT

## 2025-07-07 PROCEDURE — 99215 OFFICE O/P EST HI 40 MIN: CPT

## 2025-07-07 PROCEDURE — 83615 LACTATE (LD) (LDH) ENZYME: CPT

## 2025-07-07 PROCEDURE — 84165 PROTEIN E-PHORESIS SERUM: CPT

## 2025-07-07 PROCEDURE — 2500000004 HC RX 250 GENERAL PHARMACY W/ HCPCS (ALT 636 FOR OP/ED): Performed by: INTERNAL MEDICINE

## 2025-07-07 PROCEDURE — 2500000004 HC RX 250 GENERAL PHARMACY W/ HCPCS (ALT 636 FOR OP/ED): Mod: TB | Performed by: INTERNAL MEDICINE

## 2025-07-07 PROCEDURE — 87517 HEPATITIS B DNA QUANT: CPT

## 2025-07-07 PROCEDURE — 1159F MED LIST DOCD IN RCRD: CPT

## 2025-07-07 PROCEDURE — 83521 IG LIGHT CHAINS FREE EACH: CPT

## 2025-07-07 PROCEDURE — 96401 CHEMO ANTI-NEOPL SQ/IM: CPT

## 2025-07-07 PROCEDURE — 86706 HEP B SURFACE ANTIBODY: CPT

## 2025-07-07 PROCEDURE — 2500000001 HC RX 250 WO HCPCS SELF ADMINISTERED DRUGS (ALT 637 FOR MEDICARE OP): Performed by: INTERNAL MEDICINE

## 2025-07-07 PROCEDURE — 85025 COMPLETE CBC W/AUTO DIFF WBC: CPT

## 2025-07-07 PROCEDURE — 36415 COLL VENOUS BLD VENIPUNCTURE: CPT

## 2025-07-07 RX ORDER — DIPHENHYDRAMINE HYDROCHLORIDE 50 MG/ML
50 INJECTION, SOLUTION INTRAMUSCULAR; INTRAVENOUS AS NEEDED
Status: DISCONTINUED | OUTPATIENT
Start: 2025-07-07 | End: 2025-07-07 | Stop reason: HOSPADM

## 2025-07-07 RX ORDER — PROCHLORPERAZINE EDISYLATE 5 MG/ML
10 INJECTION INTRAMUSCULAR; INTRAVENOUS EVERY 6 HOURS PRN
Status: DISCONTINUED | OUTPATIENT
Start: 2025-07-07 | End: 2025-07-07 | Stop reason: HOSPADM

## 2025-07-07 RX ORDER — DIPHENHYDRAMINE HCL 50 MG
50 CAPSULE ORAL ONCE
Status: COMPLETED | OUTPATIENT
Start: 2025-07-07 | End: 2025-07-07

## 2025-07-07 RX ORDER — ACETAMINOPHEN 325 MG/1
650 TABLET ORAL ONCE
Status: COMPLETED | OUTPATIENT
Start: 2025-07-07 | End: 2025-07-07

## 2025-07-07 RX ORDER — PROCHLORPERAZINE MALEATE 10 MG
10 TABLET ORAL EVERY 6 HOURS PRN
Status: DISCONTINUED | OUTPATIENT
Start: 2025-07-07 | End: 2025-07-07 | Stop reason: HOSPADM

## 2025-07-07 RX ORDER — EPINEPHRINE 0.3 MG/.3ML
0.3 INJECTION SUBCUTANEOUS EVERY 5 MIN PRN
Status: DISCONTINUED | OUTPATIENT
Start: 2025-07-07 | End: 2025-07-07 | Stop reason: HOSPADM

## 2025-07-07 RX ORDER — ALBUTEROL SULFATE 0.83 MG/ML
3 SOLUTION RESPIRATORY (INHALATION) AS NEEDED
Status: DISCONTINUED | OUTPATIENT
Start: 2025-07-07 | End: 2025-07-07 | Stop reason: HOSPADM

## 2025-07-07 RX ORDER — FAMOTIDINE 10 MG/ML
20 INJECTION, SOLUTION INTRAVENOUS ONCE AS NEEDED
Status: DISCONTINUED | OUTPATIENT
Start: 2025-07-07 | End: 2025-07-07 | Stop reason: HOSPADM

## 2025-07-07 RX ADMIN — TALQUETAMAB 0.74 MG: 3 INJECTION SUBCUTANEOUS at 16:41

## 2025-07-07 RX ADMIN — ACETAMINOPHEN 650 MG: 325 TABLET ORAL at 16:02

## 2025-07-07 RX ADMIN — DEXAMETHASONE 16 MG: 6 TABLET ORAL at 16:02

## 2025-07-07 RX ADMIN — DIPHENHYDRAMINE HYDROCHLORIDE 50 MG: 50 CAPSULE ORAL at 16:02

## 2025-07-07 ASSESSMENT — ENCOUNTER SYMPTOMS
NUMBNESS: 0
NAUSEA: 0
MUSCULOSKELETAL NEGATIVE: 1
DIZZINESS: 0
VOMITING: 0
HEADACHES: 0

## 2025-07-07 ASSESSMENT — PAIN SCALES - GENERAL: PAINLEVEL_OUTOF10: 0-NO PAIN

## 2025-07-07 NOTE — PROGRESS NOTES
Pt here for C1D1 step up dose Talquetamab. Paperwork & flowsheet completed. Pt voices no concerns at this time. Pt tolerated injection without issue. Pt was reminded about appt time tomorrow. A copy of her AVS was given and pt was discharged in stable condition.

## 2025-07-07 NOTE — PATIENT INSTRUCTIONS
Plan for PET scan prior to next visit.    Contact oncology team at [phone] with any questions or symptom concerns.

## 2025-07-08 ENCOUNTER — TELEPHONE (OUTPATIENT)
Dept: ADMISSION | Facility: HOSPITAL | Age: 85
End: 2025-07-08
Payer: MEDICARE

## 2025-07-08 ENCOUNTER — INFUSION (OUTPATIENT)
Dept: HEMATOLOGY/ONCOLOGY | Facility: HOSPITAL | Age: 85
End: 2025-07-08
Payer: MEDICARE

## 2025-07-08 ENCOUNTER — LAB (OUTPATIENT)
Dept: LAB | Facility: HOSPITAL | Age: 85
End: 2025-07-08
Payer: MEDICARE

## 2025-07-08 ENCOUNTER — OFFICE VISIT (OUTPATIENT)
Dept: HEMATOLOGY/ONCOLOGY | Facility: HOSPITAL | Age: 85
End: 2025-07-08
Payer: MEDICARE

## 2025-07-08 VITALS
OXYGEN SATURATION: 99 % | DIASTOLIC BLOOD PRESSURE: 80 MMHG | SYSTOLIC BLOOD PRESSURE: 139 MMHG | WEIGHT: 161.6 LBS | HEART RATE: 113 BPM | RESPIRATION RATE: 18 BRPM | BODY MASS INDEX: 30.01 KG/M2 | TEMPERATURE: 97.9 F

## 2025-07-08 DIAGNOSIS — C90.00 MULTIPLE MYELOMA NOT HAVING ACHIEVED REMISSION (MULTI): ICD-10-CM

## 2025-07-08 LAB
ALBUMIN SERPL BCP-MCNC: 4.1 G/DL (ref 3.4–5)
ALP SERPL-CCNC: 54 U/L (ref 33–136)
ALT SERPL W P-5'-P-CCNC: 8 U/L (ref 7–45)
ANION GAP SERPL CALC-SCNC: 15 MMOL/L (ref 10–20)
AST SERPL W P-5'-P-CCNC: 14 U/L (ref 9–39)
BASOPHILS # BLD AUTO: 0.01 X10*3/UL (ref 0–0.1)
BASOPHILS NFR BLD AUTO: 0.1 %
BILIRUB SERPL-MCNC: 0.4 MG/DL (ref 0–1.2)
BUN SERPL-MCNC: 34 MG/DL (ref 6–23)
CALCIUM SERPL-MCNC: 10.3 MG/DL (ref 8.6–10.3)
CHLORIDE SERPL-SCNC: 105 MMOL/L (ref 98–107)
CO2 SERPL-SCNC: 26 MMOL/L (ref 21–32)
CREAT SERPL-MCNC: 1.46 MG/DL (ref 0.5–1.05)
EGFRCR SERPLBLD CKD-EPI 2021: 36 ML/MIN/1.73M*2
EOSINOPHIL # BLD AUTO: 0 X10*3/UL (ref 0–0.4)
EOSINOPHIL NFR BLD AUTO: 0 %
ERYTHROCYTE [DISTWIDTH] IN BLOOD BY AUTOMATED COUNT: 12.2 % (ref 11.5–14.5)
GLUCOSE SERPL-MCNC: 201 MG/DL (ref 74–99)
HBV CORE AB SER QL: REACTIVE
HBV SURFACE AB SER-ACNC: 396.1 MIU/ML
HBV SURFACE AG SERPL QL IA: NONREACTIVE
HCT VFR BLD AUTO: 34.4 % (ref 36–46)
HGB BLD-MCNC: 11.2 G/DL (ref 12–16)
IGA SERPL-MCNC: 17 MG/DL (ref 70–400)
IGG SERPL-MCNC: 820 MG/DL (ref 700–1600)
IGM SERPL-MCNC: 5 MG/DL (ref 40–230)
IMM GRANULOCYTES # BLD AUTO: 0.07 X10*3/UL (ref 0–0.5)
IMM GRANULOCYTES NFR BLD AUTO: 0.6 % (ref 0–0.9)
KAPPA LC SERPL-MCNC: 0.48 MG/DL (ref 0.33–1.94)
KAPPA LC/LAMBDA SER: 0 {RATIO} (ref 0.26–1.65)
LAMBDA LC SERPL-MCNC: 251.39 MG/DL (ref 0.57–2.63)
LYMPHOCYTES # BLD AUTO: 0.32 X10*3/UL (ref 0.8–3)
LYMPHOCYTES NFR BLD AUTO: 2.6 %
MCH RBC QN AUTO: 33 PG (ref 26–34)
MCHC RBC AUTO-ENTMCNC: 32.6 G/DL (ref 32–36)
MCV RBC AUTO: 102 FL (ref 80–100)
MONOCYTES # BLD AUTO: 0.49 X10*3/UL (ref 0.05–0.8)
MONOCYTES NFR BLD AUTO: 4 %
NEUTROPHILS # BLD AUTO: 11.45 X10*3/UL (ref 1.6–5.5)
NEUTROPHILS NFR BLD AUTO: 92.7 %
NRBC BLD-RTO: 0 /100 WBCS (ref 0–0)
PLATELET # BLD AUTO: 313 X10*3/UL (ref 150–450)
POTASSIUM SERPL-SCNC: 4.8 MMOL/L (ref 3.5–5.3)
PROT SERPL-MCNC: 7.3 G/DL (ref 6.4–8.2)
RBC # BLD AUTO: 3.39 X10*6/UL (ref 4–5.2)
SODIUM SERPL-SCNC: 141 MMOL/L (ref 136–145)
WBC # BLD AUTO: 12.3 X10*3/UL (ref 4.4–11.3)

## 2025-07-08 PROCEDURE — 85025 COMPLETE CBC W/AUTO DIFF WBC: CPT

## 2025-07-08 PROCEDURE — 99215 OFFICE O/P EST HI 40 MIN: CPT | Performed by: NURSE PRACTITIONER

## 2025-07-08 PROCEDURE — 36415 COLL VENOUS BLD VENIPUNCTURE: CPT

## 2025-07-08 PROCEDURE — 80053 COMPREHEN METABOLIC PANEL: CPT

## 2025-07-08 NOTE — TELEPHONE ENCOUNTER
Returned call explained that positive hepatitis Bcore andsurface antibody is passive from her IVIG infusions

## 2025-07-08 NOTE — PROGRESS NOTES
Sherie West arrived to infusion center for follow up visit. Pt denies new or worsening symptoms. Neurotox visit complete.   LADY Seay assessed pt at chairside. Pt creatine elevated. Pt wishes to trial increasing her oral intake for now. Will repeat labs on next visit. Pt discharged home in stable condition with daughter.

## 2025-07-08 NOTE — TELEPHONE ENCOUNTER
Daughter, Frances, concerned about Sherie's Hepatitis B coming back as reactive. Asking to speak with Dr. Marshall if possible.

## 2025-07-10 ENCOUNTER — INFUSION (OUTPATIENT)
Dept: HEMATOLOGY/ONCOLOGY | Facility: HOSPITAL | Age: 85
End: 2025-07-10
Payer: MEDICARE

## 2025-07-10 ENCOUNTER — LAB (OUTPATIENT)
Dept: LAB | Facility: HOSPITAL | Age: 85
End: 2025-07-10
Payer: MEDICARE

## 2025-07-10 VITALS
TEMPERATURE: 97.9 F | HEART RATE: 77 BPM | DIASTOLIC BLOOD PRESSURE: 67 MMHG | BODY MASS INDEX: 29.84 KG/M2 | SYSTOLIC BLOOD PRESSURE: 160 MMHG | RESPIRATION RATE: 18 BRPM | WEIGHT: 160.72 LBS | OXYGEN SATURATION: 100 %

## 2025-07-10 DIAGNOSIS — C90.00 MULTIPLE MYELOMA NOT HAVING ACHIEVED REMISSION (MULTI): ICD-10-CM

## 2025-07-10 LAB
ALBUMIN SERPL BCP-MCNC: 3.8 G/DL (ref 3.4–5)
ALP SERPL-CCNC: 51 U/L (ref 33–136)
ALT SERPL W P-5'-P-CCNC: 7 U/L (ref 7–45)
ANION GAP SERPL CALC-SCNC: 13 MMOL/L (ref 10–20)
AST SERPL W P-5'-P-CCNC: 14 U/L (ref 9–39)
BASOPHILS # BLD AUTO: 0.02 X10*3/UL (ref 0–0.1)
BASOPHILS NFR BLD AUTO: 0.3 %
BILIRUB SERPL-MCNC: 0.4 MG/DL (ref 0–1.2)
BUN SERPL-MCNC: 28 MG/DL (ref 6–23)
CALCIUM SERPL-MCNC: 9.6 MG/DL (ref 8.6–10.3)
CHLORIDE SERPL-SCNC: 105 MMOL/L (ref 98–107)
CO2 SERPL-SCNC: 26 MMOL/L (ref 21–32)
CREAT SERPL-MCNC: 1.38 MG/DL (ref 0.5–1.05)
EGFRCR SERPLBLD CKD-EPI 2021: 38 ML/MIN/1.73M*2
EOSINOPHIL # BLD AUTO: 0.01 X10*3/UL (ref 0–0.4)
EOSINOPHIL NFR BLD AUTO: 0.1 %
ERYTHROCYTE [DISTWIDTH] IN BLOOD BY AUTOMATED COUNT: 12.1 % (ref 11.5–14.5)
GLUCOSE SERPL-MCNC: 93 MG/DL (ref 74–99)
HBV DNA SERPL NAA+PROBE-ACNC: NOT DETECTED [IU]/ML
HBV DNA SERPL NAA+PROBE-LOG IU: NORMAL {LOG_IU}/ML
HCT VFR BLD AUTO: 32.5 % (ref 36–46)
HGB BLD-MCNC: 10.5 G/DL (ref 12–16)
IMM GRANULOCYTES # BLD AUTO: 0.03 X10*3/UL (ref 0–0.5)
IMM GRANULOCYTES NFR BLD AUTO: 0.4 % (ref 0–0.9)
LYMPHOCYTES # BLD AUTO: 0.37 X10*3/UL (ref 0.8–3)
LYMPHOCYTES NFR BLD AUTO: 5.2 %
MCH RBC QN AUTO: 32.7 PG (ref 26–34)
MCHC RBC AUTO-ENTMCNC: 32.3 G/DL (ref 32–36)
MCV RBC AUTO: 101 FL (ref 80–100)
MONOCYTES # BLD AUTO: 0.57 X10*3/UL (ref 0.05–0.8)
MONOCYTES NFR BLD AUTO: 8 %
NEUTROPHILS # BLD AUTO: 6.15 X10*3/UL (ref 1.6–5.5)
NEUTROPHILS NFR BLD AUTO: 86 %
NRBC BLD-RTO: 0 /100 WBCS (ref 0–0)
PLATELET # BLD AUTO: 264 X10*3/UL (ref 150–450)
POTASSIUM SERPL-SCNC: 4.3 MMOL/L (ref 3.5–5.3)
PROT SERPL-MCNC: 6.7 G/DL (ref 6.4–8.2)
RBC # BLD AUTO: 3.21 X10*6/UL (ref 4–5.2)
SODIUM SERPL-SCNC: 140 MMOL/L (ref 136–145)
WBC # BLD AUTO: 7.2 X10*3/UL (ref 4.4–11.3)

## 2025-07-10 PROCEDURE — 2500000004 HC RX 250 GENERAL PHARMACY W/ HCPCS (ALT 636 FOR OP/ED): Mod: TB | Performed by: INTERNAL MEDICINE

## 2025-07-10 PROCEDURE — 36415 COLL VENOUS BLD VENIPUNCTURE: CPT

## 2025-07-10 PROCEDURE — 2500000001 HC RX 250 WO HCPCS SELF ADMINISTERED DRUGS (ALT 637 FOR MEDICARE OP): Performed by: INTERNAL MEDICINE

## 2025-07-10 PROCEDURE — 96401 CHEMO ANTI-NEOPL SQ/IM: CPT

## 2025-07-10 PROCEDURE — 85025 COMPLETE CBC W/AUTO DIFF WBC: CPT

## 2025-07-10 PROCEDURE — 2500000004 HC RX 250 GENERAL PHARMACY W/ HCPCS (ALT 636 FOR OP/ED): Performed by: INTERNAL MEDICINE

## 2025-07-10 PROCEDURE — 80053 COMPREHEN METABOLIC PANEL: CPT

## 2025-07-10 RX ORDER — DIPHENHYDRAMINE HCL 50 MG
50 CAPSULE ORAL ONCE
Status: COMPLETED | OUTPATIENT
Start: 2025-07-10 | End: 2025-07-10

## 2025-07-10 RX ORDER — PROCHLORPERAZINE MALEATE 10 MG
10 TABLET ORAL EVERY 6 HOURS PRN
Status: DISCONTINUED | OUTPATIENT
Start: 2025-07-10 | End: 2025-07-10 | Stop reason: HOSPADM

## 2025-07-10 RX ORDER — FAMOTIDINE 10 MG/ML
20 INJECTION, SOLUTION INTRAVENOUS ONCE AS NEEDED
Status: DISCONTINUED | OUTPATIENT
Start: 2025-07-10 | End: 2025-07-10 | Stop reason: HOSPADM

## 2025-07-10 RX ORDER — DIPHENHYDRAMINE HYDROCHLORIDE 50 MG/ML
50 INJECTION, SOLUTION INTRAMUSCULAR; INTRAVENOUS AS NEEDED
Status: DISCONTINUED | OUTPATIENT
Start: 2025-07-10 | End: 2025-07-10 | Stop reason: HOSPADM

## 2025-07-10 RX ORDER — ACETAMINOPHEN 325 MG/1
650 TABLET ORAL ONCE
Status: COMPLETED | OUTPATIENT
Start: 2025-07-10 | End: 2025-07-10

## 2025-07-10 RX ORDER — EPINEPHRINE 0.3 MG/.3ML
0.3 INJECTION SUBCUTANEOUS EVERY 5 MIN PRN
Status: DISCONTINUED | OUTPATIENT
Start: 2025-07-10 | End: 2025-07-10 | Stop reason: HOSPADM

## 2025-07-10 RX ORDER — ALBUTEROL SULFATE 0.83 MG/ML
3 SOLUTION RESPIRATORY (INHALATION) AS NEEDED
Status: DISCONTINUED | OUTPATIENT
Start: 2025-07-10 | End: 2025-07-10 | Stop reason: HOSPADM

## 2025-07-10 RX ORDER — PROCHLORPERAZINE EDISYLATE 5 MG/ML
10 INJECTION INTRAMUSCULAR; INTRAVENOUS EVERY 6 HOURS PRN
Status: DISCONTINUED | OUTPATIENT
Start: 2025-07-10 | End: 2025-07-10 | Stop reason: HOSPADM

## 2025-07-10 RX ADMIN — DIPHENHYDRAMINE HYDROCHLORIDE 50 MG: 50 CAPSULE ORAL at 14:17

## 2025-07-10 RX ADMIN — DEXAMETHASONE 16 MG: 6 TABLET ORAL at 14:17

## 2025-07-10 RX ADMIN — TALQUETAMAB 4.4 MG: 3 INJECTION SUBCUTANEOUS at 14:52

## 2025-07-10 RX ADMIN — ACETAMINOPHEN 650 MG: 325 TABLET ORAL at 14:17

## 2025-07-10 ASSESSMENT — ENCOUNTER SYMPTOMS
APPETITE CHANGE: 0
RESPIRATORY NEGATIVE: 1
FATIGUE: 0
NUMBNESS: 0
UNEXPECTED WEIGHT CHANGE: 0
ADENOPATHY: 0
FEVER: 0
MUSCULOSKELETAL NEGATIVE: 1
BRUISES/BLEEDS EASILY: 1
CHILLS: 0
VOMITING: 0
DIAPHORESIS: 0
DIARRHEA: 1
NAUSEA: 0
HEADACHES: 0
CARDIOVASCULAR NEGATIVE: 1
DIZZINESS: 0

## 2025-07-10 ASSESSMENT — PAIN SCALES - GENERAL: PAINLEVEL_OUTOF10: 0-NO PAIN

## 2025-07-10 NOTE — PROGRESS NOTES
Sherie West is a 85 y.o. female who presents for Talquetamab    She is on the following chemotherapy regimen:     Treatment Plans       Name Type Plan Dates Plan Provider         Active    Talquetamab (Weekly), 28 Day Cycles Oncology Treatment 7/3/2025 - Present Carmencita Marshall MD                    Since her last visit, she has been doing well.  Overall, she states her energy level is stable.  Her appetite has been unchanged. Prior to patient leaving she was slightly unsteady. Vitals taken and Jessica Seay checked on pt and pt ok to leave. Pt ambulated off unit with daughter

## 2025-07-10 NOTE — SIGNIFICANT EVENT
07/10/25 1403   Prechemo Checklist   Has the patient been in the hospital, ED, or urgent care since last date of service No   Chemo/Immuno Consent Completed and Signed Yes   Protocol/Indications Verified Yes   Confirmed to previous date/time of medication Yes   Compared to previous dose Yes  (step up)   All medications are dated accurately Yes   Pregnancy Test Negative Not applicable   Parameters Met Yes   BSA/Weight-Height Verified Yes   Dose Calculations Verified (current, total, cumulative) Yes

## 2025-07-10 NOTE — PROGRESS NOTES
"Patient ID: Sil West \"In[place]" is a 85 y.o. female.    Treatment:   Oncology History Overview Note   I. Diagnosis (1/2023):  IgG Lambda Multiple Myeloma  Presenting symptoms: Urinary incontinence and diffuse bony pain  II. Workup:  Bone Biopsy (1/6/23):  Plasma cells neoplasm  Repeat Bone Marrow Biopsy (1/26/23):  Hypercellular marrow, 80-90% plasma cells, lambda-restricted  FISH: 1q gain (high risk), trisomy 3  MRI Spine (12/18/22):  Osseous metastases, involvement in mid-cervical, mid-thoracic, and lumbar vertebral bodies, as well as the right iliac bone  PET Scan (1/23):  FDG avidity in right iliac bone, right sacral ala, left posterior 6th rib, and appendicular skeleton  Serum and Urine Studies (1/19-1/28/23):  FKLC 1.57, FLLC 2947.7, K/L ratio 0  IgG 537, IgA 221, IgM 19, b2M 23.4, Hgb 5.7  M protein IgA lambda 0.7 g/dL   U/L  Creatinine: 2.33 (peaked at 3.15)  UPEP: Monoclonal lambda light chain at 386.6 mg/24H  Hepatitis B and C negative  III. Treatment:  Radiation (2/2/23):  Right hip + T6-T8 x 5 fractions (total 2000 cGy)  Induction (12/24/22 - 2/16/23):  Bortezomib and dexamethasone + daratumumab  C1 (1/28/23): Bortezomib 1, 4, 8, 11 + daratumumab x 2 doses  C2 (2/16/23): Weekly dosing of daratumumab and bortezomib (1, 8, 15), with lenalidomide planned when renal function allows  Response Evaluation (5/25/23):  CR with M protein 0.1 (IgG kappa c/w roscoe) and free lyte chain 1.37  C8 Rosceo RVD (Completed 7/6/23):  Transitioned with a VGPR vs CR, ongoing multifocal bone pain  IV. Response Evaluation (7/13/23):  Marked improvement in bony lesions  Pathological fractures in right pelvis and ribs, possible infiltrate in the right lower lobe base, and hypodensity in the right thyroid gland  V. Treatment Adjustment (Cycle 9 and forward): 10/2023  Velcade omitted  Transitioned to a 4-week schedule with Roscoe (day 1) and Revlimid 15 mg days 1-21/28 days     Multiple myeloma not having achieved remission " (Multi)   9/13/2023 Initial Diagnosis    Multiple myeloma not having achieved remission (CMS/HCC)     10/2/2023 - 7/8/2024 Chemotherapy    Daratumumab, Pomalidomide and dexamethasone (oral meds managed outside treatment plan) 28 Day Cycles - Maintenance     8/5/2024 - 6/23/2025 Chemotherapy    Carfilzomib and Cyclophosphamide (Weekly) / Dexamethasone, 28 Day Cycles (Custom)     7/7/2025 -  Chemotherapy    Talquetamab (Weekly), 28 Day Cycles       Past Medical History:  HTN, DLP  pre skin cancers,   diveriticulitis   peripheral artery disease not amenable to surgery  Thyroid nodules under observation    Surgical History:  squamous cell carcinoma removed to left hand and right arm 6/2024 2/21/25 Mohs surgery done for 4 areas of SCC on 5 fluorouracil 5%  Past Surgical History:   Procedure Laterality Date    CT GUIDED PERCUTANEOUS BIOPSY BONE DEEP  01/06/2023    CT GUIDED PERCUTANEOUS BIOPSY BONE DEEP 1/6/2023 [facility]    OTHER SURGICAL HISTORY      THYROID BIOPSY      Family History:     Family History   Problem Relation Name Age of Onset    Alzheimer's disease Mother      Colon cancer Father      Rashes / Skin problems Sister      Rashes / Skin problems Brother       Social History:  ,  passed away on 10/24/23.  3 grown children (1 daughter, 2 sons). 2 grandchildren.  Has 6 cats.  Enjoyed skiing.  Occupation: retired .  Social History     Tobacco Use    Smoking status: Never     Passive exposure: Never    Smokeless tobacco: Never   Vaping Use    Vaping status: Never Used   Substance Use Topics    Alcohol use: Never    Drug use: Never      -------------------------------------------------------------------------------------------------------  Subjective       Sil West is a 84 year old female with IgG lambda multiple myeloma.  She recently had an increase to her lambda free light chains.  PET imaging done 7/17/24 showed an new soft tissue uptake in right 11th rib and  interestingly patient has pain there as well.  Transitioned to carfilzomib, cyclophophamide, dex, cycle 1 on 8/5/24, Starting at C10 , cyclophosphamide drops out and she is only receiving carfilzomib every 2 weeks.  Completed carfilzomib June 23, 2025 due to disease progression.     Sherie's progressive disease has been mainly manifected by rapidly increasing lambda lyte chains as in chart below.  She does not seem to have any other CRAB criteria. Receiving IVIG every month and zometa restarted 3/17/25 after completion of dental work, scheduled for every 3 months.      Sil presents to the clinic today (7/8/25) accompanied by her daughter Frances for follow up evaluation after receiving  C1D1 talquetamab yesterday 7/7.  She tolerated dose well without any issues. Feels well today, no evidence of CRS. sCr mildly elevated today (1.46), reports drinking less water.     Energy level is okay.  Staying active working in yard, trimming bushes.  Appetite is lower but thinks is due to the heat.  Weight is steady.  Following closely with dermatology.  Continues to have intermittent loose stools, but feels is food related.  Taking imodium as needed.  Bowels rotate between constipation and diarrhea.  Bruises easily.  Daughter Frances is staying with Sherie for the next 10 day.      Review of Systems   Constitutional:  Negative for appetite change, chills, diaphoresis, fatigue, fever and unexpected weight change.   Respiratory: Negative.     Cardiovascular: Negative.    Gastrointestinal:  Positive for diarrhea. Negative for nausea and vomiting.   Genitourinary: Negative.     Musculoskeletal: Negative.  Negative for gait problem.   Skin: Negative.         Had multiple spots of SCC removed by dermatology.   Neurological:  Negative for dizziness, gait problem, headaches and numbness.   Hematological:  Negative for adenopathy. Bruises/bleeds easily (bruises easily).   All other systems reviewed and are  negative.  -------------------------------------------------------------------------------------------------------  Objective   BSA: There is no height or weight on file to calculate BSA.  There were no vitals taken for this visit.    Physical Exam  Vitals reviewed.   Constitutional:       Appearance: Normal appearance. She is well-developed and normal weight.   HENT:      Head: Normocephalic and atraumatic.      Nose: Nose normal.   Eyes:      General: No scleral icterus.     Extraocular Movements: Extraocular movements intact.      Conjunctiva/sclera: Conjunctivae normal.      Pupils: Pupils are equal, round, and reactive to light.   Cardiovascular:      Rate and Rhythm: Normal rate and regular rhythm.      Pulses: Normal pulses.      Heart sounds: Normal heart sounds.   Pulmonary:      Effort: Pulmonary effort is normal.      Breath sounds: Normal breath sounds.   Abdominal:      General: Abdomen is flat. Bowel sounds are normal. There is no distension.      Palpations: Abdomen is soft. There is no mass.      Tenderness: There is no abdominal tenderness.   Musculoskeletal:         General: Normal range of motion.      Right lower leg: Edema (non-pitting) present.      Left lower leg: Edema (non-pitting) present.      Comments: No spine tenderness   Lymphadenopathy:      Comments: No lymphadenopathy   Skin:     General: Skin is warm and dry.   Neurological:      General: No focal deficit present.      Mental Status: She is alert and oriented to person, place, and time.   Psychiatric:         Mood and Affect: Mood normal.         Behavior: Behavior normal.         Thought Content: Thought content normal.   Performance Status:  Symptomatic; fully ambulatory  -------------------------------------------------------------------------------------------------------  Assessment and Plan:    Multiple myeloma not having achieved remission (CMS/HCC)  IgG lambda MM    - Currently on daratumamab/Lenalidomide. Continued  uptrending of free lambda light chain. M-protein still 0.1g IgG kappa, likely represents daratumumab. Will add weekly dex 10 mg to see if this could deepen her response.     5/13/24:  Continues to have increasing free lambda light chain, M-protein remains at 0.1.  Receiving C20 daratumumab today.  Discussed that free lyte chain continues to increase significantly and suggested switching revlimid to pomalidomide  4 mg 21/28 days since no other CRAB criteria,  -Taking aspirin 81 mg daily for VTE prophylaxis during treatment with pomalidomide      PET scan (7/17/24)   IMPRESSION:  1. Redemonstration of an FDG avid right posterolateral 11th rib  fracture now with evidence of an underlying soft tissue lesion.  2. Previously described hypermetabolic osseous lesions in the right  iliac, right superior pubic ramus, and right aspect of the pubic  symphysis have continued to decrease in metabolic activity with  persistent FDG avidity likely representing continued treatment  response with residual viable disease, attention on follow-up imaging.  3. Healing right inferior pubic ramus fracture with decreased extent  of hypermetabolic activity.    7/18/24:  Laboratory results show major increase in free lyte chains to 62 mg/dL up from 18 in March 2024, only sx is right rib pain where PET from yesterday shows a new lesion, no other new lesions seen,      Bone marrow biopsy results from 7/22/24 showing limited bone marrow (20% cellularity) with maturing trilineage hematopoiesis, minimal involvement by lambda-restricted plasma cells by flow cytometry. ECHO 8/2/2024 LVEF 60-65%    Started cycle 1 carfilzomib, cyclophosphamide, dexamethasone 8//24, cyclophosphamide omitted from regimen with cycle 10.  Last dose of carfilzomib 6/23/25 due to disease progression.    6/17/25:   Marked increase in lambda lyte chains to 85.45 from 23.95 (5/12/25) up from 6.76 (4/14/25)    7/3/25:  Patient and her family have decided to proceed with  talquetamab treatment and will provide support needed for outpatient ramp up. Today, I spent 50 minutes in the office reviewing the schedule, potential toxicites, outpatient medications including dexamethasone to be used in event of suspected CRS or ICANS with immediate contact to physician team should these sx develop.     7/8/25: C1D1 step up talquetamab yesterday. No evidence of CRS, ICE 10/10.  Lambda free light chains increased to 85.25 (6/9/25)-see below.  SPEP normal (6/9/25).  Myeloma labs in process from today.  Reviewed supportive medications and confirmed that Sherie has picked up prescriptions.  Advised to contact oncology team with symptom concerns, provided contact number.  Confirmed has dexamethasone prescription at home, advised to wait from oncology team prior to taking.  Daughter Frances will be staying with Sherie for the next 10 days.  Plan for PET scan, advised to schedule.    Ig [place] Free Light Chain   Date Value Ref Range Status   07/07/2025 0.48 0.33 - 1.94 mg/dL Final   06/09/2025 0.35 0.33 - 1.94 mg/dL Final   05/12/2025 0.38 0.33 - 1.94 mg/dL Final   04/14/2025 0.32 (L) 0.33 - 1.94 mg/dL Final   03/17/2025 0.20 (L) 0.33 - 1.94 mg/dL Final     Ig Lambda Free Light Chain   Date Value Ref Range Status   07/07/2025 251.39 (H) 0.57 - 2.63 mg/dL Final   06/09/2025 85.45 (H) 0.57 - 2.63 mg/dL Final   05/12/2025 23.95 (H) 0.57 - 2.63 mg/dL Final   04/14/2025 6.76 (H) 0.57 - 2.63 mg/dL Final   03/17/2025 5.52 (H) 0.57 - 2.63 mg/dL Final     Kappa/Lambda Ratio   Date Value Ref Range Status   07/07/2025 0.00 (L) 0.26 - 1.65 Final   06/09/2025 0.00 (L) 0.26 - 1.65 Final   05/12/2025 0.02 (L) 0.26 - 1.65 Final   04/14/2025 0.05 (L) 0.26 - 1.65 Final   03/17/2025 0.04 (L) 0.26 - 1.65 Final     M-PROTEIN 1   Date Value Ref Range Status   10/28/2024 0.1 (H)   g/dL Final   10/14/2024 0.1 (H)   g/dL Final   09/03/2024 0.1 (H)   g/dL Final   08/12/2024 0.1 (H)   g/dL Final   07/22/2024 0.1 (H)   g/dL Final       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  IgG level 694 (6/23/25).  Continue monthly at [facility], last received 6/23/25.  Next dose due 7/21/25.    Dysuria: urinalysis bland, suspect needs urologic evaluation for incontinence    Bone Health:  - Continue zoledronic acid 3.3 mg (renal dosing) every 3 months, received last on 9/3/24.    - Continue vitamin D supplement -- 2000 units PO daily.  Started zometa 3/30/23 will complete 3/2025.  12/23/24:  Sil reports she had a crown fall out and may need a root canal.  Placed zometa on hold until dental work has been completed.    Dental work completed.  Resumed zometa 3/17/25, continue every 3 months.   Last received Zometa 6/9/25.       Thyroid nodule  - repeat thyroid US on 2/19/24, 2 nodules noted with FNA recommended.  Thyroid biopsy 4/29/24 showing rare atypical cells from FNA of right mid lobe.  Repeat biopsy was nondiagnostic.  Dr. Magallanes recommended thyroid surgery vs continued observation.  Sil requested to having US monitoring for now.  Last visit with Dr. Magallanes on 9/9/24. US 2/24/25 with 30 mm mildly suspicious nodule to right lobe without significant change from prior imaging.  Plan to have US in 1 year with follow up.       Right lower back pain  - seems musculoskeletal  - MRI lumbar spine (3/4/24): degenerative changes, no evidence of progressive disease or fracture.    - MRI thoracic spine (3/12/24): degenerative changes, redemonstration of multifocal marrow signal abnormalities compatible with the given history of multiple myeloma, osseous expansion and epidural extension of neoplasm previously seen have improved, decrease in size and enhancement of previously seen lesions.    7/7/25:  Reports no further back pain.      Health Care Maintenance:  Vaccines:    Received influenza- vaccine 10/9/24.   Advised to obtain updated covid, RSV, shingrix, and prevnar 20 vaccines at local pharmacy.    RTC:   Plan for PET scan.  Continue monthly IVIG and zometa every 3 months.   7/10/25:  C1D3 2nd step up talquetamab dose.  7/11/25:  Mal heme provider visit.  7/14/25:  C1D6 first treatment talquestamab dose.    7/15/25:  Mal heme provider visit.  7/21/25:  Follow up visit with oncology team for C2D1 talquetamab and IVIG.    Will transition to [facility] day 8 of cycle 2.   -----------------------------------------------------------------------------------------  CHANTEL Gordillo-CNP  [facility]

## 2025-07-11 ENCOUNTER — OFFICE VISIT (OUTPATIENT)
Dept: HEMATOLOGY/ONCOLOGY | Facility: HOSPITAL | Age: 85
End: 2025-07-11
Payer: MEDICARE

## 2025-07-11 ENCOUNTER — LAB (OUTPATIENT)
Dept: LAB | Facility: HOSPITAL | Age: 85
End: 2025-07-11
Payer: MEDICARE

## 2025-07-11 ENCOUNTER — INFUSION (OUTPATIENT)
Dept: HEMATOLOGY/ONCOLOGY | Facility: HOSPITAL | Age: 85
End: 2025-07-11
Payer: MEDICARE

## 2025-07-11 VITALS
TEMPERATURE: 98.8 F | DIASTOLIC BLOOD PRESSURE: 65 MMHG | HEART RATE: 114 BPM | OXYGEN SATURATION: 99 % | WEIGHT: 159.7 LBS | SYSTOLIC BLOOD PRESSURE: 135 MMHG | RESPIRATION RATE: 18 BRPM | BODY MASS INDEX: 29.65 KG/M2

## 2025-07-11 DIAGNOSIS — C90.00 MULTIPLE MYELOMA NOT HAVING ACHIEVED REMISSION (MULTI): ICD-10-CM

## 2025-07-11 LAB
ALBUMIN SERPL BCP-MCNC: 4.1 G/DL (ref 3.4–5)
ALBUMIN: 3.6 G/DL (ref 3.4–5)
ALP SERPL-CCNC: 57 U/L (ref 33–136)
ALPHA 1 GLOBULIN: 0.4 G/DL (ref 0.2–0.6)
ALPHA 2 GLOBULIN: 1 G/DL (ref 0.4–1.1)
ALT SERPL W P-5'-P-CCNC: 9 U/L (ref 7–45)
ANION GAP SERPL CALC-SCNC: 15 MMOL/L (ref 10–20)
AST SERPL W P-5'-P-CCNC: 14 U/L (ref 9–39)
BASOPHILS # BLD AUTO: 0.01 X10*3/UL (ref 0–0.1)
BASOPHILS NFR BLD AUTO: 0.1 %
BETA GLOBULIN: 0.7 G/DL (ref 0.5–1.2)
BILIRUB SERPL-MCNC: 0.5 MG/DL (ref 0–1.2)
BUN SERPL-MCNC: 39 MG/DL (ref 6–23)
CALCIUM SERPL-MCNC: 10.3 MG/DL (ref 8.6–10.6)
CHLORIDE SERPL-SCNC: 107 MMOL/L (ref 98–107)
CO2 SERPL-SCNC: 24 MMOL/L (ref 21–32)
CREAT SERPL-MCNC: 1.46 MG/DL (ref 0.5–1.05)
EGFRCR SERPLBLD CKD-EPI 2021: 36 ML/MIN/1.73M*2
EOSINOPHIL # BLD AUTO: 0 X10*3/UL (ref 0–0.4)
EOSINOPHIL NFR BLD AUTO: 0 %
ERYTHROCYTE [DISTWIDTH] IN BLOOD BY AUTOMATED COUNT: 12.1 % (ref 11.5–14.5)
GAMMA GLOBULIN: 0.8 G/DL (ref 0.5–1.4)
GLUCOSE SERPL-MCNC: 158 MG/DL (ref 74–99)
HCT VFR BLD AUTO: 33.4 % (ref 36–46)
HGB BLD-MCNC: 11 G/DL (ref 12–16)
IMM GRANULOCYTES # BLD AUTO: 0.08 X10*3/UL (ref 0–0.5)
IMM GRANULOCYTES NFR BLD AUTO: 0.6 % (ref 0–0.9)
IMMUNOFIXATION COMMENT: ABNORMAL
LYMPHOCYTES # BLD AUTO: 0.23 X10*3/UL (ref 0.8–3)
LYMPHOCYTES NFR BLD AUTO: 1.8 %
M-PROTEIN 1: 0.1 G/DL (ref ?–0)
MCH RBC QN AUTO: 32.6 PG (ref 26–34)
MCHC RBC AUTO-ENTMCNC: 32.9 G/DL (ref 32–36)
MCV RBC AUTO: 99 FL (ref 80–100)
MONOCYTES # BLD AUTO: 0.58 X10*3/UL (ref 0.05–0.8)
MONOCYTES NFR BLD AUTO: 4.5 %
NEUTROPHILS # BLD AUTO: 12.05 X10*3/UL (ref 1.6–5.5)
NEUTROPHILS NFR BLD AUTO: 93 %
NRBC BLD-RTO: 0 /100 WBCS (ref 0–0)
PATH REVIEW - SERUM IMMUNOFIXATION: ABNORMAL
PATH REVIEW-SERUM PROTEIN ELECTROPHORESIS: ABNORMAL
PLATELET # BLD AUTO: 304 X10*3/UL (ref 150–450)
POTASSIUM SERPL-SCNC: 4.2 MMOL/L (ref 3.5–5.3)
PROT SERPL-MCNC: 7.3 G/DL (ref 6.4–8.2)
PROTEIN ELECTROPHORESIS COMMENT: ABNORMAL
RBC # BLD AUTO: 3.37 X10*6/UL (ref 4–5.2)
SODIUM SERPL-SCNC: 142 MMOL/L (ref 136–145)
WBC # BLD AUTO: 13 X10*3/UL (ref 4.4–11.3)

## 2025-07-11 PROCEDURE — 99215 OFFICE O/P EST HI 40 MIN: CPT | Performed by: NURSE PRACTITIONER

## 2025-07-11 PROCEDURE — 83735 ASSAY OF MAGNESIUM: CPT

## 2025-07-11 PROCEDURE — 80053 COMPREHEN METABOLIC PANEL: CPT

## 2025-07-11 PROCEDURE — 36415 COLL VENOUS BLD VENIPUNCTURE: CPT

## 2025-07-11 PROCEDURE — 85025 COMPLETE CBC W/AUTO DIFF WBC: CPT

## 2025-07-11 ASSESSMENT — ENCOUNTER SYMPTOMS
HEADACHES: 0
NUMBNESS: 0
FEVER: 0
ADENOPATHY: 0
BRUISES/BLEEDS EASILY: 1
CARDIOVASCULAR NEGATIVE: 1
NAUSEA: 0
DIZZINESS: 0
FATIGUE: 0
UNEXPECTED WEIGHT CHANGE: 0
MUSCULOSKELETAL NEGATIVE: 1
DIAPHORESIS: 0
RESPIRATORY NEGATIVE: 1
VOMITING: 0
DIARRHEA: 1
CHILLS: 0
APPETITE CHANGE: 0

## 2025-07-11 NOTE — PROGRESS NOTES
Pt arrived to infusion for follow up for talvey. Neuro tox assessment completed; ICE score 11. Jessica Seay NP at chairside to assess pt. Pt off unit independently with daughter upon completion of visit with NP

## 2025-07-11 NOTE — PROGRESS NOTES
"Patient ID: Sil West \"Ines" is a 85 y.o. female.    Treatment:   Oncology History Overview Note   I. Diagnosis (1/2023):  IgG Lambda Multiple Myeloma  Presenting symptoms: Urinary incontinence and diffuse bony pain  II. Workup:  Bone Biopsy (1/6/23):  Plasma cells neoplasm  Repeat Bone Marrow Biopsy (1/26/23):  Hypercellular marrow, 80-90% plasma cells, lambda-restricted  FISH: 1q gain (high risk), trisomy 3  MRI Spine (12/18/22):  Osseous metastases, involvement in mid-cervical, mid-thoracic, and lumbar vertebral bodies, as well as the right iliac bone  PET Scan (1/23):  FDG avidity in right iliac bone, right sacral ala, left posterior 6th rib, and appendicular skeleton  Serum and Urine Studies (1/19-1/28/23):  FKLC 1.57, FLLC 2947.7, K/L ratio 0  IgG 537, IgA 221, IgM 19, b2M 23.4, Hgb 5.7  M protein IgA lambda 0.7 g/dL   U/L  Creatinine: 2.33 (peaked at 3.15)  UPEP: Monoclonal lambda light chain at 386.6 mg/24H  Hepatitis B and C negative  III. Treatment:  Radiation (2/2/23):  Right hip + T6-T8 x 5 fractions (total 2000 cGy)  Induction (12/24/22 - 2/16/23):  Bortezomib and dexamethasone + daratumumab  C1 (1/28/23): Bortezomib 1, 4, 8, 11 + daratumumab x 2 doses  C2 (2/16/23): Weekly dosing of daratumumab and bortezomib (1, 8, 15), with lenalidomide planned when renal function allows  Response Evaluation (5/25/23):  CR with M protein 0.1 (IgG kappa c/w roscoe) and free lyte chain 1.37  C8 Roscoe RVD (Completed 7/6/23):  Transitioned with a VGPR vs CR, ongoing multifocal bone pain  IV. Response Evaluation (7/13/23):  Marked improvement in bony lesions  Pathological fractures in right pelvis and ribs, possible infiltrate in the right lower lobe base, and hypodensity in the right thyroid gland  V. Treatment Adjustment (Cycle 9 and forward): 10/2023  Velcade omitted  Transitioned to a 4-week schedule with Roscoe (day 1) and Revlimid 15 mg days 1-21/28 days     Multiple myeloma not having achieved remission " (Multi)   9/13/2023 Initial Diagnosis    Multiple myeloma not having achieved remission (CMS/HCC)     10/2/2023 - 7/8/2024 Chemotherapy    Daratumumab, Pomalidomide and dexamethasone (oral meds managed outside treatment plan) 28 Day Cycles - Maintenance     8/5/2024 - 6/23/2025 Chemotherapy    Carfilzomib and Cyclophosphamide (Weekly) / Dexamethasone, 28 Day Cycles (Custom)     7/7/2025 -  Chemotherapy    Talquetamab (Weekly), 28 Day Cycles       Past Medical History:  HTN, DLP  pre skin cancers,   diveriticulitis   peripheral artery disease not amenable to surgery  Thyroid nodules under observation    Surgical History:  squamous cell carcinoma removed to left hand and right arm 6/2024 2/21/25 Mohs surgery done for 4 areas of SCC on 5 fluorouracil 5%  Past Surgical History:   Procedure Laterality Date    CT GUIDED PERCUTANEOUS BIOPSY BONE DEEP  01/06/2023    CT GUIDED PERCUTANEOUS BIOPSY BONE DEEP 1/6/2023 [facility]    OTHER SURGICAL HISTORY      THYROID BIOPSY      Family History:     Family History   Problem Relation Name Age of Onset    Alzheimer's disease Mother      Colon cancer Father      Rashes / Skin problems Sister      Rashes / Skin problems Brother       Social History:  ,  passed away on 10/24/23.  3 grown children (1 daughter, 2 sons). 2 grandchildren.  Has 6 cats.  Enjoyed skiing.  Occupation: retired .  Social History     Tobacco Use    Smoking status: Never     Passive exposure: Never    Smokeless tobacco: Never   Vaping Use    Vaping status: Never Used   Substance Use Topics    Alcohol use: Never    Drug use: Never      -------------------------------------------------------------------------------------------------------  Subjective       Sil West is a 84 year old female with IgG lambda multiple myeloma.  She recently had an increase to her lambda free light chains.  PET imaging done 7/17/24 showed an new soft tissue uptake in right 11th rib and  interestingly patient has pain there as well.  Transitioned to carfilzomib, cyclophophamide, dex, cycle 1 on 8/5/24, Starting at C10 , cyclophosphamide drops out and she is only receiving carfilzomib every 2 weeks.  Completed carfilzomib June 23, 2025 due to disease progression.     Sherie's progressive disease has been mainly manifected by rapidly increasing lambda lyte chains as in chart below.  She does not seem to have any other CRAB criteria. Receiving IVIG every month and zometa restarted 3/17/25 after completion of dental work, scheduled for every 3 months.      Sil presents to the clinic today (7/8/25) accompanied by her daughter Frances for follow up evaluation after receiving  C1D1 talquetamab yesterday 7/7.  She tolerated dose well without any issues. Feels well today, no evidence of CRS. sCr mildly elevated today (1.46), reports drinking less water.     Energy level is okay.  Staying active working in yard, trimming bushes.  Appetite is lower but thinks is due to the heat.  Weight is steady.  Following closely with dermatology.  Continues to have intermittent loose stools, but feels is food related.  Taking imodium as needed.  Bowels rotate between constipation and diarrhea.  Bruises easily.  Daughter Frances is staying with Sherie for the next 10 day.      Review of Systems   Constitutional:  Negative for appetite change, chills, diaphoresis, fatigue, fever and unexpected weight change.   Respiratory: Negative.     Cardiovascular: Negative.    Gastrointestinal:  Positive for diarrhea. Negative for nausea and vomiting.   Genitourinary: Negative.     Musculoskeletal: Negative.  Negative for gait problem.   Skin: Negative.         Had multiple spots of SCC removed by dermatology.   Neurological:  Negative for dizziness, gait problem, headaches and numbness.   Hematological:  Negative for adenopathy. Bruises/bleeds easily (bruises easily).   All other systems reviewed and are  negative.  -------------------------------------------------------------------------------------------------------  Objective   BSA: There is no height or weight on file to calculate BSA.  There were no vitals taken for this visit.    Physical Exam  Vitals reviewed.   Constitutional:       Appearance: Normal appearance. She is well-developed and normal weight.   HENT:      Head: Normocephalic and atraumatic.      Nose: Nose normal.   Eyes:      General: No scleral icterus.     Extraocular Movements: Extraocular movements intact.      Conjunctiva/sclera: Conjunctivae normal.      Pupils: Pupils are equal, round, and reactive to light.   Cardiovascular:      Rate and Rhythm: Normal rate and regular rhythm.      Pulses: Normal pulses.      Heart sounds: Normal heart sounds.   Pulmonary:      Effort: Pulmonary effort is normal.      Breath sounds: Normal breath sounds.   Abdominal:      General: Abdomen is flat. Bowel sounds are normal. There is no distension.      Palpations: Abdomen is soft. There is no mass.      Tenderness: There is no abdominal tenderness.   Musculoskeletal:         General: Normal range of motion.      Right lower leg: Edema (non-pitting) present.      Left lower leg: Edema (non-pitting) present.      Comments: No spine tenderness   Lymphadenopathy:      Comments: No lymphadenopathy   Skin:     General: Skin is warm and dry.   Neurological:      General: No focal deficit present.      Mental Status: She is alert and oriented to person, place, and time.   Psychiatric:         Mood and Affect: Mood normal.         Behavior: Behavior normal.         Thought Content: Thought content normal.   Performance Status:  Symptomatic; fully ambulatory  -------------------------------------------------------------------------------------------------------  Assessment and Plan:    Multiple myeloma not having achieved remission (CMS/HCC)  IgG lambda MM    - Currently on daratumamab/Lenalidomide. Continued  uptrending of free lambda light chain. M-protein still 0.1g IgG kappa, likely represents daratumumab. Will add weekly dex 10 mg to see if this could deepen her response.     5/13/24:  Continues to have increasing free lambda light chain, M-protein remains at 0.1.  Receiving C20 daratumumab today.  Discussed that free lyte chain continues to increase significantly and suggested switching revlimid to pomalidomide  4 mg 21/28 days since no other CRAB criteria,  -Taking aspirin 81 mg daily for VTE prophylaxis during treatment with pomalidomide      PET scan (7/17/24)   IMPRESSION:  1. Redemonstration of an FDG avid right posterolateral 11th rib  fracture now with evidence of an underlying soft tissue lesion.  2. Previously described hypermetabolic osseous lesions in the right  iliac, right superior pubic ramus, and right aspect of the pubic  symphysis have continued to decrease in metabolic activity with  persistent FDG avidity likely representing continued treatment  response with residual viable disease, attention on follow-up imaging.  3. Healing right inferior pubic ramus fracture with decreased extent  of hypermetabolic activity.    7/18/24:  Laboratory results show major increase in free lyte chains to 62 mg/dL up from 18 in March 2024, only sx is right rib pain where PET from yesterday shows a new lesion, no other new lesions seen,      Bone marrow biopsy results from 7/22/24 showing limited bone marrow (20% cellularity) with maturing trilineage hematopoiesis, minimal involvement by lambda-restricted plasma cells by flow cytometry. ECHO 8/2/2024 LVEF 60-65%    Started cycle 1 carfilzomib, cyclophosphamide, dexamethasone 8//24, cyclophosphamide omitted from regimen with cycle 10.  Last dose of carfilzomib 6/23/25 due to disease progression.    6/17/25:   Marked increase in lambda lyte chains to 85.45 from 23.95 (5/12/25) up from 6.76 (4/14/25)    7/3/25:  Patient and her family have decided to proceed with  talquetamab treatment and will provide support needed for outpatient ramp up. Today, I spent 50 minutes in the office reviewing the schedule, potential toxicites, outpatient medications including dexamethasone to be used in event of suspected CRS or ICANS with immediate contact to physician team should these sx develop.     7/11/25: C1D3 Step up talquetamab yesterday. Doing fine without evidence of CRS, ICE 10/10.  Lambda free light chains increased to 85.25 (6/9/25)-see below.  SPEP normal (6/9/25).  Myeloma labs in process from this week.  Reviewed supportive medications and confirmed that Sherie has picked up prescriptions.  Advised to contact oncology team with symptom concerns, provided contact number.  Confirmed has dexamethasone prescription at home, advised to wait from oncology team prior to taking.  Daughter Frances will be staying with Sherie for the next 10 days.     Ig [place] Free Light Chain   Date Value Ref Range Status   07/07/2025 0.48 0.33 - 1.94 mg/dL Final   06/09/2025 0.35 0.33 - 1.94 mg/dL Final   05/12/2025 0.38 0.33 - 1.94 mg/dL Final   04/14/2025 0.32 (L) 0.33 - 1.94 mg/dL Final   03/17/2025 0.20 (L) 0.33 - 1.94 mg/dL Final     Ig Lambda Free Light Chain   Date Value Ref Range Status   07/07/2025 251.39 (H) 0.57 - 2.63 mg/dL Final   06/09/2025 85.45 (H) 0.57 - 2.63 mg/dL Final   05/12/2025 23.95 (H) 0.57 - 2.63 mg/dL Final   04/14/2025 6.76 (H) 0.57 - 2.63 mg/dL Final   03/17/2025 5.52 (H) 0.57 - 2.63 mg/dL Final     Kappa/Lambda Ratio   Date Value Ref Range Status   07/07/2025 0.00 (L) 0.26 - 1.65 Final   06/09/2025 0.00 (L) 0.26 - 1.65 Final   05/12/2025 0.02 (L) 0.26 - 1.65 Final   04/14/2025 0.05 (L) 0.26 - 1.65 Final   03/17/2025 0.04 (L) 0.26 - 1.65 Final     M-PROTEIN 1   Date Value Ref Range Status   07/07/2025 0.1 (H)   g/dL Final   10/28/2024 0.1 (H)   g/dL Final   10/14/2024 0.1 (H)   g/dL Final   09/03/2024 0.1 (H)   g/dL Final   08/12/2024 0.1 (H)   g/dL Final       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  IgG level 694 (6/23/25).  Continue monthly at [facility], last received 6/23/25.  Next dose due 7/21/25.    Dysuria: urinalysis bland, suspect needs urologic evaluation for incontinence    Bone Health:  - Continue zoledronic acid 3.3 mg (renal dosing) every 3 months, received last on 9/3/24.    - Continue vitamin D supplement -- 2000 units PO daily.  Started zometa 3/30/23 will complete 3/2025.  12/23/24:  Sil reports she had a crown fall out and may need a root canal.  Placed zometa on hold until dental work has been completed.    Dental work completed.  Resumed zometa 3/17/25, continue every 3 months.   Last received Zometa 6/9/25.       Thyroid nodule  - repeat thyroid US on 2/19/24, 2 nodules noted with FNA recommended.  Thyroid biopsy 4/29/24 showing rare atypical cells from FNA of right mid lobe.  Repeat biopsy was nondiagnostic.  Dr. Magallanes recommended thyroid surgery vs continued observation.  Sil requested to having US monitoring for now.  Last visit with Dr. Magallanes on 9/9/24. US 2/24/25 with 30 mm mildly suspicious nodule to right lobe without significant change from prior imaging.  Plan to have US in 1 year with follow up.       Right lower back pain  - seems musculoskeletal  - MRI lumbar spine (3/4/24): degenerative changes, no evidence of progressive disease or fracture.    - MRI thoracic spine (3/12/24): degenerative changes, redemonstration of multifocal marrow signal abnormalities compatible with the given history of multiple myeloma, osseous expansion and epidural extension of neoplasm previously seen have improved, decrease in size and enhancement of previously seen lesions.    7/7/25:  Reports no further back pain.      Health Care Maintenance:  Vaccines:    Received influenza- vaccine 10/9/24.  Advised  to obtain updated covid, RSV, shingrix, and prevnar 20 vaccines at local pharmacy.    RTC:   Continue monthly IVIG and zometa every 3 months.   7/14/25:  C1D6 first treatment talquestamab dose.    7/15/25:  Mal heme provider visit.  7/17: PET-CT  7/21/25:  Follow up visit with oncology team for C2D1 talquetamab and IVIG.    Will transition to [facility] day 8 of cycle 2.   -----------------------------------------------------------------------------------------  CHANTEL Gordillo-CNP  [facility]

## 2025-07-12 ENCOUNTER — TELEPHONE (OUTPATIENT)
Dept: HEMATOLOGY/ONCOLOGY | Facility: HOSPITAL | Age: 85
End: 2025-07-12
Payer: MEDICARE

## 2025-07-12 ENCOUNTER — CLINICAL SUPPORT (OUTPATIENT)
Dept: EMERGENCY MEDICINE | Facility: HOSPITAL | Age: 85
End: 2025-07-12
Payer: MEDICARE

## 2025-07-12 ENCOUNTER — HOSPITAL ENCOUNTER (EMERGENCY)
Facility: HOSPITAL | Age: 85
Discharge: HOME | End: 2025-07-12
Attending: EMERGENCY MEDICINE
Payer: MEDICARE

## 2025-07-12 VITALS
DIASTOLIC BLOOD PRESSURE: 71 MMHG | HEART RATE: 81 BPM | TEMPERATURE: 98.7 F | RESPIRATION RATE: 18 BRPM | WEIGHT: 160 LBS | OXYGEN SATURATION: 100 % | BODY MASS INDEX: 30.21 KG/M2 | HEIGHT: 61 IN | SYSTOLIC BLOOD PRESSURE: 128 MMHG

## 2025-07-12 DIAGNOSIS — R42 LIGHTHEADED: Primary | ICD-10-CM

## 2025-07-12 DIAGNOSIS — T45.1X5A ADVERSE EFFECT OF CHEMOTHERAPY, INITIAL ENCOUNTER: ICD-10-CM

## 2025-07-12 LAB
ALBUMIN SERPL BCP-MCNC: 4.2 G/DL (ref 3.4–5)
ALP SERPL-CCNC: 59 U/L (ref 33–136)
ALT SERPL W P-5'-P-CCNC: 8 U/L (ref 7–45)
ANION GAP SERPL CALC-SCNC: 13 MMOL/L (ref 10–20)
APPEARANCE UR: CLEAR
AST SERPL W P-5'-P-CCNC: 15 U/L (ref 9–39)
BASOPHILS # BLD AUTO: 0.01 X10*3/UL (ref 0–0.1)
BASOPHILS NFR BLD AUTO: 0.2 %
BILIRUB SERPL-MCNC: 0.6 MG/DL (ref 0–1.2)
BILIRUB UR STRIP.AUTO-MCNC: NEGATIVE MG/DL
BUN SERPL-MCNC: 29 MG/DL (ref 6–23)
CALCIUM SERPL-MCNC: 9.7 MG/DL (ref 8.6–10.6)
CHLORIDE SERPL-SCNC: 102 MMOL/L (ref 98–107)
CO2 SERPL-SCNC: 26 MMOL/L (ref 21–32)
COLOR UR: NORMAL
CREAT SERPL-MCNC: 1.31 MG/DL (ref 0.5–1.05)
EGFRCR SERPLBLD CKD-EPI 2021: 41 ML/MIN/1.73M*2
EOSINOPHIL # BLD AUTO: 0.01 X10*3/UL (ref 0–0.4)
EOSINOPHIL NFR BLD AUTO: 0.2 %
ERYTHROCYTE [DISTWIDTH] IN BLOOD BY AUTOMATED COUNT: 12.2 % (ref 11.5–14.5)
GLUCOSE SERPL-MCNC: 97 MG/DL (ref 74–99)
GLUCOSE UR STRIP.AUTO-MCNC: NORMAL MG/DL
HCT VFR BLD AUTO: 32.3 % (ref 36–46)
HGB BLD-MCNC: 10.8 G/DL (ref 12–16)
IMM GRANULOCYTES # BLD AUTO: 0.03 X10*3/UL (ref 0–0.5)
IMM GRANULOCYTES NFR BLD AUTO: 0.5 % (ref 0–0.9)
KETONES UR STRIP.AUTO-MCNC: NEGATIVE MG/DL
LEUKOCYTE ESTERASE UR QL STRIP.AUTO: NEGATIVE
LYMPHOCYTES # BLD AUTO: 0.22 X10*3/UL (ref 0.8–3)
LYMPHOCYTES NFR BLD AUTO: 3.6 %
MAGNESIUM SERPL-MCNC: 2.26 MG/DL (ref 1.6–2.4)
MCH RBC QN AUTO: 32.3 PG (ref 26–34)
MCHC RBC AUTO-ENTMCNC: 33.4 G/DL (ref 32–36)
MCV RBC AUTO: 97 FL (ref 80–100)
MONOCYTES # BLD AUTO: 0.22 X10*3/UL (ref 0.05–0.8)
MONOCYTES NFR BLD AUTO: 3.6 %
MUCOUS THREADS #/AREA URNS AUTO: NORMAL /LPF
NEUTROPHILS # BLD AUTO: 5.68 X10*3/UL (ref 1.6–5.5)
NEUTROPHILS NFR BLD AUTO: 91.9 %
NITRITE UR QL STRIP.AUTO: NEGATIVE
NRBC BLD-RTO: 0 /100 WBCS (ref 0–0)
PH UR STRIP.AUTO: 6 [PH]
PLATELET # BLD AUTO: 268 X10*3/UL (ref 150–450)
POTASSIUM SERPL-SCNC: 4.1 MMOL/L (ref 3.5–5.3)
PROT SERPL-MCNC: 7.4 G/DL (ref 6.4–8.2)
PROT UR STRIP.AUTO-MCNC: NORMAL MG/DL
RBC # BLD AUTO: 3.34 X10*6/UL (ref 4–5.2)
RBC # UR STRIP.AUTO: NEGATIVE MG/DL
RBC #/AREA URNS AUTO: NORMAL /HPF
SODIUM SERPL-SCNC: 137 MMOL/L (ref 136–145)
SP GR UR STRIP.AUTO: 1.02
UROBILINOGEN UR STRIP.AUTO-MCNC: NORMAL MG/DL
WBC # BLD AUTO: 6.2 X10*3/UL (ref 4.4–11.3)
WBC #/AREA URNS AUTO: NORMAL /HPF

## 2025-07-12 PROCEDURE — 85025 COMPLETE CBC W/AUTO DIFF WBC: CPT

## 2025-07-12 PROCEDURE — 36415 COLL VENOUS BLD VENIPUNCTURE: CPT

## 2025-07-12 PROCEDURE — 93010 ELECTROCARDIOGRAM REPORT: CPT | Performed by: EMERGENCY MEDICINE

## 2025-07-12 PROCEDURE — 80053 COMPREHEN METABOLIC PANEL: CPT

## 2025-07-12 PROCEDURE — 99284 EMERGENCY DEPT VISIT MOD MDM: CPT | Performed by: EMERGENCY MEDICINE

## 2025-07-12 PROCEDURE — 93005 ELECTROCARDIOGRAM TRACING: CPT

## 2025-07-12 PROCEDURE — 81001 URINALYSIS AUTO W/SCOPE: CPT

## 2025-07-12 ASSESSMENT — PAIN SCALES - GENERAL
PAINLEVEL_OUTOF10: 3
PAINLEVEL_OUTOF10: 0 - NO PAIN

## 2025-07-12 ASSESSMENT — PAIN - FUNCTIONAL ASSESSMENT: PAIN_FUNCTIONAL_ASSESSMENT: 0-10

## 2025-07-12 NOTE — ED TRIAGE NOTES
C/O Headache, Fever, tooth pain, and frequent urination with urgency. Pt stated she started a new medication for multiple melanoma and had 2nd treatment yesterday.  Per Oncology, Pt told to report to ED if any possible side effects were noted.  Pt is A/O x4, VSS,  Resp E&U, Ambulatory.

## 2025-07-12 NOTE — TELEPHONE ENCOUNTER
Ms. West is a 85 y.o. female with multiple myeloma not having achieved remission IgG lambda MM who has recently begun treatment with BiTE therapy with talquetamab, her 2nd step up dose was 7/10/2025.    Patient's daughter called and stated that her mother has been experiencing fever with Tmax of at least 100.4, significant headache, balance issues, as well as urgency with urination and other facial pain.     Given her concerning CNS symptoms and recent treatment with BiTE therapy putting her at risk for AE such as ICANS I recommended that she immediately present to [facility] emergency department for evaluation for this as well as other CNS insults including infection and stroke given new motor/balance issues and admission.    Patient's daughter understands and will bring her mother immediately to the ED for admission to malignant hematology inpatient service.    Darius Hirsch MD  Hematology Oncology Fellow, PGY4

## 2025-07-13 LAB
ATRIAL RATE: 84 BPM
P AXIS: 34 DEGREES
P OFFSET: 173 MS
P ONSET: 123 MS
PR INTERVAL: 196 MS
Q ONSET: 221 MS
QRS COUNT: 14 BEATS
QRS DURATION: 68 MS
QT INTERVAL: 370 MS
QTC CALCULATION(BAZETT): 437 MS
QTC FREDERICIA: 413 MS
R AXIS: 18 DEGREES
T AXIS: 31 DEGREES
T OFFSET: 406 MS
VENTRICULAR RATE: 84 BPM

## 2025-07-13 NOTE — ED PROVIDER NOTES
Emergency Department Provider Note        History of Present Illness     History provided by: Patient  Limitations to History: None  External Records Reviewed with Brief Summary: Reviewed telephone encounter with patient's hematology/oncology team, who recommended she come to the emergency department for further evaluation.    HPI:  Sil West is a 85 y.o. female with PMHx of multiple myeloma, recently started monoclonal antibody immunotherapy, notes she received her second dose on 7/10 and the following day patient noted she was out side doing yard work all day, and felt like she overexerted herself, she had a difficult night reporting urgency with her urination and inability to make it to the restroom in time secondary to balance issues, notes she normally ambulates without assistance, but has been feeling unsteady and off balance having to hold the walls for assistance.  Patient also reported pain to the left upper tooth.  Denies lightheadedness, dizziness, headache, fever, chills, shortness of breath, cough, congestion, chest pain, numbness, tingling or focal weakness.    Physical Exam   Triage vitals:  T 37.1 °C (98.7 °F)  HR 92  /75  RR 16  O2 98 % None (Room air)    General: Awake, alert, in no acute distress  Eyes: Gaze conjugate.  No scleral icterus or injection  HENT: Normo-cephalic, atraumatic. No stridor.  Poor dentition, with dental decay to multiple tooth, there is a crown to one of the left upper molars, there is no tenderness to palpation to any tooth, no areas of gingival swelling, no obvious apical abscess, uvula midline, no posterior oropharynx erythema, or tonsillar swelling.  CV: Regular rate, regular rhythm. Radial pulses 2+ bilaterally  Resp: Breathing non-labored, speaking in full sentences.  Clear to auscultation bilaterally  GI: Soft, non-distended, non-tender. No rebound or guarding.  MSK/Extremities: No gross bony deformities. Moving all extremities.  No peripheral  edema.  Skin: Warm. Appropriate color  Neuro: Alert. Oriented. Face symmetric. Speech is fluent.  Gross strength and sensation intact in b/l UE and LEs.  Cranial nerves II through XII intact.  Normal heel-to-shin, finger-to-nose, negative Romberg's, no ataxia.  Psych: Appropriate mood and affect    Medical Decision Making & ED Course   Medical Decision Makin y.o. female with PMHx of multiple myeloma, recently started immunotherapy, patient presented to the emergency department after experiencing symptoms which she attributed to possibly being adverse effects of her immunotherapy, notes she had a difficult night of sleeping, with multiple episodes of urinary urgency, unsteady balance, tooth pain.  On arrival, vital signs stable, patient ambulatory in the ED without assistance.  Patient in no acute distress.  Physical exam largely unremarkable.  There is no obvious apical abscess, no tenderness to palpation where an acute concern for pulpitis or dental infection.  No tenderness palpation to the temple, low suspicion or concern for GCA, no sensory deficits to the face or facial pain concerning for any shingles reaction or trigeminal neuralgia.  Patient had no focal neurological deficits, no ataxia, no concern for TIA or stroke.  Given patient's unsteadiness was episodic, triggered, low suspicion for central causes no clinical indication for CT imaging at this time.  Will obtain basic lab workup to evaluate for any acute electrolyte abnormalities, PATRICIA, evidence of leukocytosis or inflammation, evidence of anemia.  The patient's workup is largely unremarkable, I suspect patient can go home, recommend follow-up with patient's oncologist.    ----  Social Determinants of Health which Significantly Impact Care: None identified     EKG Independent Interpretation: EKG interpreted by myself. Please see ED Course for full interpretation.    Independent Result Review and Interpretation: Relevant laboratory and radiographic  results were reviewed and independently interpreted by myself.  As necessary, they are commented on in the ED Course.    Chronic conditions affecting the patient's care: As documented above in [facility]    The patient was discussed with the following consultants/services: None    Care Considerations: As documented above in [facility]    ED Course:  ED Course as of 07/13/25 1455   Sun Jul 13, 2025 1453 EKG shows normal sinus rhythm, rate of 84, left axis, normal intervals, nonspecific T wave inversion in V1, no acute ST segment changes, compared to prior EKG in 2023, patient had T wave inversions in V1, V2, otherwise unchanged. [RUKHSANA]   1454 CMP showed no acute electrolyte abnormalities, no evidence of PATRICIA, creatinine and BUN at baseline.  CBC showed no leukocytosis or leukopenia, mild normocytic anemia with stable hemoglobin 10.8.  Platelets normal.  Magnesium normal.  UA shows no evidence of acute urinary tract infection.  Given patient's stable workup, patient feeling improved after rest and on reassessment, again ambulatory without difficulty in ED without assistance.  Patient and family want to be discharged and to go home.  Recommended follow-up with oncologist.  Understand and agree with plan. [RUKHSANA]      ED Course User Index  [RUKHSANA] Ovi Rodriguez DO         Diagnoses as of 07/13/25 1455   Lightheaded   Adverse effect of chemotherapy, initial encounter     Disposition   As a result of the work-up, the patient was discharged home.  she was informed of her diagnosis and instructed to come back with any concerns or worsening of condition.  she and was agreeable to the plan as discussed above.  she was given the opportunity to ask questions.  All of the patient's questions were answered.    Procedures   Procedures    Patient seen and discussed with ED attending physician.    Ovi Rodriguez DO  Emergency Medicine     Ovi Rodriguez DO  Resident  07/13/25 1455

## 2025-07-14 ENCOUNTER — INFUSION (OUTPATIENT)
Dept: HEMATOLOGY/ONCOLOGY | Facility: HOSPITAL | Age: 85
End: 2025-07-14
Payer: MEDICARE

## 2025-07-14 ENCOUNTER — APPOINTMENT (OUTPATIENT)
Dept: HEMATOLOGY/ONCOLOGY | Facility: HOSPITAL | Age: 85
End: 2025-07-14
Payer: MEDICARE

## 2025-07-14 ENCOUNTER — OFFICE VISIT (OUTPATIENT)
Dept: HEMATOLOGY/ONCOLOGY | Facility: HOSPITAL | Age: 85
End: 2025-07-14
Payer: MEDICARE

## 2025-07-14 ENCOUNTER — TELEPHONE (OUTPATIENT)
Dept: HEMATOLOGY/ONCOLOGY | Facility: HOSPITAL | Age: 85
End: 2025-07-14
Payer: MEDICARE

## 2025-07-14 ENCOUNTER — LAB (OUTPATIENT)
Dept: LAB | Facility: HOSPITAL | Age: 85
End: 2025-07-14
Payer: MEDICARE

## 2025-07-14 VITALS
OXYGEN SATURATION: 99 % | SYSTOLIC BLOOD PRESSURE: 121 MMHG | DIASTOLIC BLOOD PRESSURE: 64 MMHG | HEART RATE: 93 BPM | TEMPERATURE: 99 F | RESPIRATION RATE: 18 BRPM

## 2025-07-14 VITALS
SYSTOLIC BLOOD PRESSURE: 131 MMHG | HEART RATE: 110 BPM | BODY MASS INDEX: 29.43 KG/M2 | RESPIRATION RATE: 16 BRPM | TEMPERATURE: 97.2 F | HEIGHT: 61 IN | WEIGHT: 155.87 LBS | OXYGEN SATURATION: 99 % | DIASTOLIC BLOOD PRESSURE: 69 MMHG

## 2025-07-14 DIAGNOSIS — Z79.69 IMMUNOSUPPRESSED DUE TO CHEMOTHERAPY: ICD-10-CM

## 2025-07-14 DIAGNOSIS — N18.31 CKD STAGE 3A, GFR 45-59 ML/MIN (MULTI): ICD-10-CM

## 2025-07-14 DIAGNOSIS — C90.00 MULTIPLE MYELOMA NOT HAVING ACHIEVED REMISSION (MULTI): Primary | ICD-10-CM

## 2025-07-14 DIAGNOSIS — C90.00 MULTIPLE MYELOMA NOT HAVING ACHIEVED REMISSION (MULTI): ICD-10-CM

## 2025-07-14 DIAGNOSIS — D80.1 HYPOGAMMAGLOBULINEMIA (MULTI): ICD-10-CM

## 2025-07-14 DIAGNOSIS — Z51.11 ENCOUNTER FOR ANTINEOPLASTIC CHEMOTHERAPY: ICD-10-CM

## 2025-07-14 DIAGNOSIS — T45.1X5A IMMUNOSUPPRESSED DUE TO CHEMOTHERAPY: ICD-10-CM

## 2025-07-14 DIAGNOSIS — D84.821 IMMUNOSUPPRESSED DUE TO CHEMOTHERAPY: ICD-10-CM

## 2025-07-14 LAB
ALBUMIN SERPL BCP-MCNC: 4 G/DL (ref 3.4–5)
ALP SERPL-CCNC: 64 U/L (ref 33–136)
ALT SERPL W P-5'-P-CCNC: 8 U/L (ref 7–45)
ANION GAP SERPL CALC-SCNC: 13 MMOL/L (ref 10–20)
AST SERPL W P-5'-P-CCNC: 12 U/L (ref 9–39)
BASOPHILS # BLD AUTO: 0.02 X10*3/UL (ref 0–0.1)
BASOPHILS NFR BLD AUTO: 0.3 %
BILIRUB SERPL-MCNC: 0.5 MG/DL (ref 0–1.2)
BUN SERPL-MCNC: 28 MG/DL (ref 6–23)
CALCIUM SERPL-MCNC: 9.8 MG/DL (ref 8.6–10.3)
CHLORIDE SERPL-SCNC: 104 MMOL/L (ref 98–107)
CO2 SERPL-SCNC: 27 MMOL/L (ref 21–32)
CREAT SERPL-MCNC: 1.27 MG/DL (ref 0.5–1.05)
EGFRCR SERPLBLD CKD-EPI 2021: 42 ML/MIN/1.73M*2
EOSINOPHIL # BLD AUTO: 0.04 X10*3/UL (ref 0–0.4)
EOSINOPHIL NFR BLD AUTO: 0.6 %
ERYTHROCYTE [DISTWIDTH] IN BLOOD BY AUTOMATED COUNT: 12.3 % (ref 11.5–14.5)
GLUCOSE SERPL-MCNC: 121 MG/DL (ref 74–99)
HCT VFR BLD AUTO: 35 % (ref 36–46)
HGB BLD-MCNC: 11.4 G/DL (ref 12–16)
HOLD SPECIMEN: NORMAL
IGA SERPL-MCNC: 18 MG/DL (ref 70–400)
IGG SERPL-MCNC: 786 MG/DL (ref 700–1600)
IGM SERPL-MCNC: 6 MG/DL (ref 40–230)
IMM GRANULOCYTES # BLD AUTO: 0.03 X10*3/UL (ref 0–0.5)
IMM GRANULOCYTES NFR BLD AUTO: 0.5 % (ref 0–0.9)
LDH SERPL L TO P-CCNC: 134 U/L (ref 84–246)
LYMPHOCYTES # BLD AUTO: 0.33 X10*3/UL (ref 0.8–3)
LYMPHOCYTES NFR BLD AUTO: 5.2 %
MCH RBC QN AUTO: 32.7 PG (ref 26–34)
MCHC RBC AUTO-ENTMCNC: 32.6 G/DL (ref 32–36)
MCV RBC AUTO: 100 FL (ref 80–100)
MONOCYTES # BLD AUTO: 0.58 X10*3/UL (ref 0.05–0.8)
MONOCYTES NFR BLD AUTO: 9.1 %
NEUTROPHILS # BLD AUTO: 5.4 X10*3/UL (ref 1.6–5.5)
NEUTROPHILS NFR BLD AUTO: 84.3 %
NRBC BLD-RTO: 0 /100 WBCS (ref 0–0)
PLATELET # BLD AUTO: 279 X10*3/UL (ref 150–450)
POTASSIUM SERPL-SCNC: 4.2 MMOL/L (ref 3.5–5.3)
PROT SERPL-MCNC: 6.5 G/DL (ref 6.4–8.2)
PROT SERPL-MCNC: 7.3 G/DL (ref 6.4–8.2)
RBC # BLD AUTO: 3.49 X10*6/UL (ref 4–5.2)
SODIUM SERPL-SCNC: 140 MMOL/L (ref 136–145)
WBC # BLD AUTO: 6.4 X10*3/UL (ref 4.4–11.3)

## 2025-07-14 PROCEDURE — 84155 ASSAY OF PROTEIN SERUM: CPT | Mod: 59

## 2025-07-14 PROCEDURE — 99215 OFFICE O/P EST HI 40 MIN: CPT | Mod: 25

## 2025-07-14 PROCEDURE — 85025 COMPLETE CBC W/AUTO DIFF WBC: CPT

## 2025-07-14 PROCEDURE — 96401 CHEMO ANTI-NEOPL SQ/IM: CPT

## 2025-07-14 PROCEDURE — 83615 LACTATE (LD) (LDH) ENZYME: CPT

## 2025-07-14 PROCEDURE — 2500000001 HC RX 250 WO HCPCS SELF ADMINISTERED DRUGS (ALT 637 FOR MEDICARE OP): Performed by: INTERNAL MEDICINE

## 2025-07-14 PROCEDURE — 80053 COMPREHEN METABOLIC PANEL: CPT

## 2025-07-14 PROCEDURE — 83521 IG LIGHT CHAINS FREE EACH: CPT

## 2025-07-14 PROCEDURE — 86334 IMMUNOFIX E-PHORESIS SERUM: CPT

## 2025-07-14 PROCEDURE — 2500000004 HC RX 250 GENERAL PHARMACY W/ HCPCS (ALT 636 FOR OP/ED): Performed by: INTERNAL MEDICINE

## 2025-07-14 PROCEDURE — 99215 OFFICE O/P EST HI 40 MIN: CPT

## 2025-07-14 PROCEDURE — 2500000004 HC RX 250 GENERAL PHARMACY W/ HCPCS (ALT 636 FOR OP/ED): Mod: TB | Performed by: INTERNAL MEDICINE

## 2025-07-14 PROCEDURE — 1160F RVW MEDS BY RX/DR IN RCRD: CPT

## 2025-07-14 PROCEDURE — 1159F MED LIST DOCD IN RCRD: CPT

## 2025-07-14 PROCEDURE — 82784 ASSAY IGA/IGD/IGG/IGM EACH: CPT

## 2025-07-14 PROCEDURE — 36415 COLL VENOUS BLD VENIPUNCTURE: CPT

## 2025-07-14 PROCEDURE — 1126F AMNT PAIN NOTED NONE PRSNT: CPT

## 2025-07-14 RX ORDER — ALBUTEROL SULFATE 0.83 MG/ML
3 SOLUTION RESPIRATORY (INHALATION) AS NEEDED
Status: DISCONTINUED | OUTPATIENT
Start: 2025-07-14 | End: 2025-07-14 | Stop reason: HOSPADM

## 2025-07-14 RX ORDER — DIPHENHYDRAMINE HCL 50 MG
50 CAPSULE ORAL ONCE
Status: COMPLETED | OUTPATIENT
Start: 2025-07-14 | End: 2025-07-14

## 2025-07-14 RX ORDER — PROCHLORPERAZINE EDISYLATE 5 MG/ML
10 INJECTION INTRAMUSCULAR; INTRAVENOUS EVERY 6 HOURS PRN
Status: DISCONTINUED | OUTPATIENT
Start: 2025-07-14 | End: 2025-07-14 | Stop reason: HOSPADM

## 2025-07-14 RX ORDER — ACETAMINOPHEN 325 MG/1
650 TABLET ORAL ONCE
Status: COMPLETED | OUTPATIENT
Start: 2025-07-14 | End: 2025-07-14

## 2025-07-14 RX ORDER — DIPHENHYDRAMINE HYDROCHLORIDE 50 MG/ML
50 INJECTION, SOLUTION INTRAMUSCULAR; INTRAVENOUS AS NEEDED
Status: DISCONTINUED | OUTPATIENT
Start: 2025-07-14 | End: 2025-07-14 | Stop reason: HOSPADM

## 2025-07-14 RX ORDER — FAMOTIDINE 10 MG/ML
20 INJECTION, SOLUTION INTRAVENOUS ONCE AS NEEDED
Status: DISCONTINUED | OUTPATIENT
Start: 2025-07-14 | End: 2025-07-14 | Stop reason: HOSPADM

## 2025-07-14 RX ORDER — PROCHLORPERAZINE MALEATE 10 MG
10 TABLET ORAL EVERY 6 HOURS PRN
Status: DISCONTINUED | OUTPATIENT
Start: 2025-07-14 | End: 2025-07-14 | Stop reason: HOSPADM

## 2025-07-14 RX ORDER — EPINEPHRINE 0.3 MG/.3ML
0.3 INJECTION SUBCUTANEOUS EVERY 5 MIN PRN
Status: DISCONTINUED | OUTPATIENT
Start: 2025-07-14 | End: 2025-07-14 | Stop reason: HOSPADM

## 2025-07-14 RX ADMIN — ACETAMINOPHEN 650 MG: 325 TABLET ORAL at 15:37

## 2025-07-14 RX ADMIN — DIPHENHYDRAMINE HYDROCHLORIDE 50 MG: 50 CAPSULE ORAL at 15:37

## 2025-07-14 RX ADMIN — DEXAMETHASONE 16 MG: 6 TABLET ORAL at 15:37

## 2025-07-14 RX ADMIN — TALQUETAMAB 29.6 MG: 40 INJECTION SUBCUTANEOUS at 16:21

## 2025-07-14 ASSESSMENT — ENCOUNTER SYMPTOMS
APPETITE CHANGE: 0
DIARRHEA: 1
MUSCULOSKELETAL NEGATIVE: 1
NAUSEA: 0
NUMBNESS: 0
DIZZINESS: 0
HEADACHES: 0
BRUISES/BLEEDS EASILY: 1
FEVER: 0
ADENOPATHY: 0
CARDIOVASCULAR NEGATIVE: 1
RESPIRATORY NEGATIVE: 1
VOMITING: 0
CHILLS: 0
UNEXPECTED WEIGHT CHANGE: 0
FATIGUE: 0
DIAPHORESIS: 0

## 2025-07-14 ASSESSMENT — PAIN SCALES - GENERAL: PAINLEVEL_OUTOF10: 0-NO PAIN

## 2025-07-14 NOTE — PROGRESS NOTES
"Patient ID: Sil West \"Ines" is a 85 y.o. female.    Treatment:   Oncology History Overview Note   I. Diagnosis (1/2023):  IgG Lambda Multiple Myeloma  Presenting symptoms: Urinary incontinence and diffuse bony pain  II. Workup:  Bone Biopsy (1/6/23):  Plasma cells neoplasm  Repeat Bone Marrow Biopsy (1/26/23):  Hypercellular marrow, 80-90% plasma cells, lambda-restricted  FISH: 1q gain (high risk), trisomy 3  MRI Spine (12/18/22):  Osseous metastases, involvement in mid-cervical, mid-thoracic, and lumbar vertebral bodies, as well as the right iliac bone  PET Scan (1/23):  FDG avidity in right iliac bone, right sacral ala, left posterior 6th rib, and appendicular skeleton  Serum and Urine Studies (1/19-1/28/23):  FKLC 1.57, FLLC 2947.7, K/L ratio 0  IgG 537, IgA 221, IgM 19, b2M 23.4, Hgb 5.7  M protein IgA lambda 0.7 g/dL   U/L  Creatinine: 2.33 (peaked at 3.15)  UPEP: Monoclonal lambda light chain at 386.6 mg/24H  Hepatitis B and C negative  III. Treatment:  Radiation (2/2/23):  Right hip + T6-T8 x 5 fractions (total 2000 cGy)  Induction (12/24/22 - 2/16/23):  Bortezomib and dexamethasone + daratumumab  C1 (1/28/23): Bortezomib 1, 4, 8, 11 + daratumumab x 2 doses  C2 (2/16/23): Weekly dosing of daratumumab and bortezomib (1, 8, 15), with lenalidomide planned when renal function allows  Response Evaluation (5/25/23):  CR with M protein 0.1 (IgG kappa c/w roscoe) and free lyte chain 1.37  C8 Roscoe RVD (Completed 7/6/23):  Transitioned with a VGPR vs CR, ongoing multifocal bone pain  IV. Response Evaluation (7/13/23):  Marked improvement in bony lesions  Pathological fractures in right pelvis and ribs, possible infiltrate in the right lower lobe base, and hypodensity in the right thyroid gland  V. Treatment Adjustment (Cycle 9 and forward): 10/2023  Velcade omitted  Transitioned to a 4-week schedule with Roscoe (day 1) and Revlimid 15 mg days 1-21/28 days     Multiple myeloma not having achieved remission " (Multi)   9/13/2023 Initial Diagnosis    Multiple myeloma not having achieved remission (CMS/HCC)     10/2/2023 - 7/8/2024 Chemotherapy    Daratumumab, Pomalidomide and dexamethasone (oral meds managed outside treatment plan) 28 Day Cycles - Maintenance     8/5/2024 - 6/23/2025 Chemotherapy    Carfilzomib and Cyclophosphamide (Weekly) / Dexamethasone, 28 Day Cycles (Custom)     7/7/2025 -  Chemotherapy    Talquetamab (Weekly), 28 Day Cycles       Past Medical History:  HTN, DLP  pre skin cancers,   diveriticulitis   peripheral artery disease not amenable to surgery  Thyroid nodules under observation    Surgical History:  squamous cell carcinoma removed to left hand and right arm 6/2024 2/21/25 Mohs surgery done for 4 areas of SCC on 5 fluorouracil 5%  Past Surgical History:   Procedure Laterality Date    CT GUIDED PERCUTANEOUS BIOPSY BONE DEEP  01/06/2023    CT GUIDED PERCUTANEOUS BIOPSY BONE DEEP 1/6/2023 [facility]    OTHER SURGICAL HISTORY      THYROID BIOPSY      Family History:     Family History   Problem Relation Name Age of Onset    Alzheimer's disease Mother      Colon cancer Father      Rashes / Skin problems Sister      Rashes / Skin problems Brother       Social History:  ,  passed away on 10/24/23.  3 grown children (1 daughter, 2 sons). 2 grandchildren.  Has 6 cats.  Enjoyed skiing.  Occupation: retired .  Social History     Tobacco Use    Smoking status: Never     Passive exposure: Never    Smokeless tobacco: Never   Vaping Use    Vaping status: Never Used   Substance Use Topics    Alcohol use: Never    Drug use: Never      -------------------------------------------------------------------------------------------------------  Subjective       Sil West is a 85 year old female with IgG lambda multiple myeloma.  She recently had an increase to her lambda free light chains.  PET imaging done 7/17/24 showed an new soft tissue uptake in right 11th rib and  interestingly patient has pain there as well.  Transitioned to carfilzomib, cyclophophamide, dex, cycle 1 on 8/5/24, Starting at C10 , cyclophosphamide drops out and she is only receiving carfilzomib every 2 weeks.  Completed carfilzomib June 23, 2025 due to disease progression.     Sherie's progressive disease has been mainly manifected by rapidly increasing lambda lyte chains as in chart below.  She does not seem to have any other CRAB criteria. Receiving IVIG every month and zometa restarted 3/17/25 after completion of dental work, scheduled for every 3 months.      Sil presents to the clinic today (7/14/25) accompanied by her daughter Frances for follow up evaluation following ED visit on 7/12/25.  Scheduled to receive C1D7 first treatment dose of talquetamab today.  Reports that she first developed symptoms Friday night of temperature of 100.4, balance changes, facial pain, urinary urgency.  Called oncology team and was instructed to go to ED.  Explained was out doing a lot of yard work on Friday.  Forgot to give the ED her talquetamab wallet card.  States she slept a lot Saturday and Sunday.  Tomer back to normal today.  Mouth is doing okay no longer having tooth pain to left side.  Felt like something was stuck in her tooth.  She has been staying at son's house some nights or her daughter is staying with her.  She was playing games over the weekend.      Review of Systems   Constitutional:  Negative for appetite change, chills, diaphoresis, fatigue, fever and unexpected weight change.   Respiratory: Negative.     Cardiovascular: Negative.    Gastrointestinal:  Positive for diarrhea. Negative for nausea and vomiting.   Genitourinary: Negative.     Musculoskeletal: Negative.  Negative for gait problem.   Skin: Negative.         Had multiple spots of SCC removed by dermatology.   Neurological:  Negative for dizziness, gait problem, headaches and numbness.   Hematological:  Negative for adenopathy.  "Bruises/bleeds easily (bruises easily).   All other systems reviewed and are negative.  -------------------------------------------------------------------------------------------------------  Objective   BSA: 1.75 meters squared  /69   Pulse 110 Comment: apical  Temp 36.2 °C (97.2 °F)   Resp 16   Ht (S) 1.559 m (5' 1.38\")   Wt 70.7 kg (155 lb 13.8 oz)   SpO2 99%   BMI 29.09 kg/m²     Physical Exam  Vitals reviewed.   Constitutional:       Appearance: Normal appearance. She is well-developed and normal weight.   HENT:      Head: Normocephalic and atraumatic.      Nose: Nose normal.     Eyes:      General: No scleral icterus.     Extraocular Movements: Extraocular movements intact.      Conjunctiva/sclera: Conjunctivae normal.      Pupils: Pupils are equal, round, and reactive to light.       Cardiovascular:      Rate and Rhythm: Normal rate and regular rhythm.      Pulses: Normal pulses.      Heart sounds: Normal heart sounds.   Pulmonary:      Effort: Pulmonary effort is normal.      Breath sounds: Normal breath sounds.   Abdominal:      General: Abdomen is flat. Bowel sounds are normal. There is no distension.      Palpations: Abdomen is soft. There is no mass.      Tenderness: There is no abdominal tenderness.     Musculoskeletal:         General: Normal range of motion.      Right lower leg: Edema (non-pitting) present.      Left lower leg: Edema (non-pitting) present.      Comments: No spine tenderness   Lymphadenopathy:      Comments: No lymphadenopathy     Skin:     General: Skin is warm and dry.     Neurological:      General: No focal deficit present.      Mental Status: She is alert and oriented to person, place, and time.     Psychiatric:         Mood and Affect: Mood normal.         Behavior: Behavior normal.         Thought Content: Thought content normal.     Performance Status:  Symptomatic; fully " ambulatory  -------------------------------------------------------------------------------------------------------  Assessment and Plan:    Multiple myeloma not having achieved remission (CMS/HCC)  IgG lambda MM    - Currently on daratumamab/Lenalidomide. Continued uptrending of free lambda light chain. M-protein still 0.1g IgG kappa, likely represents daratumumab. Will add weekly dex 10 mg to see if this could deepen her response.     5/13/24:  Continues to have increasing free lambda light chain, M-protein remains at 0.1.  Receiving C20 daratumumab today.  Discussed that free lyte chain continues to increase significantly and suggested switching revlimid to pomalidomide  4 mg 21/28 days since no other CRAB criteria,  -Taking aspirin 81 mg daily for VTE prophylaxis during treatment with pomalidomide      PET scan (7/17/24)   IMPRESSION:  1. Redemonstration of an FDG avid right posterolateral 11th rib  fracture now with evidence of an underlying soft tissue lesion.  2. Previously described hypermetabolic osseous lesions in the right  iliac, right superior pubic ramus, and right aspect of the pubic  symphysis have continued to decrease in metabolic activity with  persistent FDG avidity likely representing continued treatment  response with residual viable disease, attention on follow-up imaging.  3. Healing right inferior pubic ramus fracture with decreased extent  of hypermetabolic activity.    7/18/24:  Laboratory results show major increase in free lyte chains to 62 mg/dL up from 18 in March 2024, only sx is right rib pain where PET from yesterday shows a new lesion, no other new lesions seen,      Bone marrow biopsy results from 7/22/24 showing limited bone marrow (20% cellularity) with maturing trilineage hematopoiesis, minimal involvement by lambda-restricted plasma cells by flow cytometry. ECHO 8/2/2024 LVEF 60-65%    Started cycle 1 carfilzomib, cyclophosphamide, dexamethasone 8//24, cyclophosphamide omitted  from regimen with cycle 10.  Last dose of carfilzomib 6/23/25 due to disease progression.    6/17/25:   Marked increase in lambda lyte chains to 85.45 from 23.95 (5/12/25) up from 6.76 (4/14/25)    7/3/25:  Patient and her family have decided to proceed with talquetamab treatment and will provide support needed for outpatient ramp up. Today, I spent 50 minutes in the office reviewing the schedule, potential toxicites, outpatient medications including dexamethasone to be used in event of suspected CRS or ICANS with immediate contact to physician team should these sx develop.     7/14/25:  Was advised to go to ED on 7/12/25 for temperature of 100.4, balance changes, facial pain, urinary urgency.  No interventions in ED and was discharged home.  Symptoms have resolved and she is feeling back to her baseline.  No evidence of CRS, ICE 10/10.  Lambda free light chains increased to 261.24 (7/14/25)-see below.  M-protein 0.1 (7/7/25), SPEP in process from today.   Okay to proceed with first treatment dose of talquetamab today.  Advised to contact oncology team with symptom concerns, provided contact number.  Confirmed has dexamethasone prescription at home, advised to wait from oncology team prior to taking.  Daughter Frances will be staying with Sherie.  Follow up visit in mal heme 5/15/25.  PET scan scheduled for 7/17/25.    Ig [place] Free Light Chain   Date Value Ref Range Status   07/14/2025 0.25 (L) 0.33 - 1.94 mg/dL Final   07/07/2025 0.48 0.33 - 1.94 mg/dL Final   06/09/2025 0.35 0.33 - 1.94 mg/dL Final   05/12/2025 0.38 0.33 - 1.94 mg/dL Final   04/14/2025 0.32 (L) 0.33 - 1.94 mg/dL Final     Ig Lambda Free Light Chain   Date Value Ref Range Status   07/14/2025 261.24 (H) 0.57 - 2.63 mg/dL Final   07/07/2025 251.39 (H) 0.57 - 2.63 mg/dL Final   06/09/2025 85.45 (H) 0.57 - 2.63 mg/dL Final   05/12/2025 23.95 (H) 0.57 - 2.63 mg/dL Final   04/14/2025 6.76 (H) 0.57 - 2.63 mg/dL Final     Kappa/Lambda Ratio   Date Value  Ref Range Status   07/14/2025 0.00 (L) 0.26 - 1.65 Final   07/07/2025 0.00 (L) 0.26 - 1.65 Final   06/09/2025 0.00 (L) 0.26 - 1.65 Final   05/12/2025 0.02 (L) 0.26 - 1.65 Final   04/14/2025 0.05 (L) 0.26 - 1.65 Final     M-PROTEIN 1   Date Value Ref Range Status   07/07/2025 0.1 (H)   g/dL Final   10/28/2024 0.1 (H)   g/dL Final   10/14/2024 0.1 (H)   g/dL Final   09/03/2024 0.1 (H)   g/dL Final   08/12/2024 0.1 (H)   g/dL Final      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  IgG level 786 (7/14/25).  Continue monthly at [facility], last received 6/23/25.  Next dose due 7/21/25.    Dysuria: urinalysis bland, suspect needs urologic evaluation for incontinence    Bone Health:  - Continue zoledronic acid 3.3 mg (renal dosing) every 3 months, received last on 9/3/24.    - Continue vitamin D supplement -- 2000 units PO daily.  Started zometa 3/30/23 will complete 3/2025.  12/23/24:  Sil reports she had a crown fall out and may need a root canal.  Placed zometa on hold until dental work has been completed.    Dental work completed.  Resumed zometa 3/17/25, continue every 3 months.  Last received Zometa 6/9/25.       Thyroid nodule  - repeat thyroid US on 2/19/24, 2 nodules noted with FNA recommended.  Thyroid biopsy 4/29/24 showing rare atypical cells from FNA of right mid lobe.  Repeat biopsy was nondiagnostic.  Dr. Magallanes recommended thyroid surgery vs continued observation.  Sil requested to having US monitoring for now.  Last visit with Dr. Magallanes on 9/9/24. US 2/24/25 with 30 mm mildly suspicious nodule to right lobe without significant change from prior imaging.  Plan to have US in 1 year with follow up.       Right lower back pain  - seems musculoskeletal  - MRI lumbar spine (3/4/24): degenerative changes, no evidence of progressive disease or fracture.    - MRI  thoracic spine (3/12/24): degenerative changes, redemonstration of multifocal marrow signal abnormalities compatible with the given history of multiple myeloma, osseous expansion and epidural extension of neoplasm previously seen have improved, decrease in size and enhancement of previously seen lesions.    7/7/25:  Reports no further back pain.      Health Care Maintenance:  Vaccines:    Received influenza- vaccine 10/9/24.  Advised to obtain updated covid, RSV, shingrix, and prevnar 20 vaccines at local pharmacy.    RTC:   Continue monthly IVIG and zometa every 3 months.   7/15/25:  Mal heme provider visit.  7/17: PET-CT  7/21/25:  Follow up visit with oncology team for C2D1 talquetamab and IVIG.    Will transition to [facility] day 8 of cycle 2.   -----------------------------------------------------------------------------------------  CHANTEL Medrano-PAUL

## 2025-07-14 NOTE — PROGRESS NOTES
Sherie West is a 85 y.o. female who presents for first full dose. Seen by Bisi Arboleda CNP prior and performed neurotoxicity flowsheet.    She is on the following chemotherapy regimen:     Treatment Plans       Name Type Plan Dates Plan Provider         Active    Talquetamab (Weekly), 28 Day Cycles Oncology Treatment 7/3/2025 - Present Carmencita Marshall MD                    Since her last visit, she has been doing well.  Overall, she states her energy level is stable.  Her appetite has been unchanged.  She reports no complaints.  She has no other concerns.    Tolerated injection well, observed for over 30 minutes, vitals within normal range and discharged in stable condition with daughter at her side.

## 2025-07-14 NOTE — TELEPHONE ENCOUNTER
Patients daughter called back and states okay for appointment today at 230pm.  Will arrive at 215pm today.

## 2025-07-15 ENCOUNTER — APPOINTMENT (OUTPATIENT)
Dept: LAB | Facility: HOSPITAL | Age: 85
End: 2025-07-15
Payer: MEDICARE

## 2025-07-15 ENCOUNTER — INFUSION (OUTPATIENT)
Dept: HEMATOLOGY/ONCOLOGY | Facility: HOSPITAL | Age: 85
End: 2025-07-15
Payer: MEDICARE

## 2025-07-15 ENCOUNTER — OFFICE VISIT (OUTPATIENT)
Dept: HEMATOLOGY/ONCOLOGY | Facility: HOSPITAL | Age: 85
End: 2025-07-15
Payer: MEDICARE

## 2025-07-15 VITALS
TEMPERATURE: 98.2 F | OXYGEN SATURATION: 100 % | SYSTOLIC BLOOD PRESSURE: 139 MMHG | RESPIRATION RATE: 18 BRPM | HEART RATE: 110 BPM | WEIGHT: 156.7 LBS | BODY MASS INDEX: 29.24 KG/M2 | DIASTOLIC BLOOD PRESSURE: 83 MMHG

## 2025-07-15 DIAGNOSIS — C90.00 MULTIPLE MYELOMA NOT HAVING ACHIEVED REMISSION (MULTI): ICD-10-CM

## 2025-07-15 LAB
KAPPA LC SERPL-MCNC: 0.25 MG/DL (ref 0.33–1.94)
KAPPA LC/LAMBDA SER: 0 {RATIO} (ref 0.26–1.65)
LAMBDA LC SERPL-MCNC: 261.24 MG/DL (ref 0.57–2.63)

## 2025-07-15 PROCEDURE — 99215 OFFICE O/P EST HI 40 MIN: CPT | Performed by: NURSE PRACTITIONER

## 2025-07-15 NOTE — PROGRESS NOTES
Pt arrived ambulatory to infusion for treatment of follow up visit with Jessica Seay CNP. Denies any new or worsening symptoms. CARTOX negative. Discharged in stable condition.

## 2025-07-16 ASSESSMENT — ENCOUNTER SYMPTOMS
DIARRHEA: 1
ADENOPATHY: 0
VOMITING: 0
DIAPHORESIS: 0
CHILLS: 0
APPETITE CHANGE: 0
CARDIOVASCULAR NEGATIVE: 1
NUMBNESS: 0
DIZZINESS: 0
FATIGUE: 0
UNEXPECTED WEIGHT CHANGE: 0
RESPIRATORY NEGATIVE: 1
BRUISES/BLEEDS EASILY: 1
MUSCULOSKELETAL NEGATIVE: 1
NAUSEA: 0
HEADACHES: 0
FEVER: 0

## 2025-07-16 NOTE — PROGRESS NOTES
"Patient ID: Sil West \"Ines" is a 85 y.o. female.    Treatment:   Oncology History Overview Note   I. Diagnosis (1/2023):  IgG Lambda Multiple Myeloma  Presenting symptoms: Urinary incontinence and diffuse bony pain  II. Workup:  Bone Biopsy (1/6/23):  Plasma cells neoplasm  Repeat Bone Marrow Biopsy (1/26/23):  Hypercellular marrow, 80-90% plasma cells, lambda-restricted  FISH: 1q gain (high risk), trisomy 3  MRI Spine (12/18/22):  Osseous metastases, involvement in mid-cervical, mid-thoracic, and lumbar vertebral bodies, as well as the right iliac bone  PET Scan (1/23):  FDG avidity in right iliac bone, right sacral ala, left posterior 6th rib, and appendicular skeleton  Serum and Urine Studies (1/19-1/28/23):  FKLC 1.57, FLLC 2947.7, K/L ratio 0  IgG 537, IgA 221, IgM 19, b2M 23.4, Hgb 5.7  M protein IgA lambda 0.7 g/dL   U/L  Creatinine: 2.33 (peaked at 3.15)  UPEP: Monoclonal lambda light chain at 386.6 mg/24H  Hepatitis B and C negative  III. Treatment:  Radiation (2/2/23):  Right hip + T6-T8 x 5 fractions (total 2000 cGy)  Induction (12/24/22 - 2/16/23):  Bortezomib and dexamethasone + daratumumab  C1 (1/28/23): Bortezomib 1, 4, 8, 11 + daratumumab x 2 doses  C2 (2/16/23): Weekly dosing of daratumumab and bortezomib (1, 8, 15), with lenalidomide planned when renal function allows  Response Evaluation (5/25/23):  CR with M protein 0.1 (IgG kappa c/w roscoe) and free lyte chain 1.37  C8 Roscoe RVD (Completed 7/6/23):  Transitioned with a VGPR vs CR, ongoing multifocal bone pain  IV. Response Evaluation (7/13/23):  Marked improvement in bony lesions  Pathological fractures in right pelvis and ribs, possible infiltrate in the right lower lobe base, and hypodensity in the right thyroid gland  V. Treatment Adjustment (Cycle 9 and forward): 10/2023  Velcade omitted  Transitioned to a 4-week schedule with Roscoe (day 1) and Revlimid 15 mg days 1-21/28 days     Multiple myeloma not having achieved remission " (Multi)   9/13/2023 Initial Diagnosis    Multiple myeloma not having achieved remission (CMS/HCC)     10/2/2023 - 7/8/2024 Chemotherapy    Daratumumab, Pomalidomide and dexamethasone (oral meds managed outside treatment plan) 28 Day Cycles - Maintenance     8/5/2024 - 6/23/2025 Chemotherapy    Carfilzomib and Cyclophosphamide (Weekly) / Dexamethasone, 28 Day Cycles (Custom)     7/7/2025 -  Chemotherapy    Talquetamab (Weekly), 28 Day Cycles       Past Medical History:  HTN, DLP  pre skin cancers,   diveriticulitis   peripheral artery disease not amenable to surgery  Thyroid nodules under observation    Surgical History:  squamous cell carcinoma removed to left hand and right arm 6/2024 2/21/25 Mohs surgery done for 4 areas of SCC on 5 fluorouracil 5%  Past Surgical History:   Procedure Laterality Date    CT GUIDED PERCUTANEOUS BIOPSY BONE DEEP  01/06/2023    CT GUIDED PERCUTANEOUS BIOPSY BONE DEEP 1/6/2023 [facility]    OTHER SURGICAL HISTORY      THYROID BIOPSY      Family History:     Family History   Problem Relation Name Age of Onset    Alzheimer's disease Mother      Colon cancer Father      Rashes / Skin problems Sister      Rashes / Skin problems Brother       Social History:  ,  passed away on 10/24/23.  3 grown children (1 daughter, 2 sons). 2 grandchildren.  Has 6 cats.  Enjoyed skiing.  Occupation: retired .  Social History     Tobacco Use    Smoking status: Never     Passive exposure: Never    Smokeless tobacco: Never   Vaping Use    Vaping status: Never Used   Substance Use Topics    Alcohol use: Never    Drug use: Never      -------------------------------------------------------------------------------------------------------  Subjective       Sil West is a 84 year old female with IgG lambda multiple myeloma.  She recently had an increase to her lambda free light chains.  PET imaging done 7/17/24 showed an new soft tissue uptake in right 11th rib and  interestingly patient has pain there as well.  Transitioned to carfilzomib, cyclophophamide, dex, cycle 1 on 8/5/24, Starting at C10 , cyclophosphamide drops out and she is only receiving carfilzomib every 2 weeks.  Completed carfilzomib June 23, 2025 due to disease progression.     Sherie's progressive disease has been mainly manifected by rapidly increasing lambda lyte chains as in chart below.  She does not seem to have any other CRAB criteria. Receiving IVIG every month and zometa restarted 3/17/25 after completion of dental work, scheduled for every 3 months.      Sil presents to the clinic today (7/8/25) accompanied by her daughter Frances for follow up evaluation after receiving  C1D1 talquetamab yesterday 7/7.  She tolerated dose well without any issues. Feels well today, no evidence of CRS. sCr mildly elevated today (1.46), reports drinking less water.     Energy level is okay.  Staying active working in yard, trimming bushes.  Appetite is lower but thinks is due to the heat.  Weight is steady.  Following closely with dermatology.  Continues to have intermittent loose stools, but feels is food related.  Taking imodium as needed.  Bowels rotate between constipation and diarrhea.  Bruises easily.  Daughter Frances is staying with Sherie for the next 10 day.      7/15: Received first full dose Talquetamab yesterday. Feeling well today with no issues. Called in over the weekend for 1 time fever with headache. Went to ER and work up negative; discharged home. No issues since then.      Review of Systems   Constitutional:  Negative for appetite change, chills, diaphoresis, fatigue, fever and unexpected weight change.   Respiratory: Negative.     Cardiovascular: Negative.    Gastrointestinal:  Positive for diarrhea. Negative for nausea and vomiting.   Genitourinary: Negative.     Musculoskeletal: Negative.  Negative for gait problem.   Skin: Negative.         Had multiple spots of SCC removed by dermatology.    Neurological:  Negative for dizziness, gait problem, headaches and numbness.   Hematological:  Negative for adenopathy. Bruises/bleeds easily (bruises easily).   All other systems reviewed and are negative.  -------------------------------------------------------------------------------------------------------  Objective   BSA: There is no height or weight on file to calculate BSA.  There were no vitals taken for this visit.    Physical Exam  Vitals reviewed.   Constitutional:       Appearance: Normal appearance. She is well-developed and normal weight.   HENT:      Head: Normocephalic and atraumatic.      Nose: Nose normal.     Eyes:      General: No scleral icterus.     Extraocular Movements: Extraocular movements intact.      Conjunctiva/sclera: Conjunctivae normal.      Pupils: Pupils are equal, round, and reactive to light.       Cardiovascular:      Rate and Rhythm: Normal rate and regular rhythm.      Pulses: Normal pulses.      Heart sounds: Normal heart sounds.   Pulmonary:      Effort: Pulmonary effort is normal.      Breath sounds: Normal breath sounds.   Abdominal:      General: Abdomen is flat. Bowel sounds are normal. There is no distension.      Palpations: Abdomen is soft. There is no mass.      Tenderness: There is no abdominal tenderness.     Musculoskeletal:         General: Normal range of motion.      Right lower leg: Edema (non-pitting) present.      Left lower leg: Edema (non-pitting) present.      Comments: No spine tenderness   Lymphadenopathy:      Comments: No lymphadenopathy     Skin:     General: Skin is warm and dry.     Neurological:      General: No focal deficit present.      Mental Status: She is alert and oriented to person, place, and time.     Psychiatric:         Mood and Affect: Mood normal.         Behavior: Behavior normal.         Thought Content: Thought content normal.     Performance Status:  Symptomatic; fully  ambulatory  -------------------------------------------------------------------------------------------------------  Assessment and Plan:    Multiple myeloma not having achieved remission (CMS/HCC)  IgG lambda MM    - Currently on daratumamab/Lenalidomide. Continued uptrending of free lambda light chain. M-protein still 0.1g IgG kappa, likely represents daratumumab. Will add weekly dex 10 mg to see if this could deepen her response.     5/13/24:  Continues to have increasing free lambda light chain, M-protein remains at 0.1.  Receiving C20 daratumumab today.  Discussed that free lyte chain continues to increase significantly and suggested switching revlimid to pomalidomide  4 mg 21/28 days since no other CRAB criteria,  -Taking aspirin 81 mg daily for VTE prophylaxis during treatment with pomalidomide      PET scan (7/17/24)   IMPRESSION:  1. Redemonstration of an FDG avid right posterolateral 11th rib  fracture now with evidence of an underlying soft tissue lesion.  2. Previously described hypermetabolic osseous lesions in the right  iliac, right superior pubic ramus, and right aspect of the pubic  symphysis have continued to decrease in metabolic activity with  persistent FDG avidity likely representing continued treatment  response with residual viable disease, attention on follow-up imaging.  3. Healing right inferior pubic ramus fracture with decreased extent  of hypermetabolic activity.    7/18/24:  Laboratory results show major increase in free lyte chains to 62 mg/dL up from 18 in March 2024, only sx is right rib pain where PET from yesterday shows a new lesion, no other new lesions seen,      Bone marrow biopsy results from 7/22/24 showing limited bone marrow (20% cellularity) with maturing trilineage hematopoiesis, minimal involvement by lambda-restricted plasma cells by flow cytometry. ECHO 8/2/2024 LVEF 60-65%    Started cycle 1 carfilzomib, cyclophosphamide, dexamethasone 8//24, cyclophosphamide omitted  from regimen with cycle 10.  Last dose of carfilzomib 6/23/25 due to disease progression.    6/17/25:   Marked increase in lambda lyte chains to 85.45 from 23.95 (5/12/25) up from 6.76 (4/14/25)    7/3/25:  Patient and her family have decided to proceed with talquetamab treatment and will provide support needed for outpatient ramp up. Today, I spent 50 minutes in the office reviewing the schedule, potential toxicites, outpatient medications including dexamethasone to be used in event of suspected CRS or ICANS with immediate contact to physician team should these sx develop.     7/11/25: C1D3 Step up talquetamab yesterday. Doing fine without evidence of CRS, ICE 10/10.  Lambda free light chains increased to 85.25 (6/9/25)-see below.  SPEP normal (6/9/25).  Myeloma labs in process from this week.  Reviewed supportive medications and confirmed that Sherie has picked up prescriptions.  Advised to contact oncology team with symptom concerns, provided contact number.  Confirmed has dexamethasone prescription at home, advised to wait from oncology team prior to taking.  Daughter Frances will be staying with Sherie for the next 10 days.     7/15/25: Received C1 first full treatment Talquetamab 7/14. Doing fine without evidence of CRS, ICE 10/10. Evaluated with Dr. Robert today.  IgG Free Lambda Light Chain 261.24; SPEP pending (7/14). Went to ER over the weekend after calling in with 1 time low grade fever & headache. Discharged home with negative work up. PET-CT planned for Thursday. Follow up with Dr. Marshall 7/21 prior to initiating C2.    Ig [place] Free Light Chain   Date Value Ref Range Status   07/14/2025 0.25 (L) 0.33 - 1.94 mg/dL Final   07/07/2025 0.48 0.33 - 1.94 mg/dL Final   06/09/2025 0.35 0.33 - 1.94 mg/dL Final   05/12/2025 0.38 0.33 - 1.94 mg/dL Final   04/14/2025 0.32 (L) 0.33 - 1.94 mg/dL Final     Ig Lambda Free Light Chain   Date Value Ref Range Status   07/14/2025 261.24 (H) 0.57 - 2.63 mg/dL Final    07/07/2025 251.39 (H) 0.57 - 2.63 mg/dL Final   06/09/2025 85.45 (H) 0.57 - 2.63 mg/dL Final   05/12/2025 23.95 (H) 0.57 - 2.63 mg/dL Final   04/14/2025 6.76 (H) 0.57 - 2.63 mg/dL Final     Kappa/Lambda Ratio   Date Value Ref Range Status   07/14/2025 0.00 (L) 0.26 - 1.65 Final   07/07/2025 0.00 (L) 0.26 - 1.65 Final   06/09/2025 0.00 (L) 0.26 - 1.65 Final   05/12/2025 0.02 (L) 0.26 - 1.65 Final   04/14/2025 0.05 (L) 0.26 - 1.65 Final     M-PROTEIN 1   Date Value Ref Range Status   07/07/2025 0.1 (H)   g/dL Final   10/28/2024 0.1 (H)   g/dL Final   10/14/2024 0.1 (H)   g/dL Final   09/03/2024 0.1 (H)   g/dL Final   08/12/2024 0.1 (H)   g/dL Final      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  IgG level 694 (6/23/25).  Continue monthly at [facility], last received 6/23/25.  Next dose due 7/21/25.    Dysuria: urinalysis bland, suspect needs urologic evaluation for incontinence    Bone Health:  - Continue zoledronic acid 3.3 mg (renal dosing) every 3 months, received last on 9/3/24.    - Continue vitamin D supplement -- 2000 units PO daily.  Started zometa 3/30/23 will complete 3/2025.  12/23/24:  Sil reports she had a crown fall out and may need a root canal.  Placed zometa on hold until dental work has been completed.    Dental work completed.  Resumed zometa 3/17/25, continue every 3 months.   Last received Zometa 6/9/25.       Thyroid nodule  - repeat thyroid US on 2/19/24, 2 nodules noted with FNA recommended.  Thyroid biopsy 4/29/24 showing rare atypical cells from FNA of right mid lobe.  Repeat biopsy was nondiagnostic.  Dr. Magallanes recommended thyroid surgery vs continued observation.  Sil requested to having US monitoring for now.  Last visit with Dr. Magallanes on 9/9/24. US 2/24/25 with 30 mm mildly suspicious nodule to right lobe without significant change  from prior imaging.  Plan to have US in 1 year with follow up.       Right lower back pain  - seems musculoskeletal  - MRI lumbar spine (3/4/24): degenerative changes, no evidence of progressive disease or fracture.    - MRI thoracic spine (3/12/24): degenerative changes, redemonstration of multifocal marrow signal abnormalities compatible with the given history of multiple myeloma, osseous expansion and epidural extension of neoplasm previously seen have improved, decrease in size and enhancement of previously seen lesions.    7/7/25:  Reports no further back pain.      Health Care Maintenance:  Vaccines:    Received influenza- vaccine 10/9/24.  Advised to obtain updated covid, RSV, shingrix, and prevnar 20 vaccines at local pharmacy.    RTC:   Continue monthly IVIG and zometa every 3 months.   7/17: PET-CT  7/21/25:  Follow up visit with oncology team for C2D1 talquetamab and IVIG.    Will transition to [facility] day 8 of cycle 2.   -----------------------------------------------------------------------------------------  Jessica Seay CNP  [facility]

## 2025-07-17 ENCOUNTER — HOSPITAL ENCOUNTER (OUTPATIENT)
Dept: RADIOLOGY | Facility: CLINIC | Age: 85
Discharge: HOME | End: 2025-07-17
Payer: MEDICARE

## 2025-07-17 DIAGNOSIS — C90.00 MULTIPLE MYELOMA NOT HAVING ACHIEVED REMISSION (MULTI): ICD-10-CM

## 2025-07-17 LAB
ALBUMIN: 3.4 G/DL (ref 3.4–5)
ALPHA 1 GLOBULIN: 0.5 G/DL (ref 0.2–0.6)
ALPHA 2 GLOBULIN: 1.1 G/DL (ref 0.4–1.1)
BETA GLOBULIN: 0.8 G/DL (ref 0.5–1.2)
GAMMA GLOBULIN: 0.7 G/DL (ref 0.5–1.4)
IMMUNOFIXATION COMMENT: ABNORMAL
M-PROTEIN 1: 0.1 G/DL (ref ?–0)
PATH REVIEW - SERUM IMMUNOFIXATION: ABNORMAL
PATH REVIEW-SERUM PROTEIN ELECTROPHORESIS: ABNORMAL
PROTEIN ELECTROPHORESIS COMMENT: ABNORMAL

## 2025-07-17 PROCEDURE — 78816 PET IMAGE W/CT FULL BODY: CPT

## 2025-07-17 PROCEDURE — 3430000001 HC RX 343 DIAGNOSTIC RADIOPHARMACEUTICALS: Performed by: INTERNAL MEDICINE

## 2025-07-17 PROCEDURE — A9552 F18 FDG: HCPCS | Performed by: INTERNAL MEDICINE

## 2025-07-17 RX ORDER — FLUDEOXYGLUCOSE F 18 200 MCI/ML
12.99 INJECTION, SOLUTION INTRAVENOUS
Status: COMPLETED | OUTPATIENT
Start: 2025-07-17 | End: 2025-07-17

## 2025-07-17 RX ADMIN — FLUDEOXYGLUCOSE F 18 12.99 MILLICURIE: 200 INJECTION, SOLUTION INTRAVENOUS at 11:08

## 2025-07-20 ASSESSMENT — ENCOUNTER SYMPTOMS
BRUISES/BLEEDS EASILY: 1
CHILLS: 0
NAUSEA: 0
NUMBNESS: 0
APPETITE CHANGE: 0
DIZZINESS: 0
UNEXPECTED WEIGHT CHANGE: 0
CARDIOVASCULAR NEGATIVE: 1
DIAPHORESIS: 0
ADENOPATHY: 0
FATIGUE: 0
RESPIRATORY NEGATIVE: 1
FEVER: 0
MUSCULOSKELETAL NEGATIVE: 1
VOMITING: 0
DIARRHEA: 1
HEADACHES: 0

## 2025-07-21 ENCOUNTER — LAB (OUTPATIENT)
Dept: LAB | Facility: HOSPITAL | Age: 85
End: 2025-07-21
Payer: MEDICARE

## 2025-07-21 ENCOUNTER — OFFICE VISIT (OUTPATIENT)
Dept: HEMATOLOGY/ONCOLOGY | Facility: HOSPITAL | Age: 85
End: 2025-07-21
Payer: MEDICARE

## 2025-07-21 ENCOUNTER — INFUSION (OUTPATIENT)
Dept: HEMATOLOGY/ONCOLOGY | Facility: HOSPITAL | Age: 85
End: 2025-07-21
Payer: MEDICARE

## 2025-07-21 VITALS
HEART RATE: 103 BPM | TEMPERATURE: 97 F | BODY MASS INDEX: 28.92 KG/M2 | OXYGEN SATURATION: 100 % | SYSTOLIC BLOOD PRESSURE: 124 MMHG | WEIGHT: 154.98 LBS | DIASTOLIC BLOOD PRESSURE: 64 MMHG | RESPIRATION RATE: 17 BRPM

## 2025-07-21 VITALS
HEART RATE: 99 BPM | OXYGEN SATURATION: 99 % | DIASTOLIC BLOOD PRESSURE: 57 MMHG | SYSTOLIC BLOOD PRESSURE: 117 MMHG | RESPIRATION RATE: 17 BRPM | TEMPERATURE: 98.2 F

## 2025-07-21 DIAGNOSIS — C90.00 MULTIPLE MYELOMA NOT HAVING ACHIEVED REMISSION (MULTI): Primary | ICD-10-CM

## 2025-07-21 DIAGNOSIS — C90.00 MULTIPLE MYELOMA NOT HAVING ACHIEVED REMISSION (MULTI): ICD-10-CM

## 2025-07-21 DIAGNOSIS — D80.1 HYPOGAMMAGLOBULINEMIA (MULTI): ICD-10-CM

## 2025-07-21 LAB
ALBUMIN SERPL BCP-MCNC: 3.7 G/DL (ref 3.4–5)
ALP SERPL-CCNC: 64 U/L (ref 33–136)
ALT SERPL W P-5'-P-CCNC: 15 U/L (ref 7–45)
ANION GAP SERPL CALC-SCNC: 14 MMOL/L (ref 10–20)
AST SERPL W P-5'-P-CCNC: 15 U/L (ref 9–39)
BILIRUB SERPL-MCNC: 0.4 MG/DL (ref 0–1.2)
BUN SERPL-MCNC: 25 MG/DL (ref 6–23)
CALCIUM SERPL-MCNC: 9.6 MG/DL (ref 8.6–10.3)
CHLORIDE SERPL-SCNC: 104 MMOL/L (ref 98–107)
CO2 SERPL-SCNC: 26 MMOL/L (ref 21–32)
CREAT SERPL-MCNC: 1.11 MG/DL (ref 0.5–1.05)
EGFRCR SERPLBLD CKD-EPI 2021: 49 ML/MIN/1.73M*2
GLUCOSE SERPL-MCNC: 134 MG/DL (ref 74–99)
POTASSIUM SERPL-SCNC: 4.5 MMOL/L (ref 3.5–5.3)
PROT SERPL-MCNC: 6.9 G/DL (ref 6.4–8.2)
SODIUM SERPL-SCNC: 139 MMOL/L (ref 136–145)

## 2025-07-21 PROCEDURE — 2500000004 HC RX 250 GENERAL PHARMACY W/ HCPCS (ALT 636 FOR OP/ED): Mod: JZ,TB

## 2025-07-21 PROCEDURE — 84075 ASSAY ALKALINE PHOSPHATASE: CPT

## 2025-07-21 PROCEDURE — 96366 THER/PROPH/DIAG IV INF ADDON: CPT | Mod: INF

## 2025-07-21 PROCEDURE — 99215 OFFICE O/P EST HI 40 MIN: CPT | Mod: 25 | Performed by: INTERNAL MEDICINE

## 2025-07-21 PROCEDURE — 99215 OFFICE O/P EST HI 40 MIN: CPT | Performed by: INTERNAL MEDICINE

## 2025-07-21 PROCEDURE — 96365 THER/PROPH/DIAG IV INF INIT: CPT | Mod: INF

## 2025-07-21 PROCEDURE — 36415 COLL VENOUS BLD VENIPUNCTURE: CPT

## 2025-07-21 PROCEDURE — 1125F AMNT PAIN NOTED PAIN PRSNT: CPT | Performed by: INTERNAL MEDICINE

## 2025-07-21 PROCEDURE — 2500000001 HC RX 250 WO HCPCS SELF ADMINISTERED DRUGS (ALT 637 FOR MEDICARE OP)

## 2025-07-21 PROCEDURE — 1159F MED LIST DOCD IN RCRD: CPT | Performed by: INTERNAL MEDICINE

## 2025-07-21 RX ORDER — PROCHLORPERAZINE EDISYLATE 5 MG/ML
10 INJECTION INTRAMUSCULAR; INTRAVENOUS EVERY 6 HOURS PRN
OUTPATIENT
Start: 2025-09-04

## 2025-07-21 RX ORDER — DIPHENHYDRAMINE HCL 25 MG
25 CAPSULE ORAL ONCE
Status: CANCELLED | OUTPATIENT
Start: 2025-08-04

## 2025-07-21 RX ORDER — PROCHLORPERAZINE MALEATE 10 MG
10 TABLET ORAL EVERY 6 HOURS PRN
OUTPATIENT
Start: 2025-09-04

## 2025-07-21 RX ORDER — DIPHENHYDRAMINE HCL 25 MG
25 CAPSULE ORAL ONCE
Status: COMPLETED | OUTPATIENT
Start: 2025-07-21 | End: 2025-07-21

## 2025-07-21 RX ORDER — ONDANSETRON 8 MG/1
8 TABLET, FILM COATED ORAL ONCE
Status: CANCELLED | OUTPATIENT
Start: 2025-08-28

## 2025-07-21 RX ORDER — EPINEPHRINE 0.3 MG/.3ML
0.3 INJECTION SUBCUTANEOUS EVERY 5 MIN PRN
OUTPATIENT
Start: 2025-08-21

## 2025-07-21 RX ORDER — EPINEPHRINE 0.3 MG/.3ML
0.3 INJECTION SUBCUTANEOUS EVERY 5 MIN PRN
Status: CANCELLED | OUTPATIENT
Start: 2025-08-04

## 2025-07-21 RX ORDER — ONDANSETRON 8 MG/1
8 TABLET, FILM COATED ORAL ONCE
OUTPATIENT
Start: 2025-09-04

## 2025-07-21 RX ORDER — FAMOTIDINE 10 MG/ML
20 INJECTION, SOLUTION INTRAVENOUS ONCE AS NEEDED
OUTPATIENT
Start: 2025-09-01

## 2025-07-21 RX ORDER — DIPHENHYDRAMINE HYDROCHLORIDE 50 MG/ML
50 INJECTION, SOLUTION INTRAMUSCULAR; INTRAVENOUS AS NEEDED
OUTPATIENT
Start: 2025-09-04

## 2025-07-21 RX ORDER — PROCHLORPERAZINE MALEATE 10 MG
10 TABLET ORAL EVERY 6 HOURS PRN
Status: CANCELLED | OUTPATIENT
Start: 2025-08-28

## 2025-07-21 RX ORDER — DIPHENHYDRAMINE HYDROCHLORIDE 50 MG/ML
50 INJECTION, SOLUTION INTRAMUSCULAR; INTRAVENOUS AS NEEDED
Status: CANCELLED | OUTPATIENT
Start: 2025-09-11

## 2025-07-21 RX ORDER — ZOLEDRONIC ACID 0.04 MG/ML
3.3 INJECTION, SOLUTION INTRAVENOUS ONCE
OUTPATIENT
Start: 2025-09-01

## 2025-07-21 RX ORDER — PROCHLORPERAZINE EDISYLATE 5 MG/ML
10 INJECTION INTRAMUSCULAR; INTRAVENOUS EVERY 6 HOURS PRN
Status: CANCELLED | OUTPATIENT
Start: 2025-08-28

## 2025-07-21 RX ORDER — ALBUTEROL SULFATE 0.83 MG/ML
3 SOLUTION RESPIRATORY (INHALATION) AS NEEDED
Status: CANCELLED | OUTPATIENT
Start: 2025-09-11

## 2025-07-21 RX ORDER — ALBUTEROL SULFATE 0.83 MG/ML
3 SOLUTION RESPIRATORY (INHALATION) AS NEEDED
OUTPATIENT
Start: 2025-09-04

## 2025-07-21 RX ORDER — ONDANSETRON 8 MG/1
8 TABLET, FILM COATED ORAL ONCE
OUTPATIENT
Start: 2025-08-21

## 2025-07-21 RX ORDER — FAMOTIDINE 10 MG/ML
20 INJECTION, SOLUTION INTRAVENOUS ONCE AS NEEDED
Status: CANCELLED | OUTPATIENT
Start: 2025-08-04

## 2025-07-21 RX ORDER — ALBUTEROL SULFATE 0.83 MG/ML
3 SOLUTION RESPIRATORY (INHALATION) AS NEEDED
OUTPATIENT
Start: 2025-09-01

## 2025-07-21 RX ORDER — FAMOTIDINE 10 MG/ML
20 INJECTION, SOLUTION INTRAVENOUS ONCE AS NEEDED
Status: CANCELLED | OUTPATIENT
Start: 2025-09-11

## 2025-07-21 RX ORDER — ONDANSETRON 8 MG/1
8 TABLET, FILM COATED ORAL ONCE
Status: CANCELLED | OUTPATIENT
Start: 2025-09-11

## 2025-07-21 RX ORDER — EPINEPHRINE 0.3 MG/.3ML
0.3 INJECTION SUBCUTANEOUS EVERY 5 MIN PRN
Status: DISCONTINUED | OUTPATIENT
Start: 2025-07-21 | End: 2025-07-21 | Stop reason: HOSPADM

## 2025-07-21 RX ORDER — PROCHLORPERAZINE MALEATE 10 MG
10 TABLET ORAL EVERY 6 HOURS PRN
Status: CANCELLED | OUTPATIENT
Start: 2025-09-11

## 2025-07-21 RX ORDER — PROCHLORPERAZINE EDISYLATE 5 MG/ML
10 INJECTION INTRAMUSCULAR; INTRAVENOUS EVERY 6 HOURS PRN
OUTPATIENT
Start: 2025-08-21

## 2025-07-21 RX ORDER — ALBUTEROL SULFATE 0.83 MG/ML
3 SOLUTION RESPIRATORY (INHALATION) AS NEEDED
Status: CANCELLED | OUTPATIENT
Start: 2025-08-04

## 2025-07-21 RX ORDER — FAMOTIDINE 10 MG/ML
20 INJECTION, SOLUTION INTRAVENOUS ONCE AS NEEDED
Status: DISCONTINUED | OUTPATIENT
Start: 2025-07-21 | End: 2025-07-21 | Stop reason: HOSPADM

## 2025-07-21 RX ORDER — PROCHLORPERAZINE EDISYLATE 5 MG/ML
10 INJECTION INTRAMUSCULAR; INTRAVENOUS EVERY 6 HOURS PRN
Status: CANCELLED | OUTPATIENT
Start: 2025-09-11

## 2025-07-21 RX ORDER — ACETAMINOPHEN 325 MG/1
650 TABLET ORAL ONCE
Status: CANCELLED | OUTPATIENT
Start: 2025-08-04

## 2025-07-21 RX ORDER — EPINEPHRINE 0.3 MG/.3ML
0.3 INJECTION SUBCUTANEOUS EVERY 5 MIN PRN
Status: CANCELLED | OUTPATIENT
Start: 2025-09-11

## 2025-07-21 RX ORDER — ALBUTEROL SULFATE 0.83 MG/ML
3 SOLUTION RESPIRATORY (INHALATION) AS NEEDED
Status: CANCELLED | OUTPATIENT
Start: 2025-08-28

## 2025-07-21 RX ORDER — EPINEPHRINE 0.3 MG/.3ML
0.3 INJECTION SUBCUTANEOUS EVERY 5 MIN PRN
OUTPATIENT
Start: 2025-09-04

## 2025-07-21 RX ORDER — ACETAMINOPHEN 325 MG/1
650 TABLET ORAL ONCE
Status: COMPLETED | OUTPATIENT
Start: 2025-07-21 | End: 2025-07-21

## 2025-07-21 RX ORDER — EPINEPHRINE 0.3 MG/.3ML
0.3 INJECTION SUBCUTANEOUS EVERY 5 MIN PRN
Status: CANCELLED | OUTPATIENT
Start: 2025-08-28

## 2025-07-21 RX ORDER — DIPHENHYDRAMINE HYDROCHLORIDE 50 MG/ML
50 INJECTION, SOLUTION INTRAMUSCULAR; INTRAVENOUS AS NEEDED
Status: CANCELLED | OUTPATIENT
Start: 2025-08-28

## 2025-07-21 RX ORDER — FAMOTIDINE 10 MG/ML
20 INJECTION, SOLUTION INTRAVENOUS ONCE AS NEEDED
OUTPATIENT
Start: 2025-09-04

## 2025-07-21 RX ORDER — FAMOTIDINE 10 MG/ML
20 INJECTION, SOLUTION INTRAVENOUS ONCE AS NEEDED
OUTPATIENT
Start: 2025-08-21

## 2025-07-21 RX ORDER — EPINEPHRINE 0.3 MG/.3ML
0.3 INJECTION SUBCUTANEOUS EVERY 5 MIN PRN
OUTPATIENT
Start: 2025-09-01

## 2025-07-21 RX ORDER — FAMOTIDINE 10 MG/ML
20 INJECTION, SOLUTION INTRAVENOUS ONCE AS NEEDED
Status: CANCELLED | OUTPATIENT
Start: 2025-08-28

## 2025-07-21 RX ORDER — ALBUTEROL SULFATE 0.83 MG/ML
3 SOLUTION RESPIRATORY (INHALATION) AS NEEDED
OUTPATIENT
Start: 2025-08-21

## 2025-07-21 RX ORDER — PROCHLORPERAZINE MALEATE 10 MG
10 TABLET ORAL EVERY 6 HOURS PRN
OUTPATIENT
Start: 2025-08-21

## 2025-07-21 RX ORDER — DIPHENHYDRAMINE HYDROCHLORIDE 50 MG/ML
50 INJECTION, SOLUTION INTRAMUSCULAR; INTRAVENOUS AS NEEDED
Status: DISCONTINUED | OUTPATIENT
Start: 2025-07-21 | End: 2025-07-21 | Stop reason: HOSPADM

## 2025-07-21 RX ORDER — DIPHENHYDRAMINE HYDROCHLORIDE 50 MG/ML
50 INJECTION, SOLUTION INTRAMUSCULAR; INTRAVENOUS AS NEEDED
OUTPATIENT
Start: 2025-08-21

## 2025-07-21 RX ORDER — DIPHENHYDRAMINE HYDROCHLORIDE 50 MG/ML
50 INJECTION, SOLUTION INTRAMUSCULAR; INTRAVENOUS AS NEEDED
Status: CANCELLED | OUTPATIENT
Start: 2025-08-04

## 2025-07-21 RX ORDER — ALBUTEROL SULFATE 0.83 MG/ML
3 SOLUTION RESPIRATORY (INHALATION) AS NEEDED
Status: DISCONTINUED | OUTPATIENT
Start: 2025-07-21 | End: 2025-07-21 | Stop reason: HOSPADM

## 2025-07-21 RX ORDER — DIPHENHYDRAMINE HYDROCHLORIDE 50 MG/ML
50 INJECTION, SOLUTION INTRAMUSCULAR; INTRAVENOUS AS NEEDED
OUTPATIENT
Start: 2025-09-01

## 2025-07-21 RX ORDER — DEXAMETHASONE 0.5 MG/5ML
0.5 ELIXIR ORAL DAILY
Qty: 150 ML | Refills: 0 | Status: SHIPPED | OUTPATIENT
Start: 2025-07-21 | End: 2025-08-20

## 2025-07-21 RX ADMIN — ACETAMINOPHEN 650 MG: 325 TABLET ORAL at 11:24

## 2025-07-21 RX ADMIN — DIPHENHYDRAMINE HYDROCHLORIDE 25 MG: 25 CAPSULE ORAL at 11:24

## 2025-07-21 RX ADMIN — IMMUNE GLOBULIN (HUMAN) 25 G: 10 INJECTION INTRAVENOUS; SUBCUTANEOUS at 11:41

## 2025-07-21 ASSESSMENT — PAIN SCALES - GENERAL: PAINLEVEL_OUTOF10: 4

## 2025-07-21 NOTE — PROGRESS NOTES
Patient arrives to infusion for her IVIG. She tolerated her treatment without any issue. Vitals were taken. All questions were answered. She was discharged stable.

## 2025-07-21 NOTE — PROGRESS NOTES
"Patient ID: Sil West \"Ines" is a 85 y.o. female.    Treatment:   Oncology History Overview Note   I. Diagnosis (1/2023):  IgG Lambda Multiple Myeloma  Presenting symptoms: Urinary incontinence and diffuse bony pain  II. Workup:  Bone Biopsy (1/6/23):  Plasma cells neoplasm  Repeat Bone Marrow Biopsy (1/26/23):  Hypercellular marrow, 80-90% plasma cells, lambda-restricted  FISH: 1q gain (high risk), trisomy 3  MRI Spine (12/18/22):  Osseous metastases, involvement in mid-cervical, mid-thoracic, and lumbar vertebral bodies, as well as the right iliac bone  PET Scan (1/23):  FDG avidity in right iliac bone, right sacral ala, left posterior 6th rib, and appendicular skeleton  Serum and Urine Studies (1/19-1/28/23):  FKLC 1.57, FLLC 2947.7, K/L ratio 0  IgG 537, IgA 221, IgM 19, b2M 23.4, Hgb 5.7  M protein IgA lambda 0.7 g/dL   U/L  Creatinine: 2.33 (peaked at 3.15)  UPEP: Monoclonal lambda light chain at 386.6 mg/24H  Hepatitis B and C negative  III. Treatment:  Radiation (2/2/23):  Right hip + T6-T8 x 5 fractions (total 2000 cGy)  Induction (12/24/22 - 2/16/23):  Bortezomib and dexamethasone + daratumumab  C1 (1/28/23): Bortezomib 1, 4, 8, 11 + daratumumab x 2 doses  C2 (2/16/23): Weekly dosing of daratumumab and bortezomib (1, 8, 15), with lenalidomide planned when renal function allows  Response Evaluation (5/25/23):  CR with M protein 0.1 (IgG kappa c/w roscoe) and free lyte chain 1.37  C8 Roscoe RVD (Completed 7/6/23):  Transitioned with a VGPR vs CR, ongoing multifocal bone pain  IV. Response Evaluation (7/13/23):  Marked improvement in bony lesions  Pathological fractures in right pelvis and ribs, possible infiltrate in the right lower lobe base, and hypodensity in the right thyroid gland  V. Treatment Adjustment (Cycle 9 and forward): 10/2023  Velcade omitted  Transitioned to a 4-week schedule with Roscoe (day 1) and Revlimid 15 mg days 1-21/28 days     Multiple myeloma not having achieved remission " (Multi)   9/13/2023 Initial Diagnosis    Multiple myeloma not having achieved remission (CMS/HCC)     10/2/2023 - 7/8/2024 Chemotherapy    Daratumumab, Pomalidomide and dexamethasone (oral meds managed outside treatment plan) 28 Day Cycles - Maintenance     8/5/2024 - 6/23/2025 Chemotherapy    Carfilzomib and Cyclophosphamide (Weekly) / Dexamethasone, 28 Day Cycles (Custom)     7/7/2025 -  Chemotherapy    Talquetamab (Weekly), 28 Day Cycles       Past Medical History:  HTN, DLP  pre skin cancers,   diveriticulitis   peripheral artery disease not amenable to surgery  Thyroid nodules under observation    Surgical History:  squamous cell carcinoma removed to left hand and right arm 6/2024 2/21/25 Mohs surgery done for 4 areas of SCC on 5 fluorouracil 5%  Past Surgical History:   Procedure Laterality Date    CT GUIDED PERCUTANEOUS BIOPSY BONE DEEP  01/06/2023    CT GUIDED PERCUTANEOUS BIOPSY BONE DEEP 1/6/2023 [facility]    OTHER SURGICAL HISTORY      THYROID BIOPSY      Family History:     Family History   Problem Relation Name Age of Onset    Alzheimer's disease Mother      Colon cancer Father      Rashes / Skin problems Sister      Rashes / Skin problems Brother       Social History:  ,  passed away on 10/24/23.  3 grown children (1 daughter, 2 sons). 2 grandchildren.  Has 6 cats.  Enjoyed skiing.  Occupation: retired .  Social History     Tobacco Use    Smoking status: Never     Passive exposure: Never    Smokeless tobacco: Never   Vaping Use    Vaping status: Never Used   Substance Use Topics    Alcohol use: Never    Drug use: Never      -------------------------------------------------------------------------------------------------------  Subjective       Sil West is a 84 year old female with IgG lambda multiple myeloma.  She recently had an increase to her lambda free light chains.  PET imaging done 7/17/24 showed an new soft tissue uptake in right 11th rib and  interestingly patient has pain there as well.  Transitioned to carfilzomib, cyclophophamide, dex, cycle 1 on 8/5/24, Starting at C10 , cyclophosphamide drops out and she is only receiving carfilzomib every 2 weeks.  Completed carfilzomib June 23, 2025 due to disease progression.     Sherie's progressive disease has been mainly manifected by rapidly increasing lambda lyte chains as in chart below.  She does not seem to Receiving IVIG every month and zometa restarted 3/17/25 after completion of dental work, scheduled for every 3 months.      Sil presents to the clinic today (7/21/25) accompanied by her daughter Frances for follow up evaluation after starting talquetamab 7/7/25, she is seen today for cycle 2 day 1 having completed her ramp up. Course complicated by mild elevation in creat and a 1 time fever, headache and tooth  pain which resolved spontaneously after dose 2 without treatment. No issues since then. PET imaging done last week shows multiple new osseous lesions. She has pain in her right rib cage relieved with tylenol 650 mg 3 times a day.  Patient states over last week she is having more difficulty with focus and concentration, but able to read and remember what she's reading. She had temperatures up to 100.2 last week , 99.6, last fever yesterday 99. She is struggling with eating dry foods especially.     Review of Systems   Constitutional:  Negative for appetite change, chills, diaphoresis, fatigue, fever and unexpected weight change.   Respiratory: Negative.     Cardiovascular: Negative.    Gastrointestinal:  Positive for diarrhea. Negative for nausea and vomiting.   Genitourinary: Negative.     Musculoskeletal: Negative.  Negative for gait problem.   Skin: Negative.         Had multiple spots of SCC removed by dermatology.   Neurological:  Negative for dizziness, gait problem, headaches and numbness.   Hematological:  Negative for adenopathy. Bruises/bleeds easily (bruises easily).   All other  systems reviewed and are negative.  -------------------------------------------------------------------------------------------------------  Objective   BSA: 1.74 meters squared  /64   Pulse 103   Temp 36.1 °C (97 °F)   Resp 17   Wt 70.3 kg (154 lb 15.7 oz)   SpO2 100%   BMI 28.92 kg/m²     Physical Exam  Vitals reviewed.   Constitutional:       Appearance: Normal appearance. She is well-developed and normal weight.   HENT:      Head: Normocephalic and atraumatic.      Nose: Nose normal.     Eyes:      General: No scleral icterus.     Extraocular Movements: Extraocular movements intact.      Conjunctiva/sclera: Conjunctivae normal.      Pupils: Pupils are equal, round, and reactive to light.       Cardiovascular:      Rate and Rhythm: Normal rate and regular rhythm.      Pulses: Normal pulses.      Heart sounds: Normal heart sounds.   Pulmonary:      Effort: Pulmonary effort is normal.      Breath sounds: Normal breath sounds.   Abdominal:      General: Abdomen is flat. Bowel sounds are normal. There is no distension.      Palpations: Abdomen is soft. There is no mass.      Tenderness: There is no abdominal tenderness.     Musculoskeletal:         General: Normal range of motion.      Right lower leg: Edema (non-pitting) present.      Left lower leg: Edema (non-pitting) present.      Comments: No spine tenderness   Lymphadenopathy:      Comments: No lymphadenopathy     Skin:     General: Skin is warm and dry.     Neurological:      General: No focal deficit present.      Mental Status: She is alert and oriented to person, place, and time.      Comments: Reported problems with focus and concentration, difficulty keeping her books   Psychiatric:         Mood and Affect: Mood normal.         Behavior: Behavior normal.         Thought Content: Thought content normal.     Performance Status:  Symptomatic; fully  ambulatory  -------------------------------------------------------------------------------------------------------  Assessment and Plan:    Multiple myeloma not having achieved remission (CMS/HCC)  IgG lambda MM    - Currently on daratumamab/Lenalidomide. Continued uptrending of free lambda light chain. M-protein still 0.1g IgG kappa, likely represents daratumumab. Will add weekly dex 10 mg to see if this could deepen her response.     5/13/24:  Continues to have increasing free lambda light chain, M-protein remains at 0.1.  Receiving C20 daratumumab today.  Discussed that free lyte chain continues to increase significantly and suggested switching revlimid to pomalidomide  4 mg 21/28 days since no other CRAB criteria,  -Taking aspirin 81 mg daily for VTE prophylaxis during treatment with pomalidomide      PET scan (7/17/24)   IMPRESSION:  1. Redemonstration of an FDG avid right posterolateral 11th rib  fracture now with evidence of an underlying soft tissue lesion.  2. Previously described hypermetabolic osseous lesions in the right  iliac, right superior pubic ramus, and right aspect of the pubic  symphysis have continued to decrease in metabolic activity with  persistent FDG avidity likely representing continued treatment  response with residual viable disease, attention on follow-up imaging.  3. Healing right inferior pubic ramus fracture with decreased extent  of hypermetabolic activity.    7/18/24:  Laboratory results show major increase in free lyte chains to 62 mg/dL up from 18 in March 2024, only sx is right rib pain where PET from yesterday shows a new lesion, no other new lesions seen,      Bone marrow biopsy results from 7/22/24 showing limited bone marrow (20% cellularity) with maturing trilineage hematopoiesis, minimal involvement by lambda-restricted plasma cells by flow cytometry. ECHO 8/2/2024 LVEF 60-65%    Started cycle 1 carfilzomib, cyclophosphamide, dexamethasone 8//24, cyclophosphamide omitted  from regimen with cycle 10.  Last dose of carfilzomib 6/23/25 due to disease progression.    6/17/25:   Marked increase in lambda lyte chains to 85.45 from 23.95 (5/12/25) up from 6.76 (4/14/25)    7/3/25:  Patient and her family have decided to proceed with talquetamab treatment and will provide support needed for outpatient ramp up.     Talquetamab started 7/7/25 7/11/25: C1D3 Step up talquetamab yesterday. Went to ER over the weekend after calling in with 1 time low grade fever & headache. Discharged home with negative work up. PET-CT planned for Thursday.    7/15/25: Received C1 first full treatment Talquetamab 7/14. Doing fine without evidence of CRS, ICE 10/10.     7/22/25: Here for day 1 of cycle 2 talquetamab, has some dysgeusia, reports low grade fevers for several days and some difficulty with focus and concentration. PET imaging 7/17/2025 shows multiple new brigida lesions and patient is symptomatic. Plan to hold talquetamab till 7/24 and add dex 12 mg as premed to next few doses..    Ig [place] Free Light Chain   Date Value Ref Range Status   07/14/2025 0.25 (L) 0.33 - 1.94 mg/dL Final   07/07/2025 0.48 0.33 - 1.94 mg/dL Final   06/09/2025 0.35 0.33 - 1.94 mg/dL Final   05/12/2025 0.38 0.33 - 1.94 mg/dL Final   04/14/2025 0.32 (L) 0.33 - 1.94 mg/dL Final     Ig Lambda Free Light Chain   Date Value Ref Range Status   07/14/2025 261.24 (H) 0.57 - 2.63 mg/dL Final   07/07/2025 251.39 (H) 0.57 - 2.63 mg/dL Final   06/09/2025 85.45 (H) 0.57 - 2.63 mg/dL Final   05/12/2025 23.95 (H) 0.57 - 2.63 mg/dL Final   04/14/2025 6.76 (H) 0.57 - 2.63 mg/dL Final     Kappa/Lambda Ratio   Date Value Ref Range Status   07/14/2025 0.00 (L) 0.26 - 1.65 Final   07/07/2025 0.00 (L) 0.26 - 1.65 Final   06/09/2025 0.00 (L) 0.26 - 1.65 Final   05/12/2025 0.02 (L) 0.26 - 1.65 Final   04/14/2025 0.05 (L) 0.26 - 1.65 Final     M-PROTEIN 1   Date Value Ref Range Status   07/14/2025 0.1 (H)   g/dL Final   07/07/2025 0.1 (H)   g/dL Final    10/28/2024 0.1 (H)   g/dL Final   10/14/2024 0.1 (H)   g/dL Final   09/03/2024 0.1 (H)   g/dL Final      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  IgG level 694 (6/23/25).  Continue monthly at [facility], last received 6/23/25.  given 7/21/25.    Dysuria: urinalysis bland, suspect needs urologic evaluation for incontinence    Bone Health:  - Continue zoledronic acid 3.3 mg (renal dosing) every 3 months, received last on 9/3/24.    - Continue vitamin D supplement -- 2000 units PO daily.  Started zometa 3/30/23 will complete 3/2025.  12/23/24:  Sil reports she had a crown fall out and may need a root canal.  Placed zometa on hold until dental work has been completed.    Dental work completed.  Resumed zometa 3/17/25, continue every 3 months.   Last received Zometa 6/9/25.       Thyroid nodule  - repeat thyroid US on 2/19/24, 2 nodules noted with FNA recommended.  Thyroid biopsy 4/29/24 showing rare atypical cells from FNA of right mid lobe.  Repeat biopsy was nondiagnostic.  Dr. Magallanes recommended thyroid surgery vs continued observation.  Sil requested to having US monitoring for now.  Last visit with Dr. Magallanes on 9/9/24. US 2/24/25 with 30 mm mildly suspicious nodule to right lobe without significant change from prior imaging.  Plan to have US in 1 year with follow up.         Health Care Maintenance:  Vaccines:    Received influenza- vaccine 10/9/24.  Advised to obtain updated covid, RSV, shingrix, and prevnar 20 vaccines at local pharmacy.    RTC: 7/24 mild for day 1 cycle 2 if grade I ICANs resolved. Transition to [facility] based on tolerance  Continue monthly IVIG and zometa every 3 months.

## 2025-07-21 NOTE — PROGRESS NOTES
IV IG rate    35ml/hr x 30 mins/ 17.5  71ml/hr x 30 mins/ 35.5  142 ml/hr x 30 mins/ 71  284 ml/hr till its done/126

## 2025-07-22 ENCOUNTER — APPOINTMENT (OUTPATIENT)
Dept: HEMATOLOGY/ONCOLOGY | Facility: CLINIC | Age: 85
End: 2025-07-22
Payer: MEDICARE

## 2025-07-22 LAB — HOLD SPECIMEN: NORMAL

## 2025-07-23 ASSESSMENT — ENCOUNTER SYMPTOMS
BRUISES/BLEEDS EASILY: 1
VOMITING: 0
HEADACHES: 0
CHILLS: 0
RESPIRATORY NEGATIVE: 1
FATIGUE: 0
NUMBNESS: 0
FEVER: 0
CARDIOVASCULAR NEGATIVE: 1
DIARRHEA: 1
UNEXPECTED WEIGHT CHANGE: 0
NAUSEA: 0
ADENOPATHY: 0
DIAPHORESIS: 0
DIZZINESS: 0

## 2025-07-23 NOTE — PROGRESS NOTES
"Patient ID: Sil West \"Ines" is a 85 y.o. female.    Treatment:   Oncology History Overview Note   I. Diagnosis (1/2023):  IgG Lambda Multiple Myeloma  Presenting symptoms: Urinary incontinence and diffuse bony pain  II. Workup:  Bone Biopsy (1/6/23):  Plasma cells neoplasm  Repeat Bone Marrow Biopsy (1/26/23):  Hypercellular marrow, 80-90% plasma cells, lambda-restricted  FISH: 1q gain (high risk), trisomy 3  MRI Spine (12/18/22):  Osseous metastases, involvement in mid-cervical, mid-thoracic, and lumbar vertebral bodies, as well as the right iliac bone  PET Scan (1/23):  FDG avidity in right iliac bone, right sacral ala, left posterior 6th rib, and appendicular skeleton  Serum and Urine Studies (1/19-1/28/23):  FKLC 1.57, FLLC 2947.7, K/L ratio 0  IgG 537, IgA 221, IgM 19, b2M 23.4, Hgb 5.7  M protein IgA lambda 0.7 g/dL   U/L  Creatinine: 2.33 (peaked at 3.15)  UPEP: Monoclonal lambda light chain at 386.6 mg/24H  Hepatitis B and C negative  III. Treatment:  Radiation (2/2/23):  Right hip + T6-T8 x 5 fractions (total 2000 cGy)  Induction (12/24/22 - 2/16/23):  Bortezomib and dexamethasone + daratumumab  C1 (1/28/23): Bortezomib 1, 4, 8, 11 + daratumumab x 2 doses  C2 (2/16/23): Weekly dosing of daratumumab and bortezomib (1, 8, 15), with lenalidomide planned when renal function allows  Response Evaluation (5/25/23):  CR with M protein 0.1 (IgG kappa c/w roscoe) and free lyte chain 1.37  C8 Roscoe RVD (Completed 7/6/23):  Transitioned with a VGPR vs CR, ongoing multifocal bone pain  IV. Response Evaluation (7/13/23):  Marked improvement in bony lesions  Pathological fractures in right pelvis and ribs, possible infiltrate in the right lower lobe base, and hypodensity in the right thyroid gland  V. Treatment Adjustment (Cycle 9 and forward): 10/2023  Velcade omitted  Transitioned to a 4-week schedule with Roscoe (day 1) and Revlimid 15 mg days 1-21/28 days    VI. PET imaging done 7/17/24 showed an new soft " tissue uptake in right 11th rib and interestingly patient has pain there as well. Transitioned to carfilzomib, cyclophophamide, dex, cycle 1 on 8/5/24, Starting at C10 , cyclophosphamide drops out and she is only receiving carfilzomib every 2 weeks. Completed carfilzomib June 23, 2025 due to disease progressio     VII. Initiated talquetamab 7/7/25 for rapidly rising free lyte chain.   PET 7/15/25   1. Interval progression of active myelomatous disease, demonstrated  by newly developed FDG-avid lytic lesions throughout the axial and  appendicular skeleton as detailed above  2. Interval decrease in FDG avidity associated with right pubic ramus  fracture.  3. No PET evidence of FDG avid extramedullary disease.         Multiple myeloma not having achieved remission (Multi)   9/13/2023 Initial Diagnosis    Multiple myeloma not having achieved remission (CMS/HCC)     10/2/2023 - 7/8/2024 Chemotherapy    Daratumumab, Pomalidomide and dexamethasone (oral meds managed outside treatment plan) 28 Day Cycles - Maintenance     8/5/2024 - 6/23/2025 Chemotherapy    Carfilzomib and Cyclophosphamide (Weekly) / Dexamethasone, 28 Day Cycles (Custom)     7/7/2025 -  Chemotherapy    Talquetamab (Weekly), 28 Day Cycles       Past Medical History:  HTN, DLP  pre skin cancers,   diveriticulitis   peripheral artery disease not amenable to surgery  Thyroid nodules under observation    Surgical History:  squamous cell carcinoma removed to left hand and right arm 6/2024 2/21/25 Mohs surgery done for 4 areas of SCC on 5 fluorouracil 5%  Past Surgical History:   Procedure Laterality Date    CT GUIDED PERCUTANEOUS BIOPSY BONE DEEP  01/06/2023    CT GUIDED PERCUTANEOUS BIOPSY BONE DEEP 1/6/2023 [facility]    OTHER SURGICAL HISTORY      THYROID BIOPSY      Family History:     Family History   Problem Relation Name Age of Onset    Alzheimer's disease Mother      Colon cancer Father      Rashes / Skin problems Sister      Rashes / Skin problems  Brother       Social History:  ,  passed away on 10/24/23.  3 grown children (1 daughter, 2 sons). 2 grandchildren.  Has 6 cats.  Enjoyed skiing.  Occupation: retired .  Social History     Tobacco Use    Smoking status: Never     Passive exposure: Never    Smokeless tobacco: Never   Vaping Use    Vaping status: Never Used   Substance Use Topics    Alcohol use: Never    Drug use: Never      -------------------------------------------------------------------------------------------------------  Subjective       Sli West is a 85 year old female with IgG lambda multiple myeloma.  She recently had an increase to her lambda free light chains.  PET imaging done 7/17/24 showed an new soft tissue uptake in right 11th rib and interestingly patient has pain there as well.  Transitioned to carfilzomib, cyclophophamide, dex, cycle 1 on 8/5/24, Starting at C10 , cyclophosphamide drops out and she is only receiving carfilzomib every 2 weeks.  Completed carfilzomib June 23, 2025 due to disease progression.     Sherie's progressive disease has been mainly manifected by rapidly increasing lambda lyte chains as in chart below.  She does not seem to Receiving IVIG every month and zometa restarted 3/17/25 after completion of dental work, scheduled for every 3 months.      Sil presents to the clinic today (7/24/25) accompanied by her daughter Frances for follow up evaluation for IgG Kappa Multiple Myeloma and talquetamab C2D1.  Course complicated by mild elevation in creat and a 1 time fever, headache and tooth pain which resolved spontaneously after dose 2 without treatment. Dose of talquetamab for C2D1 was held on 7/21/25 due to symptom complaints of difficulty with focus, concentration, and remembering what she was reading.  Also had low grade temperatures up to 100.2.  Plan to administer C2D1 talquetamab today with addition of pre-medication of dexamethasone 12 mg.  PET imaging done 7/17/25  shows multiple new osseous lesions.     Sherie reports she is feeling okay.  Mental concentration is better, and is feeling less foggy.  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  No longer feeling flushed or having elevated temperatures.  Energy level is okay.  Able to do household chores.  Appetite is lower and has dry mouth.  Just got new mouth wash, dry mouth tablets, and picked up dexamethasone rinse but has not tried yet.  Lost a few pounds since last visit.  Continues to report loose stools.  Is having worsening pain to right middle back.  Notices pain more at night when laying in bed.  States that her pain goes up to 3 out of 10 on the pain scale.  Bruises easily.      Review of Systems   Constitutional:  Positive for appetite change. Negative for chills, diaphoresis, fatigue, fever and unexpected weight change.   Respiratory: Negative.     Cardiovascular: Negative.    Gastrointestinal:  Positive for diarrhea. Negative for blood in stool, nausea and vomiting.   Genitourinary: Negative.     Musculoskeletal:  Positive for back pain. Negative for gait problem.   Skin: Negative.         Had multiple spots of SCC removed by dermatology.   Neurological:  Negative for dizziness, gait problem, headaches and numbness.   Hematological:  Negative for adenopathy. Bruises/bleeds easily (bruises easily).   All other systems reviewed and are negative.  -------------------------------------------------------------------------------------------------------  Objective   BSA: 1.75 meters squared  /64   Pulse 107   Temp 36.4 °C (97.5 °F)   Resp 17   Wt 70.5 kg (155 lb 6.8 oz)   SpO2 99%   BMI 29.01 kg/m²     Physical Exam  Vitals reviewed.   Constitutional:       Appearance: Normal appearance. She is well-developed and normal weight.   HENT:      Head: Normocephalic and atraumatic.      Nose: Nose normal.     Eyes:      General: No scleral icterus.     Extraocular Movements: Extraocular movements  intact.      Conjunctiva/sclera: Conjunctivae normal.      Pupils: Pupils are equal, round, and reactive to light.       Cardiovascular:      Rate and Rhythm: Normal rate and regular rhythm.      Pulses: Normal pulses.      Heart sounds: Normal heart sounds.   Pulmonary:      Effort: Pulmonary effort is normal.      Breath sounds: Normal breath sounds.   Abdominal:      General: Abdomen is flat. Bowel sounds are normal. There is no distension.      Palpations: Abdomen is soft. There is no mass.      Tenderness: There is no abdominal tenderness.     Musculoskeletal:         General: Normal range of motion.        Arms:       Right lower leg: Edema (non-pitting) present.      Left lower leg: Edema (non-pitting) present.      Comments: No spine tenderness.  Reports tenderness to palpation of right posterior back.     Lymphadenopathy:      Comments: No lymphadenopathy     Skin:     General: Skin is warm and dry.     Neurological:      General: No focal deficit present.      Mental Status: She is alert and oriented to person, place, and time.     Psychiatric:         Mood and Affect: Mood normal.         Behavior: Behavior normal.         Thought Content: Thought content normal.     Performance Status:  Symptomatic; fully ambulatory  -------------------------------------------------------------------------------------------------------  Assessment and Plan:    Multiple myeloma not having achieved remission (CMS/HCC)  IgG lambda MM    - Currently on daratumamab/Lenalidomide. Continued uptrending of free lambda light chain. M-protein still 0.1g IgG kappa, likely represents daratumumab. Will add weekly dex 10 mg to see if this could deepen her response.     5/13/24:  Continues to have increasing free lambda light chain, M-protein remains at 0.1.  Receiving C20 daratumumab today.  Discussed that free lyte chain continues to increase significantly and suggested switching revlimid to pomalidomide  4 mg 21/28 days since no  other CRAB criteria,  -Taking aspirin 81 mg daily for VTE prophylaxis during treatment with pomalidomide      PET scan (7/17/24)   IMPRESSION:  1. Redemonstration of an FDG avid right posterolateral 11th rib  fracture now with evidence of an underlying soft tissue lesion.  2. Previously described hypermetabolic osseous lesions in the right  iliac, right superior pubic ramus, and right aspect of the pubic  symphysis have continued to decrease in metabolic activity with  persistent FDG avidity likely representing continued treatment  response with residual viable disease, attention on follow-up imaging.  3. Healing right inferior pubic ramus fracture with decreased extent  of hypermetabolic activity.    7/18/24:  Laboratory results show major increase in free lyte chains to 62 mg/dL up from 18 in March 2024, only sx is right rib pain where PET from yesterday shows a new lesion, no other new lesions seen,      Bone marrow biopsy results from 7/22/24 showing limited bone marrow (20% cellularity) with maturing trilineage hematopoiesis, minimal involvement by lambda-restricted plasma cells by flow cytometry. ECHO 8/2/2024 LVEF 60-65%    Started cycle 1 carfilzomib, cyclophosphamide, dexamethasone 8//24, cyclophosphamide omitted from regimen with cycle 10.  Last dose of carfilzomib 6/23/25 due to disease progression.    6/17/25:   Marked increase in lambda lyte chains to 85.45 from 23.95 (5/12/25) up from 6.76 (4/14/25)    Talquetamab started 7/7/25 7/11/25: C1D3 Step up talquetamab yesterday. Went to ER over the weekend after calling in with 1 time low grade fever & headache. Discharged home with negative work up. PET-CT planned for Thursday.    7/21/25: Here for day 1 of cycle 2 talquetamab, has some dysgeusia, reports low grade fevers for several days and some difficulty with focus and concentration. PET imaging 7/17/2025 shows multiple new brigida lesions and patient is symptomatic. Plan to hold talquetamab till 7/24  and add dex 12 mg as premed to next few doses.    7/24/25:  Symptoms of poor concentration and memory concerns has resolved.  She reports also feeling less foggy.  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  Reports increased dry mouth symptoms.  Okay to continue with dry mouth tablets, mouth rinses.  Advised to use dexamethasone oral rinse if other measures aren't helping dry mouth symptoms.  Sherie reports ongoing pain to right side of back, rates 3 out of 10.  PET scan 7/17/25 showing new brigida lesions.  ICE score 11/11.  Okay to proceed with C2D1 talquetamab today with dexamethasone 12 mg premedication.  Advised to contact oncology team with symptom concerns.      Ig [place] Free Light Chain   Date Value Ref Range Status   07/14/2025 0.25 (L) 0.33 - 1.94 mg/dL Final   07/07/2025 0.48 0.33 - 1.94 mg/dL Final   06/09/2025 0.35 0.33 - 1.94 mg/dL Final   05/12/2025 0.38 0.33 - 1.94 mg/dL Final   04/14/2025 0.32 (L) 0.33 - 1.94 mg/dL Final     Ig Lambda Free Light Chain   Date Value Ref Range Status   07/14/2025 261.24 (H) 0.57 - 2.63 mg/dL Final   07/07/2025 251.39 (H) 0.57 - 2.63 mg/dL Final   06/09/2025 85.45 (H) 0.57 - 2.63 mg/dL Final   05/12/2025 23.95 (H) 0.57 - 2.63 mg/dL Final   04/14/2025 6.76 (H) 0.57 - 2.63 mg/dL Final     Kappa/Lambda Ratio   Date Value Ref Range Status   07/14/2025 0.00 (L) 0.26 - 1.65 Final   07/07/2025 0.00 (L) 0.26 - 1.65 Final   06/09/2025 0.00 (L) 0.26 - 1.65 Final   05/12/2025 0.02 (L) 0.26 - 1.65 Final   04/14/2025 0.05 (L) 0.26 - 1.65 Final     M-PROTEIN 1   Date Value Ref Range Status   07/14/2025 0.1 (H)   g/dL Final   07/07/2025 0.1 (H)   g/dL Final   10/28/2024 0.1 (H)   g/dL Final   10/14/2024 0.1 (H)   g/dL Final   09/03/2024 0.1 (H)   g/dL Final      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  IgG level 694 (6/23/25).  Continue monthly at  [facility], last received 6/23/25.  Last given 7/21/25.    Dysuria: urinalysis bland, suspect needs urologic evaluation for incontinence    Bone Health:  - Continue zoledronic acid 3.3 mg (renal dosing) every 3 months, received last on 9/3/24.    - Continue vitamin D supplement -- 2000 units PO daily.  Started zometa 3/30/23 will complete 3/2025.  12/23/24:  Sil reports she had a crown fall out and may need a root canal.  Placed zometa on hold until dental work has been completed.    Dental work completed.  Resumed zometa 3/17/25, continue every 3 months.   Last received Zometa 6/9/25.       Thyroid nodule  - repeat thyroid US on 2/19/24, 2 nodules noted with FNA recommended.  Thyroid biopsy 4/29/24 showing rare atypical cells from FNA of right mid lobe.  Repeat biopsy was nondiagnostic.  Dr. Magallanes recommended thyroid surgery vs continued observation.  Sli requested to having US monitoring for now.  Last visit with Dr. Magallanes on 9/9/24. US 2/24/25 with 30 mm mildly suspicious nodule to right lobe without significant change from prior imaging.  Plan to have US in 1 year with follow up.       Health Care Maintenance:  Vaccines:    Received influenza- vaccine 10/9/24.  Advised to obtain updated covid, RSV, shingrix, and prevnar 20 vaccines at local pharmacy.    RTC:   Continue monthly IVIG and zometa every 3 months.   7/31/25, 8/7/25, 8/14/25:  Infusion appts at [facility]  8/21/25:  Follow up visit with Dr. Marshall and CHLOE talquetamab.  -----------------------------------------------------------------------------  CHANTEL Medrano-PAUL

## 2025-07-24 ENCOUNTER — LAB (OUTPATIENT)
Dept: LAB | Facility: HOSPITAL | Age: 85
End: 2025-07-24
Payer: MEDICARE

## 2025-07-24 ENCOUNTER — OFFICE VISIT (OUTPATIENT)
Dept: HEMATOLOGY/ONCOLOGY | Facility: HOSPITAL | Age: 85
End: 2025-07-24
Payer: MEDICARE

## 2025-07-24 ENCOUNTER — INFUSION (OUTPATIENT)
Dept: HEMATOLOGY/ONCOLOGY | Facility: HOSPITAL | Age: 85
End: 2025-07-24
Payer: MEDICARE

## 2025-07-24 VITALS
SYSTOLIC BLOOD PRESSURE: 130 MMHG | HEART RATE: 107 BPM | RESPIRATION RATE: 17 BRPM | OXYGEN SATURATION: 99 % | DIASTOLIC BLOOD PRESSURE: 64 MMHG | TEMPERATURE: 97.5 F | WEIGHT: 155.42 LBS | BODY MASS INDEX: 29.01 KG/M2

## 2025-07-24 DIAGNOSIS — C90.00 MULTIPLE MYELOMA NOT HAVING ACHIEVED REMISSION (MULTI): Primary | ICD-10-CM

## 2025-07-24 DIAGNOSIS — R68.2 DRY MOUTH: ICD-10-CM

## 2025-07-24 DIAGNOSIS — T45.1X5A IMMUNOSUPPRESSED DUE TO CHEMOTHERAPY: ICD-10-CM

## 2025-07-24 DIAGNOSIS — D84.821 IMMUNOSUPPRESSED DUE TO CHEMOTHERAPY: ICD-10-CM

## 2025-07-24 DIAGNOSIS — C90.00 MULTIPLE MYELOMA NOT HAVING ACHIEVED REMISSION (MULTI): ICD-10-CM

## 2025-07-24 DIAGNOSIS — Z79.69 IMMUNOSUPPRESSED DUE TO CHEMOTHERAPY: ICD-10-CM

## 2025-07-24 DIAGNOSIS — D80.1 HYPOGAMMAGLOBULINEMIA (MULTI): ICD-10-CM

## 2025-07-24 LAB
ALBUMIN SERPL BCP-MCNC: 3.7 G/DL (ref 3.4–5)
ALP SERPL-CCNC: 64 U/L (ref 33–136)
ALT SERPL W P-5'-P-CCNC: 12 U/L (ref 7–45)
ANION GAP SERPL CALC-SCNC: 13 MMOL/L (ref 10–20)
AST SERPL W P-5'-P-CCNC: 15 U/L (ref 9–39)
BASOPHILS # BLD AUTO: 0.02 X10*3/UL (ref 0–0.1)
BASOPHILS NFR BLD AUTO: 0.3 %
BILIRUB SERPL-MCNC: 0.4 MG/DL (ref 0–1.2)
BUN SERPL-MCNC: 19 MG/DL (ref 6–23)
CALCIUM SERPL-MCNC: 10.3 MG/DL (ref 8.6–10.3)
CHLORIDE SERPL-SCNC: 103 MMOL/L (ref 98–107)
CO2 SERPL-SCNC: 27 MMOL/L (ref 21–32)
CREAT SERPL-MCNC: 1.26 MG/DL (ref 0.5–1.05)
EGFRCR SERPLBLD CKD-EPI 2021: 42 ML/MIN/1.73M*2
EOSINOPHIL # BLD AUTO: 0.02 X10*3/UL (ref 0–0.4)
EOSINOPHIL NFR BLD AUTO: 0.3 %
ERYTHROCYTE [DISTWIDTH] IN BLOOD BY AUTOMATED COUNT: 12.2 % (ref 11.5–14.5)
GLUCOSE SERPL-MCNC: 137 MG/DL (ref 74–99)
HCT VFR BLD AUTO: 32.4 % (ref 36–46)
HGB BLD-MCNC: 9.9 G/DL (ref 12–16)
IGA SERPL-MCNC: 12 MG/DL (ref 70–400)
IGG SERPL-MCNC: 1090 MG/DL (ref 700–1600)
IGM SERPL-MCNC: 5 MG/DL (ref 40–230)
IMM GRANULOCYTES # BLD AUTO: 0.03 X10*3/UL (ref 0–0.5)
IMM GRANULOCYTES NFR BLD AUTO: 0.5 % (ref 0–0.9)
KAPPA LC SERPL-MCNC: 0.48 MG/DL (ref 0.33–1.94)
KAPPA LC/LAMBDA SER: 0 {RATIO} (ref 0.26–1.65)
LAMBDA LC SERPL-MCNC: 257.74 MG/DL (ref 0.57–2.63)
LDH SERPL L TO P-CCNC: 120 U/L (ref 84–246)
LYMPHOCYTES # BLD AUTO: 0.2 X10*3/UL (ref 0.8–3)
LYMPHOCYTES NFR BLD AUTO: 3.4 %
MCH RBC QN AUTO: 31.3 PG (ref 26–34)
MCHC RBC AUTO-ENTMCNC: 30.6 G/DL (ref 32–36)
MCV RBC AUTO: 103 FL (ref 80–100)
MONOCYTES # BLD AUTO: 0.29 X10*3/UL (ref 0.05–0.8)
MONOCYTES NFR BLD AUTO: 4.9 %
NEUTROPHILS # BLD AUTO: 5.31 X10*3/UL (ref 1.6–5.5)
NEUTROPHILS NFR BLD AUTO: 90.6 %
NRBC BLD-RTO: 0 /100 WBCS (ref 0–0)
PLATELET # BLD AUTO: 328 X10*3/UL (ref 150–450)
POTASSIUM SERPL-SCNC: 4.4 MMOL/L (ref 3.5–5.3)
PROT SERPL-MCNC: 6.9 G/DL (ref 6.4–8.2)
PROT SERPL-MCNC: 7.6 G/DL (ref 6.4–8.2)
RBC # BLD AUTO: 3.16 X10*6/UL (ref 4–5.2)
SODIUM SERPL-SCNC: 139 MMOL/L (ref 136–145)
WBC # BLD AUTO: 5.9 X10*3/UL (ref 4.4–11.3)

## 2025-07-24 PROCEDURE — 36415 COLL VENOUS BLD VENIPUNCTURE: CPT

## 2025-07-24 PROCEDURE — 1125F AMNT PAIN NOTED PAIN PRSNT: CPT

## 2025-07-24 PROCEDURE — 2500000004 HC RX 250 GENERAL PHARMACY W/ HCPCS (ALT 636 FOR OP/ED)

## 2025-07-24 PROCEDURE — 80053 COMPREHEN METABOLIC PANEL: CPT

## 2025-07-24 PROCEDURE — 82784 ASSAY IGA/IGD/IGG/IGM EACH: CPT

## 2025-07-24 PROCEDURE — 85025 COMPLETE CBC W/AUTO DIFF WBC: CPT

## 2025-07-24 PROCEDURE — 1159F MED LIST DOCD IN RCRD: CPT

## 2025-07-24 PROCEDURE — 99215 OFFICE O/P EST HI 40 MIN: CPT

## 2025-07-24 PROCEDURE — 2500000004 HC RX 250 GENERAL PHARMACY W/ HCPCS (ALT 636 FOR OP/ED): Mod: JW,TB | Performed by: INTERNAL MEDICINE

## 2025-07-24 PROCEDURE — 83521 IG LIGHT CHAINS FREE EACH: CPT

## 2025-07-24 PROCEDURE — 1036F TOBACCO NON-USER: CPT

## 2025-07-24 PROCEDURE — 86334 IMMUNOFIX E-PHORESIS SERUM: CPT

## 2025-07-24 PROCEDURE — 1160F RVW MEDS BY RX/DR IN RCRD: CPT

## 2025-07-24 PROCEDURE — 96401 CHEMO ANTI-NEOPL SQ/IM: CPT

## 2025-07-24 PROCEDURE — 83615 LACTATE (LD) (LDH) ENZYME: CPT

## 2025-07-24 PROCEDURE — 84155 ASSAY OF PROTEIN SERUM: CPT

## 2025-07-24 PROCEDURE — 2500000004 HC RX 250 GENERAL PHARMACY W/ HCPCS (ALT 636 FOR OP/ED): Performed by: INTERNAL MEDICINE

## 2025-07-24 RX ORDER — SULFAMETHOXAZOLE AND TRIMETHOPRIM 200; 40 MG/5ML; MG/5ML
160 SUSPENSION ORAL
Qty: 240 ML | Refills: 2 | Status: SHIPPED | OUTPATIENT
Start: 2025-07-25 | End: 2025-10-16

## 2025-07-24 RX ORDER — FAMOTIDINE 10 MG/ML
20 INJECTION, SOLUTION INTRAVENOUS ONCE AS NEEDED
Status: DISCONTINUED | OUTPATIENT
Start: 2025-07-24 | End: 2025-07-24 | Stop reason: HOSPADM

## 2025-07-24 RX ORDER — ALBUTEROL SULFATE 0.83 MG/ML
3 SOLUTION RESPIRATORY (INHALATION) AS NEEDED
Status: DISCONTINUED | OUTPATIENT
Start: 2025-07-24 | End: 2025-07-24 | Stop reason: HOSPADM

## 2025-07-24 RX ORDER — EPINEPHRINE 0.3 MG/.3ML
0.3 INJECTION SUBCUTANEOUS EVERY 5 MIN PRN
Status: DISCONTINUED | OUTPATIENT
Start: 2025-07-24 | End: 2025-07-24 | Stop reason: HOSPADM

## 2025-07-24 RX ORDER — DEXAMETHASONE 4 MG/1
12 TABLET ORAL ONCE
OUTPATIENT
Start: 2025-07-31

## 2025-07-24 RX ORDER — ONDANSETRON 8 MG/1
8 TABLET, FILM COATED ORAL ONCE
Status: COMPLETED | OUTPATIENT
Start: 2025-07-24 | End: 2025-07-24

## 2025-07-24 RX ORDER — DEXAMETHASONE 6 MG/1
12 TABLET ORAL ONCE
Status: COMPLETED | OUTPATIENT
Start: 2025-07-24 | End: 2025-07-24

## 2025-07-24 RX ORDER — DEXAMETHASONE 4 MG/1
12 TABLET ORAL ONCE
OUTPATIENT
Start: 2025-08-14

## 2025-07-24 RX ORDER — ALBUTEROL SULFATE 0.83 MG/ML
3 SOLUTION RESPIRATORY (INHALATION) AS NEEDED
OUTPATIENT
Start: 2025-08-14

## 2025-07-24 RX ORDER — DIPHENHYDRAMINE HYDROCHLORIDE 50 MG/ML
50 INJECTION, SOLUTION INTRAMUSCULAR; INTRAVENOUS AS NEEDED
Status: DISCONTINUED | OUTPATIENT
Start: 2025-07-24 | End: 2025-07-24 | Stop reason: HOSPADM

## 2025-07-24 RX ORDER — DEXAMETHASONE 4 MG/1
12 TABLET ORAL ONCE
Status: CANCELLED | OUTPATIENT
Start: 2025-07-24

## 2025-07-24 RX ORDER — DIPHENHYDRAMINE HCL 25 MG
25 CAPSULE ORAL ONCE
OUTPATIENT
Start: 2025-08-14

## 2025-07-24 RX ORDER — DEXAMETHASONE 4 MG/1
12 TABLET ORAL ONCE
OUTPATIENT
Start: 2025-08-07

## 2025-07-24 RX ORDER — FAMOTIDINE 10 MG/ML
20 INJECTION, SOLUTION INTRAVENOUS ONCE AS NEEDED
OUTPATIENT
Start: 2025-08-14

## 2025-07-24 RX ORDER — DIPHENHYDRAMINE HYDROCHLORIDE 50 MG/ML
50 INJECTION, SOLUTION INTRAMUSCULAR; INTRAVENOUS AS NEEDED
OUTPATIENT
Start: 2025-08-14

## 2025-07-24 RX ORDER — PROCHLORPERAZINE MALEATE 10 MG
10 TABLET ORAL EVERY 6 HOURS PRN
Status: DISCONTINUED | OUTPATIENT
Start: 2025-07-24 | End: 2025-07-24 | Stop reason: HOSPADM

## 2025-07-24 RX ORDER — EPINEPHRINE 0.3 MG/.3ML
0.3 INJECTION SUBCUTANEOUS EVERY 5 MIN PRN
OUTPATIENT
Start: 2025-08-14

## 2025-07-24 RX ORDER — PROCHLORPERAZINE EDISYLATE 5 MG/ML
10 INJECTION INTRAMUSCULAR; INTRAVENOUS EVERY 6 HOURS PRN
Status: DISCONTINUED | OUTPATIENT
Start: 2025-07-24 | End: 2025-07-24 | Stop reason: HOSPADM

## 2025-07-24 RX ORDER — ACETAMINOPHEN 325 MG/1
650 TABLET ORAL ONCE
OUTPATIENT
Start: 2025-08-14

## 2025-07-24 RX ADMIN — DEXAMETHASONE 12 MG: 6 TABLET ORAL at 10:30

## 2025-07-24 RX ADMIN — TALQUETAMAB 29.6 MG: 40 INJECTION SUBCUTANEOUS at 11:15

## 2025-07-24 RX ADMIN — ONDANSETRON HYDROCHLORIDE 8 MG: 8 TABLET, FILM COATED ORAL at 10:31

## 2025-07-24 ASSESSMENT — ENCOUNTER SYMPTOMS
APPETITE CHANGE: 1
BLOOD IN STOOL: 0
BACK PAIN: 1

## 2025-07-24 ASSESSMENT — PAIN SCALES - GENERAL: PAINLEVEL_OUTOF10: 4

## 2025-07-24 NOTE — PROGRESS NOTES
Learner: patient  Educated on: Talquetamab  Readiness: acceptance  Preferred learning method: preferred: listening  Method used: explanation  Response: demonstrated understanding  Barriers: None  Preferred language: English  Resources given: patient already have it

## 2025-07-24 NOTE — PROGRESS NOTES
Patient arrives to infusion for her C2 D1 Talquetamab. Her lab reults were good for treatment. She tolerated her treatment without nay issue. She was discharged stable.

## 2025-07-28 ENCOUNTER — APPOINTMENT (OUTPATIENT)
Dept: HEMATOLOGY/ONCOLOGY | Facility: CLINIC | Age: 85
End: 2025-07-28
Payer: MEDICARE

## 2025-07-28 ENCOUNTER — APPOINTMENT (OUTPATIENT)
Dept: HEMATOLOGY/ONCOLOGY | Facility: HOSPITAL | Age: 85
End: 2025-07-28
Payer: MEDICARE

## 2025-07-29 ENCOUNTER — APPOINTMENT (OUTPATIENT)
Dept: HEMATOLOGY/ONCOLOGY | Facility: CLINIC | Age: 85
End: 2025-07-29
Payer: MEDICARE

## 2025-07-30 LAB
ALBUMIN: 3.1 G/DL (ref 3.4–5)
ALPHA 1 GLOBULIN: 0.6 G/DL (ref 0.2–0.6)
ALPHA 2 GLOBULIN: 1.3 G/DL (ref 0.4–1.1)
BETA GLOBULIN: 0.8 G/DL (ref 0.5–1.2)
GAMMA GLOBULIN: 1 G/DL (ref 0.5–1.4)
IMMUNOFIXATION COMMENT: ABNORMAL
M-PROTEIN 1: 0.2 G/DL (ref ?–0)
PATH REVIEW - SERUM IMMUNOFIXATION: ABNORMAL
PATH REVIEW-SERUM PROTEIN ELECTROPHORESIS: ABNORMAL
PROTEIN ELECTROPHORESIS COMMENT: ABNORMAL

## 2025-07-31 ENCOUNTER — LAB (OUTPATIENT)
Dept: LAB | Facility: CLINIC | Age: 85
End: 2025-07-31
Payer: MEDICARE

## 2025-07-31 ENCOUNTER — INFUSION (OUTPATIENT)
Dept: HEMATOLOGY/ONCOLOGY | Facility: CLINIC | Age: 85
End: 2025-07-31
Payer: MEDICARE

## 2025-07-31 VITALS
SYSTOLIC BLOOD PRESSURE: 127 MMHG | RESPIRATION RATE: 18 BRPM | BODY MASS INDEX: 28.94 KG/M2 | WEIGHT: 155.09 LBS | TEMPERATURE: 98.4 F | DIASTOLIC BLOOD PRESSURE: 77 MMHG | OXYGEN SATURATION: 99 % | HEART RATE: 98 BPM

## 2025-07-31 DIAGNOSIS — C90.00 MULTIPLE MYELOMA NOT HAVING ACHIEVED REMISSION (MULTI): ICD-10-CM

## 2025-07-31 LAB
ALBUMIN SERPL BCP-MCNC: 3.6 G/DL (ref 3.4–5)
ALP SERPL-CCNC: 64 U/L (ref 33–136)
ALT SERPL W P-5'-P-CCNC: 12 U/L (ref 7–45)
ANION GAP SERPL CALC-SCNC: 15 MMOL/L (ref 10–20)
AST SERPL W P-5'-P-CCNC: 16 U/L (ref 9–39)
BASOPHILS # BLD AUTO: 0.02 X10*3/UL (ref 0–0.1)
BASOPHILS NFR BLD AUTO: 0.4 %
BILIRUB SERPL-MCNC: 0.3 MG/DL (ref 0–1.2)
BUN SERPL-MCNC: 19 MG/DL (ref 6–23)
CALCIUM SERPL-MCNC: 9.3 MG/DL (ref 8.6–10.3)
CHLORIDE SERPL-SCNC: 103 MMOL/L (ref 98–107)
CO2 SERPL-SCNC: 25 MMOL/L (ref 21–32)
CREAT SERPL-MCNC: 1.2 MG/DL (ref 0.5–1.05)
EGFRCR SERPLBLD CKD-EPI 2021: 45 ML/MIN/1.73M*2
EOSINOPHIL # BLD AUTO: 0.07 X10*3/UL (ref 0–0.4)
EOSINOPHIL NFR BLD AUTO: 1.5 %
ERYTHROCYTE [DISTWIDTH] IN BLOOD BY AUTOMATED COUNT: 12.5 % (ref 11.5–14.5)
GLUCOSE SERPL-MCNC: 130 MG/DL (ref 74–99)
HCT VFR BLD AUTO: 27.8 % (ref 36–46)
HGB BLD-MCNC: 9.2 G/DL (ref 12–16)
IMM GRANULOCYTES # BLD AUTO: 0.02 X10*3/UL (ref 0–0.5)
IMM GRANULOCYTES NFR BLD AUTO: 0.4 % (ref 0–0.9)
LYMPHOCYTES # BLD AUTO: 0.29 X10*3/UL (ref 0.8–3)
LYMPHOCYTES NFR BLD AUTO: 6.4 %
MCH RBC QN AUTO: 32.5 PG (ref 26–34)
MCHC RBC AUTO-ENTMCNC: 33.1 G/DL (ref 32–36)
MCV RBC AUTO: 98 FL (ref 80–100)
MONOCYTES # BLD AUTO: 0.46 X10*3/UL (ref 0.05–0.8)
MONOCYTES NFR BLD AUTO: 10.2 %
NEUTROPHILS # BLD AUTO: 3.67 X10*3/UL (ref 1.6–5.5)
NEUTROPHILS NFR BLD AUTO: 81.1 %
NRBC BLD-RTO: 0 /100 WBCS (ref 0–0)
PLATELET # BLD AUTO: 368 X10*3/UL (ref 150–450)
POTASSIUM SERPL-SCNC: 4.3 MMOL/L (ref 3.5–5.3)
PROT SERPL-MCNC: 6.9 G/DL (ref 6.4–8.2)
RBC # BLD AUTO: 2.83 X10*6/UL (ref 4–5.2)
SODIUM SERPL-SCNC: 139 MMOL/L (ref 136–145)
WBC # BLD AUTO: 4.5 X10*3/UL (ref 4.4–11.3)

## 2025-07-31 PROCEDURE — 2500000004 HC RX 250 GENERAL PHARMACY W/ HCPCS (ALT 636 FOR OP/ED)

## 2025-07-31 PROCEDURE — 36415 COLL VENOUS BLD VENIPUNCTURE: CPT

## 2025-07-31 PROCEDURE — 96401 CHEMO ANTI-NEOPL SQ/IM: CPT

## 2025-07-31 PROCEDURE — 84075 ASSAY ALKALINE PHOSPHATASE: CPT

## 2025-07-31 PROCEDURE — 2500000004 HC RX 250 GENERAL PHARMACY W/ HCPCS (ALT 636 FOR OP/ED): Mod: TB | Performed by: INTERNAL MEDICINE

## 2025-07-31 PROCEDURE — 85025 COMPLETE CBC W/AUTO DIFF WBC: CPT

## 2025-07-31 RX ORDER — PROCHLORPERAZINE EDISYLATE 5 MG/ML
10 INJECTION INTRAMUSCULAR; INTRAVENOUS EVERY 6 HOURS PRN
Status: DISCONTINUED | OUTPATIENT
Start: 2025-07-31 | End: 2025-07-31 | Stop reason: HOSPADM

## 2025-07-31 RX ORDER — PROCHLORPERAZINE MALEATE 10 MG
10 TABLET ORAL EVERY 6 HOURS PRN
Status: DISCONTINUED | OUTPATIENT
Start: 2025-07-31 | End: 2025-07-31 | Stop reason: HOSPADM

## 2025-07-31 RX ORDER — ONDANSETRON 8 MG/1
8 TABLET, FILM COATED ORAL ONCE
Status: COMPLETED | OUTPATIENT
Start: 2025-07-31 | End: 2025-07-31

## 2025-07-31 RX ORDER — ALBUTEROL SULFATE 0.83 MG/ML
3 SOLUTION RESPIRATORY (INHALATION) AS NEEDED
Status: DISCONTINUED | OUTPATIENT
Start: 2025-07-31 | End: 2025-07-31 | Stop reason: HOSPADM

## 2025-07-31 RX ORDER — EPINEPHRINE 0.3 MG/.3ML
0.3 INJECTION SUBCUTANEOUS EVERY 5 MIN PRN
Status: DISCONTINUED | OUTPATIENT
Start: 2025-07-31 | End: 2025-07-31 | Stop reason: HOSPADM

## 2025-07-31 RX ORDER — DEXAMETHASONE 6 MG/1
12 TABLET ORAL ONCE
Status: COMPLETED | OUTPATIENT
Start: 2025-07-31 | End: 2025-07-31

## 2025-07-31 RX ORDER — DIPHENHYDRAMINE HYDROCHLORIDE 50 MG/ML
50 INJECTION, SOLUTION INTRAMUSCULAR; INTRAVENOUS AS NEEDED
Status: DISCONTINUED | OUTPATIENT
Start: 2025-07-31 | End: 2025-07-31 | Stop reason: HOSPADM

## 2025-07-31 RX ORDER — FAMOTIDINE 10 MG/ML
20 INJECTION, SOLUTION INTRAVENOUS ONCE AS NEEDED
Status: DISCONTINUED | OUTPATIENT
Start: 2025-07-31 | End: 2025-07-31 | Stop reason: HOSPADM

## 2025-07-31 RX ADMIN — DEXAMETHASONE 12 MG: 6 TABLET ORAL at 09:06

## 2025-07-31 RX ADMIN — TALQUETAMAB 29.6 MG: 40 INJECTION SUBCUTANEOUS at 09:27

## 2025-07-31 RX ADMIN — ONDANSETRON HYDROCHLORIDE 8 MG: 8 TABLET, FILM COATED ORAL at 09:06

## 2025-07-31 ASSESSMENT — PAIN SCALES - GENERAL: PAINLEVEL_OUTOF10: 2

## 2025-07-31 NOTE — PROGRESS NOTES
Patient education:  Learner: patient  Educated on: Plan of care. Talvey injection   Readiness: acceptance  Preferred learning method: preferred: listening  Method used: explanation  Response: demonstrated understanding  Barriers: None  Preferred language: English

## 2025-07-31 NOTE — PROGRESS NOTES
Patient in clinic for Talvey injection. Denied problem or concern today. Denied any issues following her injection last week. Premeds given per order. Injection given in left abdomen. Patient tolerated. Denied problem or concern. DC to home in stable condition.

## 2025-08-04 ENCOUNTER — APPOINTMENT (OUTPATIENT)
Dept: HEMATOLOGY/ONCOLOGY | Facility: CLINIC | Age: 85
End: 2025-08-04
Payer: MEDICARE

## 2025-08-04 ENCOUNTER — APPOINTMENT (OUTPATIENT)
Dept: HEMATOLOGY/ONCOLOGY | Facility: HOSPITAL | Age: 85
End: 2025-08-04
Payer: MEDICARE

## 2025-08-04 DIAGNOSIS — I10 PRIMARY HYPERTENSION: ICD-10-CM

## 2025-08-05 ENCOUNTER — APPOINTMENT (OUTPATIENT)
Dept: HEMATOLOGY/ONCOLOGY | Facility: CLINIC | Age: 85
End: 2025-08-05
Payer: MEDICARE

## 2025-08-06 RX ORDER — AMLODIPINE BESYLATE 5 MG/1
5 TABLET ORAL DAILY
Qty: 90 TABLET | Refills: 0 | Status: SHIPPED | OUTPATIENT
Start: 2025-08-06 | End: 2025-11-04

## 2025-08-07 ENCOUNTER — INFUSION (OUTPATIENT)
Dept: HEMATOLOGY/ONCOLOGY | Facility: CLINIC | Age: 85
End: 2025-08-07
Payer: MEDICARE

## 2025-08-07 ENCOUNTER — LAB (OUTPATIENT)
Dept: LAB | Facility: CLINIC | Age: 85
End: 2025-08-07
Payer: MEDICARE

## 2025-08-07 VITALS
WEIGHT: 155.75 LBS | HEART RATE: 96 BPM | DIASTOLIC BLOOD PRESSURE: 73 MMHG | BODY MASS INDEX: 29.07 KG/M2 | OXYGEN SATURATION: 98 % | SYSTOLIC BLOOD PRESSURE: 159 MMHG | TEMPERATURE: 97.7 F | RESPIRATION RATE: 18 BRPM

## 2025-08-07 DIAGNOSIS — C90.00 MULTIPLE MYELOMA NOT HAVING ACHIEVED REMISSION (MULTI): ICD-10-CM

## 2025-08-07 LAB
ALBUMIN SERPL BCP-MCNC: 3.6 G/DL (ref 3.4–5)
ALP SERPL-CCNC: 67 U/L (ref 33–136)
ALT SERPL W P-5'-P-CCNC: 10 U/L (ref 7–45)
ANION GAP SERPL CALC-SCNC: 15 MMOL/L (ref 10–20)
AST SERPL W P-5'-P-CCNC: 15 U/L (ref 9–39)
BASOPHILS # BLD AUTO: 0.03 X10*3/UL (ref 0–0.1)
BASOPHILS NFR BLD AUTO: 0.6 %
BILIRUB SERPL-MCNC: 0.3 MG/DL (ref 0–1.2)
BUN SERPL-MCNC: 18 MG/DL (ref 6–23)
CALCIUM SERPL-MCNC: 9.2 MG/DL (ref 8.6–10.3)
CHLORIDE SERPL-SCNC: 104 MMOL/L (ref 98–107)
CO2 SERPL-SCNC: 26 MMOL/L (ref 21–32)
CREAT SERPL-MCNC: 1.05 MG/DL (ref 0.5–1.05)
EGFRCR SERPLBLD CKD-EPI 2021: 53 ML/MIN/1.73M*2
EOSINOPHIL # BLD AUTO: 0.1 X10*3/UL (ref 0–0.4)
EOSINOPHIL NFR BLD AUTO: 2 %
ERYTHROCYTE [DISTWIDTH] IN BLOOD BY AUTOMATED COUNT: 13 % (ref 11.5–14.5)
GLUCOSE SERPL-MCNC: 133 MG/DL (ref 74–99)
HCT VFR BLD AUTO: 30.2 % (ref 36–46)
HGB BLD-MCNC: 9.5 G/DL (ref 12–16)
IMM GRANULOCYTES # BLD AUTO: 0.02 X10*3/UL (ref 0–0.5)
IMM GRANULOCYTES NFR BLD AUTO: 0.4 % (ref 0–0.9)
LYMPHOCYTES # BLD AUTO: 0.34 X10*3/UL (ref 0.8–3)
LYMPHOCYTES NFR BLD AUTO: 6.9 %
MCH RBC QN AUTO: 31.1 PG (ref 26–34)
MCHC RBC AUTO-ENTMCNC: 31.5 G/DL (ref 32–36)
MCV RBC AUTO: 99 FL (ref 80–100)
MONOCYTES # BLD AUTO: 0.33 X10*3/UL (ref 0.05–0.8)
MONOCYTES NFR BLD AUTO: 6.7 %
NEUTROPHILS # BLD AUTO: 4.14 X10*3/UL (ref 1.6–5.5)
NEUTROPHILS NFR BLD AUTO: 83.4 %
NRBC BLD-RTO: 0 /100 WBCS (ref 0–0)
PLATELET # BLD AUTO: 381 X10*3/UL (ref 150–450)
POTASSIUM SERPL-SCNC: 4.1 MMOL/L (ref 3.5–5.3)
PROT SERPL-MCNC: 6.6 G/DL (ref 6.4–8.2)
RBC # BLD AUTO: 3.05 X10*6/UL (ref 4–5.2)
SODIUM SERPL-SCNC: 141 MMOL/L (ref 136–145)
WBC # BLD AUTO: 5 X10*3/UL (ref 4.4–11.3)

## 2025-08-07 PROCEDURE — 84075 ASSAY ALKALINE PHOSPHATASE: CPT

## 2025-08-07 PROCEDURE — 96401 CHEMO ANTI-NEOPL SQ/IM: CPT

## 2025-08-07 PROCEDURE — 36415 COLL VENOUS BLD VENIPUNCTURE: CPT

## 2025-08-07 PROCEDURE — 85025 COMPLETE CBC W/AUTO DIFF WBC: CPT

## 2025-08-07 PROCEDURE — 2500000004 HC RX 250 GENERAL PHARMACY W/ HCPCS (ALT 636 FOR OP/ED): Mod: TB | Performed by: INTERNAL MEDICINE

## 2025-08-07 PROCEDURE — 2500000004 HC RX 250 GENERAL PHARMACY W/ HCPCS (ALT 636 FOR OP/ED)

## 2025-08-07 RX ORDER — DEXAMETHASONE 6 MG/1
12 TABLET ORAL ONCE
Status: COMPLETED | OUTPATIENT
Start: 2025-08-07 | End: 2025-08-07

## 2025-08-07 RX ORDER — PROCHLORPERAZINE MALEATE 10 MG
10 TABLET ORAL EVERY 6 HOURS PRN
Status: DISCONTINUED | OUTPATIENT
Start: 2025-08-07 | End: 2025-08-07 | Stop reason: HOSPADM

## 2025-08-07 RX ORDER — ONDANSETRON 8 MG/1
8 TABLET, FILM COATED ORAL ONCE
Status: COMPLETED | OUTPATIENT
Start: 2025-08-07 | End: 2025-08-07

## 2025-08-07 RX ORDER — ALBUTEROL SULFATE 0.83 MG/ML
3 SOLUTION RESPIRATORY (INHALATION) AS NEEDED
Status: DISCONTINUED | OUTPATIENT
Start: 2025-08-07 | End: 2025-08-07 | Stop reason: HOSPADM

## 2025-08-07 RX ORDER — EPINEPHRINE 0.3 MG/.3ML
0.3 INJECTION SUBCUTANEOUS EVERY 5 MIN PRN
Status: DISCONTINUED | OUTPATIENT
Start: 2025-08-07 | End: 2025-08-07 | Stop reason: HOSPADM

## 2025-08-07 RX ORDER — PROCHLORPERAZINE EDISYLATE 5 MG/ML
10 INJECTION INTRAMUSCULAR; INTRAVENOUS EVERY 6 HOURS PRN
Status: DISCONTINUED | OUTPATIENT
Start: 2025-08-07 | End: 2025-08-07 | Stop reason: HOSPADM

## 2025-08-07 RX ORDER — FAMOTIDINE 10 MG/ML
20 INJECTION, SOLUTION INTRAVENOUS ONCE AS NEEDED
Status: DISCONTINUED | OUTPATIENT
Start: 2025-08-07 | End: 2025-08-07 | Stop reason: HOSPADM

## 2025-08-07 RX ORDER — DIPHENHYDRAMINE HYDROCHLORIDE 50 MG/ML
50 INJECTION, SOLUTION INTRAMUSCULAR; INTRAVENOUS AS NEEDED
Status: DISCONTINUED | OUTPATIENT
Start: 2025-08-07 | End: 2025-08-07 | Stop reason: HOSPADM

## 2025-08-07 RX ADMIN — ONDANSETRON HYDROCHLORIDE 8 MG: 8 TABLET, FILM COATED ORAL at 08:36

## 2025-08-07 RX ADMIN — TALQUETAMAB 29.6 MG: 40 INJECTION SUBCUTANEOUS at 08:45

## 2025-08-07 RX ADMIN — DEXAMETHASONE 12 MG: 6 TABLET ORAL at 08:36

## 2025-08-07 ASSESSMENT — PAIN SCALES - GENERAL: PAINLEVEL_OUTOF10: 0-NO PAIN

## 2025-08-11 ENCOUNTER — APPOINTMENT (OUTPATIENT)
Dept: HEMATOLOGY/ONCOLOGY | Facility: HOSPITAL | Age: 85
End: 2025-08-11
Payer: MEDICARE

## 2025-08-11 ENCOUNTER — APPOINTMENT (OUTPATIENT)
Dept: HEMATOLOGY/ONCOLOGY | Facility: CLINIC | Age: 85
End: 2025-08-11
Payer: MEDICARE

## 2025-08-12 ENCOUNTER — APPOINTMENT (OUTPATIENT)
Dept: HEMATOLOGY/ONCOLOGY | Facility: CLINIC | Age: 85
End: 2025-08-12
Payer: MEDICARE

## 2025-08-12 ENCOUNTER — APPOINTMENT (OUTPATIENT)
Dept: PRIMARY CARE | Facility: CLINIC | Age: 85
End: 2025-08-12
Payer: MEDICARE

## 2025-08-14 ENCOUNTER — INFUSION (OUTPATIENT)
Dept: HEMATOLOGY/ONCOLOGY | Facility: CLINIC | Age: 85
End: 2025-08-14
Payer: MEDICARE

## 2025-08-14 ENCOUNTER — APPOINTMENT (OUTPATIENT)
Dept: HEMATOLOGY/ONCOLOGY | Facility: CLINIC | Age: 85
End: 2025-08-14
Payer: MEDICARE

## 2025-08-14 ENCOUNTER — LAB (OUTPATIENT)
Dept: LAB | Facility: CLINIC | Age: 85
End: 2025-08-14
Payer: MEDICARE

## 2025-08-14 VITALS
SYSTOLIC BLOOD PRESSURE: 138 MMHG | RESPIRATION RATE: 18 BRPM | HEART RATE: 96 BPM | WEIGHT: 156.31 LBS | BODY MASS INDEX: 29.17 KG/M2 | OXYGEN SATURATION: 98 % | TEMPERATURE: 97.7 F | DIASTOLIC BLOOD PRESSURE: 74 MMHG

## 2025-08-14 DIAGNOSIS — D80.1 HYPOGAMMAGLOBULINEMIA (MULTI): ICD-10-CM

## 2025-08-14 DIAGNOSIS — C90.00 MULTIPLE MYELOMA NOT HAVING ACHIEVED REMISSION (MULTI): ICD-10-CM

## 2025-08-14 LAB
ALBUMIN SERPL BCP-MCNC: 3.6 G/DL (ref 3.4–5)
ALP SERPL-CCNC: 72 U/L (ref 33–136)
ALT SERPL W P-5'-P-CCNC: 8 U/L (ref 7–45)
ANION GAP SERPL CALC-SCNC: 14 MMOL/L (ref 10–20)
AST SERPL W P-5'-P-CCNC: 14 U/L (ref 9–39)
BASOPHILS # BLD AUTO: 0.03 X10*3/UL (ref 0–0.1)
BASOPHILS NFR BLD AUTO: 0.6 %
BILIRUB SERPL-MCNC: 0.3 MG/DL (ref 0–1.2)
BUN SERPL-MCNC: 22 MG/DL (ref 6–23)
CALCIUM SERPL-MCNC: 8.9 MG/DL (ref 8.6–10.3)
CHLORIDE SERPL-SCNC: 105 MMOL/L (ref 98–107)
CO2 SERPL-SCNC: 24 MMOL/L (ref 21–32)
CREAT SERPL-MCNC: 1.12 MG/DL (ref 0.5–1.05)
EGFRCR SERPLBLD CKD-EPI 2021: 49 ML/MIN/1.73M*2
EOSINOPHIL # BLD AUTO: 0.05 X10*3/UL (ref 0–0.4)
EOSINOPHIL NFR BLD AUTO: 0.9 %
ERYTHROCYTE [DISTWIDTH] IN BLOOD BY AUTOMATED COUNT: 13.7 % (ref 11.5–14.5)
GLUCOSE SERPL-MCNC: 170 MG/DL (ref 74–99)
HCT VFR BLD AUTO: 30.8 % (ref 36–46)
HGB BLD-MCNC: 9.6 G/DL (ref 12–16)
IGA SERPL-MCNC: <7 MG/DL (ref 70–400)
IGG SERPL-MCNC: 644 MG/DL (ref 700–1600)
IGM SERPL-MCNC: 5 MG/DL (ref 40–230)
IMM GRANULOCYTES # BLD AUTO: 0.02 X10*3/UL (ref 0–0.5)
IMM GRANULOCYTES NFR BLD AUTO: 0.4 % (ref 0–0.9)
LYMPHOCYTES # BLD AUTO: 0.31 X10*3/UL (ref 0.8–3)
LYMPHOCYTES NFR BLD AUTO: 5.7 %
MCH RBC QN AUTO: 31.3 PG (ref 26–34)
MCHC RBC AUTO-ENTMCNC: 31.2 G/DL (ref 32–36)
MCV RBC AUTO: 100 FL (ref 80–100)
MONOCYTES # BLD AUTO: 0.35 X10*3/UL (ref 0.05–0.8)
MONOCYTES NFR BLD AUTO: 6.4 %
NEUTROPHILS # BLD AUTO: 4.68 X10*3/UL (ref 1.6–5.5)
NEUTROPHILS NFR BLD AUTO: 86 %
NRBC BLD-RTO: 0 /100 WBCS (ref 0–0)
PLATELET # BLD AUTO: 321 X10*3/UL (ref 150–450)
POTASSIUM SERPL-SCNC: 4.3 MMOL/L (ref 3.5–5.3)
PROT SERPL-MCNC: 6.5 G/DL (ref 6.4–8.2)
RBC # BLD AUTO: 3.07 X10*6/UL (ref 4–5.2)
SODIUM SERPL-SCNC: 139 MMOL/L (ref 136–145)
WBC # BLD AUTO: 5.4 X10*3/UL (ref 4.4–11.3)

## 2025-08-14 PROCEDURE — 36415 COLL VENOUS BLD VENIPUNCTURE: CPT

## 2025-08-14 PROCEDURE — 82784 ASSAY IGA/IGD/IGG/IGM EACH: CPT

## 2025-08-14 PROCEDURE — 85025 COMPLETE CBC W/AUTO DIFF WBC: CPT

## 2025-08-14 PROCEDURE — 96401 CHEMO ANTI-NEOPL SQ/IM: CPT

## 2025-08-14 PROCEDURE — 2500000004 HC RX 250 GENERAL PHARMACY W/ HCPCS (ALT 636 FOR OP/ED): Performed by: INTERNAL MEDICINE

## 2025-08-14 PROCEDURE — 80053 COMPREHEN METABOLIC PANEL: CPT

## 2025-08-14 PROCEDURE — 2500000004 HC RX 250 GENERAL PHARMACY W/ HCPCS (ALT 636 FOR OP/ED)

## 2025-08-14 RX ORDER — ALBUTEROL SULFATE 0.83 MG/ML
3 SOLUTION RESPIRATORY (INHALATION) AS NEEDED
Status: DISCONTINUED | OUTPATIENT
Start: 2025-08-14 | End: 2025-08-14 | Stop reason: HOSPADM

## 2025-08-14 RX ORDER — PROCHLORPERAZINE EDISYLATE 5 MG/ML
10 INJECTION INTRAMUSCULAR; INTRAVENOUS EVERY 6 HOURS PRN
Status: DISCONTINUED | OUTPATIENT
Start: 2025-08-14 | End: 2025-08-14 | Stop reason: HOSPADM

## 2025-08-14 RX ORDER — PROCHLORPERAZINE MALEATE 10 MG
10 TABLET ORAL EVERY 6 HOURS PRN
Status: DISCONTINUED | OUTPATIENT
Start: 2025-08-14 | End: 2025-08-14 | Stop reason: HOSPADM

## 2025-08-14 RX ORDER — DIPHENHYDRAMINE HYDROCHLORIDE 50 MG/ML
50 INJECTION, SOLUTION INTRAMUSCULAR; INTRAVENOUS AS NEEDED
Status: DISCONTINUED | OUTPATIENT
Start: 2025-08-14 | End: 2025-08-14 | Stop reason: HOSPADM

## 2025-08-14 RX ORDER — ONDANSETRON 8 MG/1
8 TABLET, FILM COATED ORAL ONCE
Status: COMPLETED | OUTPATIENT
Start: 2025-08-14 | End: 2025-08-14

## 2025-08-14 RX ORDER — FAMOTIDINE 10 MG/ML
20 INJECTION, SOLUTION INTRAVENOUS ONCE AS NEEDED
Status: DISCONTINUED | OUTPATIENT
Start: 2025-08-14 | End: 2025-08-14 | Stop reason: HOSPADM

## 2025-08-14 RX ORDER — EPINEPHRINE 0.3 MG/.3ML
0.3 INJECTION SUBCUTANEOUS EVERY 5 MIN PRN
Status: DISCONTINUED | OUTPATIENT
Start: 2025-08-14 | End: 2025-08-14 | Stop reason: HOSPADM

## 2025-08-14 RX ORDER — DEXAMETHASONE 6 MG/1
12 TABLET ORAL ONCE
Status: COMPLETED | OUTPATIENT
Start: 2025-08-14 | End: 2025-08-14

## 2025-08-14 RX ADMIN — TALQUETAMAB 29.6 MG: 40 INJECTION SUBCUTANEOUS at 10:07

## 2025-08-14 RX ADMIN — DEXAMETHASONE 12 MG: 6 TABLET ORAL at 09:14

## 2025-08-14 RX ADMIN — ONDANSETRON HYDROCHLORIDE 8 MG: 8 TABLET, FILM COATED ORAL at 09:14

## 2025-08-14 ASSESSMENT — PAIN SCALES - GENERAL: PAINLEVEL_OUTOF10: 0-NO PAIN

## 2025-08-15 ENCOUNTER — OFFICE VISIT (OUTPATIENT)
Dept: PRIMARY CARE | Facility: CLINIC | Age: 85
End: 2025-08-15
Payer: MEDICARE

## 2025-08-15 VITALS
BODY MASS INDEX: 29.83 KG/M2 | SYSTOLIC BLOOD PRESSURE: 120 MMHG | WEIGHT: 158 LBS | DIASTOLIC BLOOD PRESSURE: 58 MMHG | OXYGEN SATURATION: 97 % | TEMPERATURE: 98.6 F | HEART RATE: 103 BPM | HEIGHT: 61 IN | RESPIRATION RATE: 18 BRPM

## 2025-08-15 DIAGNOSIS — H61.21 IMPACTED CERUMEN, RIGHT EAR: Primary | ICD-10-CM

## 2025-08-15 PROCEDURE — 99214 OFFICE O/P EST MOD 30 MIN: CPT | Performed by: NURSE PRACTITIONER

## 2025-08-15 PROCEDURE — 69210 REMOVE IMPACTED EAR WAX UNI: CPT | Performed by: NURSE PRACTITIONER

## 2025-08-15 PROCEDURE — 99214 OFFICE O/P EST MOD 30 MIN: CPT | Mod: 25 | Performed by: NURSE PRACTITIONER

## 2025-08-15 PROCEDURE — 1124F ACP DISCUSS-NO DSCNMKR DOCD: CPT | Performed by: NURSE PRACTITIONER

## 2025-08-15 PROCEDURE — 1159F MED LIST DOCD IN RCRD: CPT | Performed by: NURSE PRACTITIONER

## 2025-08-15 PROCEDURE — 1126F AMNT PAIN NOTED NONE PRSNT: CPT | Performed by: NURSE PRACTITIONER

## 2025-08-15 ASSESSMENT — PATIENT HEALTH QUESTIONNAIRE - PHQ9
2. FEELING DOWN, DEPRESSED OR HOPELESS: NOT AT ALL
SUM OF ALL RESPONSES TO PHQ9 QUESTIONS 1 AND 2: 0
1. LITTLE INTEREST OR PLEASURE IN DOING THINGS: NOT AT ALL

## 2025-08-15 ASSESSMENT — PAIN SCALES - GENERAL: PAINLEVEL_OUTOF10: 0-NO PAIN

## 2025-08-18 ENCOUNTER — APPOINTMENT (OUTPATIENT)
Dept: HEMATOLOGY/ONCOLOGY | Facility: HOSPITAL | Age: 85
End: 2025-08-18
Payer: MEDICARE

## 2025-08-19 ASSESSMENT — ENCOUNTER SYMPTOMS
GASTROINTESTINAL NEGATIVE: 1
RESPIRATORY NEGATIVE: 1
EYES NEGATIVE: 1
CARDIOVASCULAR NEGATIVE: 1
CONSTITUTIONAL NEGATIVE: 1

## 2025-08-20 RX ORDER — PROCHLORPERAZINE MALEATE 10 MG
10 TABLET ORAL EVERY 6 HOURS PRN
OUTPATIENT
Start: 2025-09-18

## 2025-08-20 RX ORDER — DEXAMETHASONE 4 MG/1
12 TABLET ORAL ONCE
Status: CANCELLED | OUTPATIENT
Start: 2025-08-21

## 2025-08-20 RX ORDER — DEXAMETHASONE 4 MG/1
12 TABLET ORAL ONCE
OUTPATIENT
Start: 2025-09-04

## 2025-08-20 RX ORDER — DEXAMETHASONE 4 MG/1
12 TABLET ORAL ONCE
OUTPATIENT
Start: 2025-09-11

## 2025-08-20 RX ORDER — EPINEPHRINE 0.3 MG/.3ML
0.3 INJECTION SUBCUTANEOUS EVERY 5 MIN PRN
OUTPATIENT
Start: 2025-09-18

## 2025-08-20 RX ORDER — ONDANSETRON 8 MG/1
8 TABLET, FILM COATED ORAL ONCE
OUTPATIENT
Start: 2025-10-02

## 2025-08-20 RX ORDER — ALBUTEROL SULFATE 0.83 MG/ML
3 SOLUTION RESPIRATORY (INHALATION) AS NEEDED
OUTPATIENT
Start: 2025-09-18

## 2025-08-20 RX ORDER — PROCHLORPERAZINE EDISYLATE 5 MG/ML
10 INJECTION INTRAMUSCULAR; INTRAVENOUS EVERY 6 HOURS PRN
OUTPATIENT
Start: 2025-09-25

## 2025-08-20 RX ORDER — PROCHLORPERAZINE MALEATE 10 MG
10 TABLET ORAL EVERY 6 HOURS PRN
OUTPATIENT
Start: 2025-08-28

## 2025-08-20 RX ORDER — ALBUTEROL SULFATE 0.83 MG/ML
3 SOLUTION RESPIRATORY (INHALATION) AS NEEDED
OUTPATIENT
Start: 2025-09-25

## 2025-08-20 RX ORDER — DEXAMETHASONE 4 MG/1
12 TABLET ORAL ONCE
OUTPATIENT
Start: 2025-09-25

## 2025-08-20 RX ORDER — ONDANSETRON 8 MG/1
8 TABLET, FILM COATED ORAL ONCE
OUTPATIENT
Start: 2025-09-25

## 2025-08-20 RX ORDER — DEXAMETHASONE 4 MG/1
12 TABLET ORAL ONCE
OUTPATIENT
Start: 2025-10-02

## 2025-08-20 RX ORDER — ONDANSETRON 8 MG/1
8 TABLET, FILM COATED ORAL ONCE
OUTPATIENT
Start: 2025-08-28

## 2025-08-20 RX ORDER — ALBUTEROL SULFATE 0.83 MG/ML
3 SOLUTION RESPIRATORY (INHALATION) AS NEEDED
OUTPATIENT
Start: 2025-09-11

## 2025-08-20 RX ORDER — PROCHLORPERAZINE MALEATE 10 MG
10 TABLET ORAL EVERY 6 HOURS PRN
OUTPATIENT
Start: 2025-10-02

## 2025-08-20 RX ORDER — ALBUTEROL SULFATE 0.83 MG/ML
3 SOLUTION RESPIRATORY (INHALATION) AS NEEDED
OUTPATIENT
Start: 2025-08-28

## 2025-08-20 RX ORDER — FAMOTIDINE 10 MG/ML
20 INJECTION, SOLUTION INTRAVENOUS ONCE AS NEEDED
OUTPATIENT
Start: 2025-10-09

## 2025-08-20 RX ORDER — PROCHLORPERAZINE EDISYLATE 5 MG/ML
10 INJECTION INTRAMUSCULAR; INTRAVENOUS EVERY 6 HOURS PRN
OUTPATIENT
Start: 2025-08-28

## 2025-08-20 RX ORDER — ONDANSETRON 8 MG/1
8 TABLET, FILM COATED ORAL ONCE
OUTPATIENT
Start: 2025-09-11

## 2025-08-20 RX ORDER — PROCHLORPERAZINE EDISYLATE 5 MG/ML
10 INJECTION INTRAMUSCULAR; INTRAVENOUS EVERY 6 HOURS PRN
OUTPATIENT
Start: 2025-10-02

## 2025-08-20 RX ORDER — DEXAMETHASONE 4 MG/1
12 TABLET ORAL ONCE
OUTPATIENT
Start: 2025-08-28

## 2025-08-20 RX ORDER — ONDANSETRON 8 MG/1
8 TABLET, FILM COATED ORAL ONCE
OUTPATIENT
Start: 2025-10-09

## 2025-08-20 RX ORDER — PROCHLORPERAZINE EDISYLATE 5 MG/ML
10 INJECTION INTRAMUSCULAR; INTRAVENOUS EVERY 6 HOURS PRN
OUTPATIENT
Start: 2025-10-09

## 2025-08-20 RX ORDER — PROCHLORPERAZINE MALEATE 10 MG
10 TABLET ORAL EVERY 6 HOURS PRN
OUTPATIENT
Start: 2025-09-11

## 2025-08-20 RX ORDER — DIPHENHYDRAMINE HYDROCHLORIDE 50 MG/ML
50 INJECTION, SOLUTION INTRAMUSCULAR; INTRAVENOUS AS NEEDED
OUTPATIENT
Start: 2025-09-11

## 2025-08-20 RX ORDER — EPINEPHRINE 0.3 MG/.3ML
0.3 INJECTION SUBCUTANEOUS EVERY 5 MIN PRN
OUTPATIENT
Start: 2025-10-02

## 2025-08-20 RX ORDER — ONDANSETRON 8 MG/1
8 TABLET, FILM COATED ORAL ONCE
OUTPATIENT
Start: 2025-09-18

## 2025-08-20 RX ORDER — DIPHENHYDRAMINE HYDROCHLORIDE 50 MG/ML
50 INJECTION, SOLUTION INTRAMUSCULAR; INTRAVENOUS AS NEEDED
OUTPATIENT
Start: 2025-09-25

## 2025-08-20 RX ORDER — EPINEPHRINE 0.3 MG/.3ML
0.3 INJECTION SUBCUTANEOUS EVERY 5 MIN PRN
OUTPATIENT
Start: 2025-10-09

## 2025-08-20 RX ORDER — DIPHENHYDRAMINE HYDROCHLORIDE 50 MG/ML
50 INJECTION, SOLUTION INTRAMUSCULAR; INTRAVENOUS AS NEEDED
OUTPATIENT
Start: 2025-08-28

## 2025-08-20 RX ORDER — PROCHLORPERAZINE EDISYLATE 5 MG/ML
10 INJECTION INTRAMUSCULAR; INTRAVENOUS EVERY 6 HOURS PRN
OUTPATIENT
Start: 2025-09-11

## 2025-08-20 RX ORDER — DEXAMETHASONE 4 MG/1
12 TABLET ORAL ONCE
OUTPATIENT
Start: 2025-09-18

## 2025-08-20 RX ORDER — FAMOTIDINE 10 MG/ML
20 INJECTION, SOLUTION INTRAVENOUS ONCE AS NEEDED
OUTPATIENT
Start: 2025-09-25

## 2025-08-20 RX ORDER — DIPHENHYDRAMINE HYDROCHLORIDE 50 MG/ML
50 INJECTION, SOLUTION INTRAMUSCULAR; INTRAVENOUS AS NEEDED
OUTPATIENT
Start: 2025-10-02

## 2025-08-20 RX ORDER — FAMOTIDINE 10 MG/ML
20 INJECTION, SOLUTION INTRAVENOUS ONCE AS NEEDED
OUTPATIENT
Start: 2025-09-11

## 2025-08-20 RX ORDER — FAMOTIDINE 10 MG/ML
20 INJECTION, SOLUTION INTRAVENOUS ONCE AS NEEDED
OUTPATIENT
Start: 2025-09-18

## 2025-08-20 RX ORDER — EPINEPHRINE 0.3 MG/.3ML
0.3 INJECTION SUBCUTANEOUS EVERY 5 MIN PRN
OUTPATIENT
Start: 2025-09-25

## 2025-08-20 RX ORDER — PROCHLORPERAZINE MALEATE 10 MG
10 TABLET ORAL EVERY 6 HOURS PRN
OUTPATIENT
Start: 2025-10-09

## 2025-08-20 RX ORDER — PROCHLORPERAZINE EDISYLATE 5 MG/ML
10 INJECTION INTRAMUSCULAR; INTRAVENOUS EVERY 6 HOURS PRN
OUTPATIENT
Start: 2025-09-18

## 2025-08-20 RX ORDER — EPINEPHRINE 0.3 MG/.3ML
0.3 INJECTION SUBCUTANEOUS EVERY 5 MIN PRN
OUTPATIENT
Start: 2025-08-28

## 2025-08-20 RX ORDER — PROCHLORPERAZINE MALEATE 10 MG
10 TABLET ORAL EVERY 6 HOURS PRN
OUTPATIENT
Start: 2025-09-25

## 2025-08-20 RX ORDER — ALBUTEROL SULFATE 0.83 MG/ML
3 SOLUTION RESPIRATORY (INHALATION) AS NEEDED
OUTPATIENT
Start: 2025-10-09

## 2025-08-20 RX ORDER — DEXAMETHASONE 4 MG/1
12 TABLET ORAL ONCE
OUTPATIENT
Start: 2025-10-09

## 2025-08-20 RX ORDER — DIPHENHYDRAMINE HYDROCHLORIDE 50 MG/ML
50 INJECTION, SOLUTION INTRAMUSCULAR; INTRAVENOUS AS NEEDED
OUTPATIENT
Start: 2025-09-18

## 2025-08-20 RX ORDER — EPINEPHRINE 0.3 MG/.3ML
0.3 INJECTION SUBCUTANEOUS EVERY 5 MIN PRN
OUTPATIENT
Start: 2025-09-11

## 2025-08-20 RX ORDER — FAMOTIDINE 10 MG/ML
20 INJECTION, SOLUTION INTRAVENOUS ONCE AS NEEDED
OUTPATIENT
Start: 2025-08-28

## 2025-08-20 RX ORDER — DIPHENHYDRAMINE HYDROCHLORIDE 50 MG/ML
50 INJECTION, SOLUTION INTRAMUSCULAR; INTRAVENOUS AS NEEDED
OUTPATIENT
Start: 2025-10-09

## 2025-08-20 RX ORDER — ALBUTEROL SULFATE 0.83 MG/ML
3 SOLUTION RESPIRATORY (INHALATION) AS NEEDED
OUTPATIENT
Start: 2025-10-02

## 2025-08-20 RX ORDER — FAMOTIDINE 10 MG/ML
20 INJECTION, SOLUTION INTRAVENOUS ONCE AS NEEDED
OUTPATIENT
Start: 2025-10-02

## 2025-08-20 ASSESSMENT — ENCOUNTER SYMPTOMS
CARDIOVASCULAR NEGATIVE: 1
BRUISES/BLEEDS EASILY: 1
APPETITE CHANGE: 1
FATIGUE: 0
DIZZINESS: 0
NAUSEA: 0
BACK PAIN: 1
BLOOD IN STOOL: 0
DIARRHEA: 1
VOMITING: 0
ADENOPATHY: 0
HEADACHES: 0
UNEXPECTED WEIGHT CHANGE: 0
DIAPHORESIS: 0
FEVER: 0
NUMBNESS: 0
CHILLS: 0
RESPIRATORY NEGATIVE: 1

## 2025-08-21 ENCOUNTER — INFUSION (OUTPATIENT)
Dept: HEMATOLOGY/ONCOLOGY | Facility: HOSPITAL | Age: 85
End: 2025-08-21
Payer: MEDICARE

## 2025-08-21 ENCOUNTER — LAB (OUTPATIENT)
Dept: LAB | Facility: HOSPITAL | Age: 85
End: 2025-08-21
Payer: MEDICARE

## 2025-08-21 ENCOUNTER — OFFICE VISIT (OUTPATIENT)
Dept: HEMATOLOGY/ONCOLOGY | Facility: HOSPITAL | Age: 85
End: 2025-08-21
Payer: MEDICARE

## 2025-08-21 VITALS
SYSTOLIC BLOOD PRESSURE: 131 MMHG | HEART RATE: 99 BPM | WEIGHT: 155.87 LBS | DIASTOLIC BLOOD PRESSURE: 61 MMHG | OXYGEN SATURATION: 99 % | RESPIRATION RATE: 17 BRPM | TEMPERATURE: 97.2 F | BODY MASS INDEX: 29.45 KG/M2

## 2025-08-21 DIAGNOSIS — D80.1 HYPOGAMMAGLOBULINEMIA (MULTI): ICD-10-CM

## 2025-08-21 DIAGNOSIS — C90.00 MULTIPLE MYELOMA NOT HAVING ACHIEVED REMISSION (MULTI): ICD-10-CM

## 2025-08-21 DIAGNOSIS — C90.00 MULTIPLE MYELOMA NOT HAVING ACHIEVED REMISSION (MULTI): Primary | ICD-10-CM

## 2025-08-21 LAB
ALBUMIN SERPL BCP-MCNC: 4.1 G/DL (ref 3.4–5)
ALP SERPL-CCNC: 79 U/L (ref 33–136)
ALT SERPL W P-5'-P-CCNC: 8 U/L (ref 7–45)
ANION GAP SERPL CALC-SCNC: 11 MMOL/L (ref 10–20)
AST SERPL W P-5'-P-CCNC: 13 U/L (ref 9–39)
BASOPHILS # BLD AUTO: 0.04 X10*3/UL (ref 0–0.1)
BASOPHILS NFR BLD AUTO: 0.8 %
BILIRUB SERPL-MCNC: 0.3 MG/DL (ref 0–1.2)
BUN SERPL-MCNC: 19 MG/DL (ref 6–23)
CALCIUM SERPL-MCNC: 9.7 MG/DL (ref 8.6–10.3)
CHLORIDE SERPL-SCNC: 105 MMOL/L (ref 98–107)
CO2 SERPL-SCNC: 27 MMOL/L (ref 21–32)
CREAT SERPL-MCNC: 1.25 MG/DL (ref 0.5–1.05)
EGFRCR SERPLBLD CKD-EPI 2021: 43 ML/MIN/1.73M*2
EOSINOPHIL # BLD AUTO: 0.03 X10*3/UL (ref 0–0.4)
EOSINOPHIL NFR BLD AUTO: 0.6 %
ERYTHROCYTE [DISTWIDTH] IN BLOOD BY AUTOMATED COUNT: 14.1 % (ref 11.5–14.5)
GLUCOSE SERPL-MCNC: 85 MG/DL (ref 74–99)
HCT VFR BLD AUTO: 32.5 % (ref 36–46)
HGB BLD-MCNC: 10.2 G/DL (ref 12–16)
IGA SERPL-MCNC: <7 MG/DL (ref 70–400)
IGG SERPL-MCNC: 634 MG/DL (ref 700–1600)
IGM SERPL-MCNC: 5 MG/DL (ref 40–230)
IMM GRANULOCYTES # BLD AUTO: 0.03 X10*3/UL (ref 0–0.5)
IMM GRANULOCYTES NFR BLD AUTO: 0.6 % (ref 0–0.9)
LDH SERPL L TO P-CCNC: 116 U/L (ref 84–246)
LYMPHOCYTES # BLD AUTO: 0.33 X10*3/UL (ref 0.8–3)
LYMPHOCYTES NFR BLD AUTO: 6.7 %
MCH RBC QN AUTO: 31.5 PG (ref 26–34)
MCHC RBC AUTO-ENTMCNC: 31.4 G/DL (ref 32–36)
MCV RBC AUTO: 100 FL (ref 80–100)
MONOCYTES # BLD AUTO: 0.48 X10*3/UL (ref 0.05–0.8)
MONOCYTES NFR BLD AUTO: 9.7 %
NEUTROPHILS # BLD AUTO: 4.04 X10*3/UL (ref 1.6–5.5)
NEUTROPHILS NFR BLD AUTO: 81.6 %
NRBC BLD-RTO: 0 /100 WBCS (ref 0–0)
PLATELET # BLD AUTO: 288 X10*3/UL (ref 150–450)
POTASSIUM SERPL-SCNC: 4.3 MMOL/L (ref 3.5–5.3)
PROT SERPL-MCNC: 6.2 G/DL (ref 6.4–8.2)
PROT SERPL-MCNC: 6.9 G/DL (ref 6.4–8.2)
RBC # BLD AUTO: 3.24 X10*6/UL (ref 4–5.2)
SODIUM SERPL-SCNC: 139 MMOL/L (ref 136–145)
WBC # BLD AUTO: 5 X10*3/UL (ref 4.4–11.3)

## 2025-08-21 PROCEDURE — 83521 IG LIGHT CHAINS FREE EACH: CPT

## 2025-08-21 PROCEDURE — 82784 ASSAY IGA/IGD/IGG/IGM EACH: CPT

## 2025-08-21 PROCEDURE — 1126F AMNT PAIN NOTED NONE PRSNT: CPT | Performed by: INTERNAL MEDICINE

## 2025-08-21 PROCEDURE — 84155 ASSAY OF PROTEIN SERUM: CPT

## 2025-08-21 PROCEDURE — 84075 ASSAY ALKALINE PHOSPHATASE: CPT

## 2025-08-21 PROCEDURE — 85025 COMPLETE CBC W/AUTO DIFF WBC: CPT

## 2025-08-21 PROCEDURE — 36415 COLL VENOUS BLD VENIPUNCTURE: CPT

## 2025-08-21 PROCEDURE — 99215 OFFICE O/P EST HI 40 MIN: CPT | Performed by: INTERNAL MEDICINE

## 2025-08-21 PROCEDURE — 86334 IMMUNOFIX E-PHORESIS SERUM: CPT

## 2025-08-21 PROCEDURE — 96401 CHEMO ANTI-NEOPL SQ/IM: CPT

## 2025-08-21 PROCEDURE — 1159F MED LIST DOCD IN RCRD: CPT | Performed by: INTERNAL MEDICINE

## 2025-08-21 PROCEDURE — 2500000004 HC RX 250 GENERAL PHARMACY W/ HCPCS (ALT 636 FOR OP/ED): Performed by: INTERNAL MEDICINE

## 2025-08-21 PROCEDURE — 2500000004 HC RX 250 GENERAL PHARMACY W/ HCPCS (ALT 636 FOR OP/ED): Mod: TB | Performed by: INTERNAL MEDICINE

## 2025-08-21 PROCEDURE — 83615 LACTATE (LD) (LDH) ENZYME: CPT

## 2025-08-21 RX ORDER — PROCHLORPERAZINE MALEATE 10 MG
10 TABLET ORAL EVERY 6 HOURS PRN
Status: DISCONTINUED | OUTPATIENT
Start: 2025-08-21 | End: 2025-08-21 | Stop reason: HOSPADM

## 2025-08-21 RX ORDER — DIPHENHYDRAMINE HCL 25 MG
25 CAPSULE ORAL ONCE
OUTPATIENT
Start: 2025-08-21

## 2025-08-21 RX ORDER — EPINEPHRINE 0.3 MG/.3ML
0.3 INJECTION SUBCUTANEOUS EVERY 5 MIN PRN
OUTPATIENT
Start: 2025-08-21

## 2025-08-21 RX ORDER — PROCHLORPERAZINE EDISYLATE 5 MG/ML
10 INJECTION INTRAMUSCULAR; INTRAVENOUS EVERY 6 HOURS PRN
Status: DISCONTINUED | OUTPATIENT
Start: 2025-08-21 | End: 2025-08-21 | Stop reason: HOSPADM

## 2025-08-21 RX ORDER — DIPHENHYDRAMINE HYDROCHLORIDE 50 MG/ML
50 INJECTION, SOLUTION INTRAMUSCULAR; INTRAVENOUS AS NEEDED
Status: DISCONTINUED | OUTPATIENT
Start: 2025-08-21 | End: 2025-08-21 | Stop reason: HOSPADM

## 2025-08-21 RX ORDER — DIPHENHYDRAMINE HYDROCHLORIDE 50 MG/ML
50 INJECTION, SOLUTION INTRAMUSCULAR; INTRAVENOUS AS NEEDED
OUTPATIENT
Start: 2025-08-21

## 2025-08-21 RX ORDER — ACETAMINOPHEN 325 MG/1
650 TABLET ORAL ONCE
OUTPATIENT
Start: 2025-08-21

## 2025-08-21 RX ORDER — EPINEPHRINE 0.3 MG/.3ML
0.3 INJECTION SUBCUTANEOUS EVERY 5 MIN PRN
Status: DISCONTINUED | OUTPATIENT
Start: 2025-08-21 | End: 2025-08-21 | Stop reason: HOSPADM

## 2025-08-21 RX ORDER — ONDANSETRON 8 MG/1
8 TABLET, FILM COATED ORAL ONCE
Status: COMPLETED | OUTPATIENT
Start: 2025-08-21 | End: 2025-08-21

## 2025-08-21 RX ORDER — FAMOTIDINE 10 MG/ML
20 INJECTION, SOLUTION INTRAVENOUS ONCE AS NEEDED
Status: DISCONTINUED | OUTPATIENT
Start: 2025-08-21 | End: 2025-08-21 | Stop reason: HOSPADM

## 2025-08-21 RX ORDER — ALBUTEROL SULFATE 0.83 MG/ML
3 SOLUTION RESPIRATORY (INHALATION) AS NEEDED
OUTPATIENT
Start: 2025-08-21

## 2025-08-21 RX ORDER — FAMOTIDINE 10 MG/ML
20 INJECTION, SOLUTION INTRAVENOUS ONCE AS NEEDED
OUTPATIENT
Start: 2025-08-21

## 2025-08-21 RX ORDER — ALBUTEROL SULFATE 0.83 MG/ML
3 SOLUTION RESPIRATORY (INHALATION) AS NEEDED
Status: DISCONTINUED | OUTPATIENT
Start: 2025-08-21 | End: 2025-08-21 | Stop reason: HOSPADM

## 2025-08-21 RX ORDER — DEXAMETHASONE 6 MG/1
12 TABLET ORAL ONCE
Status: COMPLETED | OUTPATIENT
Start: 2025-08-21 | End: 2025-08-21

## 2025-08-21 RX ADMIN — DEXAMETHASONE 12 MG: 6 TABLET ORAL at 16:22

## 2025-08-21 RX ADMIN — ONDANSETRON HYDROCHLORIDE 8 MG: 8 TABLET, FILM COATED ORAL at 16:22

## 2025-08-21 RX ADMIN — TALQUETAMAB 29.6 MG: 40 INJECTION SUBCUTANEOUS at 16:32

## 2025-08-21 ASSESSMENT — PAIN SCALES - GENERAL: PAINLEVEL_OUTOF10: 0-NO PAIN

## 2025-08-22 LAB
KAPPA LC SERPL-MCNC: 0.45 MG/DL (ref 0.33–1.94)
KAPPA LC/LAMBDA SER: 0 {RATIO} (ref 0.26–1.65)
LAMBDA LC SERPL-MCNC: 193.28 MG/DL (ref 0.57–2.63)

## 2025-08-26 LAB
ALBUMIN: 3.4 G/DL (ref 3.4–5)
ALPHA 1 GLOBULIN: 0.5 G/DL (ref 0.2–0.6)
ALPHA 2 GLOBULIN: 1.1 G/DL (ref 0.4–1.1)
BETA GLOBULIN: 0.7 G/DL (ref 0.5–1.2)
GAMMA GLOBULIN: 0.6 G/DL (ref 0.5–1.4)
IMMUNOFIXATION COMMENT: ABNORMAL
M-PROTEIN 1: 0.1 G/DL (ref ?–0)
PATH REVIEW - SERUM IMMUNOFIXATION: ABNORMAL
PATH REVIEW-SERUM PROTEIN ELECTROPHORESIS: ABNORMAL
PROTEIN ELECTROPHORESIS COMMENT: ABNORMAL

## 2025-08-28 ENCOUNTER — LAB (OUTPATIENT)
Dept: LAB | Facility: HOSPITAL | Age: 85
End: 2025-08-28
Payer: MEDICARE

## 2025-08-28 ENCOUNTER — INFUSION (OUTPATIENT)
Dept: HEMATOLOGY/ONCOLOGY | Facility: HOSPITAL | Age: 85
End: 2025-08-28
Payer: MEDICARE

## 2025-08-28 ENCOUNTER — TELEPHONE (OUTPATIENT)
Dept: HEMATOLOGY/ONCOLOGY | Facility: HOSPITAL | Age: 85
End: 2025-08-28

## 2025-08-28 VITALS
HEART RATE: 85 BPM | WEIGHT: 157.2 LBS | BODY MASS INDEX: 29.7 KG/M2 | TEMPERATURE: 98.1 F | OXYGEN SATURATION: 97 % | SYSTOLIC BLOOD PRESSURE: 130 MMHG | RESPIRATION RATE: 18 BRPM | DIASTOLIC BLOOD PRESSURE: 86 MMHG

## 2025-08-28 DIAGNOSIS — D80.1 HYPOGAMMAGLOBULINEMIA (MULTI): ICD-10-CM

## 2025-08-28 DIAGNOSIS — C90.00 MULTIPLE MYELOMA NOT HAVING ACHIEVED REMISSION (MULTI): ICD-10-CM

## 2025-08-28 PROCEDURE — 2500000004 HC RX 250 GENERAL PHARMACY W/ HCPCS (ALT 636 FOR OP/ED): Mod: JZ,TB | Performed by: INTERNAL MEDICINE

## 2025-08-28 PROCEDURE — 96401 CHEMO ANTI-NEOPL SQ/IM: CPT

## 2025-08-28 PROCEDURE — 2500000004 HC RX 250 GENERAL PHARMACY W/ HCPCS (ALT 636 FOR OP/ED): Performed by: INTERNAL MEDICINE

## 2025-08-28 PROCEDURE — 2500000001 HC RX 250 WO HCPCS SELF ADMINISTERED DRUGS (ALT 637 FOR MEDICARE OP): Performed by: INTERNAL MEDICINE

## 2025-08-28 PROCEDURE — 96365 THER/PROPH/DIAG IV INF INIT: CPT | Mod: INF

## 2025-08-28 PROCEDURE — 96366 THER/PROPH/DIAG IV INF ADDON: CPT | Mod: INF

## 2025-08-28 RX ORDER — ACETAMINOPHEN 325 MG/1
650 TABLET ORAL ONCE
OUTPATIENT
Start: 2025-09-18

## 2025-08-28 RX ORDER — ONDANSETRON 8 MG/1
8 TABLET, FILM COATED ORAL ONCE
Status: COMPLETED | OUTPATIENT
Start: 2025-08-28 | End: 2025-08-28

## 2025-08-28 RX ORDER — EPINEPHRINE 0.3 MG/.3ML
0.3 INJECTION SUBCUTANEOUS EVERY 5 MIN PRN
Status: DISCONTINUED | OUTPATIENT
Start: 2025-08-28 | End: 2025-08-28 | Stop reason: HOSPADM

## 2025-08-28 RX ORDER — DIPHENHYDRAMINE HYDROCHLORIDE 50 MG/ML
50 INJECTION, SOLUTION INTRAMUSCULAR; INTRAVENOUS AS NEEDED
Status: DISCONTINUED | OUTPATIENT
Start: 2025-08-28 | End: 2025-08-28 | Stop reason: HOSPADM

## 2025-08-28 RX ORDER — PROCHLORPERAZINE EDISYLATE 5 MG/ML
10 INJECTION INTRAMUSCULAR; INTRAVENOUS EVERY 6 HOURS PRN
Status: DISCONTINUED | OUTPATIENT
Start: 2025-08-28 | End: 2025-08-28 | Stop reason: HOSPADM

## 2025-08-28 RX ORDER — FAMOTIDINE 10 MG/ML
20 INJECTION, SOLUTION INTRAVENOUS ONCE AS NEEDED
Status: DISCONTINUED | OUTPATIENT
Start: 2025-08-28 | End: 2025-08-28 | Stop reason: HOSPADM

## 2025-08-28 RX ORDER — DIPHENHYDRAMINE HCL 25 MG
25 CAPSULE ORAL ONCE
OUTPATIENT
Start: 2025-09-18

## 2025-08-28 RX ORDER — ALBUTEROL SULFATE 0.83 MG/ML
3 SOLUTION RESPIRATORY (INHALATION) AS NEEDED
Status: DISCONTINUED | OUTPATIENT
Start: 2025-08-28 | End: 2025-08-28 | Stop reason: HOSPADM

## 2025-08-28 RX ORDER — EPINEPHRINE 0.3 MG/.3ML
0.3 INJECTION SUBCUTANEOUS EVERY 5 MIN PRN
OUTPATIENT
Start: 2025-09-18

## 2025-08-28 RX ORDER — ALBUTEROL SULFATE 0.83 MG/ML
3 SOLUTION RESPIRATORY (INHALATION) AS NEEDED
OUTPATIENT
Start: 2025-09-18

## 2025-08-28 RX ORDER — DEXAMETHASONE 6 MG/1
12 TABLET ORAL ONCE
Status: COMPLETED | OUTPATIENT
Start: 2025-08-28 | End: 2025-08-28

## 2025-08-28 RX ORDER — PROCHLORPERAZINE MALEATE 10 MG
10 TABLET ORAL EVERY 6 HOURS PRN
Status: DISCONTINUED | OUTPATIENT
Start: 2025-08-28 | End: 2025-08-28 | Stop reason: HOSPADM

## 2025-08-28 RX ORDER — DIPHENHYDRAMINE HYDROCHLORIDE 50 MG/ML
50 INJECTION, SOLUTION INTRAMUSCULAR; INTRAVENOUS AS NEEDED
OUTPATIENT
Start: 2025-09-18

## 2025-08-28 RX ORDER — FAMOTIDINE 10 MG/ML
20 INJECTION, SOLUTION INTRAVENOUS ONCE AS NEEDED
OUTPATIENT
Start: 2025-09-18

## 2025-08-28 RX ORDER — DIPHENHYDRAMINE HCL 25 MG
25 CAPSULE ORAL ONCE
Status: COMPLETED | OUTPATIENT
Start: 2025-08-28 | End: 2025-08-28

## 2025-08-28 RX ORDER — ACETAMINOPHEN 325 MG/1
650 TABLET ORAL ONCE
Status: COMPLETED | OUTPATIENT
Start: 2025-08-28 | End: 2025-08-28

## 2025-08-28 RX ADMIN — ACETAMINOPHEN 650 MG: 325 TABLET ORAL at 09:51

## 2025-08-28 RX ADMIN — DEXAMETHASONE 12 MG: 6 TABLET ORAL at 09:51

## 2025-08-28 RX ADMIN — TALQUETAMAB 29.6 MG: 40 INJECTION SUBCUTANEOUS at 11:21

## 2025-08-28 RX ADMIN — DIPHENHYDRAMINE HYDROCHLORIDE 25 MG: 25 CAPSULE ORAL at 09:51

## 2025-08-28 RX ADMIN — IMMUNE GLOBULIN (HUMAN) 25 G: 10 INJECTION INTRAVENOUS; SUBCUTANEOUS at 10:15

## 2025-08-28 RX ADMIN — ONDANSETRON HYDROCHLORIDE 8 MG: 8 TABLET, FILM COATED ORAL at 09:51

## 2025-09-02 ENCOUNTER — APPOINTMENT (OUTPATIENT)
Dept: HEMATOLOGY/ONCOLOGY | Facility: HOSPITAL | Age: 85
End: 2025-09-02
Payer: MEDICARE

## 2025-09-02 ENCOUNTER — APPOINTMENT (OUTPATIENT)
Dept: HEMATOLOGY/ONCOLOGY | Facility: CLINIC | Age: 85
End: 2025-09-02
Payer: MEDICARE

## 2025-09-03 ENCOUNTER — APPOINTMENT (OUTPATIENT)
Dept: HEMATOLOGY/ONCOLOGY | Facility: HOSPITAL | Age: 85
End: 2025-09-03
Payer: MEDICARE

## 2025-09-04 ENCOUNTER — LAB (OUTPATIENT)
Dept: LAB | Facility: HOSPITAL | Age: 85
End: 2025-09-04
Payer: MEDICARE

## 2025-09-04 ENCOUNTER — INFUSION (OUTPATIENT)
Dept: HEMATOLOGY/ONCOLOGY | Facility: HOSPITAL | Age: 85
End: 2025-09-04
Payer: MEDICARE

## 2025-09-04 VITALS
BODY MASS INDEX: 29.34 KG/M2 | DIASTOLIC BLOOD PRESSURE: 63 MMHG | TEMPERATURE: 98.1 F | HEART RATE: 101 BPM | OXYGEN SATURATION: 98 % | WEIGHT: 155.42 LBS | HEIGHT: 61 IN | RESPIRATION RATE: 18 BRPM | SYSTOLIC BLOOD PRESSURE: 120 MMHG

## 2025-09-04 DIAGNOSIS — C90.00 MULTIPLE MYELOMA NOT HAVING ACHIEVED REMISSION (MULTI): ICD-10-CM

## 2025-09-04 LAB
ALBUMIN SERPL BCP-MCNC: 3.8 G/DL (ref 3.4–5)
ALP SERPL-CCNC: 67 U/L (ref 33–136)
ALT SERPL W P-5'-P-CCNC: 7 U/L (ref 7–45)
ANION GAP SERPL CALC-SCNC: 13 MMOL/L (ref 10–20)
AST SERPL W P-5'-P-CCNC: 12 U/L (ref 9–39)
BASOPHILS # BLD AUTO: 0.03 X10*3/UL (ref 0–0.1)
BASOPHILS NFR BLD AUTO: 0.6 %
BILIRUB SERPL-MCNC: 0.3 MG/DL (ref 0–1.2)
BUN SERPL-MCNC: 21 MG/DL (ref 6–23)
CALCIUM SERPL-MCNC: 9.6 MG/DL (ref 8.6–10.3)
CHLORIDE SERPL-SCNC: 105 MMOL/L (ref 98–107)
CO2 SERPL-SCNC: 27 MMOL/L (ref 21–32)
CREAT SERPL-MCNC: 1.2 MG/DL (ref 0.5–1.05)
EGFRCR SERPLBLD CKD-EPI 2021: 45 ML/MIN/1.73M*2
EOSINOPHIL # BLD AUTO: 0.1 X10*3/UL (ref 0–0.4)
EOSINOPHIL NFR BLD AUTO: 2 %
ERYTHROCYTE [DISTWIDTH] IN BLOOD BY AUTOMATED COUNT: 13.9 % (ref 11.5–14.5)
GLUCOSE SERPL-MCNC: 121 MG/DL (ref 74–99)
HCT VFR BLD AUTO: 33.3 % (ref 36–46)
HGB BLD-MCNC: 10.4 G/DL (ref 12–16)
IMM GRANULOCYTES # BLD AUTO: 0.02 X10*3/UL (ref 0–0.5)
IMM GRANULOCYTES NFR BLD AUTO: 0.4 % (ref 0–0.9)
LYMPHOCYTES # BLD AUTO: 0.28 X10*3/UL (ref 0.8–3)
LYMPHOCYTES NFR BLD AUTO: 5.7 %
MAGNESIUM SERPL-MCNC: 1.97 MG/DL (ref 1.6–2.4)
MCH RBC QN AUTO: 30.9 PG (ref 26–34)
MCHC RBC AUTO-ENTMCNC: 31.2 G/DL (ref 32–36)
MCV RBC AUTO: 99 FL (ref 80–100)
MONOCYTES # BLD AUTO: 0.39 X10*3/UL (ref 0.05–0.8)
MONOCYTES NFR BLD AUTO: 8 %
NEUTROPHILS # BLD AUTO: 4.07 X10*3/UL (ref 1.6–5.5)
NEUTROPHILS NFR BLD AUTO: 83.3 %
NRBC BLD-RTO: 0 /100 WBCS (ref 0–0)
PHOSPHATE SERPL-MCNC: 3 MG/DL (ref 2.5–4.9)
PLATELET # BLD AUTO: 278 X10*3/UL (ref 150–450)
POTASSIUM SERPL-SCNC: 4.3 MMOL/L (ref 3.5–5.3)
PROT SERPL-MCNC: 6.6 G/DL (ref 6.4–8.2)
RBC # BLD AUTO: 3.37 X10*6/UL (ref 4–5.2)
SODIUM SERPL-SCNC: 141 MMOL/L (ref 136–145)
WBC # BLD AUTO: 4.9 X10*3/UL (ref 4.4–11.3)

## 2025-09-04 PROCEDURE — 96365 THER/PROPH/DIAG IV INF INIT: CPT | Mod: INF

## 2025-09-04 PROCEDURE — 83735 ASSAY OF MAGNESIUM: CPT

## 2025-09-04 PROCEDURE — 96401 CHEMO ANTI-NEOPL SQ/IM: CPT

## 2025-09-04 PROCEDURE — 80053 COMPREHEN METABOLIC PANEL: CPT

## 2025-09-04 PROCEDURE — 36415 COLL VENOUS BLD VENIPUNCTURE: CPT

## 2025-09-04 PROCEDURE — 84100 ASSAY OF PHOSPHORUS: CPT

## 2025-09-04 PROCEDURE — 85025 COMPLETE CBC W/AUTO DIFF WBC: CPT

## 2025-09-04 PROCEDURE — 2500000004 HC RX 250 GENERAL PHARMACY W/ HCPCS (ALT 636 FOR OP/ED): Mod: TB | Performed by: INTERNAL MEDICINE

## 2025-09-04 PROCEDURE — 2500000004 HC RX 250 GENERAL PHARMACY W/ HCPCS (ALT 636 FOR OP/ED): Performed by: INTERNAL MEDICINE

## 2025-09-04 RX ORDER — DIPHENHYDRAMINE HYDROCHLORIDE 50 MG/ML
50 INJECTION, SOLUTION INTRAMUSCULAR; INTRAVENOUS AS NEEDED
OUTPATIENT
Start: 2025-11-24

## 2025-09-04 RX ORDER — FAMOTIDINE 10 MG/ML
20 INJECTION, SOLUTION INTRAVENOUS ONCE AS NEEDED
Status: DISCONTINUED | OUTPATIENT
Start: 2025-09-04 | End: 2025-09-04 | Stop reason: HOSPADM

## 2025-09-04 RX ORDER — EPINEPHRINE 0.3 MG/.3ML
0.3 INJECTION SUBCUTANEOUS EVERY 5 MIN PRN
OUTPATIENT
Start: 2025-11-24

## 2025-09-04 RX ORDER — ONDANSETRON 8 MG/1
8 TABLET, FILM COATED ORAL ONCE
Status: COMPLETED | OUTPATIENT
Start: 2025-09-04 | End: 2025-09-04

## 2025-09-04 RX ORDER — DEXAMETHASONE 6 MG/1
12 TABLET ORAL ONCE
Status: COMPLETED | OUTPATIENT
Start: 2025-09-04 | End: 2025-09-04

## 2025-09-04 RX ORDER — HEPARIN SODIUM,PORCINE/PF 10 UNIT/ML
50 SYRINGE (ML) INTRAVENOUS AS NEEDED
OUTPATIENT
Start: 2025-09-04

## 2025-09-04 RX ORDER — ALBUTEROL SULFATE 0.83 MG/ML
3 SOLUTION RESPIRATORY (INHALATION) AS NEEDED
OUTPATIENT
Start: 2025-11-24

## 2025-09-04 RX ORDER — ALBUTEROL SULFATE 0.83 MG/ML
3 SOLUTION RESPIRATORY (INHALATION) AS NEEDED
Status: DISCONTINUED | OUTPATIENT
Start: 2025-09-04 | End: 2025-09-04 | Stop reason: HOSPADM

## 2025-09-04 RX ORDER — PROCHLORPERAZINE EDISYLATE 5 MG/ML
10 INJECTION INTRAMUSCULAR; INTRAVENOUS EVERY 6 HOURS PRN
Status: DISCONTINUED | OUTPATIENT
Start: 2025-09-04 | End: 2025-09-04 | Stop reason: HOSPADM

## 2025-09-04 RX ORDER — EPINEPHRINE 0.3 MG/.3ML
0.3 INJECTION SUBCUTANEOUS EVERY 5 MIN PRN
Status: DISCONTINUED | OUTPATIENT
Start: 2025-09-04 | End: 2025-09-04 | Stop reason: HOSPADM

## 2025-09-04 RX ORDER — HEPARIN 100 UNIT/ML
500 SYRINGE INTRAVENOUS AS NEEDED
OUTPATIENT
Start: 2025-09-04

## 2025-09-04 RX ORDER — DIPHENHYDRAMINE HYDROCHLORIDE 50 MG/ML
50 INJECTION, SOLUTION INTRAMUSCULAR; INTRAVENOUS AS NEEDED
Status: DISCONTINUED | OUTPATIENT
Start: 2025-09-04 | End: 2025-09-04 | Stop reason: HOSPADM

## 2025-09-04 RX ORDER — PROCHLORPERAZINE MALEATE 10 MG
10 TABLET ORAL EVERY 6 HOURS PRN
Status: DISCONTINUED | OUTPATIENT
Start: 2025-09-04 | End: 2025-09-04 | Stop reason: HOSPADM

## 2025-09-04 RX ORDER — FAMOTIDINE 10 MG/ML
20 INJECTION, SOLUTION INTRAVENOUS ONCE AS NEEDED
OUTPATIENT
Start: 2025-11-24

## 2025-09-04 RX ADMIN — ONDANSETRON HYDROCHLORIDE 8 MG: 8 TABLET, FILM COATED ORAL at 09:20

## 2025-09-04 RX ADMIN — DEXAMETHASONE 12 MG: 6 TABLET ORAL at 09:20

## 2025-09-04 RX ADMIN — TALQUETAMAB 29.6 MG: 40 INJECTION SUBCUTANEOUS at 09:46

## 2025-09-04 RX ADMIN — SODIUM CHLORIDE 500 ML: 0.9 INJECTION, SOLUTION INTRAVENOUS at 09:42

## 2025-09-04 RX ADMIN — ZOLEDRONIC ACID 3.3 MG: 4 INJECTION, SOLUTION, CONCENTRATE INTRAVENOUS at 09:42

## 2026-01-06 ENCOUNTER — APPOINTMENT (OUTPATIENT)
Dept: DERMATOLOGY | Facility: CLINIC | Age: 86
End: 2026-01-06
Payer: MEDICARE